# Patient Record
Sex: MALE | Race: WHITE | NOT HISPANIC OR LATINO | Employment: OTHER | URBAN - METROPOLITAN AREA
[De-identification: names, ages, dates, MRNs, and addresses within clinical notes are randomized per-mention and may not be internally consistent; named-entity substitution may affect disease eponyms.]

---

## 2017-01-12 ENCOUNTER — OFFICE VISIT (OUTPATIENT)
Dept: NEUROLOGY | Facility: CLINIC | Age: 77
End: 2017-01-12
Payer: MEDICARE

## 2017-01-12 VITALS
WEIGHT: 172.38 LBS | DIASTOLIC BLOOD PRESSURE: 67 MMHG | HEIGHT: 66 IN | BODY MASS INDEX: 27.7 KG/M2 | SYSTOLIC BLOOD PRESSURE: 133 MMHG | HEART RATE: 60 BPM

## 2017-01-12 DIAGNOSIS — G20.C PARKINSONISM, UNSPECIFIED PARKINSONISM TYPE: Primary | ICD-10-CM

## 2017-01-12 DIAGNOSIS — G31.84 MCI (MILD COGNITIVE IMPAIRMENT): ICD-10-CM

## 2017-01-12 PROCEDURE — 99215 OFFICE O/P EST HI 40 MIN: CPT | Mod: S$GLB,,,

## 2017-01-12 PROCEDURE — 1160F RVW MEDS BY RX/DR IN RCRD: CPT | Mod: S$GLB,,,

## 2017-01-12 PROCEDURE — 3075F SYST BP GE 130 - 139MM HG: CPT | Mod: S$GLB,,,

## 2017-01-12 PROCEDURE — 1157F ADVNC CARE PLAN IN RCRD: CPT | Mod: S$GLB,,,

## 2017-01-12 PROCEDURE — 1159F MED LIST DOCD IN RCRD: CPT | Mod: S$GLB,,,

## 2017-01-12 PROCEDURE — 99999 PR PBB SHADOW E&M-EST. PATIENT-LVL III: CPT | Mod: PBBFAC,,,

## 2017-01-12 PROCEDURE — 1126F AMNT PAIN NOTED NONE PRSNT: CPT | Mod: S$GLB,,,

## 2017-01-12 PROCEDURE — 3078F DIAST BP <80 MM HG: CPT | Mod: S$GLB,,,

## 2017-01-12 NOTE — MR AVS SNAPSHOT
[facility] Neurology  [address] [address]  Phone: [phone]  Fax: [phone]                  Mark Anthony Velarde Jr.   2017 3:45 PM   Office Visit    Description:  Male : 1940   Provider:  Yazmin Israel NP   Department:  [facility] Neurology                To Do List           Future Appointments        Provider Department Dept Phone    2017 11:30 AM Andrew Jane MD [facility] - Cardiology [phone]    2017 12:20 PM LAB, APPOINTMENT [place] [facility] [phone]    2017 12:25 PM SPECIMEN, [facility] [phone]    2017 1:15 PM EKG, APPT [facility] - [phone]    2017 1:40 PM COORDINATED DEVICE CHECK [facility] Arrhythmia [phone]      Goals (5 Years of Data)     None      [facility] On Call     [facility] On [facility] Nurse Care Line -  Assistance  Registered nurses in the [facility] Center provide clinical advisement, health education, appointment booking, and other advisory services.  Call for this free service at [phone].             Medications           Message regarding Medications     Verify the changes and/or additions to your medication regime listed below are the same as discussed with your clinician today.  If any of these changes or additions are incorrect, please notify your healthcare provider.             Verify that the below list of medications is an accurate representation of the medications you are currently taking.  If none reported, the list may be blank. If incorrect, please contact your healthcare provider. Carry this list with you in case of emergency.           Current Medications     amlodipine (NORVASC) 5 MG tablet Take 1 tablet (5 mg total) by mouth once daily.    aspirin (ECOTRIN) 81 MG EC tablet Take 81 mg by mouth once daily.    atorvastatin (LIPITOR) 40 MG tablet Take 1 tablet (40 mg total) by mouth once daily.    dabigatran etexilate  "(PRADAXA) 150 mg Cap Take 1 capsule (150 mg total) by mouth 2 (two) times daily. "Do NOT break, chew, or open capsules."    donepezil (ARICEPT) 10 MG tablet Take 1 tablet (10 mg total) by mouth once daily.    metoprolol tartrate (LOPRESSOR) 25 MG tablet Take 0.5 tablets (12.5 mg total) by mouth 2 (two) times daily.    multivitamin (MULTIVITAMIN) per tablet Take 1 tablet by mouth once daily.           Clinical Reference Information           Vital Signs - Last Recorded  Most recent update: 1/12/2017  3:32 PM by Marvin Sanchez MA    BP Pulse Ht Wt BMI    133/67 60 5' 6" (1.676 m) 78.2 kg (172 lb 6.4 oz) 27.83 kg/m2      Blood Pressure          Most Recent Value    BP  133/67      Allergies as of 1/12/2017     Arb-angiotensin Receptor Antagonist    Lisinopril      Immunizations Administered on Date of Encounter - 1/12/2017     None      "

## 2017-01-12 NOTE — PROGRESS NOTES
"Name: Mark Anthony Velarde Jr.  MRN: [id number]   CSN: [id number]      Date: 01/12/2017    Referring physician:  Self Referral  No address on file    Chief Complaint / Interval History: tremor    History of Present Illness (HPI):    75 yo RH male with MCI, prev followed by Deloris Bender NP, last seen in office in June. Does have tremor in RH, probably the past 12-18 months. He does not feel it is getting worse. Wife and son think it may be more frequent. Notes at rest and holding steering wheel. This does not bother him. Wife says he does not often even know it is occurring. They area not aware of tremor anywhere else.   Denies stiffness or slowness. Son and wife feel he is a bit slower a year ago.   Balance is pretty good. No assistive devices.   No recent falls. Does shuffle per wife and son- past 2-3 years intermittently. Wife states she will sometimes ask him if his shoes are too big.   Denies dysphagia but they disagree. This has been going on x 10 years- will excuse self from table- vomit and resume eating. He prev saw ENT and told to chew food better. Patient says he has always had to eat fast- is now eating slower. Still not clear if he is actually choking.   Denies sialorrhea.  He has had memory issues about 2-3 years.     Father had tremor. No FMH of PD.       From Deloris Bender's NP note in June 2016...  Date: 06/21/2016        Chief Complaint:  MCI     History of Present Illness (HPI):Pt reports to clinic at recommendation of his family because of concern for the possibility of his developing dementia. 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 Most concerning is that his sense of direction has worsened and he has trouble remembering how to get to places that he has frequented. No problems with ADL's. Still managing finances as well.     05/12/14 Interval History: Patient is presently " on Aricept 10 mg daily with no adverse side effects. Wife feels his memory function has improved with the medication. She reported one incident 2 weeks ago, patient became confused with directions while driving to a restaurant they visit frequently. Had difficulty remembering they had a gift care for payment of the meal. Did have 2 mixed drinks during meal. No other incidents. More active, doing more gardening and yard work. Both are pleased with effects of the medication.     Interval History: Last seen in April 2015, Patient continues to do well on Aricept 10 mg with no adverse side effects. Continues to garden and read. Wife feels he is doing well. Patient does not notice any decline in cognitive function since last visit. Continues to go to [employer] several times a week. Feels he helps to remain focused and improves his mood and sleep. Last MOCA 21/30- April 2015 06/21/16 Interval History- Here for follow up. Presently on Aricept with no adverse side effects. Comes to appt with his wife Has had family issues involving his son which has disrupted household. Also had infection in left leg and fracture of toe with a fair amount of pain. Was confused on Father's day with so many people around him.    02/17/14 CT Head- Age-appropriate generalized cerebral volume loss otherwise unremarkable noncontrast CT head without evidence for acute intracranial hemorrhage or definite new abnormal parenchymal attenuation. Clinical correlation and further evaluation as warranted.    Diagnoses:          Mild Cognitive Impairment vs. Dementia     Plan:  -Reviewed results of cognitive screen. Last MOCA 21/30  -continue Aricept 10 mg daily  -Discussed Namenda -still does not want to start Namenda .  -Discussed safety issues related to memory loss  -Caregiver burden and stress- Recommended Caregiver Support group  -Encouraged regular cognitive and physical exercise. Active with gardening. No cognitive exercise.  -Follow up in  "4 months with Dr Israel for evaluation of tremor of the right hand      Nonmotor/Premotor ROS:  Hyposmia (HENT)?No  RBD/sleep issues (Constitutional)?Yes- on rare occasion will talk in sleep- trouble falling asleep at times  Depression/anxiety (Psychiatric)?No  Fatigue (Constitutional)?Yes  Constipation (GI)?No  Urinary issues ()?No  Orthostasis (Cardiovascular)?"on the odd time"  Falls (Musculoskeletal)?No  Cognitive impairment (Neurologic)?"not bad"  Psychoses (Psychiatric)?No  Pain/Paresthesia (Neurologic)?No  Visual changes (Eyes)?"normal aging"  Moles / skin changes (Skin)?Yes-psoriasis  Stridor / SOB (Pulm)?No  Bruising (Heme)?Yes    Past Medical History: The patient  has a past medical history of *Atrial fibrillation; Anticoagulant long-term use; Atrial fibrillation - asymptomatic but noted on ICD interrogation ([id number]) - 16 h 40 minutes of afib (2/13/2013); Atrial flutter with controlled response - seen on ICD interrogation - asymptomatic (2/13/2013); Automatic implantable cardioverter-defibrillator in situ (2/13/2013); Basal cell carcinoma; CAD (coronary artery disease) (2/13/2013); Cardiomyopathy; Cataract; H/O syncope -  (5/27/2013); History of PTCA - LAD, LCX and PDA PCI in 2006 (2/13/2013); HTN (hypertension) (2/13/2013); Hyperlipidemia LDL goal < 70 (2/13/2013); Hypertension; ICD (implantable cardiac defibrillator) in place (2/13/2013); Ischemic cardiomyopathy LVEF ~45% (2/13/2013); LBBB (left bundle branch block) (2/13/2013); Memory loss; Psoriasis vulgaris; and Syncope and collapse.    Social History: The patient  reports that he quit smoking about 53 years ago. He does not have any smokeless tobacco history on file. He reports that he does not drink alcohol or use illicit drugs.    Family History: Their family history includes Cancer in his mother; Cataracts in his mother; Dementia in his father; Diabetes in his maternal grandmother; Kidney disease in his sister; No Known Problems in his " "brother, maternal aunt, maternal grandfather, maternal uncle, paternal aunt, paternal grandfather, paternal grandmother, and paternal uncle; Psoriasis in his father, son, and son. There is no history of Amblyopia, Blindness, Glaucoma, Hypertension, Macular degeneration, Retinal detachment, Strabismus, Stroke, or Thyroid disease.    Allergies: Arb-angiotensin receptor antagonist and Lisinopril     Meds:   Current Outpatient Prescriptions on File Prior to Visit   Medication Sig Dispense Refill    amlodipine (NORVASC) 5 MG tablet Take 1 tablet (5 mg total) by mouth once daily. 90 tablet 3    aspirin (ECOTRIN) 81 MG EC tablet Take 81 mg by mouth once daily.      atorvastatin (LIPITOR) 40 MG tablet Take 1 tablet (40 mg total) by mouth once daily. 90 tablet 3    dabigatran etexilate (PRADAXA) 150 mg Cap Take 1 capsule (150 mg total) by mouth 2 (two) times daily. "Do NOT break, chew, or open capsules." 180 capsule 3    donepezil (ARICEPT) 10 MG tablet Take 1 tablet (10 mg total) by mouth once daily. 90 tablet 2    metoprolol tartrate (LOPRESSOR) 25 MG tablet Take 0.5 tablets (12.5 mg total) by mouth 2 (two) times daily. 90 tablet 3    multivitamin (MULTIVITAMIN) per tablet Take 1 tablet by mouth once daily.       No current facility-administered medications on file prior to visit.        Exam:  There were no vitals taken for this visit.    Constitutional  Well-developed, well-nourished, appears stated age   Cardiovascular  Radial pulses 2+ and symmetric, no LE edema bilaterally   Neurological    * Mental status      - Orientation  Oriented to person, place,  and situation- irritable at times but generally cooperative     - Memory   not tested today     - Attention/concentration  Attentive during exam   * Cranial nerves       - CN III, IV, VI  Extraocular movements full, normal pursuits and saccades     - CN V  Sensation V1 - V3 intact     - CN VII  Face strong and symmetric bilaterally     - CN VIII  Hearing intact " bilaterally     - CN IX, X  Palate raises midline and symmetric     - CN XI  SCM and trapezius 5/5 bilaterally     - CN XII  Tongue midline   * Specialized movement exam Mild hypophonic speech.    Mild facial masking.   Subtle cogwheel rigidity RUE/RLE, increases to mild. Suspect slt rigidity LUE but more diff relaxing.      Slight bradykinesia with R FT, R HG, R PS and R TT. Normal on Left side.    Mild intermittent rest tremor RH. Very subtle postural tremor BH. Very subtle intention tremor LH.     No other dystonia, chorea, athetosis, myoclonus, or tics.   Arises from seated position without diff or push.   Narrow based.   Shortened stride.   Slowed pace.   Diminished arm swing B (more decreased on left).      Laboratory/Radiological:  - Results:No visits with results within 3 Month(s) from this visit.  Latest known visit with results is:    Lab Visit on 07/11/2016   Component Date Value Ref Range Status    Glucose 07/11/2016 179* 70 - 110 mg/dL Final    Sodium 07/11/2016 141  136 - 145 mmol/L Final    Potassium 07/11/2016 4.9  3.5 - 5.1 mmol/L Final    Chloride 07/11/2016 105  95 - 110 mmol/L Final    CO2 07/11/2016 31* 23 - 29 mmol/L Final    BUN, Bld 07/11/2016 17  8 - 23 mg/dL Final    Calcium 07/11/2016 9.5  8.7 - 10.5 mg/dL Final    Creatinine 07/11/2016 1.7* 0.5 - 1.4 mg/dL Final    Albumin 07/11/2016 3.6  3.5 - 5.2 g/dL Final    Phosphorus 07/11/2016 2.7  2.7 - 4.5 mg/dL Final    eGFR if  07/11/2016 43.7* >60 mL/min/1.73 m^2 Final    eGFR if non African American 07/11/2016 37.8* >60 mL/min/1.73 m^2 Final    Anion Gap 07/11/2016 5* 8 - 16 mmol/L Final           Diagnoses:          Early parkinsonian features on exam today  MCI vs dementia    Medical Decision Making:    At this point, he is not bothered by his physical symptoms. He is absolutely not interested in adding any medications. Will watch.   Wife would like to return to have him evaluated further in regards to memory.  She wants to know if it is time to add on or adjust his current memory meds. Will have him return in about 1 month and will do MOCA and concentrated visit on memory.  Will also look back on timeline of symptoms- parkinsonian features in the setting of early memory can be suggestive of DLB.      I spent 55  minutes face-to-face with the patient with >50% of the time spent with counseling and education regarding:  - results of data, diagnosis, and recommendations stated above  - the prognosis of tremor and memory  - importance of diet and exercise    Yazmin Israel, DNP, NP-C  Division of Movement and Memory Disorders  [facility]  [phone]

## 2017-01-13 ENCOUNTER — CLINICAL SUPPORT (OUTPATIENT)
Dept: ELECTROPHYSIOLOGY | Facility: CLINIC | Age: 77
End: 2017-01-13
Payer: MEDICARE

## 2017-01-13 ENCOUNTER — OFFICE VISIT (OUTPATIENT)
Dept: ELECTROPHYSIOLOGY | Facility: CLINIC | Age: 77
End: 2017-01-13
Payer: MEDICARE

## 2017-01-13 ENCOUNTER — HOSPITAL ENCOUNTER (OUTPATIENT)
Dept: CARDIOLOGY | Facility: CLINIC | Age: 77
Discharge: HOME | End: 2017-01-13
Payer: MEDICARE

## 2017-01-13 ENCOUNTER — OFFICE VISIT (OUTPATIENT)
Dept: CARDIOLOGY | Facility: CLINIC | Age: 77
End: 2017-01-13
Payer: MEDICARE

## 2017-01-13 VITALS
DIASTOLIC BLOOD PRESSURE: 67 MMHG | HEIGHT: 65 IN | BODY MASS INDEX: 28.47 KG/M2 | SYSTOLIC BLOOD PRESSURE: 140 MMHG | WEIGHT: 170.88 LBS | HEART RATE: 60 BPM

## 2017-01-13 VITALS
BODY MASS INDEX: 27.64 KG/M2 | HEART RATE: 60 BPM | HEIGHT: 66 IN | WEIGHT: 172 LBS | SYSTOLIC BLOOD PRESSURE: 118 MMHG | DIASTOLIC BLOOD PRESSURE: 58 MMHG

## 2017-01-13 DIAGNOSIS — N18.30 CHRONIC KIDNEY DISEASE, STAGE III (MODERATE): ICD-10-CM

## 2017-01-13 DIAGNOSIS — E78.5 HYPERLIPIDEMIA WITH TARGET LDL LESS THAN 70: Chronic | ICD-10-CM

## 2017-01-13 DIAGNOSIS — I44.7 LBBB (LEFT BUNDLE BRANCH BLOCK): Chronic | ICD-10-CM

## 2017-01-13 DIAGNOSIS — I25.5 ISCHEMIC CARDIOMYOPATHY: Chronic | ICD-10-CM

## 2017-01-13 DIAGNOSIS — I10 ESSENTIAL HYPERTENSION: Chronic | ICD-10-CM

## 2017-01-13 DIAGNOSIS — I25.5 ISCHEMIC CARDIOMYOPATHY: ICD-10-CM

## 2017-01-13 DIAGNOSIS — Z95.810 AUTOMATIC IMPLANTABLE CARDIAC DEFIBRILLATOR IN SITU: ICD-10-CM

## 2017-01-13 DIAGNOSIS — Z95.810 AUTOMATIC IMPLANTABLE CARDIAC DEFIBRILLATOR IN SITU: Primary | ICD-10-CM

## 2017-01-13 DIAGNOSIS — I48.0 PAROXYSMAL ATRIAL FIBRILLATION: Chronic | ICD-10-CM

## 2017-01-13 DIAGNOSIS — I48.92 ATRIAL FLUTTER WITH CONTROLLED RESPONSE: Chronic | ICD-10-CM

## 2017-01-13 DIAGNOSIS — Z98.61 HISTORY OF PTCA: Chronic | ICD-10-CM

## 2017-01-13 DIAGNOSIS — Z95.810 AUTOMATIC IMPLANTABLE CARDIOVERTER-DEFIBRILLATOR IN SITU: Chronic | ICD-10-CM

## 2017-01-13 DIAGNOSIS — I25.10 CORONARY ARTERY DISEASE INVOLVING NATIVE CORONARY ARTERY OF NATIVE HEART WITHOUT ANGINA PECTORIS: Primary | Chronic | ICD-10-CM

## 2017-01-13 DIAGNOSIS — Z79.01 CURRENT USE OF LONG TERM ANTICOAGULATION: ICD-10-CM

## 2017-01-13 DIAGNOSIS — I25.10 CORONARY ARTERY DISEASE INVOLVING NATIVE CORONARY ARTERY OF NATIVE HEART WITHOUT ANGINA PECTORIS: Chronic | ICD-10-CM

## 2017-01-13 DIAGNOSIS — I25.5 ISCHEMIC CARDIOMYOPATHY: Primary | Chronic | ICD-10-CM

## 2017-01-13 DIAGNOSIS — Z95.810 ICD (IMPLANTABLE CARDIOVERTER-DEFIBRILLATOR), DUAL, IN SITU: ICD-10-CM

## 2017-01-13 PROCEDURE — 99999 PR PBB SHADOW E&M-EST. PATIENT-LVL III: CPT | Mod: PBBFAC,,, | Performed by: INTERNAL MEDICINE

## 2017-01-13 PROCEDURE — 3074F SYST BP LT 130 MM HG: CPT | Mod: S$GLB,,, | Performed by: NURSE PRACTITIONER

## 2017-01-13 PROCEDURE — 1157F ADVNC CARE PLAN IN RCRD: CPT | Mod: S$GLB,,, | Performed by: NURSE PRACTITIONER

## 2017-01-13 PROCEDURE — 3078F DIAST BP <80 MM HG: CPT | Mod: S$GLB,,, | Performed by: NURSE PRACTITIONER

## 2017-01-13 PROCEDURE — 99214 OFFICE O/P EST MOD 30 MIN: CPT | Mod: S$GLB,,, | Performed by: NURSE PRACTITIONER

## 2017-01-13 PROCEDURE — 99999 PR PBB SHADOW E&M-EST. PATIENT-LVL III: CPT | Mod: PBBFAC,,, | Performed by: NURSE PRACTITIONER

## 2017-01-13 PROCEDURE — 99214 OFFICE O/P EST MOD 30 MIN: CPT | Mod: S$GLB,,, | Performed by: INTERNAL MEDICINE

## 2017-01-13 PROCEDURE — 1126F AMNT PAIN NOTED NONE PRSNT: CPT | Mod: S$GLB,,, | Performed by: NURSE PRACTITIONER

## 2017-01-13 PROCEDURE — 3078F DIAST BP <80 MM HG: CPT | Mod: S$GLB,,, | Performed by: INTERNAL MEDICINE

## 2017-01-13 PROCEDURE — 3077F SYST BP >= 140 MM HG: CPT | Mod: S$GLB,,, | Performed by: INTERNAL MEDICINE

## 2017-01-13 PROCEDURE — 93000 ELECTROCARDIOGRAM COMPLETE: CPT | Mod: S$GLB,,, | Performed by: INTERNAL MEDICINE

## 2017-01-13 PROCEDURE — 1159F MED LIST DOCD IN RCRD: CPT | Mod: S$GLB,,, | Performed by: NURSE PRACTITIONER

## 2017-01-13 PROCEDURE — 93283 PRGRMG EVAL IMPLANTABLE DFB: CPT | Mod: S$GLB,,, | Performed by: INTERNAL MEDICINE

## 2017-01-13 PROCEDURE — 1159F MED LIST DOCD IN RCRD: CPT | Mod: S$GLB,,, | Performed by: INTERNAL MEDICINE

## 2017-01-13 PROCEDURE — 1160F RVW MEDS BY RX/DR IN RCRD: CPT | Mod: S$GLB,,, | Performed by: INTERNAL MEDICINE

## 2017-01-13 PROCEDURE — 1160F RVW MEDS BY RX/DR IN RCRD: CPT | Mod: S$GLB,,, | Performed by: NURSE PRACTITIONER

## 2017-01-13 PROCEDURE — 1126F AMNT PAIN NOTED NONE PRSNT: CPT | Mod: S$GLB,,, | Performed by: INTERNAL MEDICINE

## 2017-01-13 PROCEDURE — 1157F ADVNC CARE PLAN IN RCRD: CPT | Mod: S$GLB,,, | Performed by: INTERNAL MEDICINE

## 2017-01-13 NOTE — MR AVS SNAPSHOT
[facility] - Arrhythmia  [address]  [facility] [address]  Phone: [phone]  Fax: [phone]                  Mark Anthony Velarde Jr.   2017 2:00 PM   Office Visit    Description:  Male : 1940   Provider:  SAL Tejeda   Department:  [facility] [place] - Arrhythmia           Reason for Visit     Pacemaker Check           Diagnoses this Visit        Comments    Ischemic cardiomyopathy    -  Primary            To Do List           Future Appointments        Provider Department Dept Phone    2017 9:00 AM Mayito Sequeira MD [facility] - Nephrology [phone]    2017 8:15 AM Yazmin Israel NP [facility] - Neurology [phone]      Goals (5 Years of Data)     None      Follow-Up and Disposition     Return in about 1 year (around 2018).      [facility] On Call     [facility] On Call Nurse Care Line - 24/ Assistance  Registered nurses in the [facility] On Call Center provide clinical advisement, health education, appointment booking, and other advisory services.  Call for this free service at [phone].             Medications           Message regarding Medications     Verify the changes and/or additions to your medication regime listed below are the same as discussed with your clinician today.  If any of these changes or additions are incorrect, please notify your healthcare provider.             Verify that the below list of medications is an accurate representation of the medications you are currently taking.  If none reported, the list may be blank. If incorrect, please contact your healthcare provider. Carry this list with you in case of emergency.           Current Medications     amlodipine (NORVASC) 5 MG tablet Take 1 tablet (5 mg total) by mouth once daily.    aspirin (ECOTRIN) 81 MG EC tablet Take 81 mg by mouth once daily.    atorvastatin (LIPITOR) 40 MG tablet Take 1 tablet (40 mg total) by mouth once daily.    dabigatran etexilate (PRADAXA) 150 mg Cap Take 1  "capsule (150 mg total) by mouth 2 (two) times daily. "Do NOT break, chew, or open capsules."    donepezil (ARICEPT) 10 MG tablet Take 1 tablet (10 mg total) by mouth once daily.    metoprolol tartrate (LOPRESSOR) 25 MG tablet Take 0.5 tablets (12.5 mg total) by mouth 2 (two) times daily.    multivitamin (MULTIVITAMIN) per tablet Take 1 tablet by mouth once daily.           Clinical Reference Information           Vital Signs - Last Recorded  Most recent update: 1/13/2017  1:56 PM by Kalyn Alvarado MA    BP Pulse Ht Wt BMI    (!) 118/58 60 5' 6" (1.676 m) 78 kg (172 lb) 27.76 kg/m2      Blood Pressure          Most Recent Value    BP  (!)  118/58      Allergies as of 1/13/2017     Arb-angiotensin Receptor Antagonist    Lisinopril      Immunizations Administered on Date of Encounter - 1/13/2017     None      Orders Placed During Today's Visit      Normal Orders This Visit    2D echo with color flow doppler       "

## 2017-01-13 NOTE — PROGRESS NOTES
Subjective:    Patient ID:  Mark Anthony Velarde Jr. is a 76 y.o. male who presents for follow-up of ICD Check.     Mr. Velarde is a patient of Dr. Sherlyn Lutz.     HPI     Mr. Velarde is a 76 year old male with a hx of syncope with inducible VT, LBBB and high grade infrahisian block s/p DC ICD, asymptomatic AF, HTN, HLD, ICM, and CAD s/p PCI. At his last office visit, Mr. Velarde reported feeling well and his device was found to be at ANGI.     Mr. Velarde underwent a successful DC ICD (09/22/15), and was lost to follow-up.     Since the procedure, Mr. Velarde reports feeling well overall; he denies chest pain, SOB/DUARTE, dizziness, palpitations, or syncope. He reports that he gardens regularly and goes to the gym 3-5 days/week; he reports no difficulty with this.     Recent cardiac studies:  Echo (07/21/15) revealed an EF of 45-50%, LVDD, mild LAE, and mild TR.   Device Interrogation (01/13/17) reveals an intrinsic SR w/CHB with stable lead and device function. AMS (<1% AF burden; max episode duration 11 hours, 28 minutes, and 14 seconds); no NSVT noted. He paces 47% in the RA and 100% in the RV. Estimated battery longevity 6-7 years. [employer] advisory device; pacer-dependent.     I reviewed today's ECG which demonstrated a dual AV-paced rhythm w/LBBBat 60 bpm; NV 88, , and QTc 472.     Review of Systems   Constitution: Negative for decreased appetite, diaphoresis, weakness and malaise/fatigue.   HENT: Negative for congestion, headaches and nosebleeds.    Eyes: Negative for blurred vision and double vision.   Cardiovascular: Negative for chest pain, claudication, cyanosis, dyspnea on exertion, irregular heartbeat, leg swelling, near-syncope, orthopnea, palpitations, paroxysmal nocturnal dyspnea and syncope.   Respiratory: Negative for cough, hemoptysis, shortness of breath, sputum production and wheezing.    Skin: Negative.    Musculoskeletal: Positive for stiffness.   Gastrointestinal: Negative for abdominal pain, hematemesis,  hematochezia, nausea and vomiting.   Neurological: Negative for dizziness and light-headedness.   Psychiatric/Behavioral: Negative for altered mental status.        Objective:    Physical Exam   Constitutional: He is oriented to person, place, and time. He appears well-developed and well-nourished.   HENT:   Head: Normocephalic and atraumatic.   Eyes: Pupils are equal, round, and reactive to light.   Neck: Normal range of motion. Neck supple.   Cardiovascular: Normal rate, regular rhythm, normal heart sounds and intact distal pulses.    Pulmonary/Chest: Effort normal and breath sounds normal.   Abdominal: Soft. Bowel sounds are normal.   Musculoskeletal: Normal range of motion.   Neurological: He is alert and oriented to person, place, and time.   Vitals reviewed.        Assessment:       1. Ischemic cardiomyopathy LVEF ~45%    2. LBBB (left bundle branch block)    3. ICD (implantable cardioverter-defibrillator), dual, in situ    4. Paroxysmal atrial fibrillation    5. Atrial flutter with controlled response - seen on ICD interrogation - asymptomatic    6. Current use of long term anticoagulation    7. Coronary artery disease involving native coronary artery of native heart without angina pectoris    8. History of PTCA - LAD, LCX and PDA PCI in 2006    9. Essential hypertension    10. Chronic kidney disease, stage III (moderate)    11. Hyperlipidemia with target LDL less than 70         Plan:       In summary, Mr. Velarde is a 76 y.o. male with a hx of ICM (EF 40-45%), LBBB, CHB s/p DC ICD w/ recent generator change, a hx of pAF, CAD s/p PTCA, HTN, CKD III, and HLD. Mr. Velarde is doing well from a rhythm perspective with stable lead and device function with <1% AF burden and no ventricular arrhythmias; rate-controlled on Lopressor and anticoagulated on Pradaxa.     Echo now to evaluate EF given 100% RV-pacing burden, LBBB, and a QRS of 184 ms.   Mr. Velarde is pacemaker-dependent, with a [employer]. Otto advisory device. Will  discuss with Dr. Sherlyn Lutz the possibility of a generator change prior to ANGI; this was discussed with Mr. Velarde and his wife; understanding verbalized.   For now, continue current medication regimen and device settings.   Follow up in device clinic as scheduled.   Follow up in EP clinic in 1 year, sooner as needed.     Amelia Jarquin, MN, APRN, FNP-C        A copy of today's note was sent to Dr. Sherlyn Lutz.

## 2017-01-13 NOTE — MR AVS SNAPSHOT
[facility] - Cardiology  [address]  [facility] [address]  Phone: [phone]                  Mark Anthony Velarde    2017 11:30 AM   Office Visit    Description:  Male : 1940   Provider:  Andrew Jane MD   Department:  [facility]           Reason for Visit     Coronary Artery Disease           Diagnoses this Visit        Comments    Coronary artery disease involving native coronary artery of native heart without angina pectoris    -  Primary     Essential hypertension         LBBB (left bundle branch block)         Ischemic cardiomyopathy         Paroxysmal atrial fibrillation         Atrial flutter with controlled response         Chronic kidney disease, stage III (moderate)         History of PTCA         Automatic implantable cardioverter-defibrillator in situ                To Do List           Future Appointments        Provider Department Dept Phone    2017 12:20 PM LAB, APPOINTMENT [place] [facility] [phone]    2017 12:25 PM SPECIMEN, MAIN CAMPUS [facility] [phone]    2017  1:15 PM EKG, APPT [facility] - [phone]    2017 1:40 PM COORDINATED DEVICE CHECK [facility] Arrhythmia [phone]    2017 2:00 [facility] [phone]      Goals (5 Years of Data)     None      [facility]     [facility] On Call Nurse Care Line - / Assistance  Registered nurses in the [facility] On Call Center provide clinical advisement, health education, appointment booking, and other advisory services.  Call for this free service at [phone].             Medications           Message regarding Medications     Verify the changes and/or additions to your medication regime listed below are the same as discussed with your clinician today.  If any of these changes or additions are incorrect, please notify your healthcare provider.             Verify that the below list  "of medications is an accurate representation of the medications you are currently taking.  If none reported, the list may be blank. If incorrect, please contact your healthcare provider. Carry this list with you in case of emergency.           Current Medications     amlodipine (NORVASC) 5 MG tablet Take 1 tablet (5 mg total) by mouth once daily.    aspirin (ECOTRIN) 81 MG EC tablet Take 81 mg by mouth once daily.    atorvastatin (LIPITOR) 40 MG tablet Take 1 tablet (40 mg total) by mouth once daily.    dabigatran etexilate (PRADAXA) 150 mg Cap Take 1 capsule (150 mg total) by mouth 2 (two) times daily. "Do NOT break, chew, or open capsules."    donepezil (ARICEPT) 10 MG tablet Take 1 tablet (10 mg total) by mouth once daily.    metoprolol tartrate (LOPRESSOR) 25 MG tablet Take 0.5 tablets (12.5 mg total) by mouth 2 (two) times daily.    multivitamin (MULTIVITAMIN) per tablet Take 1 tablet by mouth once daily.           Clinical Reference Information           Vital Signs - Last Recorded  Most recent update: 1/13/2017 11:28 AM by June Sebastian MA    BP Pulse Ht Wt BMI    (!) 140/67 (BP Location: Left arm, Patient Position: Sitting, BP Method: Automatic) 60 5' 5" (1.651 m) 77.5 kg (170 lb 13.7 oz) 28.43 kg/m2      Blood Pressure          Most Recent Value    Right Arm BP - Sitting  140/72    Left Arm BP - Sitting  140/67    BP  (!)  140/67      Allergies as of 1/13/2017     Arb-angiotensin Receptor Antagonist    Lisinopril      Immunizations Administered on Date of Encounter - 1/13/2017     None      "

## 2017-01-13 NOTE — LETTER
January 13, 2017      Maurice Ewing MD  [address]  [facility] [address]           [facility] - Arrhythmia  [address]  [facility] [address]  Phone: [phone]  Fax: [phone]          Patient: Mark Anthony Velarde Jr.   MR Number: [id number]   YOB: 1940   Date of Visit: 1/13/2017       Dear Dr. Maurice Ewing:    Thank you for referring Mark Anthony Velarde to me for evaluation. Attached you will find relevant portions of my assessment and plan of care.    If you have questions, please do not hesitate to call me. I look forward to following Mark Anthony Velarde along with you.    Sincerely,    Amelia Jarquin, [facility]    Enclosure  CC:  No Recipients    If you would like to receive this communication electronically, please contact [email or web address] or [phone] to request more information on [employer]. Link access.    For providers and/or their staff who would like to refer a patient to [facility], please contact us through our one-stop-shop provider referral line, [facility], at [phone].    If you feel you have received this communication in error or would no longer like to receive these types of communications, please e-mail [email or web address]

## 2017-01-13 NOTE — PROGRESS NOTES
"Subjective:   Patient ID:  Mark Anthony Velarde Jr. is a 76 y.o. male who presents for follow-up of Coronary Artery Disease (1 yr fu)      HPI:   Mr. Velarde returns for f/u for HTN, dyslipidemia and CAD s/p PCI of LAD, LCX and PDA in .   He has a chronic LBBB and an EF of 45%.  He underwent an EP evaluation for syncope with HUT test and an EP study. EP study demonstrated marked infrahisian block and TWA was positive.  He therefore received an ICD.  In 9-15 had a pulse generator change. Has device check today.    Mr. Velarde denies any  exertional chest discomfort, palpitations, TIA's, syncope or presyncope. In 5-2012, ICD interrogation revealed ~ 16 hrs of afib at which time he was asymptomatic. He is on pradaxa.     He is exercising on a bike 5 days a week and is also in silver sneakers. Stress test from 1-2008 was negatvie for ischemia.  Mr. Velarde denies any claudication or myalgias.  He also had a AAA screen which was negative.  He hasn't used nitro in ~ 4 years    Losartan stopped for hyperkalemia        Vitals:    01/13/17 1126   BP: (!) 140/67   BP Location: Left arm   Patient Position: Sitting   BP Method: Automatic   Pulse: 60   Weight: 77.5 kg (170 lb 13.7 oz)   Height: 5' 5" (1.651 m)     Body mass index is 28.43 kg/(m^2).  CrCl cannot be calculated (Patient has no serum creatinine result on file.).    Lab Results   Component Value Date     07/11/2016    K 4.9 07/11/2016     07/11/2016    CO2 31 (H) 07/11/2016    BUN 17 07/11/2016    CREATININE 1.7 (H) 07/11/2016     (H) 07/11/2016    MG 2.4 12/15/2007    AST 33 07/02/2014    ALT 36 07/02/2014    ALBUMIN 3.6 07/11/2016    PROT 7.2 07/02/2014    BILITOT 0.7 07/02/2014    WBC 6.92 09/18/2015    HGB 13.0 (L) 09/18/2015    HCT 40.1 09/18/2015    MCV 93 09/18/2015     09/18/2015    INR 1.6 (H) 09/18/2015    PSA 1.8 08/04/2014    TSH 0.844 07/02/2014    CHOL 120 07/02/2014    HDL 40 07/02/2014    LDLCALC 57.4 (L) 07/02/2014    TRIG 113 " "07/02/2014       Current Outpatient Prescriptions   Medication Sig    amlodipine (NORVASC) 5 MG tablet Take 1 tablet (5 mg total) by mouth once daily.    aspirin (ECOTRIN) 81 MG EC tablet Take 81 mg by mouth once daily.    atorvastatin (LIPITOR) 40 MG tablet Take 1 tablet (40 mg total) by mouth once daily.    dabigatran etexilate (PRADAXA) 150 mg Cap Take 1 capsule (150 mg total) by mouth 2 (two) times daily. "Do NOT break, chew, or open capsules."    donepezil (ARICEPT) 10 MG tablet Take 1 tablet (10 mg total) by mouth once daily.    metoprolol tartrate (LOPRESSOR) 25 MG tablet Take 0.5 tablets (12.5 mg total) by mouth 2 (two) times daily.    multivitamin (MULTIVITAMIN) per tablet Take 1 tablet by mouth once daily.     No current facility-administered medications for this visit.        Review of Systems   Constitution: Negative for decreased appetite, weakness, malaise/fatigue, weight gain and weight loss.   HENT: Negative for headaches.    Eyes: Negative for visual disturbance.   Cardiovascular: Negative for chest pain, claudication, dyspnea on exertion, irregular heartbeat, orthopnea, palpitations, paroxysmal nocturnal dyspnea and syncope.   Respiratory: Negative for cough, shortness of breath and snoring.    Skin: Negative for rash.   Musculoskeletal: Negative for arthritis, muscle cramps, muscle weakness and myalgias.   Gastrointestinal: Negative for abdominal pain, anorexia, change in bowel habit and nausea.   Genitourinary: Negative for dysuria and frequency.   Neurological: Negative for excessive daytime sleepiness, dizziness, loss of balance and numbness.   Psychiatric/Behavioral: Negative for depression.       Objective:   Physical Exam   Constitutional: He is oriented to person, place, and time. He appears well-developed and well-nourished.   HENT:   Head: Normocephalic and atraumatic.   Eyes: Pupils are equal, round, and reactive to light.   Neck: Normal range of motion. Neck supple. "   Cardiovascular: Normal rate, regular rhythm, normal heart sounds, intact distal pulses and normal pulses.  Exam reveals no gallop.    No murmur heard.  Pulmonary/Chest: Effort normal and breath sounds normal.   Abdominal: Soft. Bowel sounds are normal. There is no hepatosplenomegaly. There is no tenderness.   Musculoskeletal: Normal range of motion.   Neurological: He is alert and oriented to person, place, and time.   Skin: Skin is warm and dry.   Psychiatric: He has a normal mood and affect. His speech is normal and behavior is normal. Judgment and thought content normal.       Assessment:     1. Coronary artery disease involving native coronary artery of native heart without angina pectoris    2. Essential hypertension    3. LBBB (left bundle branch block)    4. Ischemic cardiomyopathy LVEF ~45%    5. Paroxysmal atrial fibrillation    6. Atrial flutter with controlled response - seen on ICD interrogation - asymptomatic    7. Chronic kidney disease, stage III (moderate)    8. History of PTCA - LAD, LCX and PDA PCI in 2006    9. Automatic implantable cardioverter-defibrillator in situ        Plan:   From a cardiac standpoint, pt is doing well and is clinically stable. Pts lipid profile and BP's are at goal. Pt does not require any cardiac testing at this time

## 2017-01-16 ENCOUNTER — TELEPHONE (OUTPATIENT)
Dept: ELECTROPHYSIOLOGY | Facility: CLINIC | Age: 77
End: 2017-01-16

## 2017-01-16 NOTE — TELEPHONE ENCOUNTER
"Patient's wife contacted the [facility] on this am with concern that patient's Remote ICD home monitor is not transmitting.  Attempted to trouble shoot with the her as well as the patient was sitting next to her.  All attempts made were unfortunately unsuccessful.  Instructed patient's wife to contact [facility] as to the monitor is connected to them and they in turn send the information to our clinic.  Contact # for [facility] given to patient's wife.  Also asked patient's wife to call me back once they have spoken to [facility] so we can determine what needs to be done to get patient's remote monitor back on line as to his ICD is, "Under [facility] Advisory that can possibly cause early Battery Depletion".  Patient's wife verbalized understanding to all of the above.   "

## 2017-01-17 DIAGNOSIS — I48.0 PAROXYSMAL ATRIAL FIBRILLATION: Primary | ICD-10-CM

## 2017-01-18 ENCOUNTER — OFFICE VISIT (OUTPATIENT)
Dept: NEPHROLOGY | Facility: CLINIC | Age: 77
End: 2017-01-18
Payer: MEDICARE

## 2017-01-18 VITALS
SYSTOLIC BLOOD PRESSURE: 128 MMHG | WEIGHT: 171.31 LBS | DIASTOLIC BLOOD PRESSURE: 64 MMHG | HEART RATE: 62 BPM | HEIGHT: 66 IN | OXYGEN SATURATION: 95 % | BODY MASS INDEX: 27.53 KG/M2 | TEMPERATURE: 98 F

## 2017-01-18 DIAGNOSIS — N18.30 CHRONIC KIDNEY DISEASE, STAGE III (MODERATE): Primary | ICD-10-CM

## 2017-01-18 DIAGNOSIS — I25.5 ISCHEMIC CARDIOMYOPATHY: Chronic | ICD-10-CM

## 2017-01-18 PROCEDURE — 1159F MED LIST DOCD IN RCRD: CPT | Mod: S$GLB,,, | Performed by: INTERNAL MEDICINE

## 2017-01-18 PROCEDURE — 1160F RVW MEDS BY RX/DR IN RCRD: CPT | Mod: S$GLB,,, | Performed by: INTERNAL MEDICINE

## 2017-01-18 PROCEDURE — 1126F AMNT PAIN NOTED NONE PRSNT: CPT | Mod: S$GLB,,, | Performed by: INTERNAL MEDICINE

## 2017-01-18 PROCEDURE — 99999 PR PBB SHADOW E&M-EST. PATIENT-LVL III: CPT | Mod: PBBFAC,,, | Performed by: INTERNAL MEDICINE

## 2017-01-18 PROCEDURE — 1157F ADVNC CARE PLAN IN RCRD: CPT | Mod: S$GLB,,, | Performed by: INTERNAL MEDICINE

## 2017-01-18 PROCEDURE — 99213 OFFICE O/P EST LOW 20 MIN: CPT | Mod: S$GLB,,, | Performed by: INTERNAL MEDICINE

## 2017-01-18 PROCEDURE — 3078F DIAST BP <80 MM HG: CPT | Mod: S$GLB,,, | Performed by: INTERNAL MEDICINE

## 2017-01-18 PROCEDURE — 3074F SYST BP LT 130 MM HG: CPT | Mod: S$GLB,,, | Performed by: INTERNAL MEDICINE

## 2017-01-18 NOTE — PROGRESS NOTES
Subjective:      Patient ID: Mark Anthony Velarde Jr. is a 76 y.o. White male who presents for follow-up evaluation of CKD. Previously seen by Dr. Bloom and FILIPE Conley. This is the first time that I am seeing this patient.     HPI   No acute events since last exam. No acute complaints or difficulty with urination. Continues to have an ICD/PCM who is keeping him stable. The patient denies taking NSAIDs or new antibiotics, recreational drugs, recent episode of dehydration, diarrhea, nausea or vomiting, acute illness, hospitalization or exposure to IV radiocontrast.     Review of Systems   Constitutional: Negative for activity change, appetite change, chills, diaphoresis, fatigue, fever and unexpected weight change.   HENT: Negative for congestion, facial swelling and trouble swallowing.    Eyes: Negative for pain, discharge, redness and visual disturbance.   Respiratory: Negative for cough, shortness of breath and wheezing.    Cardiovascular: Negative for chest pain, palpitations and leg swelling.   Gastrointestinal: Negative for abdominal distention, abdominal pain, constipation and diarrhea.   Endocrine: Negative for cold intolerance and heat intolerance.   Genitourinary: Negative for decreased urine volume, difficulty urinating, dysuria, flank pain, frequency and urgency.   Musculoskeletal: Negative for arthralgias, joint swelling and myalgias.   Skin: Negative for color change.   Allergic/Immunologic: Negative for immunocompromised state.   Neurological: Negative for dizziness, tremors, syncope, speech difficulty, weakness, light-headedness and numbness.   Hematological: Negative for adenopathy.   Psychiatric/Behavioral: Negative for agitation, confusion, decreased concentration and dysphoric mood.       Objective:   /64 mmHg  Physical Exam   Constitutional: He is oriented to person, place, and time. He appears well-developed and well-nourished.   HENT:   Head: Normocephalic and atraumatic.   Eyes: Conjunctivae  and EOM are normal. Pupils are equal, round, and reactive to light.   Neck: Neck supple.   Cardiovascular: Normal rate, regular rhythm, normal heart sounds and intact distal pulses.    Pulmonary/Chest: Effort normal and breath sounds normal.   Abdominal: Soft. Bowel sounds are normal.   Musculoskeletal: Normal range of motion.   Neurological: He is alert and oriented to person, place, and time. He has normal reflexes.   Skin: Skin is warm and dry.   Psychiatric: He has a normal mood and affect. His behavior is normal.   Nursing note and vitals reviewed.      Assessment:     1. CKD stage 3A eGFR ~ 50 ml/min. Etiology unknown, possible chronic TI disease, hypoperfusion injury (DCM, AFIB), hypertensive injury.  Stable kidney function. In fact, sCr is better . No proteinuria. Risk for ESRD progression is low.      Plan:       Continue current regimen, Avoid NSAIDs.      Follow up in 6 months with labs.

## 2017-01-18 NOTE — MR AVS SNAPSHOT
[facility] - Nephrology  [address]  [facility] [address]  Phone: [phone]  Fax: [phone]                  Mark Anthony Velarde Jr.   2017 9:00 AM   Office Visit    Description:  Male : 1940   Provider:  Mayito Sequeira MD   Department:  [facility] - Nephrology           Diagnoses this Visit        Comments    Chronic kidney disease, stage III (moderate)    -  Primary     Ischemic cardiomyopathy                To Do List           Future Appointments        Provider Department Dept Phone    2017 1:00 PM ECHO, [facility] - Echo/Stress Lab [phone]    2017 8:15 AM Yazmin Israel NP [facility] - Neurology [phone]      Goals (5 Years of Data)     None      Follow-Up and Disposition     Return in about 6 months (around 2017).      [facility] On Call     [facility] On Call Nurse Care Line -  Assistance  Registered nurses in the [facility] On Call Center provide clinical advisement, health education, appointment booking, and other advisory services.  Call for this free service at [phone].             Medications           Message regarding Medications     Verify the changes and/or additions to your medication regime listed below are the same as discussed with your clinician today.  If any of these changes or additions are incorrect, please notify your healthcare provider.             Verify that the below list of medications is an accurate representation of the medications you are currently taking.  If none reported, the list may be blank. If incorrect, please contact your healthcare provider. Carry this list with you in case of emergency.           Current Medications     amlodipine (NORVASC) 5 MG tablet Take 1 tablet (5 mg total) by mouth once daily.    aspirin (ECOTRIN) 81 MG EC tablet Take 81 mg by mouth once daily.    atorvastatin (LIPITOR) 40 MG tablet Take 1 tablet (40 mg total) by mouth once daily.    dabigatran etexilate (PRADAXA) 150 mg Cap  "Take 1 capsule (150 mg total) by mouth 2 (two) times daily. "Do NOT break, chew, or open capsules."    donepezil (ARICEPT) 10 MG tablet Take 1 tablet (10 mg total) by mouth once daily.    metoprolol tartrate (LOPRESSOR) 25 MG tablet Take 0.5 tablets (12.5 mg total) by mouth 2 (two) times daily.    multivitamin (MULTIVITAMIN) per tablet Take 1 tablet by mouth once daily.           Clinical Reference Information           Vital Signs - Last Recorded  Most recent update: 1/18/2017  8:57 AM by Heather Browning LPN    BP Pulse Temp Ht Wt SpO2    128/64 (BP Location: Right arm, Patient Position: Sitting) 62 97.9 °F (36.6 °C) (Oral) 5' 6" (1.676 m) 77.7 kg (171 lb 4.8 oz) 95%    BMI                27.65 kg/m2          Blood Pressure          Most Recent Value    BP  128/64      Allergies as of 1/18/2017     Arb-angiotensin Receptor Antagonist    Lisinopril      Immunizations Administered on Date of Encounter - 1/18/2017     None      "

## 2017-01-27 ENCOUNTER — HOSPITAL ENCOUNTER (OUTPATIENT)
Dept: CARDIOLOGY | Facility: CLINIC | Age: 77
Discharge: HOME | End: 2017-01-27
Payer: MEDICARE

## 2017-01-27 LAB
AORTIC VALVE REGURGITATION: ABNORMAL
DIASTOLIC DYSFUNCTION: NO
ESTIMATED PA SYSTOLIC PRESSURE: 33
MITRAL VALVE REGURGITATION: ABNORMAL
RETIRED EF AND QEF - SEE NOTES: 43 (ref 55–65)
TRICUSPID VALVE REGURGITATION: ABNORMAL

## 2017-01-27 PROCEDURE — 93306 TTE W/DOPPLER COMPLETE: CPT | Mod: S$GLB,,, | Performed by: INTERNAL MEDICINE

## 2017-02-13 ENCOUNTER — TELEPHONE (OUTPATIENT)
Dept: ELECTROPHYSIOLOGY | Facility: CLINIC | Age: 77
End: 2017-02-13

## 2017-02-13 ENCOUNTER — OFFICE VISIT (OUTPATIENT)
Dept: NEUROLOGY | Facility: CLINIC | Age: 77
End: 2017-02-13
Payer: MEDICARE

## 2017-02-13 VITALS
SYSTOLIC BLOOD PRESSURE: 148 MMHG | HEIGHT: 66 IN | WEIGHT: 170.44 LBS | DIASTOLIC BLOOD PRESSURE: 76 MMHG | BODY MASS INDEX: 27.39 KG/M2 | HEART RATE: 60 BPM

## 2017-02-13 DIAGNOSIS — G31.83 LEWY BODY DEMENTIA WITH BEHAVIORAL DISTURBANCE: Primary | ICD-10-CM

## 2017-02-13 DIAGNOSIS — F02.818 LEWY BODY DEMENTIA WITH BEHAVIORAL DISTURBANCE: Primary | ICD-10-CM

## 2017-02-13 PROCEDURE — 99999 PR PBB SHADOW E&M-EST. PATIENT-LVL III: CPT | Mod: PBBFAC,,, | Performed by: NURSE PRACTITIONER

## 2017-02-13 PROCEDURE — 3077F SYST BP >= 140 MM HG: CPT | Mod: S$GLB,,, | Performed by: NURSE PRACTITIONER

## 2017-02-13 PROCEDURE — 99215 OFFICE O/P EST HI 40 MIN: CPT | Mod: S$GLB,,, | Performed by: NURSE PRACTITIONER

## 2017-02-13 PROCEDURE — 1126F AMNT PAIN NOTED NONE PRSNT: CPT | Mod: S$GLB,,, | Performed by: NURSE PRACTITIONER

## 2017-02-13 PROCEDURE — 1159F MED LIST DOCD IN RCRD: CPT | Mod: S$GLB,,, | Performed by: NURSE PRACTITIONER

## 2017-02-13 PROCEDURE — 1160F RVW MEDS BY RX/DR IN RCRD: CPT | Mod: S$GLB,,, | Performed by: NURSE PRACTITIONER

## 2017-02-13 PROCEDURE — 1157F ADVNC CARE PLAN IN RCRD: CPT | Mod: S$GLB,,, | Performed by: NURSE PRACTITIONER

## 2017-02-13 PROCEDURE — 3078F DIAST BP <80 MM HG: CPT | Mod: S$GLB,,, | Performed by: NURSE PRACTITIONER

## 2017-02-13 RX ORDER — MEMANTINE HYDROCHLORIDE 10 MG/1
10 TABLET ORAL 2 TIMES DAILY
Qty: 60 TABLET | Refills: 11 | Status: SHIPPED | OUTPATIENT
Start: 2017-02-13 | End: 2017-02-20 | Stop reason: SDUPTHER

## 2017-02-13 RX ORDER — DONEPEZIL HYDROCHLORIDE 10 MG/1
10 TABLET, FILM COATED ORAL DAILY
Qty: 90 TABLET | Refills: 3 | Status: SHIPPED | OUTPATIENT
Start: 2017-02-13 | End: 2017-05-19 | Stop reason: SDUPTHER

## 2017-02-13 RX ORDER — MEMANTINE HYDROCHLORIDE 5 MG-10 MG
KIT ORAL
Qty: 49 TABLET | Refills: 0 | Status: SHIPPED | OUTPATIENT
Start: 2017-02-13 | End: 2017-02-20 | Stop reason: SDUPTHER

## 2017-02-13 NOTE — PROGRESS NOTES
"Name: Mark Anthony Velarde Jr.  MRN: [id number]   CSN: [id number]      Date: 02/13/2017    Referring physician:  Jeff Yan MD  [address] [place] [address]    Chief Complaint / Interval History: Parkinson's, unspecified Parkinsonism type      History of Present Illness (HPI):    75 yo male with       Date: 01/12/2017  Chief Complaint / Interval History: tremor  History of Present Illness (HPI):  75 yo RH male with MCI, prev followed by Deloris Bender NP, last seen in office in June. Does have tremor in RH, probably the past 12-18 months. He does not feel it is getting worse. Wife and son think it may be more frequent. Notes at rest and holding steering wheel. This does not bother him. Wife says he does not often even know it is occurring. They area not aware of tremor anywhere else.   Denies stiffness or slowness. Son and wife feel he is a bit slower a year ago.   Balance is pretty good. No assistive devices.   No recent falls. Does shuffle per wife and son- past 2-3 years intermittently. Wife states she will sometimes ask him if his shoes are too big.   Denies dysphagia but they disagree. This has been going on x 10 years- will excuse self from table- vomit and resume eating. He prev saw ENT and told to chew food better. Patient says he has always had to eat fast- is now eating slower. Still not clear if he is actually choking.   Denies sialorrhea.  He has had memory issues about 2-3 years.      Father had tremor. No FMH of PD.         From Deloris Bender's NP note in June 2016...  Date: 06/21/2016          Chief Complaint:  MCI      History of Present Illness (HPI):Pt reports to clinic at recommendation of his family because of concern for the possibility of his developing dementia. 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 Most concerning is that his sense of direction has " worsened and he has trouble remembering how to get to places that he has frequented. No problems with ADL's. Still managing finances as well.      05/12/14 Interval History: Patient is presently on Aricept 10 mg daily with no adverse side effects. Wife feels his memory function has improved with the medication. She reported one incident 2 weeks ago, patient became confused with directions while driving to a restaurant they visit frequently. Had difficulty remembering they had a gift care for payment of the meal. Did have 2 mixed drinks during meal. No other incidents. More active, doing more gardening and yard work. Both are pleased with effects of the medication.      Interval History: Last seen in April 2015, Patient continues to do well on Aricept 10 mg with no adverse side effects. Continues to garden and read. Wife feels he is doing well. Patient does not notice any decline in cognitive function since last visit. Continues to go to [employer] several times a week. Feels he helps to remain focused and improves his mood and sleep. Last MOCA 21/30- April 2015 06/21/16 Interval History- Here for follow up. Presently on Aricept with no adverse side effects. Comes to appt with his wife Has had family issues involving his son which has disrupted household. Also had infection in left leg and fracture of toe with a fair amount of pain. Was confused on Father's day with so many people around him.     02/17/14 CT Head- Age-appropriate generalized cerebral volume loss otherwise unremarkable noncontrast CT head without evidence for acute intracranial hemorrhage or definite new abnormal parenchymal attenuation. Clinical correlation and further evaluation as warranted.    Diagnoses:   Mild Cognitive Impairment vs. Dementia      Plan:  -Reviewed results of cognitive screen. Last MOCA 21/30  -continue Aricept 10 mg daily  -Discussed Namenda -still does not want to start Namenda .  -Discussed safety issues related to  memory loss  -Caregiver burden and stress- Recommended Caregiver Support group  -Encouraged regular cognitive and physical exercise. Active with gardening. No cognitive exercise.  -Follow up in 4 months with Dr Israel for evaluation of tremor of the right hand     Nonmotor/Premotor ROS:    Depression/anxiety (Psychiatric)?No  Fatigue (Constitutional)?No?  Falls (Musculoskeletal)?No  Cognitive impairment (Neurologic)?Yes  Psychoses (Psychiatric)?No  Pain/Paresthesia (Neurologic)?No      Past Medical History: The patient  has a past medical history of *Atrial fibrillation; Anticoagulant long-term use; Atrial fibrillation - asymptomatic but noted on ICD interrogation ([id number]) - 16 h 40 minutes of afib (2/13/2013); Atrial flutter with controlled response - seen on ICD interrogation - asymptomatic (2/13/2013); Automatic implantable cardioverter-defibrillator in situ (2/13/2013); Basal cell carcinoma; CAD (coronary artery disease) (2/13/2013); Cardiomyopathy; Cataract; H/O syncope -  (5/27/2013); History of PTCA - LAD, LCX and PDA PCI in 2006 (2/13/2013); HTN (hypertension) (2/13/2013); Hyperlipidemia LDL goal < 70 (2/13/2013); Hypertension; ICD (implantable cardiac defibrillator) in place (2/13/2013); Ischemic cardiomyopathy LVEF ~45% (2/13/2013); LBBB (left bundle branch block) (2/13/2013); Memory loss; Psoriasis vulgaris; and Syncope and collapse.    Social History: The patient  reports that he quit smoking about 53 years ago. He does not have any smokeless tobacco history on file. He reports that he does not drink alcohol or use illicit drugs.    Family History: Their family history includes Cancer in his mother; Cataracts in his mother; Dementia in his father; Diabetes in his maternal grandmother; Kidney disease in his sister; No Known Problems in his brother, maternal aunt, maternal grandfather, maternal uncle, paternal aunt, paternal grandfather, paternal grandmother, and paternal uncle; Psoriasis in his father,  "son, and son; Tremor in his father. There is no history of Amblyopia, Blindness, Glaucoma, Hypertension, Macular degeneration, Retinal detachment, Strabismus, Stroke, Thyroid disease, or Parkinsonism.    Allergies: Arb-angiotensin receptor antagonist and Lisinopril     Meds:   Current Outpatient Prescriptions on File Prior to Visit   Medication Sig Dispense Refill    amlodipine (NORVASC) 5 MG tablet Take 1 tablet (5 mg total) by mouth once daily. 90 tablet 3    aspirin (ECOTRIN) 81 MG EC tablet Take 81 mg by mouth once daily.      atorvastatin (LIPITOR) 40 MG tablet Take 1 tablet (40 mg total) by mouth once daily. 90 tablet 3    dabigatran etexilate (PRADAXA) 150 mg Cap Take 1 capsule (150 mg total) by mouth 2 (two) times daily. "Do NOT break, chew, or open capsules." 180 capsule 3    donepezil (ARICEPT) 10 MG tablet Take 1 tablet (10 mg total) by mouth once daily. 90 tablet 2    metoprolol tartrate (LOPRESSOR) 25 MG tablet Take 0.5 tablets (12.5 mg total) by mouth 2 (two) times daily. 90 tablet 3    multivitamin (MULTIVITAMIN) per tablet Take 1 tablet by mouth once daily.       No current facility-administered medications on file prior to visit.        Exam:  Visit Vitals    BP (!) 148/76 (BP Location: Left arm, Patient Position: Sitting, BP Method: Automatic)    Pulse 60    Ht 5' 6" (1.676 m)    Wt 77.3 kg (170 lb 6.7 oz)    BMI 27.51 kg/m2       Constitutional  Well-developed, well-nourished, appears stated age   Neurological    * Mental status  MOCA= 17/30 (last MOCA was 21/30)   He completed some post grad work- studies were in history.      - Orientation  Oriented to person, place, and situation. [place] to month, year, day, place and city  But not date.      - Memory   Immediate memory - 1/5 & 5/5. With delayed memory, recalls 0/5 without cue. With category cue, recalls 0. With multiple choice, recalls 3/5.      - Attention/concentration  Loses 3 of 6 points.     - Language  Animal naming & " "repetition intact. With verbal fluency, names 6 "f" words in one minute (11 or > is normal). Repeats one the words once. States that he is trying to get clean words and will tell himself to not say that one. He giggles during this on several occasions trying to keep self composed. He names fiddle, fort, fryer, finger, fondle and fight.      - Executive Unable to correctly complete trail or draw cube. Clock draw intact.      - Abstract  Loses 1 of 2 points   * Gait  See below.   * Specialized movement exam  slightly hypophonic speech.    slight  facial masking.   slight cogwheel rigidity right side, increases slt on diversion..     radykinesia.  Intermittent subtle rest tremor RH.    No other dystonia, chorea, athetosis, myoclonus, or tics.   No motor impersistence.   Narrow-based gait.   Shortened stride length.   Slightly reduced arm swing bilaterally.   Pivots without difficulty.     Laboratory/Radiological:  - Results:  Office Visit on 01/13/2017   Component Date Value Ref Range Status    EF 01/27/2017 43* 55 - 65 Final    Mitral Valve Regurgitation 01/27/2017 MILD   Final    Diastolic Dysfunction 01/27/2017 No   Final    Aortic Valve Regurgitation 01/27/2017 TRIVIAL TO MILD   Final    Est. PA Systolic Pressure 01/27/2017 33   Final    Tricuspid Valve Regurgitation 01/27/2017 TRIVIAL   Final   Lab Visit on 01/13/2017   Component Date Value Ref Range Status    Protein, Urine Random 01/13/2017 <7  0 - 15 mg/dL Final    Creatinine, Random Ur 01/13/2017 76.0  23.0 - 375.0 mg/dL Final    Prot/Creat Ratio, Ur 01/13/2017 Unable to calculate  0.00 - 0.20 Final   Lab Visit on 01/13/2017   Component Date Value Ref Range Status    WBC 01/13/2017 5.87  3.90 - 12.70 K/uL Final    RBC 01/13/2017 4.56* 4.60 - 6.20 M/uL Final    Hemoglobin 01/13/2017 13.9* 14.0 - 18.0 g/dL Final    Hematocrit 01/13/2017 42.2  40.0 - 54.0 % Final    MCV 01/13/2017 93  82 - 98 fL Final    MCH 01/13/2017 30.5  27.0 - 31.0 pg Final "    MCHC 01/13/2017 32.9  32.0 - 36.0 % Final    RDW 01/13/2017 14.5  11.5 - 14.5 % Final    Platelets 01/13/2017 231  150 - 350 K/uL Final    MPV 01/13/2017 10.0  9.2 - 12.9 fL Final    Gran # 01/13/2017 3.8  1.8 - 7.7 K/uL Final    Lymph # 01/13/2017 1.2  1.0 - 4.8 K/uL Final    Mono # 01/13/2017 0.6  0.3 - 1.0 K/uL Final    Eos # 01/13/2017 0.2  0.0 - 0.5 K/uL Final    Baso # 01/13/2017 0.03  0.00 - 0.20 K/uL Final    Gran% 01/13/2017 64.0  38.0 - 73.0 % Final    Lymph% 01/13/2017 21.1  18.0 - 48.0 % Final    Mono% 01/13/2017 10.1  4.0 - 15.0 % Final    Eosinophil% 01/13/2017 4.1  0.0 - 8.0 % Final    Basophil% 01/13/2017 0.5  0.0 - 1.9 % Final    Differential Method 01/13/2017 Automated   Final    PTH, Intact 01/13/2017 82.0* 9.0 - 77.0 pg/mL Final    Glucose 01/13/2017 95  70 - 110 mg/dL Final    Sodium 01/13/2017 143  136 - 145 mmol/L Final    Potassium 01/13/2017 5.2* 3.5 - 5.1 mmol/L Final    Chloride 01/13/2017 108  95 - 110 mmol/L Final    CO2 01/13/2017 27  23 - 29 mmol/L Final    BUN, Bld 01/13/2017 20  8 - 23 mg/dL Final    Calcium 01/13/2017 9.9  8.7 - 10.5 mg/dL Final    Creatinine 01/13/2017 1.3  0.5 - 1.4 mg/dL Final    Albumin 01/13/2017 4.0  3.5 - 5.2 g/dL Final    Phosphorus 01/13/2017 3.9  2.7 - 4.5 mg/dL Final    eGFR if African American 01/13/2017 >60.0  >60 mL/min/1.73 m^2 Final    eGFR if non  01/13/2017 52.3* >60 mL/min/1.73 m^2 Final    Anion Gap 01/13/2017 8  8 - 16 mmol/L Final       - Independent review of images:CT head 2014...                Impression    Age-appropriate generalized cerebral volume loss otherwise unremarkable noncontrast CT head without evidence for acute intracranial hemorrhage or definite new abnormal parenchymal attenuation. Clinical correlation and further evaluation as warranted.      Electronically signed by: BINDU WEBB DO  Date: 02/17/14         Diagnoses:                  Medical Decision Making:    I suspect this  "is more in the realm of DLB or even FTD, rather than MCI. They give a timeline of memory issues in the past 2-3 years and timeline of tremor as 10 months. He denies hallucinations and they are not aware of this.   Son states his father has always lacked a filter in social settings and that they referred to it as "Dad being cute."  He does not identify tremor as bothersome and does not endorse any physical issues.   Continue donepezil 10 mg daily.    Discussed options...  -refer for neuropsychological testing  -repeat scan of head  -begin Namenda    Discussed rationale for each of these. At this point, wife would like for him to try Namenda. Discussed goals of therapy as well as potential SE.   Call for problems.   Follow up 4 months.     I spent 50 minutes face-to-face with the patient, wife and son with >50% of the time spent with counseling and education regarding:  - results of data, diagnosis, and recommendations stated above  - the prognosis of dementias and parkinsonisms  - risks and benefits of Namenda  - importance of diet and exercise    Yazmin Israel, CARLTON, NP-C  Division of Movement and Memory Disorders  [facility]  [phone]    "

## 2017-02-13 NOTE — MR AVS SNAPSHOT
[facility] - Neurology  [address]  [place] [address]  Phone: [phone]  Fax: [phone]                  Mark Anthony Velarde Jr.   2017 8:15 AM   Office Visit    Description:  Male : 1940   Provider:  Yazmin Israel NP   Department:  [facility] - Neurology           Reason for Visit     Parkinson's, unspecified Parkinsonism type           Diagnoses this Visit        Comments    Lewy body dementia with behavioral disturbance    -  Primary            To Do List           Goals (5 Years of Data)     None       These Medications        Disp Refills Start End    donepezil (ARICEPT) 10 MG tablet 90 tablet 3 2017     Take 1 tablet (10 mg total) by mouth once daily. - Oral    Pharmacy: [facility] [address] [place] [id number] [address] [facility] Ph #: [phone]       memantine (NAMENDA TITRATION PACK) tablet pack 49 tablet 0 2017    Follow package directions.    Pharmacy: [facility] [address] - [address] AT [facility] Ph #: [phone]         [facility]     [facility] On Call Nurse Care Line - / Assistance  Registered nurses in the [facility] provide clinical advisement, health education, appointment booking, and other advisory services.  Call for this free service at [phone].             Medications           Message regarding Medications     Verify the changes and/or additions to your medication regime listed below are the same as discussed with your clinician today.  If any of these changes or additions are incorrect, please notify your healthcare provider.        START taking these NEW medications        Refills    memantine (NAMENDA TITRATION PACK) tablet pack 0    Sig: Follow package directions.    Class: Normal           Verify that the below list of medications is an accurate representation of the medications you are currently taking.  If  "none reported, the list may be blank. If incorrect, please contact your healthcare provider. Carry this list with you in case of emergency.           Current Medications     amlodipine (NORVASC) 5 MG tablet Take 1 tablet (5 mg total) by mouth once daily.    aspirin (ECOTRIN) 81 MG EC tablet Take 81 mg by mouth once daily.    atorvastatin (LIPITOR) 40 MG tablet Take 1 tablet (40 mg total) by mouth once daily.    dabigatran etexilate (PRADAXA) 150 mg Cap Take 1 capsule (150 mg total) by mouth 2 (two) times daily. "Do NOT break, chew, or open capsules."    donepezil (ARICEPT) 10 MG tablet Take 1 tablet (10 mg total) by mouth once daily.    metoprolol tartrate (LOPRESSOR) 25 MG tablet Take 0.5 tablets (12.5 mg total) by mouth 2 (two) times daily.    multivitamin (MULTIVITAMIN) per tablet Take 1 tablet by mouth once daily.    memantine (NAMENDA TITRATION PACK) tablet pack Follow package directions.           Clinical Reference Information           Your Vitals Were     BP Pulse Height Weight BMI    148/76 (BP Location: Left arm, Patient Position: Sitting, BP Method: Automatic) 60 5' 6" (1.676 m) 77.3 kg (170 lb 6.7 oz) 27.51 kg/m2      Blood Pressure          Most Recent Value    BP  (!)  148/76      Allergies as of 2/13/2017     Arb-angiotensin Receptor Antagonist    Lisinopril      Immunizations Administered on Date of Encounter - 2/13/2017     None      Language Assistance Services     ATTENTION: Language assistance services are available, free of charge. Please call [phone].      ATENCIÓN: Si ghulam simmons, tiene a tomlin disposición servicios gratuitos de asistencia lingüística. Llame al [phone].     [facility] Ý: N?u b?n nói Ti?ng Vi?t, có các d?ch v? h? tr? ngôn ng? mi?n phí dành cho b?n. G?i s? [phone].         [facility] complies with applicable Federal civil rights laws and does not discriminate on the basis of race, color, national origin, age, disability, or sex.        "

## 2017-02-13 NOTE — TELEPHONE ENCOUNTER
----- Message from SAL Tejeda sent at 1/30/2017 11:28 AM CST -----  Lena,     Per my last note, Mr. Velarde was feeling well from a symptom perspective; I obtained an Echo given his 100% RV pacing burden; it is slightly decreased. The final decision will be up to Dr. Sherlyn Lutz. I can only speak to his symptoms when I saw him in clinic, and at the time, he reported feeling well.     Thanks.     CM   ----- Message -----     From: Lena Johnson RN     Sent: 1/30/2017   9:09 AM       To: SAL Tejeda    Please advise on this so I can schedule pt.  ----- Message -----     From: Maurice Ewing MD     Sent: 1/27/2017   5:35 PM       To: Lena Johnson RN, SAL Tejeda,  He seems like he does not have issues with CHF (am I correct?)-- if he does not, then he needs a simple replacement if he wants to   If he has CHF sx then we would add an LV lead too.   Tx  Lena, can youset based on what mariama confirms as to his state of symptoms  Tx  ----- Message -----     From: SAL Tejeda     Sent: 1/27/2017  12:52 PM       To: MD Dr. Sherlyn Woody,     I saw Mr. Velarde and ordered an Echo as he has a SJM advisory device and is dependent. His Echo remains relatively stable, but he has yet to be scheduled for a generator change; please advise.     TEJINDER

## 2017-02-20 DIAGNOSIS — G31.83 LEWY BODY DEMENTIA WITH BEHAVIORAL DISTURBANCE: ICD-10-CM

## 2017-02-20 DIAGNOSIS — F02.818 LEWY BODY DEMENTIA WITH BEHAVIORAL DISTURBANCE: ICD-10-CM

## 2017-02-21 RX ORDER — MEMANTINE HYDROCHLORIDE 5 MG-10 MG
KIT ORAL
Qty: 49 TABLET | Refills: 0 | Status: SHIPPED | OUTPATIENT
Start: 2017-02-21 | End: 2017-09-22 | Stop reason: DRUGHIGH

## 2017-02-21 RX ORDER — MEMANTINE HYDROCHLORIDE 10 MG/1
10 TABLET ORAL 2 TIMES DAILY
Qty: 60 TABLET | Refills: 11 | Status: SHIPPED | OUTPATIENT
Start: 2017-02-21 | End: 2017-05-25 | Stop reason: SDUPTHER

## 2017-02-21 NOTE — TELEPHONE ENCOUNTER
----- Message from Fanny Ordonez sent at 2/17/2017  2:13 PM CST -----  Contact: mago    [facility]     [phone]  pts prescriptions for memantine (NAMENDA TITRATION PACK) tablet pack and memantine (NAMENDA) 10 MG Tab did not make it to the pharmacy.  The e-scribing status shows transmission failed.  pls resend asap.        [facility] [address] [place], [place] [address] [address] [place] [facility]  [address]  [facility] [place] [address]  Phone: [phone] Fax: [phone]

## 2017-05-10 DIAGNOSIS — N18.30 CHRONIC KIDNEY DISEASE, STAGE III (MODERATE): Primary | ICD-10-CM

## 2017-05-19 DIAGNOSIS — G31.83 LEWY BODY DEMENTIA WITH BEHAVIORAL DISTURBANCE: ICD-10-CM

## 2017-05-19 DIAGNOSIS — F02.818 LEWY BODY DEMENTIA WITH BEHAVIORAL DISTURBANCE: ICD-10-CM

## 2017-05-22 DIAGNOSIS — F02.818 LEWY BODY DEMENTIA WITH BEHAVIORAL DISTURBANCE: ICD-10-CM

## 2017-05-22 DIAGNOSIS — G31.83 LEWY BODY DEMENTIA WITH BEHAVIORAL DISTURBANCE: ICD-10-CM

## 2017-05-22 RX ORDER — DONEPEZIL HYDROCHLORIDE 10 MG/1
TABLET, FILM COATED ORAL
Qty: 90 TABLET | Refills: 3 | Status: SHIPPED | OUTPATIENT
Start: 2017-05-22 | End: 2017-05-25 | Stop reason: SDUPTHER

## 2017-05-22 RX ORDER — DONEPEZIL HYDROCHLORIDE 10 MG/1
TABLET, FILM COATED ORAL
Qty: 90 TABLET | Refills: 3 | Status: SHIPPED | OUTPATIENT
Start: 2017-05-22 | End: 2017-09-22 | Stop reason: SDUPTHER

## 2017-05-25 ENCOUNTER — OFFICE VISIT (OUTPATIENT)
Dept: INTERNAL MEDICINE | Facility: CLINIC | Age: 77
End: 2017-05-25
Payer: MEDICARE

## 2017-05-25 VITALS
SYSTOLIC BLOOD PRESSURE: 118 MMHG | HEART RATE: 60 BPM | WEIGHT: 169.31 LBS | HEIGHT: 66 IN | BODY MASS INDEX: 27.21 KG/M2 | DIASTOLIC BLOOD PRESSURE: 76 MMHG

## 2017-05-25 DIAGNOSIS — I48.92 ATRIAL FLUTTER WITH CONTROLLED RESPONSE: Chronic | ICD-10-CM

## 2017-05-25 DIAGNOSIS — R13.19 ESOPHAGEAL DYSPHAGIA: Primary | ICD-10-CM

## 2017-05-25 DIAGNOSIS — G31.84 MCI (MILD COGNITIVE IMPAIRMENT) WITH MEMORY LOSS: ICD-10-CM

## 2017-05-25 DIAGNOSIS — I10 ESSENTIAL HYPERTENSION: Chronic | ICD-10-CM

## 2017-05-25 PROCEDURE — 1126F AMNT PAIN NOTED NONE PRSNT: CPT | Mod: S$GLB,,, | Performed by: INTERNAL MEDICINE

## 2017-05-25 PROCEDURE — 99999 PR PBB SHADOW E&M-EST. PATIENT-LVL III: CPT | Mod: PBBFAC,,, | Performed by: INTERNAL MEDICINE

## 2017-05-25 PROCEDURE — 1159F MED LIST DOCD IN RCRD: CPT | Mod: S$GLB,,, | Performed by: INTERNAL MEDICINE

## 2017-05-25 PROCEDURE — 1157F ADVNC CARE PLAN IN RCRD: CPT | Mod: S$GLB,,, | Performed by: INTERNAL MEDICINE

## 2017-05-25 PROCEDURE — 99214 OFFICE O/P EST MOD 30 MIN: CPT | Mod: S$GLB,,, | Performed by: INTERNAL MEDICINE

## 2017-05-25 NOTE — PROGRESS NOTES
Subjective:       Patient ID: Mark Anthony Velarde Jr. is a 77 y.o. male.    Chief Complaint: Choking (1 time 2 weeks ago)    HPI: Pt has a history of dysphagia. Recently a few episodes including choking and vomitting. He has had this several times over the last few years. He saw ENT for hoarse voice and discussed the choking then. They did a scope of the vocal cards which looked ok and then they discussed getting GI eval but he didn't pursue it. He is having some memory issues (for a while) and he wife discusses and answers questions. No passing blood in stool or emesis.       Review of Systems   Constitutional: Negative for chills, fatigue, fever and unexpected weight change.   HENT: Negative for trouble swallowing.         Dysphagia   Eyes: Negative for visual disturbance.   Respiratory: Negative for cough, shortness of breath and wheezing.    Cardiovascular: Negative for chest pain and palpitations.   Gastrointestinal: Negative for abdominal pain, constipation, diarrhea, nausea and vomiting.   Genitourinary: Negative for difficulty urinating.   Musculoskeletal: Negative for neck pain.   Skin: Negative for rash.   Neurological: Negative for dizziness and headaches.       Objective:      Physical Exam   Constitutional: He is oriented to person, place, and time. He appears well-developed and well-nourished. No distress.   HENT:   Head: Normocephalic and atraumatic.   Mouth/Throat: No oropharyngeal exudate.   TM's clear, pharynx clear   Eyes: Conjunctivae and EOM are normal. Pupils are equal, round, and reactive to light. No scleral icterus.   Neck: Normal range of motion. Neck supple. No thyromegaly present.   No supraclavicular nodes palpated   Cardiovascular: Normal rate, regular rhythm and normal heart sounds.    No murmur heard.  Pulmonary/Chest: Effort normal and breath sounds normal. He has no wheezes.   Abdominal: Soft. Bowel sounds are normal. He exhibits no mass. There is no tenderness.   Musculoskeletal: He  exhibits no edema.   Lymphadenopathy:     He has no cervical adenopathy.   Neurological: He is alert and oriented to person, place, and time.   Tremor right hand   Skin: No pallor.   Psychiatric: He has a normal mood and affect.       Assessment:       1. Esophageal dysphagia    2. Atrial flutter with controlled response - seen on ICD interrogation - asymptomatic    3. Essential hypertension    4. MCI (mild cognitive impairment) with memory loss        Plan:       Mark Anthony was seen today for choking.    Diagnoses and all orders for this visit:    Esophageal dysphagia  -     Case request GI: ESOPHAGOGASTRODUODENOSCOPY (EGD)    Atrial flutter with controlled response - seen on ICD interrogation - asymptomatic    Essential hypertension    MCI (mild cognitive impairment) with memory loss        OK to hold blood thinner prior to EGD if needed.

## 2017-05-25 NOTE — MEDICAL/APP STUDENT
S: Mr. Mark Anthony Velarde Jr is a 77 yM who presents to clinic with complaints of difficulty swallowing.    Mr Velarde is here with his wife who is his primary historian as Mr. Velarde has memory difficulties. She reports his swallowing difficulties began several years ago. At the time, he was seen by ENT for hoarseness, was found to be within normal limits, and recommended to cut food smaller and a GI consult. The swallowing difficulties has progressed to where he is vomiting up pieces of undigested food weekly. The last distressing occurrance happened on 05/14 at [facility] where the pt vomited pieces of filet mignon. The pt says he always cuts the food up into small pieces but that it gets lodged in the upper part of his esophagus and then he vomits immediately. His wife say the types of food can vary from pieces to meat to beans. The pt has never had issues with liquids. He is a previous smoker (1 pack/day for 15 years; last ciagarette 40 years ago). He endorses drinking hot liquids and eating smoked foods. His father also had difficulty swallowing as he got older; however, both the pt and his wife are unclear as to why. He denies any recent travelling and his wife denies any signs of halitosis or vomiting hours after eating.      ROS: positive for swallowing difficulties, and memory recall. negative otherwise.    O: VSS and WNL, /79 mm Hg, MN 60 bpm, afebrile    Physical Exam  General: AAO x 3 and NAD  HEENT: normalcephalic and atraumatic. PERRL. Mucous membranes moist, no mucosal lesions. Neck supple, no lesions. Tracheal midline. No palpable masses. Thyroid non-enlarged, nontender. No cervical lymphandenopathy.    CARDs: normal HS, no added sounds or murmurs  Resp: clear breath sounds bilaterally, no crackles or wheezes  Neuro: AAO x 3. CN II-XII intact. Intermittent resting pill rolling tremor on the right. 5/5 strength throughout. 2+ reflexes throughout.    A: Mr. Mark Anthony Velarde Jr is a 77 yM who presents  to clinic with dysphagia.    P:  Dysphagia  - progressive over the last 4 years  - EGD to query strictures, rings, esophageal webs.  - hold pradaxa for EGD  - consult to GI  - barium swallow study if EGD wnl  - CBC    Vi Fernandez  MS4  [employer]

## 2017-06-01 ENCOUNTER — OFFICE VISIT (OUTPATIENT)
Dept: OPTOMETRY | Facility: CLINIC | Age: 77
End: 2017-06-01
Payer: MEDICARE

## 2017-06-01 DIAGNOSIS — H02.836 DERMATOCHALASIS, BILATERAL: ICD-10-CM

## 2017-06-01 DIAGNOSIS — H02.833 DERMATOCHALASIS, BILATERAL: ICD-10-CM

## 2017-06-01 DIAGNOSIS — H52.7 REFRACTIVE ERRORS: ICD-10-CM

## 2017-06-01 DIAGNOSIS — H02.419: Primary | ICD-10-CM

## 2017-06-01 DIAGNOSIS — Z96.1 PSEUDOPHAKIA, BOTH EYES: ICD-10-CM

## 2017-06-01 DIAGNOSIS — Z98.41 S/P BILATERAL CATARACT EXTRACTION: ICD-10-CM

## 2017-06-01 DIAGNOSIS — Z98.42 S/P BILATERAL CATARACT EXTRACTION: ICD-10-CM

## 2017-06-01 PROCEDURE — 99999 PR PBB SHADOW E&M-EST. PATIENT-LVL III: CPT | Mod: PBBFAC,,, | Performed by: OPTOMETRIST

## 2017-06-01 PROCEDURE — 92014 COMPRE OPH EXAM EST PT 1/>: CPT | Mod: S$GLB,,, | Performed by: OPTOMETRIST

## 2017-06-01 PROCEDURE — 92015 DETERMINE REFRACTIVE STATE: CPT | Mod: S$GLB,,, | Performed by: OPTOMETRIST

## 2017-06-01 NOTE — PROGRESS NOTES
HPI     Concerns About Ocular Health    Additional comments: Eye exam and refraction.  notes some decrease in VA   since last visit.             Comments   Patient's age: 77 y.o.  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 Gross (following   bilateral cataract surgery)    City/State: [facility]  Wears glasses? Yes     If yes, wears  Full-time or part-time?  Part-time mostly for reading,   and sometimes for driving.  Present glasses are: Bifocal, SV Distance, SV Reading? Bifocals   Approximate age of present glasses:  2+ Years old   Got new glasses following last exam, or subsequently?:  No   Any problem with VA with glasses?  No  Wears CLs?:  No  Headaches?  No  Eye pain/discomfort?  No                                                                                     Flashes?  No  Floaters?  Yes, occasionally   Diplopia/Double vision?  No  Patient's Ocular History:         Any eye surgeries? Retinal Hole in OS with Cryo, Phaco OU          Any eye injury?  No         Any treatment for eye disease?  No  Family history of eye disease?    Mother + Cataract SX  Father  + Pterygium Removal   Significant patient medical history:         1. Diabetes?  No   2. HBP?  Yes, controlled by medication and diet              3. Other (describe):                       Pacemaker                       High Cholesterol                       Psoriasis                      2 Stents    ! OTC eyedrops currently using:  None   ! Prescription eye meds currently using:  None   ! Any history of allergy/adverse reaction to any eye meds used   previously?  No    ! Any history of allergy/adverse reaction to eyedrops used during prior   eye exam(s)? No    ! Any history of allergy/adverse reaction to Novacaine or similar meds?   No   ! Any history of allergy/adverse reaction to Epinephrine or similar meds?   No    ! Patient okay with use of anesthetic eyedrops to check eye pressure?    Yes        ! Patient okay with use  "of eyedrops to dilate pupils today?  Yes   !  Allergies/Medications/Medical History/Family History reviewed today?    Yes      PD =   64/60  Desired reading distance =   18"                                                                     Last edited by Stephen Dorsey, OD on 6/1/2017  9:37 AM. (History)            Assessment /Plan     For exam results, see Encounter Report.    1. Acquired mechanical ptosis of eyelid, unspecified laterality     2. Dermatochalasis, bilateral     3. S/P bilateral cataract extraction     4. Pseudophakia, both eyes     5. Refractive errors                S/P cataract surgery in both eyes, with bialteral posterior chamber intraocular lens.  Residual refractive error in each eye, with satisfactory correctable VA in each eye.  New spectacle lens Rx issued for use as desired.  Bilateral ptosis of upper eyelid, more apparent on the right side, and partially obstructing pupil in the right eye.  Mr. Velarde to consider whether he would like referral to Dr. Terry for evaluation of need for ptosis repair eyelid surgery, and he will call me if he decides to proceed with referral.  Otherwise, ocular health appears good in both eyes.  Recheck in one year.         "

## 2017-06-01 NOTE — PATIENT INSTRUCTIONS
S/P cataract surgery in both eyes, with bialteral posterior chamber intraocular lens.  Residual refractive error in each eye, with satisfactory correctable VA in each eye.  New spectacle lens Rx issued for use as desired.  Bilateral ptosis of upper eyelid, more apparent on the right side, and partially obstructing pupil in the right eye.  Mr. Velarde to consider whether he would like referral to Dr. Terry for evaluation of need for ptosis repair eyelid surgery, and he will call me if he decides to proceed with referral.  Otherwise, ocular health appears good in both eyes.  Recheck in one year.

## 2017-06-07 ENCOUNTER — PATIENT MESSAGE (OUTPATIENT)
Dept: INTERNAL MEDICINE | Facility: CLINIC | Age: 77
End: 2017-06-07

## 2017-06-08 NOTE — TELEPHONE ENCOUNTER
It looks like they were trying to touch base with the patient's Cardiologist to determine if it was okto hold the blood thinner a few days. But I do not see a message to such. Can we try to check into this ASAP and see if we can hep them get this sorted out. Thanks and let the wife know. Pt has a decrease memory. Let me know.

## 2017-06-09 ENCOUNTER — TELEPHONE (OUTPATIENT)
Dept: ENDOSCOPY | Facility: HOSPITAL | Age: 77
End: 2017-06-09

## 2017-06-09 NOTE — TELEPHONE ENCOUNTER
Pt needs to be scheduled for EGD, not scheduled yet. Pt taking Pradaxa, will need to hold x 2 days prior to EGD. Please message back response for documentation so Pt can be scheduled.  Thank you

## 2017-06-20 DIAGNOSIS — I44.7 LBBB (LEFT BUNDLE BRANCH BLOCK): Primary | ICD-10-CM

## 2017-06-20 DIAGNOSIS — Z95.810 ICD (IMPLANTABLE CARDIOVERTER-DEFIBRILLATOR), DUAL, IN SITU: ICD-10-CM

## 2017-06-20 NOTE — PROGRESS NOTES
EXTRACTION/ IMPLANTABLE DEVICE EDUCATION CHECKLIST    7/19/17 -LABS  PRE - PROCEDURE LABS HAVE BEEN ORDERED FOR YOU @ [facility]. These lab orders have just been added to your already ordered labs for this date.  BE SURE TO ARRIVE AT YOUR SCHEDULED TIME FOR THIS LAB WORK!  (YOU DO NOT HAVE TO FAST FOR THIS LABWORK!!!!)    7/26/17 @ 6 AM -ICD gen change  REPORT TO CARDIOLOGY WAITING ROOM ON 3RD FLOOR OF HOSPITAL (DO NOT REPORT TO CLINIC)  Directions for Reporting to Cardiology Waiting Area in the Hospital  If you park in the Parking Garage:  Take elevators to the 2nd floor  Walk up ramp and turn right by Gold Elevators  Take elevator to the 3rd floor  Upon exiting the elevator, turn away from the clinic areas  Walk long rob around to front of hospital to area with windows overlooking [facility]  Check in at Reception Desk  OR  If family is dropping you off:  Have them drop you off at the front of the Hospital  (Near the ER, where all the flags are hung).  Take the E elevators to the 3rd floor.  Check in at the Reception Desk in the waiting room.    WASH YOUR CHEST WITH HIBICLENS OR AN ANTIBACTERIAL SOAP (SUCH AS DIAL) ON THE NIGHT BEFORE AND THE MORNING OF YOUR PROCEDURE.    DO NOT EAT OR DRINK ANYTHING AFTER: 12 MN ON THE NIGHT BEFORE YOUR PROCEDURE    MEDICATIONS  HOLD your Pradaxa for 1 day prior to your procedure. Last dose: Monday, July 24, 2017.  YOU MAY TAKE OTHER USUAL MORNING MEDICATIONS WITH A SIP OF WATER    YOU WILL BE GOING HOME AFTER YOUR PROCEDURE  YOU WILL NEED SOMEONE TO DRIVE YOU HOME AFTER YOUR PROCEDURE    YOUR PAIN DURING YOUR PROCEDURE WILL BE MANAGED BY THE ANESTHESIA TEAM    THE ABOVE INSTRUCTIONS WERE GIVEN TO THE PATIENT VERBALLY AND THEY VERBALIZED UNDERSTANDING. THEY DO NOT REQUIRE ANY SPECIAL NEEDS AND DO NOT HAVE ANY LEARNING BARRIERS.     Any need to reschedule or cancel procedures, or any questions regarding your procedures should be addressed directly with the  Arrhythmia Department Nurses at the following phone number: [phone]

## 2017-06-20 NOTE — PROGRESS NOTES
Post-Procedure Patient Discharge Instructions  Pacemaker/Defibrillator  Wound Care   If you are discharged with a standard dressing over the incision, you may remove the dressing after 24 hours.    If you are discharged with an AQUACEL dressing, you should keep it on until your follow-up appointment in 1-2 weeks.   If there are Steri-strips (strips of tape) over your incision, leave them on until your follow-up appointment. They may begin to fall off on their own, which is normal. If there is Dermabond (clear glue) over your incision, do not scrub it off. It acts as a barrier and will eventually disappear.   You will be discharged with 5 days of oral antibiotics. Please take the full prescription until it is gone.   Do not get the incision wet for 48 hours following the procedure. You may sponge bath during this period, working around the incision. After 48 hours, you may shower, but you should still try to keep this area as dry as possible, and avoid direct water contact to the incision (allow the water to hit back of your shoulder rather than directly on the incision). Gently pat the incision dry if it does get wet.   You may take regular showers after 2 weeks, unless otherwise indicated at your follow-up visit.   Do not submerge the incision in a tub, pool, or body of water for 6 weeks.   Avoid using deodorants, powders, creams, lotions, etc. on your incision for 6 weeks.   If you notice unusual swelling, redness, drainage, have more device site pain, chest pain, shortness of breath, or have a fever greater than 100 degrees, call our device clinic immediately: [phone] or [phone] during normal office hours. You may call [phone] after-hours or on weekends and ask for the electrophysiologist on call.  Activity    If you only had a battery/generator change performed, there are no postoperative activity restrictions.    If this is your first device or if you had new wires added to your  existing device, then you will be discharged in a sling which you should wear continuously for 48 hours. After that, the sling should remain off during the day but should be worn at night for another 2-4 weeks (depending on how active a sleeper you are).   Do not raise your device-side arm above your shoulder for 6 weeks. Do not lift more than 5-10 lbs with your device-side arm for 6 weeks.    If you were driving prior to the procedure, you may resume driving after your first follow-up appointment (1-2 weeks). If you have a history of passing out or a history of certain arrhythmias, there may be driving restrictions unrelated to the procedure. Please clarify with your physician if this is the case.   No heavy activity with the affected arm for 6 weeks (eg. tennis, golf, bowling, aerobics, mowing the lawn, etc.).   Avoid rough contact at the device site for 6 weeks.   You may participate in sexual activity unless otherwise instructed.   You may return to work within 3-5 days unless told otherwise, provided you adhere to the above activity restrictions.  Long-Term Instructions   Keep your pacemaker or defibrillator identification card with you at all times.   If you have a defibrillator and you get shocked by the device: If you receive one shock and you feel ok, you may call [phone] or [phone] during normal office hours. You may call [phone] after-hours. If you receive one shock and you do not feel well, call Emergency Medical Services. They will take you to the nearest emergency room.   If you have a defibrillator and you get more than one shock from the device or multiple shocks in a short period of time: Call Emergency Medical Services. They will take you to the nearest emergency room.   Appliances: You may operate any electrical device in your home, including microwaves.   Security Systems: Electromagnetic security systems can be located in the workplace, courthouses, or other  high-security areas. Exposure to this type of security system has been shown to cause interference in some cases. Interference may be related to the duration of exposure and/or the distance between the device and the security device. You should be aware of the location of security systems, move through them at a normal pace, and avoid leaning or standing too close.   Metal Detectors at Airports: Metal detectors at airports can potentially interfere with pacemakers or defibrillators, although this is unlikely. Metal detectors will likely be triggered by your device and therefore at places such as airports  it will be important for you to carry your identification card for the pacemaker/defibrillator. Airport personnel will likely prefer to do a manual search.   Cellular Phones: It is unlikely that using a cellular phone will interfere with your device. It should be used with the hand opposite to the side where your device was implanted. The phone should not be carried in the shirt pocket on the same side as the device.   Specific Work Conditions: Patients who work near high-voltage lines, transmitting towers, large motors, welding equipment, or powerful magnets should discuss their specific situation with their physician. In general, remain at least two feet from external electrical equipment, verify that the equipment is properly grounded, and wear insulated gloves when using electrical devices. Leave the immediate location if lightheadedness or other symptoms develop.   MRI: Some pacemakers and defibrillators are safe in MRI scanners, while others are not. Please consult with your physician to see if you have an MRI-compatible device.   Surgery: Should you require surgery in the future, some electrosurgical devices can interfere with your device function. You should discuss this with your surgeon before any operation.   Radiation Therapy: If you ever require radiation therapy in the future, care must be taken  to avoid irradiating the device.  Long-Term Follow-Up   Your device has the ability to transmit device information from home to the doctors office using a home monitoring system.   This remote system takes the place of a doctors visit. Your device will be checked from home every 3-6 months. Every 6-12 months, you will be asked to come into the office for an in-office check.   Your device should last in the range of 6-12 years. This depends on many factors including how often it paces the heart.   When the battery is low, a generator change will be performed. This is a same-day procedure with no post-op activity restrictions, unless one of the pacemaker or defibrillator leads needs to be replaced at that time, or a new lead is added to your existing system.

## 2017-06-22 ENCOUNTER — PATIENT MESSAGE (OUTPATIENT)
Dept: ELECTROPHYSIOLOGY | Facility: CLINIC | Age: 77
End: 2017-06-22

## 2017-07-03 ENCOUNTER — TELEPHONE (OUTPATIENT)
Dept: GASTROENTEROLOGY | Facility: CLINIC | Age: 77
End: 2017-07-03

## 2017-07-03 ENCOUNTER — ANESTHESIA (OUTPATIENT)
Dept: ENDOSCOPY | Facility: HOSPITAL | Age: 77
End: 2017-07-03
Payer: MEDICARE

## 2017-07-03 ENCOUNTER — HOSPITAL ENCOUNTER (OUTPATIENT)
Facility: HOSPITAL | Age: 77
Discharge: HOME | End: 2017-07-03
Attending: INTERNAL MEDICINE | Admitting: INTERNAL MEDICINE
Payer: MEDICARE

## 2017-07-03 ENCOUNTER — ANESTHESIA EVENT (OUTPATIENT)
Dept: ENDOSCOPY | Facility: HOSPITAL | Age: 77
End: 2017-07-03
Payer: MEDICARE

## 2017-07-03 VITALS
WEIGHT: 155 LBS | HEART RATE: 61 BPM | RESPIRATION RATE: 25 BRPM | DIASTOLIC BLOOD PRESSURE: 64 MMHG | RESPIRATION RATE: 18 BRPM | OXYGEN SATURATION: 96 % | HEIGHT: 66 IN | TEMPERATURE: 98 F | SYSTOLIC BLOOD PRESSURE: 133 MMHG | BODY MASS INDEX: 24.91 KG/M2

## 2017-07-03 DIAGNOSIS — K31.5 DUODENAL STRICTURE: Primary | ICD-10-CM

## 2017-07-03 DIAGNOSIS — R13.10 DYSPHAGIA, UNSPECIFIED TYPE: Primary | ICD-10-CM

## 2017-07-03 DIAGNOSIS — K29.80 DUODENITIS: ICD-10-CM

## 2017-07-03 DIAGNOSIS — R13.10 DYSPHAGIA: ICD-10-CM

## 2017-07-03 PROCEDURE — 37000009 HC ANESTHESIA EA ADD 15 MINS: Performed by: INTERNAL MEDICINE

## 2017-07-03 PROCEDURE — C1773 RET DEV, INSERTABLE: HCPCS | Performed by: INTERNAL MEDICINE

## 2017-07-03 PROCEDURE — 27201012 HC FORCEPS, HOT/COLD, DISP: Performed by: INTERNAL MEDICINE

## 2017-07-03 PROCEDURE — D9220A PRA ANESTHESIA: Mod: ANES,,, | Performed by: ANESTHESIOLOGY

## 2017-07-03 PROCEDURE — 25000003 PHARM REV CODE 250: Performed by: NURSE ANESTHETIST, CERTIFIED REGISTERED

## 2017-07-03 PROCEDURE — 37000008 HC ANESTHESIA 1ST 15 MINUTES: Performed by: INTERNAL MEDICINE

## 2017-07-03 PROCEDURE — 43239 EGD BIOPSY SINGLE/MULTIPLE: CPT | Mod: ,,, | Performed by: INTERNAL MEDICINE

## 2017-07-03 PROCEDURE — 43239 EGD BIOPSY SINGLE/MULTIPLE: CPT | Performed by: INTERNAL MEDICINE

## 2017-07-03 PROCEDURE — 88305 TISSUE EXAM BY PATHOLOGIST: CPT | Performed by: PATHOLOGY

## 2017-07-03 PROCEDURE — 88305 TISSUE EXAM BY PATHOLOGIST: CPT | Mod: 26,,, | Performed by: PATHOLOGY

## 2017-07-03 PROCEDURE — 25000003 PHARM REV CODE 250: Performed by: INTERNAL MEDICINE

## 2017-07-03 PROCEDURE — D9220A PRA ANESTHESIA: Mod: CRNA,,, | Performed by: NURSE ANESTHETIST, CERTIFIED REGISTERED

## 2017-07-03 PROCEDURE — 63600175 PHARM REV CODE 636 W HCPCS: Performed by: NURSE ANESTHETIST, CERTIFIED REGISTERED

## 2017-07-03 RX ORDER — SODIUM CHLORIDE 9 MG/ML
INJECTION, SOLUTION INTRAVENOUS CONTINUOUS
Status: DISCONTINUED | OUTPATIENT
Start: 2017-07-03 | End: 2017-07-03 | Stop reason: HOSPADM

## 2017-07-03 RX ORDER — OMEPRAZOLE 20 MG/1
CAPSULE, DELAYED RELEASE ORAL
Qty: 180 CAPSULE | Refills: 1 | Status: SHIPPED | OUTPATIENT
Start: 2017-07-03 | End: 2017-09-13

## 2017-07-03 RX ORDER — OMEPRAZOLE 20 MG/1
20 CAPSULE, DELAYED RELEASE ORAL 2 TIMES DAILY
Qty: 60 CAPSULE | Refills: 1 | Status: SHIPPED | OUTPATIENT
Start: 2017-07-03 | End: 2017-07-03 | Stop reason: SDUPTHER

## 2017-07-03 RX ORDER — PROPOFOL 10 MG/ML
VIAL (ML) INTRAVENOUS
Status: DISCONTINUED | OUTPATIENT
Start: 2017-07-03 | End: 2017-07-03

## 2017-07-03 RX ORDER — LIDOCAINE HCL/PF 100 MG/5ML
SYRINGE (ML) INTRAVENOUS
Status: DISCONTINUED | OUTPATIENT
Start: 2017-07-03 | End: 2017-07-03

## 2017-07-03 RX ADMIN — PROPOFOL 30 MG: 10 INJECTION, EMULSION INTRAVENOUS at 08:07

## 2017-07-03 RX ADMIN — PROPOFOL 20 MG: 10 INJECTION, EMULSION INTRAVENOUS at 08:07

## 2017-07-03 RX ADMIN — PROPOFOL 20 MG: 10 INJECTION, EMULSION INTRAVENOUS at 09:07

## 2017-07-03 RX ADMIN — LIDOCAINE HYDROCHLORIDE 50 MG: 20 INJECTION, SOLUTION INTRAVENOUS at 08:07

## 2017-07-03 RX ADMIN — PROPOFOL 50 MG: 10 INJECTION, EMULSION INTRAVENOUS at 08:07

## 2017-07-03 RX ADMIN — PROPOFOL 70 MG: 10 INJECTION, EMULSION INTRAVENOUS at 08:07

## 2017-07-03 RX ADMIN — SODIUM CHLORIDE: 0.9 INJECTION, SOLUTION INTRAVENOUS at 07:07

## 2017-07-03 RX ADMIN — PROPOFOL 30 MG: 10 INJECTION, EMULSION INTRAVENOUS at 09:07

## 2017-07-03 NOTE — H&P
Short Stay Endoscopy History and Physical    PCP - Jeff Yan MD     Procedure - EGD  ASA - per anesthesia  Mallampati - per anesthesia  History of Anesthesia problems - no  Family history Anesthesia problems -  no   Plan of anesthesia - General    HPI:  This is a 77 y.o. male here for evaluation of dysphagia and chocking.        ROS:  Constitutional: No fevers, chills, No weight loss  CV: No chest pain  Pulm: No cough, No shortness of breath  Ophtho: No vision changes  GI: see HPI  Derm: No rash    Medical History:  has a past medical history of *Atrial fibrillation; Anticoagulant long-term use; Atrial fibrillation - asymptomatic but noted on ICD interrogation ([id number]) - 16 h 40 minutes of afib (2/13/2013); Atrial flutter with controlled response - seen on ICD interrogation - asymptomatic (2/13/2013); Automatic implantable cardioverter-defibrillator in situ (2/13/2013); Basal cell carcinoma; CAD (coronary artery disease) (2/13/2013); Cardiomyopathy; Cataract; Cognitive deficits; H/O syncope -  (5/27/2013); History of PTCA - LAD, LCX and PDA PCI in 2006 (2/13/2013); HTN (hypertension) (2/13/2013); Hyperlipidemia LDL goal < 70 (2/13/2013); Hypertension; ICD (implantable cardiac defibrillator) in place (2/13/2013); Ischemic cardiomyopathy LVEF ~45% (2/13/2013); LBBB (left bundle branch block) (2/13/2013); Memory loss; Psoriasis vulgaris; and Syncope and collapse.    Surgical History:  has a past surgical history that includes Cardiac defibrillator placement; Tonsillectomy; Skin biopsy; Cataract extraction w/  intraocular lens implant (Right, 04/04/16); and Cataract extraction w/  intraocular lens implant (Left, 05/09/2016).    Family History: family history includes Cancer in his mother; Cataracts in his mother; Dementia in his father; Diabetes in his maternal grandmother; Kidney disease in his sister; No Known Problems in his brother, maternal aunt, maternal grandfather, maternal uncle, paternal aunt,  "paternal grandfather, paternal grandmother, and paternal uncle; Psoriasis in his father, son, and son; Tremor in his father.. Otherwise no colon cancer, inflammatory bowel disease, or GI malignancies.    Social History:  reports that he quit smoking about 53 years ago. He does not have any smokeless tobacco history on file. He reports that he does not drink alcohol or use drugs.    Review of patient's allergies indicates:   Allergen Reactions    Arb-angiotensin receptor antagonist Other (See Comments)     Hyperkalemia    Lisinopril Diarrhea       Medications:   Prescriptions Prior to Admission   Medication Sig Dispense Refill Last Dose    aspirin (ECOTRIN) 81 MG EC tablet Take 81 mg by mouth once daily.   7/2/2017 at Unknown time    atorvastatin (LIPITOR) 40 MG tablet Take 1 tablet (40 mg total) by mouth once daily. 90 tablet 3 7/2/2017 at Unknown time    amlodipine (NORVASC) 5 MG tablet Take 1 tablet (5 mg total) by mouth once daily. 90 tablet 3 Taking    dabigatran etexilate (PRADAXA) 150 mg Cap Take 1 capsule (150 mg total) by mouth 2 (two) times daily. "Do NOT break, chew, or open capsules." 180 capsule 3 6/30/2017    donepezil (ARICEPT) 10 MG tablet TAKE 1 TABLET BY MOUTH DAILY 90 tablet 3 6/30/2017    memantine (NAMENDA TITRATION PACK) tablet pack Follow package directions. 49 tablet 0 6/30/2017    metoprolol tartrate (LOPRESSOR) 25 MG tablet Take 0.5 tablets (12.5 mg total) by mouth 2 (two) times daily. 90 tablet 3 6/30/2017    multivitamin (MULTIVITAMIN) per tablet Take 1 tablet by mouth once daily.   7/2/2017       Physical Exam:    Vital Signs:   Vitals:    07/03/17 0804   BP: 126/73   Pulse: 76   Resp: 18   Temp: 98.3 °F (36.8 °C)       General Appearance: Well appearing in no acute distress  Eyes:    No scleral icterus  Lungs: CTA anteriorly  Heart:  Regular rate, S1, S2 normal, no murmurs heard.  Abdomen: Soft, non tender, non distended with normal bowel sounds. No hepatosplenomegaly, ascites, " or mass.  Extremities: No edema, large bruise on L arm  Skin: No rash    Labs:  Lab Results   Component Value Date    WBC 5.87 01/13/2017    HGB 13.9 (L) 01/13/2017    HCT 42.2 01/13/2017     01/13/2017    CHOL 120 07/02/2014    TRIG 113 07/02/2014    HDL 40 07/02/2014    ALT 36 07/02/2014    AST 33 07/02/2014     01/13/2017    K 5.2 (H) 01/13/2017     01/13/2017    CREATININE 1.3 01/13/2017    BUN 20 01/13/2017    CO2 27 01/13/2017    TSH 0.844 07/02/2014    PSA 1.8 08/04/2014    INR 1.6 (H) 09/18/2015    GLUF 97 07/17/2007       I have explained the risks and benefits of endoscopy procedures to the patient including but not limited to bleeding, perforation, infection, and death.      Dee Dee Castillo MD

## 2017-07-03 NOTE — ANESTHESIA POSTPROCEDURE EVALUATION
"Anesthesia Post Evaluation    Patient: Mark Anthony Velarde JrBridgett    Procedure(s) Performed: Procedure(s) (LRB):  ESOPHAGOGASTRODUODENOSCOPY (EGD) (N/A)    Final Anesthesia Type: general  Patient location during evaluation: GI PACU  Patient participation: Yes- Able to Participate  Level of consciousness: awake and alert  Post-procedure vital signs: reviewed and stable  Pain management: adequate  Airway patency: patent  PONV status at discharge: No PONV  Anesthetic complications: no      Cardiovascular status: blood pressure returned to baseline  Respiratory status: unassisted  Hydration status: euvolemic  Follow-up not needed.        Visit Vitals  BP (!) 113/56   Pulse 60   Temp 36.8 °C (98.3 °F) (Oral)   Resp 18   Ht 5' 6" (1.676 m)   Wt 70.3 kg (155 lb)   SpO2 96%   BMI 25.02 kg/m²       Pain/Marin Score: Pain Assessment Performed: Yes (7/3/2017  9:18 AM)  Presence of Pain: denies (7/3/2017  9:18 AM)  Marin Score: 9 (7/3/2017  9:17 AM)      "

## 2017-07-03 NOTE — TELEPHONE ENCOUNTER
Attempted to call patient regarding Dr. Castillo's orders of scheduling a CT of abdomen and a CMP lab.    Left message for patient to call the clinic back.

## 2017-07-03 NOTE — ANESTHESIA PREPROCEDURE EVALUATION
07/03/2017  Mark Anthony Velarde Jr. is a 77 y.o., male.    Anesthesia Evaluation    I have reviewed the Patient Summary Reports.    I have reviewed the Nursing Notes.   I have reviewed the Medications.     Review of Systems  Anesthesia Hx:  No problems with previous Anesthesia  History of prior surgery of interest to airway management or planning: Previous anesthesia: General Denies Family Hx of Anesthesia complications.   Denies Personal Hx of Anesthesia complications.   Cardiovascular:   Exercise tolerance: good Pacemaker Hypertension CAD  Dysrhythmias atrial fibrillation    Renal/:   Chronic Renal Disease, CRI    Neurological:   Resting tremor right upper extremity       Physical Exam  General:  Well nourished    Airway/Jaw/Neck:  Airway Findings: Mouth Opening: Normal Tongue: Normal  General Airway Assessment: Adult  Mallampati: II  Improves to II with phonation.  TM Distance: Normal, at least 6 cm  Jaw/Neck Findings:  Neck ROM: Normal ROM      Dental:  Dental Findings: In tact   Chest/Lungs:  Chest/Lungs Findings: Clear to auscultation, Normal Respiratory Rate     Heart/Vascular:  Heart Findings: Rate: Normal  Rhythm: Regular Rhythm  Sounds: Normal        Mental Status:  Mental Status Findings:  Cooperative, Alert and Oriented         Anesthesia Plan  Type of Anesthesia, risks & benefits discussed:  Anesthesia Type:  general  Patient's Preference:   Intra-op Monitoring Plan: standard ASA monitors  Intra-op Monitoring Plan Comments:   Post Op Pain Control Plan:   Post Op Pain Control Plan Comments:   Induction:   IV  Beta Blocker:  Patient is on a Beta-Blocker and has received one dose within the past 24 hours (No further documentation required).       Informed Consent: Patient understands risks and agrees with Anesthesia plan.  Questions answered. Anesthesia consent signed with patient.  ASA Score: 3     Day  of Surgery Review of History & Physical:    H&P update referred to the provider.         Ready For Surgery From Anesthesia Perspective.

## 2017-07-03 NOTE — PATIENT INSTRUCTIONS
Discharge Summary/Instructions after an Endoscopic Procedure  Patient Name: Mark Anthony Velarde  Patient MRN: [id number]  Patient YOB: 1940 Monday, July 03, 2017  Joselyn Castillo MD  RESTRICTIONS ON ACTIVITY:  - DO NOT drive a car, operate machinery, make legal/financial decisions, or   drink alcohol until the day after the procedure.    - The following day: return to full activity including work, except no heavy   lifting, straining or running for 3 days if polyps were removed.  - Diet: Eat and drink normally unless instructed otherwise.  TREATMENT FOR COMMON SIDE EFFECTS:  - Mild abdominal pain, bloating or excessive gas: rest, eat lightly and use   a heating pad.  - Sore Throat - treat with throat lozenges. Gargle with warm salt water.  SYMPTOMS TO WATCH FOR AND REPORT TO YOUR PHYSICIAN:  1. Severe abdominal pain or bloating.  2. Pain in chest.  3. Chills or fever occurring within 24 hours after a procedure.  4. A large amount of rectal bleeding, which would show as bright red,   maroon, or black stools. (A small amount of blood from the rectum is not   serious, especially if hemorrhoids are present.)  5. Because air was used during the procedure, expelling large amounts of air   from your rectum or belching is normal.  6. If a bowel prep was taken, you may not have a bowel movement for 1-3   days.  This is normal.  7. Go directly to the emergency room if you notice any of the following:   Chills and/or fever over 101 F   Persistent vomiting   Severe abdominal pain, other than gas cramps   Severe chest pain   Black, tarry stools   Any bleeding - exceeding one tablespoon  Your doctor recommends these additional instructions:  If any biopsies were performed, my office will call you in 5 to 6 business   days with any results.  You are being discharged to home.   Resume your previous diet.   Continue your present medications.   We are waiting for your pathology results.   Return to your GI clinic at appointment to  be scheduled.   The findings and recommendations have been discussed with you.  For questions, problems or results please call your physician - Joselyn Castillo MD at Work:  [phone].  [facility], EMERGENCY ROOM PHONE NUMBER: [phone]  IF A COMPLICATION OR EMERGENCY SITUATION ARISES AND YOU ARE UNABLE TO REACH   YOUR PHYSICIAN - GO TO THE EMERGENCY ROOM.  Joselyn Castillo MD  7/3/2017 9:16:17 AM  This report has been verified and signed electronically.

## 2017-07-03 NOTE — DISCHARGE INSTRUCTIONS

## 2017-07-03 NOTE — TRANSFER OF CARE
"Anesthesia Transfer of Care Note    Patient: Mark Anthony Velarde Jr.    Procedure(s) Performed: Procedure(s) (LRB):  ESOPHAGOGASTRODUODENOSCOPY (EGD) (N/A)    Patient location: PACU    Anesthesia Type: general    Transport from OR: Transported from OR on 2-3 L/min O2 by NC with adequate spontaneous ventilation    Post pain: adequate analgesia    Post assessment: no apparent anesthetic complications    Post vital signs: stable    Level of consciousness: awake    Nausea/Vomiting: no nausea/vomiting    Complications: none    Transfer of care protocol was followed      Last vitals:   Visit Vitals  BP (!) 105/52 (BP Location: Right leg, Patient Position: Lying, BP Method: Automatic)   Pulse 71   Temp 36.8 °C (98.3 °F) (Oral)   Resp 18   Ht 5' 6" (1.676 m)   Wt 70.3 kg (155 lb)   SpO2 95%   BMI 25.02 kg/m²     "

## 2017-07-03 NOTE — ANESTHESIA RELEASE NOTE
"Anesthesia Release from PACU Note    Patient: Mark Anthony Velarde JrBridgett    Procedure(s) Performed: Procedure(s) (LRB):  ESOPHAGOGASTRODUODENOSCOPY (EGD) (N/A)    Anesthesia type: general    Post pain: Adequate analgesia    Post assessment: no apparent anesthetic complications, tolerated procedure well and no evidence of recall    Last Vitals:   Visit Vitals  BP (!) 113/56   Pulse 60   Temp 36.8 °C (98.3 °F) (Oral)   Resp 18   Ht 5' 6" (1.676 m)   Wt 70.3 kg (155 lb)   SpO2 96%   BMI 25.02 kg/m²       Post vital signs: stable    Level of consciousness: awake, alert  and oriented    Nausea/Vomiting: no nausea/no vomiting    Complications: none    Airway Patency: patent    Respiratory: unassisted    Cardiovascular: stable and blood pressure at baseline    Hydration: euvolemic  "

## 2017-07-03 NOTE — TELEPHONE ENCOUNTER
EGD w duodenal stricture and diverticulum.     Pls arrange CT of abd, CMP prior to CT    Pls arrange fu w Dr. Castillo within 8 weeks or [facility] Kate if no apt available.

## 2017-07-07 ENCOUNTER — TELEPHONE (OUTPATIENT)
Dept: GASTROENTEROLOGY | Facility: CLINIC | Age: 77
End: 2017-07-07

## 2017-07-07 NOTE — TELEPHONE ENCOUNTER
Spoke with patient and results were given.    Patient verbalizes understanding.    Patient has some concerns he would like to discuss with Dr. Castillo.    Patient is having on and off trouble swallowing. Patient will sit down to eat and get a piece of meat stuck and vomit then finish the meal.    Is there anything he can do for that?  Patient has an [facility] ac[facility].

## 2017-07-10 ENCOUNTER — TELEPHONE (OUTPATIENT)
Dept: ENDOSCOPY | Facility: HOSPITAL | Age: 77
End: 2017-07-10

## 2017-07-11 ENCOUNTER — TELEPHONE (OUTPATIENT)
Dept: GASTROENTEROLOGY | Facility: CLINIC | Age: 77
End: 2017-07-11

## 2017-07-11 NOTE — TELEPHONE ENCOUNTER
Attempted to contact patient about scheduling appt with nito about getting a referral for a manometry.    Left message for patient to call the clinic.

## 2017-07-12 ENCOUNTER — TELEPHONE (OUTPATIENT)
Dept: GASTROENTEROLOGY | Facility: CLINIC | Age: 77
End: 2017-07-12

## 2017-07-12 NOTE — TELEPHONE ENCOUNTER
Left message for patient to call the clinic regarding scheduling his appt with nito to evaluate for a esophagogram and manometry.

## 2017-07-13 ENCOUNTER — TELEPHONE (OUTPATIENT)
Dept: GASTROENTEROLOGY | Facility: CLINIC | Age: 77
End: 2017-07-13

## 2017-07-13 NOTE — TELEPHONE ENCOUNTER
Attempted to contact patient to schedule appointment.    Left message for patient to call the clinic back.

## 2017-07-18 ENCOUNTER — TELEPHONE (OUTPATIENT)
Dept: GASTROENTEROLOGY | Facility: CLINIC | Age: 77
End: 2017-07-18

## 2017-07-18 NOTE — TELEPHONE ENCOUNTER
Spoke with patient.     Patient declined CT abdomen.     Patient scheduled follow up with nito for the evaluation of the manometry.

## 2017-07-19 ENCOUNTER — PATIENT MESSAGE (OUTPATIENT)
Dept: GASTROENTEROLOGY | Facility: CLINIC | Age: 77
End: 2017-07-19

## 2017-07-19 ENCOUNTER — LAB VISIT (OUTPATIENT)
Dept: LAB | Facility: HOSPITAL | Age: 77
End: 2017-07-19
Attending: INTERNAL MEDICINE
Payer: MEDICARE

## 2017-07-19 ENCOUNTER — TELEPHONE (OUTPATIENT)
Dept: NEPHROLOGY | Facility: CLINIC | Age: 77
End: 2017-07-19

## 2017-07-19 DIAGNOSIS — I44.7 LBBB (LEFT BUNDLE BRANCH BLOCK): ICD-10-CM

## 2017-07-19 DIAGNOSIS — Z95.810 ICD (IMPLANTABLE CARDIOVERTER-DEFIBRILLATOR), DUAL, IN SITU: ICD-10-CM

## 2017-07-19 DIAGNOSIS — N18.30 CHRONIC KIDNEY DISEASE, STAGE III (MODERATE): ICD-10-CM

## 2017-07-19 DIAGNOSIS — K31.5 DUODENAL STRICTURE: ICD-10-CM

## 2017-07-19 LAB
ALBUMIN SERPL BCP-MCNC: 3.7 G/DL
ALP SERPL-CCNC: 94 U/L
ALT SERPL W/O P-5'-P-CCNC: 25 U/L
ANION GAP SERPL CALC-SCNC: 6 MMOL/L
APTT BLDCRRT: 35.8 SEC
AST SERPL-CCNC: 30 U/L
BASOPHILS # BLD AUTO: 0.04 K/UL
BASOPHILS # BLD AUTO: 0.04 K/UL
BASOPHILS NFR BLD: 0.6 %
BASOPHILS NFR BLD: 0.6 %
BILIRUB SERPL-MCNC: 0.5 MG/DL
BUN SERPL-MCNC: 20 MG/DL
CALCIUM SERPL-MCNC: 9.5 MG/DL
CHLORIDE SERPL-SCNC: 109 MMOL/L
CO2 SERPL-SCNC: 27 MMOL/L
CREAT SERPL-MCNC: 1.4 MG/DL
DIFFERENTIAL METHOD: ABNORMAL
DIFFERENTIAL METHOD: ABNORMAL
EOSINOPHIL # BLD AUTO: 0.3 K/UL
EOSINOPHIL # BLD AUTO: 0.3 K/UL
EOSINOPHIL NFR BLD: 4.2 %
EOSINOPHIL NFR BLD: 4.2 %
ERYTHROCYTE [DISTWIDTH] IN BLOOD BY AUTOMATED COUNT: 14.8 %
ERYTHROCYTE [DISTWIDTH] IN BLOOD BY AUTOMATED COUNT: 14.8 %
EST. GFR  (AFRICAN AMERICAN): 54.9 ML/MIN/1.73 M^2
EST. GFR  (NON AFRICAN AMERICAN): 47.4 ML/MIN/1.73 M^2
GLUCOSE SERPL-MCNC: 72 MG/DL
HCT VFR BLD AUTO: 42.4 %
HCT VFR BLD AUTO: 42.4 %
HGB BLD-MCNC: 13.5 G/DL
HGB BLD-MCNC: 13.5 G/DL
INR PPP: 1.1
LYMPHOCYTES # BLD AUTO: 1 K/UL
LYMPHOCYTES # BLD AUTO: 1 K/UL
LYMPHOCYTES NFR BLD: 15.2 %
LYMPHOCYTES NFR BLD: 15.2 %
MCH RBC QN AUTO: 30.1 PG
MCH RBC QN AUTO: 30.1 PG
MCHC RBC AUTO-ENTMCNC: 31.8 %
MCHC RBC AUTO-ENTMCNC: 31.8 %
MCV RBC AUTO: 95 FL
MCV RBC AUTO: 95 FL
MONOCYTES # BLD AUTO: 0.6 K/UL
MONOCYTES # BLD AUTO: 0.6 K/UL
MONOCYTES NFR BLD: 8.6 %
MONOCYTES NFR BLD: 8.6 %
NEUTROPHILS # BLD AUTO: 4.8 K/UL
NEUTROPHILS # BLD AUTO: 4.8 K/UL
NEUTROPHILS NFR BLD: 71.4 %
NEUTROPHILS NFR BLD: 71.4 %
PLATELET # BLD AUTO: 237 K/UL
PLATELET # BLD AUTO: 237 K/UL
PMV BLD AUTO: 10.6 FL
PMV BLD AUTO: 10.6 FL
POTASSIUM SERPL-SCNC: 4.9 MMOL/L
PROT SERPL-MCNC: 7.2 G/DL
PROTHROMBIN TIME: 12.1 SEC
RBC # BLD AUTO: 4.48 M/UL
RBC # BLD AUTO: 4.48 M/UL
SODIUM SERPL-SCNC: 142 MMOL/L
WBC # BLD AUTO: 6.71 K/UL
WBC # BLD AUTO: 6.71 K/UL

## 2017-07-19 PROCEDURE — 85025 COMPLETE CBC W/AUTO DIFF WBC: CPT | Mod: PO

## 2017-07-19 PROCEDURE — 36415 COLL VENOUS BLD VENIPUNCTURE: CPT | Mod: PO

## 2017-07-19 PROCEDURE — 85730 THROMBOPLASTIN TIME PARTIAL: CPT

## 2017-07-19 PROCEDURE — 80053 COMPREHEN METABOLIC PANEL: CPT

## 2017-07-19 PROCEDURE — 85610 PROTHROMBIN TIME: CPT | Mod: PO

## 2017-07-19 NOTE — TELEPHONE ENCOUNTER
----- Message from Shani Mckeon sent at 7/19/2017 10:18 AM CDT -----  Contact: Pt wife Margy: [phone]  Pt wife called and stated that there was a msg for her  to come in on tomorrow.  Pt missed the call and is calling back to see if its avail.  Pt can be reached at [phone].  Please call.    Thanks      Done

## 2017-07-20 ENCOUNTER — OFFICE VISIT (OUTPATIENT)
Dept: NEPHROLOGY | Facility: CLINIC | Age: 77
End: 2017-07-20
Payer: MEDICARE

## 2017-07-20 ENCOUNTER — TELEPHONE (OUTPATIENT)
Dept: GASTROENTEROLOGY | Facility: CLINIC | Age: 77
End: 2017-07-20

## 2017-07-20 VITALS
HEIGHT: 66 IN | OXYGEN SATURATION: 95 % | HEART RATE: 60 BPM | WEIGHT: 193.13 LBS | BODY MASS INDEX: 31.04 KG/M2 | DIASTOLIC BLOOD PRESSURE: 60 MMHG | SYSTOLIC BLOOD PRESSURE: 120 MMHG

## 2017-07-20 DIAGNOSIS — N18.30 CHRONIC KIDNEY DISEASE, STAGE III (MODERATE): Primary | ICD-10-CM

## 2017-07-20 PROCEDURE — 1159F MED LIST DOCD IN RCRD: CPT | Mod: S$GLB,,, | Performed by: INTERNAL MEDICINE

## 2017-07-20 PROCEDURE — 1126F AMNT PAIN NOTED NONE PRSNT: CPT | Mod: S$GLB,,, | Performed by: INTERNAL MEDICINE

## 2017-07-20 PROCEDURE — 99999 PR PBB SHADOW E&M-EST. PATIENT-LVL III: CPT | Mod: PBBFAC,,, | Performed by: INTERNAL MEDICINE

## 2017-07-20 PROCEDURE — 99213 OFFICE O/P EST LOW 20 MIN: CPT | Mod: S$GLB,,, | Performed by: INTERNAL MEDICINE

## 2017-07-20 PROCEDURE — 1157F ADVNC CARE PLAN IN RCRD: CPT | Mod: S$GLB,,, | Performed by: INTERNAL MEDICINE

## 2017-07-20 NOTE — PROGRESS NOTES
Subjective:      Patient ID: Mark Anthony Velarde Jr. is a 77 y.o. White male who presents for follow-up evaluation of CKD.     HPI   No acute events since last exam. No acute complaints or difficulty with urination. Continues to have an ICD/PCM who is keeping him stable. The patient denies taking NSAIDs or new antibiotics, recreational drugs, recent episode of dehydration, diarrhea, nausea or vomiting, acute illness, hospitalization or exposure to IV radiocontrast.     Review of Systems   Constitutional: Negative for activity change, appetite change, chills, diaphoresis, fatigue, fever and unexpected weight change.   HENT: Negative for congestion, facial swelling and trouble swallowing.    Eyes: Negative for pain, discharge, redness and visual disturbance.   Respiratory: Negative for cough, shortness of breath and wheezing.    Cardiovascular: Negative for chest pain, palpitations and leg swelling.   Gastrointestinal: Negative for abdominal distention, abdominal pain, constipation and diarrhea.   Endocrine: Negative for cold intolerance and heat intolerance.   Genitourinary: Negative for decreased urine volume, difficulty urinating, dysuria, flank pain, frequency and urgency.   Musculoskeletal: Negative for arthralgias, joint swelling and myalgias.   Skin: Negative for color change.   Allergic/Immunologic: Negative for immunocompromised state.   Neurological: Negative for dizziness, tremors, syncope, speech difficulty, weakness, light-headedness and numbness.   Hematological: Negative for adenopathy.   Psychiatric/Behavioral: Negative for agitation, confusion, decreased concentration and dysphoric mood.       Objective:   /62 mmHg  Physical Exam   Constitutional: He is oriented to person, place, and time. He appears well-developed and well-nourished.   HENT:   Head: Normocephalic and atraumatic.   Eyes: Conjunctivae and EOM are normal. Pupils are equal, round, and reactive to light.   Neck: Neck supple.    Cardiovascular: Normal rate, regular rhythm, normal heart sounds and intact distal pulses.    Pulmonary/Chest: Effort normal and breath sounds normal.   Abdominal: Soft. Bowel sounds are normal.   Musculoskeletal: Normal range of motion.   Neurological: He is alert and oriented to person, place, and time. He has normal reflexes.   Skin: Skin is warm and dry.   Psychiatric: He has a normal mood and affect. His behavior is normal.   Nursing note and vitals reviewed.      LABS  Serum Cr 1.4 mg/dL  UPCR 0.13 g/g    Assessment:     1. CKD stage 3A eGFR ~ 50 ml/min. Etiology unknown, possible chronic TI disease, hypoperfusion injury (DCM, AFIB), hypertensive injury.  Stable kidney function. In fact, sCr is better . No proteinuria. Risk for ESRD progression is low. Avoidance of NSAIDs encouraged.      Plan:       Continue current regimen, Avoid NSAIDs.      Follow up in 6 months with labs.

## 2017-07-20 NOTE — TELEPHONE ENCOUNTER
Attempted to contact patient without success.    This call is regarding seeing if patient would like to come in tomorrow for an appt instead of sept.    Left message for patient to call back the clinic.

## 2017-07-20 NOTE — TELEPHONE ENCOUNTER
Spoke with patient and offered the appt slot available for tomorrow.    Patient declined appt and would like to keep his appt with Dr. love for Sept.    Appt was rescheduled to 9/13 instead of 9/15.    appt slip mailed.

## 2017-07-25 ENCOUNTER — ANESTHESIA EVENT (OUTPATIENT)
Dept: MEDSURG UNIT | Facility: HOSPITAL | Age: 77
End: 2017-07-25
Payer: MEDICARE

## 2017-07-25 ENCOUNTER — TELEPHONE (OUTPATIENT)
Dept: ELECTROPHYSIOLOGY | Facility: CLINIC | Age: 77
End: 2017-07-25

## 2017-07-25 NOTE — TELEPHONE ENCOUNTER
Called pt to review pre op instructions for gen change tomorrow AM. Reminded pt to fast after midnight, arrive for 6 AM to cardiology waiting area, and hold pradaxa today and tomorrow AM. Pt verbalized understanding. Pt denies any questions/concerns.

## 2017-07-26 ENCOUNTER — HOSPITAL ENCOUNTER (OUTPATIENT)
Facility: HOSPITAL | Age: 77
Discharge: HOME | End: 2017-07-26
Attending: INTERNAL MEDICINE | Admitting: INTERNAL MEDICINE
Payer: MEDICARE

## 2017-07-26 ENCOUNTER — SURGERY (OUTPATIENT)
Age: 77
End: 2017-07-26

## 2017-07-26 ENCOUNTER — ANESTHESIA (OUTPATIENT)
Dept: MEDSURG UNIT | Facility: HOSPITAL | Age: 77
End: 2017-07-26
Payer: MEDICARE

## 2017-07-26 VITALS
BODY MASS INDEX: 27.16 KG/M2 | RESPIRATION RATE: 16 BRPM | OXYGEN SATURATION: 97 % | WEIGHT: 169 LBS | DIASTOLIC BLOOD PRESSURE: 65 MMHG | SYSTOLIC BLOOD PRESSURE: 134 MMHG | HEART RATE: 61 BPM | TEMPERATURE: 97 F | HEIGHT: 66 IN

## 2017-07-26 DIAGNOSIS — Z95.810 ICD (IMPLANTABLE CARDIOVERTER-DEFIBRILLATOR) IN PLACE: ICD-10-CM

## 2017-07-26 DIAGNOSIS — Z45.02 ENCOUNTER FOR ADJUSTMENT OR MANAGEMENT OF AUTOMATIC IMPLANTABLE CARDIOVERTER-DEFIBRILLATOR: ICD-10-CM

## 2017-07-26 DIAGNOSIS — Z45.02 IMPLANTABLE CARDIOVERTER-DEFIBRILLATOR (ICD) AT END OF LIFE: Primary | ICD-10-CM

## 2017-07-26 DIAGNOSIS — I44.7 LEFT BUNDLE-BRANCH BLOCK: ICD-10-CM

## 2017-07-26 PROCEDURE — 63600175 PHARM REV CODE 636 W HCPCS: Performed by: NURSE PRACTITIONER

## 2017-07-26 PROCEDURE — 25500020 PHARM REV CODE 255

## 2017-07-26 PROCEDURE — D9220A PRA ANESTHESIA: Mod: ANES,,, | Performed by: ANESTHESIOLOGY

## 2017-07-26 PROCEDURE — 37000009 HC ANESTHESIA EA ADD 15 MINS: Performed by: INTERNAL MEDICINE

## 2017-07-26 PROCEDURE — D9220A PRA ANESTHESIA: Mod: CRNA,,, | Performed by: NURSE ANESTHETIST, CERTIFIED REGISTERED

## 2017-07-26 PROCEDURE — 37000008 HC ANESTHESIA 1ST 15 MINUTES: Performed by: INTERNAL MEDICINE

## 2017-07-26 PROCEDURE — 33263 RMVL & RPLCMT DFB GEN 2 LEAD: CPT | Mod: ,,, | Performed by: INTERNAL MEDICINE

## 2017-07-26 PROCEDURE — 25000003 PHARM REV CODE 250

## 2017-07-26 PROCEDURE — 25500020 PHARM REV CODE 255: Performed by: NURSE ANESTHETIST, CERTIFIED REGISTERED

## 2017-07-26 PROCEDURE — 93010 ELECTROCARDIOGRAM REPORT: CPT | Mod: ,,, | Performed by: INTERNAL MEDICINE

## 2017-07-26 PROCEDURE — 63600175 PHARM REV CODE 636 W HCPCS

## 2017-07-26 PROCEDURE — 63600175 PHARM REV CODE 636 W HCPCS: Performed by: NURSE ANESTHETIST, CERTIFIED REGISTERED

## 2017-07-26 PROCEDURE — 93010 ELECTROCARDIOGRAM REPORT: CPT | Mod: 76,,, | Performed by: INTERNAL MEDICINE

## 2017-07-26 PROCEDURE — 25000003 PHARM REV CODE 250: Performed by: NURSE PRACTITIONER

## 2017-07-26 PROCEDURE — C1730 CATH, EP, 19 OR FEW ELECT: HCPCS

## 2017-07-26 PROCEDURE — 93005 ELECTROCARDIOGRAM TRACING: CPT

## 2017-07-26 RX ORDER — FENTANYL CITRATE 50 UG/ML
INJECTION, SOLUTION INTRAMUSCULAR; INTRAVENOUS
Status: DISCONTINUED | OUTPATIENT
Start: 2017-07-26 | End: 2017-07-26

## 2017-07-26 RX ORDER — IODIXANOL 320 MG/ML
INJECTION, SOLUTION INTRAVASCULAR
Status: DISCONTINUED | OUTPATIENT
Start: 2017-07-26 | End: 2017-07-26

## 2017-07-26 RX ORDER — MIDAZOLAM HYDROCHLORIDE 1 MG/ML
INJECTION, SOLUTION INTRAMUSCULAR; INTRAVENOUS
Status: DISCONTINUED | OUTPATIENT
Start: 2017-07-26 | End: 2017-07-26

## 2017-07-26 RX ORDER — LIDOCAINE HCL/PF 100 MG/5ML
SYRINGE (ML) INTRAVENOUS
Status: DISCONTINUED | OUTPATIENT
Start: 2017-07-26 | End: 2017-07-26

## 2017-07-26 RX ORDER — CEFAZOLIN SODIUM 2 G/50ML
2 SOLUTION INTRAVENOUS
Status: COMPLETED | OUTPATIENT
Start: 2017-07-26 | End: 2017-07-26

## 2017-07-26 RX ORDER — SODIUM CHLORIDE 9 MG/ML
INJECTION, SOLUTION INTRAVENOUS CONTINUOUS
Status: DISCONTINUED | OUTPATIENT
Start: 2017-07-26 | End: 2017-07-26 | Stop reason: HOSPADM

## 2017-07-26 RX ORDER — PROPOFOL 10 MG/ML
VIAL (ML) INTRAVENOUS
Status: DISCONTINUED | OUTPATIENT
Start: 2017-07-26 | End: 2017-07-26

## 2017-07-26 RX ORDER — CEFADROXIL 500 MG/1
500 CAPSULE ORAL EVERY 12 HOURS
Qty: 5 CAPSULE | Refills: 0 | Status: SHIPPED | OUTPATIENT
Start: 2017-07-26 | End: 2017-09-13

## 2017-07-26 RX ADMIN — FENTANYL CITRATE 25 MCG: 50 INJECTION, SOLUTION INTRAMUSCULAR; INTRAVENOUS at 08:07

## 2017-07-26 RX ADMIN — MIDAZOLAM 1 MG: 1 INJECTION INTRAMUSCULAR; INTRAVENOUS at 08:07

## 2017-07-26 RX ADMIN — CEFAZOLIN SODIUM 2 G: 2 SOLUTION INTRAVENOUS at 08:07

## 2017-07-26 RX ADMIN — IODIXANOL 10 ML: 320 INJECTION, SOLUTION INTRAVASCULAR at 08:07

## 2017-07-26 RX ADMIN — SODIUM CHLORIDE: 0.9 INJECTION, SOLUTION INTRAVENOUS at 08:07

## 2017-07-26 RX ADMIN — LIDOCAINE HYDROCHLORIDE 60 MG: 20 INJECTION, SOLUTION INTRAVENOUS at 08:07

## 2017-07-26 RX ADMIN — PROPOFOL 20 MG: 10 INJECTION, EMULSION INTRAVENOUS at 08:07

## 2017-07-26 RX ADMIN — MIDAZOLAM 1 MG: 1 INJECTION INTRAMUSCULAR; INTRAVENOUS at 09:07

## 2017-07-26 NOTE — H&P
[facility]  Cardiology Electrophysiology  History and Physical     Patient Name: Mark Anthony Velarde Jr.  MRN: [id number]  Admission Date: 7/26/2017  Attending Provider: Maurice Ewing MD  Principal Problem: Implantable cardioverter-defibrillator (ICD) at end of life    Patient information was obtained from patient.     Subjective:     Chief Complaint:  ICD at ANGI/Battery advisory      HPI:  77 y.o. male with a hx of ICM (EF 40-45%), LBBB, CHB s/p DC ICD w/ recent generator change, a hx of pAF, CAD s/p PTCA, HTN, CKD III, and HLD. Pt 's ICD is at ANGI/ with a [employer] advisory device. Pt denies any acute symptoms at present.         Past Medical History:   Diagnosis Date    *Atrial fibrillation     Anticoagulant long-term use     Atrial fibrillation - asymptomatic but noted on ICD interrogation ([id number]) - 16 h 40 minutes of afib 2/13/2013    Atrial flutter with controlled response - seen on ICD interrogation - asymptomatic 2/13/2013    Automatic implantable cardioverter-defibrillator in situ 2/13/2013    Basal cell carcinoma     mid forehead    CAD (coronary artery disease) 2/13/2013    Cardiomyopathy     Cataract     Cognitive deficits     mild    H/O syncope -  5/27/2013    History of PTCA - LAD, LCX and PDA PCI in 2006 2/13/2013    HTN (hypertension) 2/13/2013    Hyperlipidemia LDL goal < 70 2/13/2013    Hypertension     ICD (implantable cardiac defibrillator) in place 2/13/2013    Ischemic cardiomyopathy LVEF ~45% 2/13/2013    LBBB (left bundle branch block) 2/13/2013    Memory loss     Psoriasis vulgaris     Syncope and collapse        Past Surgical History:   Procedure Laterality Date    CARDIAC DEFIBRILLATOR PLACEMENT      CATARACT EXTRACTION W/  INTRAOCULAR LENS IMPLANT Right 04/04/16    Dr Michael     CATARACT EXTRACTION W/  INTRAOCULAR LENS IMPLANT Left 05/09/2016    SKIN BIOPSY      TONSILLECTOMY            Review of patient's allergies indicates:   Allergen  "Reactions    Arb-angiotensin receptor antagonist Other (See Comments)     Hyperkalemia    Lisinopril Diarrhea        No current facility-administered medications on file prior to encounter.      Current Outpatient Prescriptions on File Prior to Encounter   Medication Sig Dispense Refill    amlodipine (NORVASC) 5 MG tablet Take 1 tablet (5 mg total) by mouth once daily. (Patient taking differently: Take 5 mg by mouth every evening. ) 90 tablet 3    aspirin (ECOTRIN) 81 MG EC tablet Take 81 mg by mouth once daily.      atorvastatin (LIPITOR) 40 MG tablet Take 1 tablet (40 mg total) by mouth once daily. 90 tablet 3    donepezil (ARICEPT) 10 MG tablet TAKE 1 TABLET BY MOUTH DAILY 90 tablet 3    memantine (NAMENDA TITRATION PACK) tablet pack Follow package directions. (Patient taking differently: 2 (two) times daily. Follow package directions.) 49 tablet 0    metoprolol tartrate (LOPRESSOR) 25 MG tablet Take 0.5 tablets (12.5 mg total) by mouth 2 (two) times daily. 90 tablet 3    multivitamin (MULTIVITAMIN) per tablet Take 1 tablet by mouth once daily.      dabigatran etexilate (PRADAXA) 150 mg Cap Take 1 capsule (150 mg total) by mouth 2 (two) times daily. "Do NOT break, chew, or open capsules." 180 capsule 3       Family History   Problem Relation Age of Onset    Psoriasis Father     Dementia Father     Tremor Father     Cataracts Mother     Cancer Mother     Psoriasis Son     Psoriasis Son     Diabetes Maternal Grandmother     Kidney disease Sister     No Known Problems Brother     No Known Problems Maternal Aunt     No Known Problems Maternal Uncle     No Known Problems Paternal Aunt     No Known Problems Paternal Uncle     No Known Problems Maternal Grandfather     No Known Problems Paternal Grandmother     No Known Problems Paternal Grandfather     Amblyopia Neg Hx     Blindness Neg Hx     Glaucoma Neg Hx     Hypertension Neg Hx     Macular degeneration Neg Hx     Retinal detachment " Neg Hx     Strabismus Neg Hx     Stroke Neg Hx     Thyroid disease Neg Hx     Parkinsonism Neg Hx        Social History   Substance Use Topics    Smoking status: Former Smoker     Quit date: 9/22/1963    Smokeless tobacco: Never Used    Alcohol use No         Review of Systems   Constitution: Negative for fevers/chills.   Positive for easily gets    weak and has malaise/fatigue.   HENT: Negative for ear pain and tinnitus.   Eyes: Negative for blurred vision.   Cardiovascular: Positive for palpitations. Negative for chest pain,  near-syncope, and syncope.   Respiratory:  Positive for shortness of breath on exertion   Endocrine:  Negative for polyuria.   Hematologic/Lymphatic: Negative for bruise/bleed easily.   Skin:  Negative for rash.   Musculoskeletal: Negative for joint pain and muscle weakness.   Extremity: Negative for swelling in the lower extremities.   Gastrointestinal:  Negative for abdominal pain and change in bowel habit.   Genitourinary: Negative for frequency.   Neurological:  Negative for dizziness.   Psychiatric/Behavioral:  Negative for depression, anxiety     Objective:     Temp: 96.7 °F (35.9 °C) (07/26/17 0620)  Pulse: 60 (07/26/17 0620)  Resp: 18 (07/26/17 0620)  BP: 138/62 (07/26/17 0624)  SpO2: 96 % (07/26/17 0620)    Vital Signs (24h Range):  Temp:  [96.7 °F (35.9 °C)]   Pulse:  [60]   Resp:  [18]   BP: (135-138)/(62)   SpO2:  [96 %]     PE:   General: Well developed, well nourished, No distress on room air   HEENT: Head is normocephalic, atraumatic;  Lungs: Clear to auscultation bilaterally.  No wheezing. Normal respiratory effort.  Cardiovascular:  S1 S2 Normal rate, regular rhythm and normal heart sounds.    PMI is not displaced  Extremities: No LE edema. Pulses 2+ and symmetric.   Abdomen: Abdomen is soft, non-tender non-distended with normal bowel sounds  Musculoskeletal: normal range of motion   Neurologic: Normal strength and tone. No focal numbness or weakness.   Psychiatric:  normal mood and affect.  behavior is normal. Alert and oriented X 3.      Significant Labs:   Lab Results   Component Value Date    WBC 6.71 07/19/2017    WBC 6.71 07/19/2017    HGB 13.5 (L) 07/19/2017    HGB 13.5 (L) 07/19/2017    HCT 42.4 07/19/2017    HCT 42.4 07/19/2017    MCV 95 07/19/2017    MCV 95 07/19/2017     07/19/2017     07/19/2017     BMP  Lab Results   Component Value Date     07/19/2017     07/19/2017     07/19/2017    K 4.9 07/19/2017    K 4.9 07/19/2017    K 4.9 07/19/2017     07/19/2017     07/19/2017     07/19/2017    CO2 27 07/19/2017    CO2 27 07/19/2017    CO2 27 07/19/2017    BUN 20 07/19/2017    BUN 20 07/19/2017    BUN 20 07/19/2017    CREATININE 1.4 07/19/2017    CREATININE 1.4 07/19/2017    CREATININE 1.4 07/19/2017    CALCIUM 9.5 07/19/2017    CALCIUM 9.5 07/19/2017    CALCIUM 9.5 07/19/2017    ANIONGAP 6 (L) 07/19/2017    ANIONGAP 6 (L) 07/19/2017    ANIONGAP 6 (L) 07/19/2017    ESTGFRAFRICA 54.9 (A) 07/19/2017    ESTGFRAFRICA 54.9 (A) 07/19/2017    ESTGFRAFRICA 54.9 (A) 07/19/2017    EGFRNONAA 47.4 (A) 07/19/2017    EGFRNONAA 47.4 (A) 07/19/2017    EGFRNONAA 47.4 (A) 07/19/2017      Lab Results   Component Value Date    INR 1.1 07/19/2017    INR 1.6 (H) 09/18/2015    INR 0.9 02/21/2008       Significant Imaging: reviewed   ECHO    CONCLUSIONS     1 - Upper limit of normal left ventricular enlargement.     2 - Mildly to moderately depressed left ventricular systolic function (EF 40-45%).     3 - Eccentric hypertrophy.     4 - Normal left ventricular diastolic function.     5 - Normal right ventricular systolic function .     6 - Mild left atrial enlargement.     7 - Trivial to mild aortic regurgitation.     8 - Mild mitral regurgitation.     9 - Trivial tricuspid regurgitation.     10 - Trivial pulmonic regurgitation.     11 - The estimated PA systolic pressure is 33 mmHg.     12 - TDI as per text.       This document has been  electronically    SIGNED BY: Harjeet Benavides MD On: 2017 12:11    EK2017 AV paced at 60 BPM     Assessment and Plan:     77 y.o. male with a hx of ICM (EF 40-45%), LBBB, CHB s/p DC ICD w/ recent generator change, a hx of pAF, CAD s/p PTCA, HTN, CKD III, and HLD. Pt 's ICD is at ANGI/ with a [employer] advisory device. Pt denies any acute symptoms at present. pradaxa was held prior to procedure last dose was on 17     Generator replacement   Anesthesia for sedation     Discussed the risks and benefits of device generator replacement. Our discussion of risks included (but was not limited to) the possibility of pain  infection, bleeding,  death   I discussed with patient risks, indications, benefits, and alternatives of the planned procedure. All questions were answered. Patient and spouse   understands and  and wishes to proceed. No further questioned voiced at this time       ANGELA Garland-BC  Cardiology Electrophysiology  NP   [facility]    Attending: MD Maurice Ewing

## 2017-07-26 NOTE — DISCHARGE SUMMARY
[facility]  Cardiac Electrophysiology  Discharge Summary      Patient Name: Mark Anthony Velarde Jr.  MRN: [id number]  Admission Date: 7/26/2017  Hospital Length of Stay: 0 days  Discharge Date and Time:  07/26/2017 10:55 AM  Attending Physician: Maurice Ewing MD    Discharging Provider: Josselyn Méndez NP  Primary Care Physician: Jeff Yan MD    HPI:  77 y.o. male with a hx of ICM (EF 40-45%), LBBB, CHB s/p DC ICD w/ recent generator change, a hx of pAF, CAD s/p PTCA, HTN, CKD III, and HLD. Pt 's ICD is at ANGI/ with a [employer] advisory device.    Procedure(s) (LRB):  REPLACEMENT-GENERATOR-ICD (N/A)     Hospital Course:  Pt underwent  Device Generator Change, tolerated procedure, no acute complications noted. Left pectoral site with  no bleeding or hematoma noted.   Pt to continue antibiotics as Rx . Pt to hold pradaxa for 3 days then resume. Pt on BB, not on Ace/Arb due to allergy.    Pt to follow up with device clinic in 1 week for wound check , and 3 months with FILIPE Jarquin   Discharge plans/instructions discussed with patient and spouse who verbalized understanding and agreement of plans of care.     Consults: anesthesia       Final Active Diagnoses:    Diagnosis Date Noted POA    PRINCIPAL PROBLEM:  Implantable cardioverter-defibrillator (ICD) at end of life [T82.198A] 07/26/2017 Yes      Problems Resolved During this Admission:    Diagnosis Date Noted Date Resolved POA       Discharged Condition: good    Disposition: Home or Self Care    Follow Up:  Follow-up Information     [facility] ARRHYTHMIA In 1 week.    Specialty:  Arrhythmia  Why:  For wound re-check  Contact information:  [address]  [facility] [id number] [phone]           [facility]aker In 3 months.    Specialty:  Cardiology  Contact information:  [address]  [facility] [id number] [phone]                 Patient Instructions:     Diet general   Order Specific Question Answer Comments    Additional restrictions: Cardiac (Low Na/Chol)      Call MD for:  temperature >100.4     Call MD for:  persistent nausea and vomiting or diarrhea     Call MD for:  severe uncontrolled pain     Call MD for:  redness, tenderness, or signs of infection (pain, swelling, redness, odor or green/yellow discharge around incision site)     Call MD for:  difficulty breathing or increased cough     Call MD for:  severe persistent headache     Call MD for:  worsening rash     Call MD for:  persistent dizziness, light-headedness, or visual disturbances     Call MD for:  increased confusion or weakness     Call MD for:   Scheduling Instructions: For any concerning medical symptoms     Remove dressing in 24 hours     Other restrictions (specify):   Scheduling Instructions: Hold pradaxa for 3 days and then resume medication       Medications:  Reconciled Home Medications:   Current Discharge Medication List      START taking these medications    Details   cefadroxil (DURICEF) 500 MG Cap Take 1 capsule (500 mg total) by mouth every 12 (twelve) hours.  Qty: 5 capsule, Refills: 0         CONTINUE these medications which have NOT CHANGED    Details   amlodipine (NORVASC) 5 MG tablet Take 1 tablet (5 mg total) by mouth once daily.  Qty: 90 tablet, Refills: 3    Associated Diagnoses: Essential hypertension      aspirin (ECOTRIN) 81 MG EC tablet Take 81 mg by mouth once daily.      atorvastatin (LIPITOR) 40 MG tablet Take 1 tablet (40 mg total) by mouth once daily.  Qty: 90 tablet, Refills: 3      donepezil (ARICEPT) 10 MG tablet TAKE 1 TABLET BY MOUTH DAILY  Qty: 90 tablet, Refills: 3    Associated Diagnoses: Lewy body dementia with behavioral disturbance      memantine (NAMENDA TITRATION PACK) tablet pack Follow package directions.  Qty: 49 tablet, Refills: 0    Associated Diagnoses: Lewy body dementia with behavioral disturbance      metoprolol tartrate (LOPRESSOR) 25 MG tablet Take 0.5 tablets (12.5 mg total) by mouth 2 (two) times  "daily.  Qty: 90 tablet, Refills: 3      multivitamin (MULTIVITAMIN) per tablet Take 1 tablet by mouth once daily.      omeprazole (PRILOSEC) 20 MG capsule TAKE ONE CAPSULE BY MOUTH TWICE DAILY  Qty: 180 capsule, Refills: 1    Comments: **Patient requests 90 days supply**      dabigatran etexilate (PRADAXA) 150 mg Cap Take 1 capsule (150 mg total) by mouth 2 (two) times daily. "Do NOT break, chew, or open capsules."  Qty: 180 capsule, Refills: 3             ANGELA Garland-BC  Cardiology Electrophysiology  NP   [facility]    Attending: MD Maurice Ewing         "

## 2017-07-26 NOTE — ANESTHESIA POSTPROCEDURE EVALUATION
"Anesthesia Post Evaluation    Patient: Mark Anthony Velarde JrBridgett    Procedure(s) Performed: Procedure(s) (LRB):  REPLACEMENT-GENERATOR-ICD (N/A)    Final Anesthesia Type: MAC  Patient location during evaluation: PACU  Patient participation: Yes- Able to Participate  Level of consciousness: awake and alert  Post-procedure vital signs: reviewed and stable  Pain management: adequate  Airway patency: patent  PONV status at discharge: No PONV  Anesthetic complications: no      Cardiovascular status: hemodynamically stable  Respiratory status: unassisted  Hydration status: euvolemic  Follow-up not needed.        Visit Vitals  /61   Pulse 60   Temp 36.3 °C (97.4 °F) (Oral)   Resp 16   Ht 5' 6" (1.676 m)   Wt 76.7 kg (169 lb)   SpO2 95%   BMI 27.28 kg/m²       Pain/Marin Score: Pain Assessment Performed: Yes (7/26/2017  9:45 AM)  Presence of Pain: denies (7/26/2017  9:45 AM)  Marin Score: 9 (7/26/2017  9:45 AM)      "

## 2017-07-26 NOTE — ANESTHESIA RELEASE NOTE
Anesthesia Release from PACU Note    Patient: Mark Anthony Velarde JrBridgett    Procedure(s) Performed: Procedure(s) (LRB):  REPLACEMENT-GENERATOR-ICD (N/A)    Anesthesia type: general    Post pain: Adequate analgesia    Post assessment: no apparent anesthetic complications, tolerated procedure well and no evidence of recall    Post vital signs:   Vitals:    07/26/17 0945   BP: 136/61   Pulse: 60   Resp: 16   Temp:         Level of consciousness: awake, alert  and oriented    Nausea/Vomiting: no nausea/no vomiting    Complications: none    Airway Patency: patent    Respiratory: unassisted, spontaneous ventilation    Cardiovascular: stable and blood pressure at baseline    Hydration: euvolemic

## 2017-07-26 NOTE — PROGRESS NOTES
Pt returned to room s/p generator change. Mepilex L chest wall incision site is c/d/i.  VSS.  Wife called to bedside.

## 2017-07-26 NOTE — ANESTHESIA PREPROCEDURE EVALUATION
07/26/2017  Mark Anthony Velarde JrBridgett is a 77 y.o., male   Patient Active Problem List   Diagnosis    Nuclear sclerosis - Both Eyes    Cortical senile cataract - Both Eyes    CAD (coronary artery disease)    HTN (hypertension)    Hyperlipidemia with target LDL less than 70    History of PTCA - LAD, LCX and PDA PCI in 2006    LBBB (left bundle branch block)    ICD (implantable cardioverter-defibrillator), dual, in situ    Ischemic cardiomyopathy LVEF ~45%    Atrial fibrillation     Atrial flutter with controlled response - seen on ICD interrogation - asymptomatic    Anemia    Vitamin D deficiency    Calculus of kidney    Chronic kidney disease, stage III (moderate)    MCI (mild cognitive impairment) with memory loss    Post-operative state    Nuclear sclerotic cataract of left eye    Tremor    Current use of long term anticoagulation    Dysphagia    Duodenitis    Implantable cardioverter-defibrillator (ICD) at end of life     .    Anesthesia Evaluation         Review of Systems      Physical Exam  General:  Well nourished    Airway/Jaw/Neck:  Airway Findings: Mouth Opening: Normal Tongue: Normal  General Airway Assessment: Adult  Mallampati: II  Improves to II with phonation.  TM Distance: Normal, at least 6 cm      Dental:  Dental Findings: In tact   Chest/Lungs:  Chest/Lungs Findings: Clear to auscultation     Heart/Vascular:  Heart Findings: Rate: Normal  Rhythm: Regular Rhythm  Sounds: Normal        Mental Status:  Mental Status Findings:  Cooperative, Alert and Oriented         Anesthesia Plan  Type of Anesthesia, risks & benefits discussed:  Anesthesia Type:  MAC  Patient's Preference: Sedation  Intra-op Monitoring Plan: standard ASA monitors  Intra-op Monitoring Plan Comments:   Post Op Pain Control Plan: per primary service following discharge from PACU  Post Op Pain Control Plan  Comments:   Induction:   IV  Beta Blocker:  Patient is not currently on a Beta-Blocker (No further documentation required).       Informed Consent: Patient understands risks and agrees with Anesthesia plan.  Questions answered.   ASA Score: 3     Day of Surgery Review of History & Physical:    H&P update referred to the surgeon.     Anesthesia Plan Notes: Sedation plan discussed.          Ready For Surgery From Anesthesia Perspective.

## 2017-07-26 NOTE — NURSING
Pt is AAOx3 and in no apparent distress.  L chest wall incision site is c/d/i without redness or swelling. Provided a copy of discharge instructions.  Teaching performed.  Pt verbalized understanding and denied any questions.  PIV d/c catheter tip intact.  2x2 applied and no active bleeding noted.

## 2017-07-26 NOTE — PROGRESS NOTES
Pt is AAOx4 and denies pain.  VSS.  Admit questions completed.  Pt's wife at bedside.  Will continue to monitor.

## 2017-07-31 DIAGNOSIS — R55 SYNCOPE, UNSPECIFIED SYNCOPE TYPE: ICD-10-CM

## 2017-07-31 DIAGNOSIS — Z95.810 AUTOMATIC IMPLANTABLE CARDIAC DEFIBRILLATOR IN SITU: ICD-10-CM

## 2017-07-31 DIAGNOSIS — Z95.818 OTHER SPECIFIED CARDIAC DEVICE IN SITU: ICD-10-CM

## 2017-07-31 DIAGNOSIS — I47.29 INDUCIBLE VENTRICULAR TACHYCARDIA: Primary | ICD-10-CM

## 2017-08-03 ENCOUNTER — CLINICAL SUPPORT (OUTPATIENT)
Dept: ELECTROPHYSIOLOGY | Facility: CLINIC | Age: 77
End: 2017-08-03
Payer: MEDICARE

## 2017-08-03 DIAGNOSIS — I25.5 ISCHEMIC CARDIOMYOPATHY: ICD-10-CM

## 2017-08-03 DIAGNOSIS — Z95.810 AUTOMATIC IMPLANTABLE CARDIAC DEFIBRILLATOR IN SITU: ICD-10-CM

## 2017-08-03 PROCEDURE — 93283 PRGRMG EVAL IMPLANTABLE DFB: CPT | Mod: S$GLB,,, | Performed by: INTERNAL MEDICINE

## 2017-08-10 ENCOUNTER — PATIENT MESSAGE (OUTPATIENT)
Dept: ELECTROPHYSIOLOGY | Facility: CLINIC | Age: 77
End: 2017-08-10

## 2017-08-14 ENCOUNTER — PATIENT MESSAGE (OUTPATIENT)
Dept: NEUROLOGY | Facility: CLINIC | Age: 77
End: 2017-08-14

## 2017-09-07 ENCOUNTER — PATIENT MESSAGE (OUTPATIENT)
Dept: ELECTROPHYSIOLOGY | Facility: CLINIC | Age: 77
End: 2017-09-07

## 2017-09-13 ENCOUNTER — LAB VISIT (OUTPATIENT)
Dept: LAB | Facility: HOSPITAL | Age: 77
End: 2017-09-13
Attending: INTERNAL MEDICINE
Payer: MEDICARE

## 2017-09-13 ENCOUNTER — OFFICE VISIT (OUTPATIENT)
Dept: GASTROENTEROLOGY | Facility: CLINIC | Age: 77
End: 2017-09-13
Payer: MEDICARE

## 2017-09-13 ENCOUNTER — TELEPHONE (OUTPATIENT)
Dept: GASTROENTEROLOGY | Facility: CLINIC | Age: 77
End: 2017-09-13

## 2017-09-13 VITALS
BODY MASS INDEX: 27.56 KG/M2 | HEART RATE: 60 BPM | HEIGHT: 66 IN | WEIGHT: 171.5 LBS | DIASTOLIC BLOOD PRESSURE: 69 MMHG | SYSTOLIC BLOOD PRESSURE: 141 MMHG

## 2017-09-13 DIAGNOSIS — R13.10 DYSPHAGIA, UNSPECIFIED TYPE: Primary | ICD-10-CM

## 2017-09-13 DIAGNOSIS — K27.9 PUD (PEPTIC ULCER DISEASE): ICD-10-CM

## 2017-09-13 DIAGNOSIS — K56.699 STRICTURE INTESTINAL: ICD-10-CM

## 2017-09-13 LAB
BASOPHILS # BLD AUTO: 0.05 K/UL
BASOPHILS NFR BLD: 0.8 %
DIFFERENTIAL METHOD: ABNORMAL
EOSINOPHIL # BLD AUTO: 0.3 K/UL
EOSINOPHIL NFR BLD: 4 %
ERYTHROCYTE [DISTWIDTH] IN BLOOD BY AUTOMATED COUNT: 14.4 %
FERRITIN SERPL-MCNC: 18 NG/ML
HCT VFR BLD AUTO: 40.6 %
HGB BLD-MCNC: 13.4 G/DL
IRON SERPL-MCNC: 59 UG/DL
LYMPHOCYTES # BLD AUTO: 1.1 K/UL
LYMPHOCYTES NFR BLD: 17 %
MCH RBC QN AUTO: 29.8 PG
MCHC RBC AUTO-ENTMCNC: 33 G/DL
MCV RBC AUTO: 90 FL
MONOCYTES # BLD AUTO: 0.6 K/UL
MONOCYTES NFR BLD: 9.3 %
NEUTROPHILS # BLD AUTO: 4.4 K/UL
NEUTROPHILS NFR BLD: 68.6 %
PLATELET # BLD AUTO: 252 K/UL
PMV BLD AUTO: 10 FL
RBC # BLD AUTO: 4.49 M/UL
SATURATED IRON: 15 %
TOTAL IRON BINDING CAPACITY: 400 UG/DL
TRANSFERRIN SERPL-MCNC: 270 MG/DL
WBC # BLD AUTO: 6.42 K/UL

## 2017-09-13 PROCEDURE — 3078F DIAST BP <80 MM HG: CPT | Mod: S$GLB,,, | Performed by: INTERNAL MEDICINE

## 2017-09-13 PROCEDURE — 3077F SYST BP >= 140 MM HG: CPT | Mod: S$GLB,,, | Performed by: INTERNAL MEDICINE

## 2017-09-13 PROCEDURE — 82728 ASSAY OF FERRITIN: CPT

## 2017-09-13 PROCEDURE — 3008F BODY MASS INDEX DOCD: CPT | Mod: S$GLB,,, | Performed by: INTERNAL MEDICINE

## 2017-09-13 PROCEDURE — 36415 COLL VENOUS BLD VENIPUNCTURE: CPT

## 2017-09-13 PROCEDURE — 1157F ADVNC CARE PLAN IN RCRD: CPT | Mod: S$GLB,,, | Performed by: INTERNAL MEDICINE

## 2017-09-13 PROCEDURE — 99999 PR PBB SHADOW E&M-EST. PATIENT-LVL III: CPT | Mod: PBBFAC,,, | Performed by: INTERNAL MEDICINE

## 2017-09-13 PROCEDURE — 99214 OFFICE O/P EST MOD 30 MIN: CPT | Mod: S$GLB,,, | Performed by: INTERNAL MEDICINE

## 2017-09-13 PROCEDURE — 1159F MED LIST DOCD IN RCRD: CPT | Mod: S$GLB,,, | Performed by: INTERNAL MEDICINE

## 2017-09-13 PROCEDURE — 86677 HELICOBACTER PYLORI ANTIBODY: CPT

## 2017-09-13 PROCEDURE — 85025 COMPLETE CBC W/AUTO DIFF WBC: CPT

## 2017-09-13 PROCEDURE — 83540 ASSAY OF IRON: CPT

## 2017-09-13 RX ORDER — OMEPRAZOLE 20 MG/1
20 CAPSULE, DELAYED RELEASE ORAL DAILY
Qty: 30 CAPSULE | Refills: 6 | Status: SHIPPED | OUTPATIENT
Start: 2017-09-13 | End: 2017-09-19

## 2017-09-13 RX ORDER — FERROUS SULFATE 325(65) MG
325 TABLET ORAL 2 TIMES DAILY
Qty: 60 TABLET | Refills: 6 | Status: SHIPPED | OUTPATIENT
Start: 2017-09-13 | End: 2017-09-19

## 2017-09-13 NOTE — LETTER
September 13, 2017      Jeff Yan MD  [address]  [facility] [address]           [facility]  [address]  [facility] [address]  Phone: [phone]  Fax: [phone]          Patient: Mark Anthony Velarde Jr.   MR Number: [id number]   YOB: 1940   Date of Visit: 9/13/2017       Dear Dr. Jeff Yan:    Thank you for referring Mark Anthony Velarde to me for evaluation. Attached you will find relevant portions of my assessment and plan of care.    If you have questions, please do not hesitate to call me. I look forward to following Mark Anthony Velarde along with you.    Sincerely,    Dee Dee Castillo MD    Enclosure  CC:  No Recipients    If you would like to receive this communication electronically, please contact [email or web address] or [phone] to request more information on [employer] access.    For providers and/or their staff who would like to refer a patient to [facility], please contact us through our one-stop-shop provider referral line, [facility], at [phone].    If you feel you have received this communication in error or would no longer like to receive these types of communications, please e-mail [email or web address]

## 2017-09-13 NOTE — PROGRESS NOTES
[facility] Consultation Note    Reason for Consult:    Chief Complaint   Patient presents with    Follow-up    Dysphagia    Anemia    stricture    Peptic Ulcer Disease         PCP:   Jeff Yan   [address] / [place] [address]    Referring MD:  Jeff Yan Md  [address] [place] [address]      HPI:  Mark Anthony Velarde Jr. is a 77 y.o. male here for evaluation of the following GI problems:    Dysphagia.  Patient reports difficulty swallowing solids, no problems with liquids.  Feels that food gets lodged in cervical esophagus.  Associated with chocking on food. Symptoms started:  Several years ago.   Had an episodes of chokling at a restaurant during family function, which prompted EGD.    Occur: Previously had symptoms about once per week, at some point it was daily. Two episodes between May and July.  No further episodes since.  Started on prilosec 20mg BID after the endoscopy.    Denies food impactions requiring ED visit.  Denies seasonal allergies, food allergies.  Denies eczema.  Seen by ENT in 2014.  Never had MBS.      Erosions is stomach/duodenum.       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  Denies nausea, vomiting, abd pain.          Anemia for years.  No overt bleeding.          Denies GERD, nausea, vomiting, early satiety, abdominal pain, bloating, diarrhea, constipation, BRBPR, melena, weight loss.       Total visit time was 30 minutes, more than 50% of which was spent in face-to-face counseling with patient regarding symptoms, diagnostic results, prognosis, risks and benefits of treatment options, instructions for management, importance of compliance with chosen treatment options, risk factor reduction, stress reduction, coping strategies.        Previous Studies:   EGD 7/3/17: Nl esoph (-). Mild resist at GEJ (-).  g eryth (-). Erosions/nodularity body (-).  Erythema in bulb. Stenosis w erosions in sweep, tx w neonattal scope (-). TiC  distal to stricture. Erosions in duodenum .     ROS:  ROS   Constitutional: No fevers, no chills, no night sweats, no weight loss  ENT: + congestion, + rhinorrhea, + chronic sinus problems  CV: No chest pain, no palpitations  Pulm: No cough, no shortness of breath  Ophtho: No blurry vision, no eye redness  GI: see HPI  Derm: No rash  Heme: No lymphadenopathy, + bruising  MSK: No arthritis, no joint swelling, no Raynauds  : No dysuria, no frequent urination, no blood in urine  Endo: No hot or cold intolerance  Neuro: No dizziness, no syncope, no seizure  Psych: No anxiety, no depression        Medical History:   Past Medical History:   Diagnosis Date    *Atrial fibrillation     Anticoagulant long-term use     Atrial fibrillation - asymptomatic but noted on ICD interrogation ([id number]) - 16 h 40 minutes of afib 2/13/2013    Atrial flutter with controlled response - seen on ICD interrogation - asymptomatic 2/13/2013    Automatic implantable cardioverter-defibrillator in situ 2/13/2013    Basal cell carcinoma     mid forehead    CAD (coronary artery disease) 2/13/2013    Cardiomyopathy     Cataract     Cognitive deficits     mild    H/O syncope -  5/27/2013    History of PTCA - LAD, LCX and PDA PCI in 2006 2/13/2013    HTN (hypertension) 2/13/2013    Hyperlipidemia LDL goal < 70 2/13/2013    Hypertension     ICD (implantable cardiac defibrillator) in place 2/13/2013    Ischemic cardiomyopathy LVEF ~45% 2/13/2013    LBBB (left bundle branch block) 2/13/2013    Memory loss     Psoriasis vulgaris     Syncope and collapse         Surgical History:   Past Surgical History:   Procedure Laterality Date    CARDIAC DEFIBRILLATOR PLACEMENT      CATARACT EXTRACTION W/  INTRAOCULAR LENS IMPLANT Right 04/04/16    Dr Michael     CATARACT EXTRACTION W/  INTRAOCULAR LENS IMPLANT Left 05/09/2016    SKIN BIOPSY      TONSILLECTOMY          Family History:   Family History   Problem Relation Age of Onset     Psoriasis Father     Dementia Father     Tremor Father     Cataracts Mother     Cancer Mother     Psoriasis Son     Psoriasis Son     Diabetes Maternal Grandmother     Kidney disease Sister     No Known Problems Brother     No Known Problems Maternal Aunt     No Known Problems Maternal Uncle     No Known Problems Paternal Aunt     No Known Problems Paternal Uncle     No Known Problems Maternal Grandfather     No Known Problems Paternal Grandmother     No Known Problems Paternal Grandfather     Amblyopia Neg Hx     Blindness Neg Hx     Glaucoma Neg Hx     Hypertension Neg Hx     Macular degeneration Neg Hx     Retinal detachment Neg Hx     Strabismus Neg Hx     Stroke Neg Hx     Thyroid disease Neg Hx     Parkinsonism Neg Hx     Celiac disease Neg Hx     Cirrhosis Neg Hx     Colon cancer Neg Hx     Colon polyps Neg Hx     Crohn's disease Neg Hx     Cystic fibrosis Neg Hx     Esophageal cancer Neg Hx     Irritable bowel syndrome Neg Hx     Inflammatory bowel disease Neg Hx     Hemochromatosis Neg Hx     Liver cancer Neg Hx     Liver disease Neg Hx     Rectal cancer Neg Hx     Stomach cancer Neg Hx     Ulcerative colitis Neg Hx     Christiano's disease Neg Hx     Lymphoma Neg Hx     Scleroderma Neg Hx     Rheum arthritis Neg Hx     Multiple sclerosis Neg Hx     Melanoma Neg Hx     Tuberculosis Neg Hx     Lupus Neg Hx         Social History:   Social History     Social History    Marital status:      Spouse name: N/A    Number of children: N/A    Years of education: N/A     Occupational History    retired      Social History Main Topics    Smoking status: Former Smoker     Quit date: 9/22/1963    Smokeless tobacco: Never Used    Alcohol use No    Drug use: No    Sexual activity: Not Asked     Other Topics Concern    None     Social History Narrative    None        Review of patient's allergies indicates:   Allergen Reactions    Arb-angiotensin receptor  "antagonist Other (See Comments)     Hyperkalemia    Lisinopril Diarrhea       Current Outpatient Prescriptions   Medication Sig Dispense Refill    amlodipine (NORVASC) 5 MG tablet Take 1 tablet (5 mg total) by mouth once daily. (Patient taking differently: Take 5 mg by mouth every evening. ) 90 tablet 3    aspirin (ECOTRIN) 81 MG EC tablet Take 81 mg by mouth once daily.      atorvastatin (LIPITOR) 40 MG tablet Take 1 tablet (40 mg total) by mouth once daily. 90 tablet 3    dabigatran etexilate (PRADAXA) 150 mg Cap Take 1 capsule (150 mg total) by mouth 2 (two) times daily. "Do NOT break, chew, or open capsules." 180 capsule 3    donepezil (ARICEPT) 10 MG tablet TAKE 1 TABLET BY MOUTH DAILY 90 tablet 3    memantine (NAMENDA TITRATION PACK) tablet pack Follow package directions. (Patient taking differently: 2 (two) times daily. Follow package directions.) 49 tablet 0    metoprolol tartrate (LOPRESSOR) 25 MG tablet Take 0.5 tablets (12.5 mg total) by mouth 2 (two) times daily. 90 tablet 3    multivitamin (MULTIVITAMIN) per tablet Take 1 tablet by mouth once daily.      omeprazole (PRILOSEC) 20 MG capsule Take 1 capsule (20 mg total) by mouth once daily. 30 capsule 6     No current facility-administered medications for this visit.         Objective Findings:  Vital Signs:  BP (!) 141/69   Pulse 60   Ht 5' 6" (1.676 m)   Wt 77.8 kg (171 lb 8.3 oz)   BMI 27.68 kg/m²   Body mass index is 27.68 kg/m².    Physical Exam:  General appearance: alert, cooperative, no distress  HENT: Normocephalic, atraumatic, neck symmetrical, no nasal discharge  Eyes: conjunctivae/corneas clear, PERRL, EOM's intact  Lungs: clear to auscultation bilaterally, no dullness to percussion bilaterally  Heart: regular rate and rhythm without rub; no displacement of the PMI  Abdomen: soft, non-tender; bowel sounds normoactive; no organomegaly  Extremities: extremities symmetric; no clubbing, cyanosis, or edema  Integument: Skin color, " texture, turgor normal; no rashes; hair distrubution normal  Neurologic: Alert and oriented X 3, normal strength, normal coordination and gait, mild dementia  Psychiatric: no pressured speech; normal affect; no evidence of impaired cognition    Labs:  Lab Results   Component Value Date    WBC 6.42 09/13/2017    HGB 13.4 (L) 09/13/2017    HCT 40.6 09/13/2017    MCV 90 09/13/2017     09/13/2017     Lab Results   Component Value Date    FERRITIN 18 (L) 09/13/2017     Lab Results   Component Value Date     07/19/2017     07/19/2017     07/19/2017    K 4.9 07/19/2017    K 4.9 07/19/2017    K 4.9 07/19/2017     07/19/2017     07/19/2017     07/19/2017    CO2 27 07/19/2017    CO2 27 07/19/2017    CO2 27 07/19/2017    GLU 72 07/19/2017    GLU 72 07/19/2017    GLU 72 07/19/2017    BUN 20 07/19/2017    BUN 20 07/19/2017    BUN 20 07/19/2017    CREATININE 1.4 07/19/2017    CREATININE 1.4 07/19/2017    CREATININE 1.4 07/19/2017    CALCIUM 9.5 07/19/2017    CALCIUM 9.5 07/19/2017    CALCIUM 9.5 07/19/2017    PROT 7.2 07/19/2017    ALBUMIN 3.7 07/19/2017    BILITOT 0.5 07/19/2017    ALKPHOS 94 07/19/2017    AST 30 07/19/2017    ALT 25 07/19/2017     Lab Results   Component Value Date    TSH 0.844 07/02/2014     No results found for: SEDRATE  No results found for: CRP  No results found for: LABA1C, HGBA1C        Assessment and Plan:  Mark Anthony Velarde Jr. is a 77 y.o. male with:    Dysphagia to solids at level of cervical esophagus. Choking. Resolved after EGD/PPI.  Seen by ENT in 2014.  Never had MBS.    -PT declined any further testing at this time as his symptoms have resolved. He would prefer to do minimal tests and procedures.    -Decrease prilosec 20mg to daily  -Would check manometry, MBS, Esophagogram w 13mm tab and [id number] if symptoms return    Gastric/duodenal erosions. +CRYSTAL. No other NSAIDs. Negative hp bx.   -Check H. Pylori IGG   -Cont omeprazole daily     Duodenal stenosis w  associated diverticulum. No previous gastric surgery. Pt denies any nausea, vomiting, early satiety.  Biopsies negative.  -PT prefers not to get CT of abdomen  -PT does not want to undergo dilation since he is asymptomatic at this time.     Anemia.  -Check iron studies, CBC = KASH  -Start iron BID  -FU w PCP to check CBC/iron studies in 4 months, and periodically after that   -Discussed that colonoscopy should be considered as KASH could represent a polyp or even a malignancy.  Anemia present since 2013/2014 and hemoglobin has been overall stable.  Multiple gastric/duodenal erosions could account for mild KASH. Risk and benefits of the procedure were discussed at length.  Patient and wife verbalized understanding. Patient does not want to undergo colonoscopy.  He would be willing to consider if today's hemoglobin is significantly lower. Wife agrees with his wishes.     Return if symptoms worsen or fail to improve.    1. Dysphagia, unspecified type    2. Stricture intestinal    3. PUD (peptic ulcer disease)          Order summary:  Orders Placed This Encounter    CBC auto differential    Ferritin    Iron and TIBC    H. PYLORI ANTIBODY, IGG    omeprazole (PRILOSEC) 20 MG capsule         Thank you so much for allowing me to participate in the care of Mark Anthony Castillo MD

## 2017-09-14 ENCOUNTER — TELEPHONE (OUTPATIENT)
Dept: GASTROENTEROLOGY | Facility: CLINIC | Age: 77
End: 2017-09-14

## 2017-09-14 LAB — H PYLORI IGG SERPL QL IA: NEGATIVE

## 2017-09-14 NOTE — TELEPHONE ENCOUNTER
----- Message from Dee Dee Castillo MD sent at 9/13/2017 10:11 PM CDT -----  Please let pt know that iron level is low and I send prescription for iron twice daily to his pharmacy.

## 2017-09-14 NOTE — TELEPHONE ENCOUNTER
Attempted to contact patient without success.    Left message for patient to return call to the clinic regarding lab results.

## 2017-09-14 NOTE — TELEPHONE ENCOUNTER
Spoke with patient and results were given.    Patient verbalizes understanding and will  prescription at the pharmacy.

## 2017-09-15 ENCOUNTER — PATIENT MESSAGE (OUTPATIENT)
Dept: GASTROENTEROLOGY | Facility: CLINIC | Age: 77
End: 2017-09-15

## 2017-09-19 RX ORDER — OMEPRAZOLE 20 MG/1
20 CAPSULE, DELAYED RELEASE ORAL DAILY
Qty: 90 CAPSULE | Refills: 3 | Status: SHIPPED | OUTPATIENT
Start: 2017-09-19 | End: 2018-12-03 | Stop reason: SDUPTHER

## 2017-09-19 RX ORDER — FERROUS SULFATE 325(65) MG
325 TABLET ORAL 2 TIMES DAILY
Qty: 180 TABLET | Refills: 3 | Status: SHIPPED | OUTPATIENT
Start: 2017-09-19 | End: 2017-10-19

## 2017-09-22 ENCOUNTER — OFFICE VISIT (OUTPATIENT)
Dept: NEUROLOGY | Facility: CLINIC | Age: 77
End: 2017-09-22
Payer: MEDICARE

## 2017-09-22 VITALS
WEIGHT: 170.63 LBS | SYSTOLIC BLOOD PRESSURE: 133 MMHG | DIASTOLIC BLOOD PRESSURE: 67 MMHG | HEIGHT: 66 IN | BODY MASS INDEX: 27.42 KG/M2 | HEART RATE: 60 BPM

## 2017-09-22 DIAGNOSIS — G31.83 LEWY BODY DEMENTIA WITH BEHAVIORAL DISTURBANCE: ICD-10-CM

## 2017-09-22 DIAGNOSIS — R25.1 TREMOR: ICD-10-CM

## 2017-09-22 DIAGNOSIS — F02.818 LEWY BODY DEMENTIA WITH BEHAVIORAL DISTURBANCE: ICD-10-CM

## 2017-09-22 DIAGNOSIS — G31.84 MCI (MILD COGNITIVE IMPAIRMENT) WITH MEMORY LOSS: Primary | ICD-10-CM

## 2017-09-22 PROCEDURE — 99999 PR PBB SHADOW E&M-EST. PATIENT-LVL III: CPT | Mod: PBBFAC,,, | Performed by: NURSE PRACTITIONER

## 2017-09-22 PROCEDURE — 1157F ADVNC CARE PLAN IN RCRD: CPT | Mod: S$GLB,,, | Performed by: NURSE PRACTITIONER

## 2017-09-22 PROCEDURE — 3008F BODY MASS INDEX DOCD: CPT | Mod: S$GLB,,, | Performed by: NURSE PRACTITIONER

## 2017-09-22 PROCEDURE — 1126F AMNT PAIN NOTED NONE PRSNT: CPT | Mod: S$GLB,,, | Performed by: NURSE PRACTITIONER

## 2017-09-22 PROCEDURE — 1159F MED LIST DOCD IN RCRD: CPT | Mod: S$GLB,,, | Performed by: NURSE PRACTITIONER

## 2017-09-22 PROCEDURE — 3075F SYST BP GE 130 - 139MM HG: CPT | Mod: S$GLB,,, | Performed by: NURSE PRACTITIONER

## 2017-09-22 PROCEDURE — 3078F DIAST BP <80 MM HG: CPT | Mod: S$GLB,,, | Performed by: NURSE PRACTITIONER

## 2017-09-22 PROCEDURE — 99214 OFFICE O/P EST MOD 30 MIN: CPT | Mod: S$GLB,,, | Performed by: NURSE PRACTITIONER

## 2017-09-22 RX ORDER — DONEPEZIL HYDROCHLORIDE 10 MG/1
10 TABLET, FILM COATED ORAL DAILY
Qty: 90 TABLET | Refills: 3 | Status: SHIPPED | OUTPATIENT
Start: 2017-09-22 | End: 2018-04-03 | Stop reason: SDUPTHER

## 2017-09-22 RX ORDER — MEMANTINE HYDROCHLORIDE 10 MG/1
10 TABLET ORAL 2 TIMES DAILY
Qty: 180 TABLET | Refills: 3 | Status: SHIPPED | OUTPATIENT
Start: 2017-09-22 | End: 2018-04-03 | Stop reason: SDUPTHER

## 2017-09-22 NOTE — PROGRESS NOTES
"Name: Mark Anthony Velarde Jr.  MRN: [id number]   CSN: [id number]      Date: 9-22-17      Referring physician:  Jeff Yan MD  [address] [place] [address]    Subjective:      Chief Complaint: "okay"    History of Present Illness (HPI):    Mark Anthony Velarde Jr. is a 77 y.o. right-handed male who presents today for a follow-up evaluation of parkinsonism/dementia and is accompanied by spouse of 57 years.     Last seen in the office in Feb 13, 2017. At that time, began Namenda. Did not see any improvement but no issues with tolerabilty.     He feels he is fine. She notes he shuffles some.   She says he shuffles some.   Tremor does not bohter him. Only in RH.     She says a concept af an object does not always register in his brain. Give ex of being in yard and telling him to pull up chair and sit down. Chair was right next to him and he did not seem to understand. He says he was not paying attentinon. She says it happeded again when she asked him to hand her the scissors. They were again in front of him.    She says "he does not seem to even want to fight the dementia." He says he is not going to fight it.     She finds that he listens and seems to follow conversation but does not participate.  She references an article she read and the lack of smell with PD. He does not have good sense of smell.     Review of Systems   Constitutional: Positive for fatigue.   HENT: Negative for drooling and hearing loss.    Eyes: Negative for visual disturbance.   Respiratory: Negative for choking and shortness of breath.    Gastrointestinal: Negative for constipation and nausea.   Genitourinary: Negative for frequency and urgency.   Musculoskeletal: Negative for gait problem.   Skin: Negative for rash.   Neurological: Positive for tremors. Negative for dizziness, syncope, speech difficulty and light-headedness.   Hematological: Bruises/bleeds easily.   Psychiatric/Behavioral: Positive for confusion. Negative for agitation, " hallucinations and sleep disturbance. The patient is not nervous/anxious.        Past Medical History: The patient  has a past medical history of *Atrial fibrillation; Anticoagulant long-term use; Atrial fibrillation - asymptomatic but noted on ICD interrogation ([id number]) - 16 h 40 minutes of afib (2/13/2013); Atrial flutter with controlled response - seen on ICD interrogation - asymptomatic (2/13/2013); Automatic implantable cardioverter-defibrillator in situ (2/13/2013); Basal cell carcinoma; CAD (coronary artery disease) (2/13/2013); Cardiomyopathy; Cataract; Cognitive deficits; H/O syncope -  (5/27/2013); History of PTCA - LAD, LCX and PDA PCI in 2006 (2/13/2013); HTN (hypertension) (2/13/2013); Hyperlipidemia LDL goal < 70 (2/13/2013); Hypertension; ICD (implantable cardiac defibrillator) in place (2/13/2013); Ischemic cardiomyopathy LVEF ~45% (2/13/2013); LBBB (left bundle branch block) (2/13/2013); Memory loss; Psoriasis vulgaris; and Syncope and collapse.    Social History: The patient  reports that he quit smoking about 54 years ago. He has never used smokeless tobacco. He reports that he does not drink alcohol or use drugs.    Family History: Their family history includes Cancer in his mother; Cataracts in his mother; Dementia in his father; Diabetes in his maternal grandmother; Kidney disease in his sister; No Known Problems in his brother, maternal aunt, maternal grandfather, maternal uncle, paternal aunt, paternal grandfather, paternal grandmother, and paternal uncle; Psoriasis in his father, son, and son; Tremor in his father.    Allergies: Arb-angiotensin receptor antagonist and Lisinopril     Meds:   Current Outpatient Prescriptions on File Prior to Visit   Medication Sig Dispense Refill    amlodipine (NORVASC) 5 MG tablet Take 1 tablet (5 mg total) by mouth once daily. (Patient taking differently: Take 5 mg by mouth every evening. ) 90 tablet 3    aspirin (ECOTRIN) 81 MG EC tablet Take 81 mg by  "mouth once daily.      atorvastatin (LIPITOR) 40 MG tablet Take 1 tablet (40 mg total) by mouth once daily. 90 tablet 3    dabigatran etexilate (PRADAXA) 150 mg Cap Take 1 capsule (150 mg total) by mouth 2 (two) times daily. "Do NOT break, chew, or open capsules." 180 capsule 3    ferrous sulfate 325 mg (65 mg iron) Tab tablet Take 1 tablet (325 mg total) by mouth 2 (two) times daily. 180 tablet 3    metoprolol tartrate (LOPRESSOR) 25 MG tablet Take 0.5 tablets (12.5 mg total) by mouth 2 (two) times daily. 90 tablet 3    multivitamin (MULTIVITAMIN) per tablet Take 1 tablet by mouth once daily.      omeprazole (PRILOSEC) 20 MG capsule Take 1 capsule (20 mg total) by mouth once daily. 90 capsule 3     No current facility-administered medications on file prior to visit.        Objective:     Physical Exam:    Vitals:    09/22/17 0816   BP: 133/67   Pulse: 60   Weight: 77.4 kg (170 lb 10.2 oz)   Height: 5' 6" (1.676 m)     Body mass index is 27.54 kg/m².    Constitutional  Well-developed, well-nourished, appears stated age   Cardiovascular  Radial pulses 2+ and symmetric, no LE edema bilaterally     ..  * Specialized movement exam  Slightly hypophonic speech.    Slight facial masking.   Subtle cogwheel rigidity RUE/RLE only.  Intermittent, slight to mild tremor RH.    Slight bradykinesia RH only.   Pushes self to stand.    No motor impersistence.   Narrow-based gait.   Shortened stride length.   Slowed pace.          Laboratory Results:  Lab Visit on 09/13/2017   Component Date Value Ref Range Status    WBC 09/13/2017 6.42  3.90 - 12.70 K/uL Final    RBC 09/13/2017 4.49* 4.60 - 6.20 M/uL Final    Hemoglobin 09/13/2017 13.4* 14.0 - 18.0 g/dL Final    Hematocrit 09/13/2017 40.6  40.0 - 54.0 % Final    MCV 09/13/2017 90  82 - 98 fL Final    MCH 09/13/2017 29.8  27.0 - 31.0 pg Final    MCHC 09/13/2017 33.0  32.0 - 36.0 g/dL Final    RDW 09/13/2017 14.4  11.5 - 14.5 % Final    Platelets 09/13/2017 252  150 - " 350 K/uL Final    MPV 09/13/2017 10.0  9.2 - 12.9 fL Final    Gran # 09/13/2017 4.4  1.8 - 7.7 K/uL Final    Lymph # 09/13/2017 1.1  1.0 - 4.8 K/uL Final    Mono # 09/13/2017 0.6  0.3 - 1.0 K/uL Final    Eos # 09/13/2017 0.3  0.0 - 0.5 K/uL Final    Baso # 09/13/2017 0.05  0.00 - 0.20 K/uL Final    Gran% 09/13/2017 68.6  38.0 - 73.0 % Final    Lymph% 09/13/2017 17.0* 18.0 - 48.0 % Final    Mono% 09/13/2017 9.3  4.0 - 15.0 % Final    Eosinophil% 09/13/2017 4.0  0.0 - 8.0 % Final    Basophil% 09/13/2017 0.8  0.0 - 1.9 % Final    Differential Method 09/13/2017 Automated   Final    Ferritin 09/13/2017 18* 20.0 - 300.0 ng/mL Final    Iron 09/13/2017 59  45 - 160 ug/dL Final    Transferrin 09/13/2017 270  200 - 375 mg/dL Final    TIBC 09/13/2017 400  250 - 450 ug/dL Final    Saturated Iron 09/13/2017 15* 20 - 50 % Final    H Pylori IgG Interp 09/14/2017 Negative  Negative Final   Lab Visit on 07/19/2017   Component Date Value Ref Range Status    Protein, Urine Random 07/19/2017 10  0 - 15 mg/dL Final    Creatinine, Random Ur 07/19/2017 76.0  23.0 - 375.0 mg/dL Final    Prot/Creat Ratio, Ur 07/19/2017 0.13  0.00 - 0.20 Final    Specimen UA 07/19/2017 Urine, Clean Catch   Final    Color, UA 07/19/2017 Yellow  Yellow, Straw, Laura Final    Appearance, UA 07/19/2017 Clear  Clear Final    pH, UA 07/19/2017 5.0  5.0 - 8.0 Final    Specific Gravity, UA 07/19/2017 1.015  1.005 - 1.030 Final    Protein, UA 07/19/2017 Negative  Negative Final    Glucose, UA 07/19/2017 Negative  Negative Final    Ketones, UA 07/19/2017 Negative  Negative Final    Bilirubin (UA) 07/19/2017 Negative  Negative Final    Occult Blood UA 07/19/2017 Negative  Negative Final    Nitrite, UA 07/19/2017 Negative  Negative Final    Urobilinogen, UA 07/19/2017 1.0  <2.0 EU/dL Final    Leukocytes, UA 07/19/2017 Negative  Negative Final   Lab Visit on 07/19/2017   Component Date Value Ref Range Status    Sodium 07/19/2017 142   136 - 145 mmol/L Final    Potassium 07/19/2017 4.9  3.5 - 5.1 mmol/L Final    Chloride 07/19/2017 109  95 - 110 mmol/L Final    CO2 07/19/2017 27  23 - 29 mmol/L Final    Glucose 07/19/2017 72  70 - 110 mg/dL Final    BUN, Bld 07/19/2017 20  8 - 23 mg/dL Final    Creatinine 07/19/2017 1.4  0.5 - 1.4 mg/dL Final    Calcium 07/19/2017 9.5  8.7 - 10.5 mg/dL Final    Anion Gap 07/19/2017 6* 8 - 16 mmol/L Final    eGFR if  07/19/2017 54.9* >60 mL/min/1.73 m^2 Final    eGFR if non African American 07/19/2017 47.4* >60 mL/min/1.73 m^2 Final    WBC 07/19/2017 6.71  3.90 - 12.70 K/uL Final    RBC 07/19/2017 4.48* 4.60 - 6.20 M/uL Final    Hemoglobin 07/19/2017 13.5* 14.0 - 18.0 g/dL Final    Hematocrit 07/19/2017 42.4  40.0 - 54.0 % Final    MCV 07/19/2017 95  82 - 98 fL Final    MCH 07/19/2017 30.1  27.0 - 31.0 pg Final    MCHC 07/19/2017 31.8* 32.0 - 36.0 % Final    RDW 07/19/2017 14.8* 11.5 - 14.5 % Final    Platelets 07/19/2017 237  150 - 350 K/uL Final    MPV 07/19/2017 10.6  9.2 - 12.9 fL Final    Gran # 07/19/2017 4.8  1.8 - 7.7 K/uL Final    Lymph # 07/19/2017 1.0  1.0 - 4.8 K/uL Final    Mono # 07/19/2017 0.6  0.3 - 1.0 K/uL Final    Eos # 07/19/2017 0.3  0.0 - 0.5 K/uL Final    Baso # 07/19/2017 0.04  0.00 - 0.20 K/uL Final    Gran% 07/19/2017 71.4  38.0 - 73.0 % Final    Lymph% 07/19/2017 15.2* 18.0 - 48.0 % Final    Mono% 07/19/2017 8.6  4.0 - 15.0 % Final    Eosinophil% 07/19/2017 4.2  0.0 - 8.0 % Final    Basophil% 07/19/2017 0.6  0.0 - 1.9 % Final    Differential Method 07/19/2017 Automated   Final    aPTT 07/19/2017 35.8* 21.0 - 32.0 sec Final    Sodium 07/19/2017 142  136 - 145 mmol/L Final    Potassium 07/19/2017 4.9  3.5 - 5.1 mmol/L Final    Chloride 07/19/2017 109  95 - 110 mmol/L Final    CO2 07/19/2017 27  23 - 29 mmol/L Final    Glucose 07/19/2017 72  70 - 110 mg/dL Final    BUN, Bld 07/19/2017 20  8 - 23 mg/dL Final    Creatinine 07/19/2017 1.4   0.5 - 1.4 mg/dL Final    Calcium 07/19/2017 9.5  8.7 - 10.5 mg/dL Final    Anion Gap 07/19/2017 6* 8 - 16 mmol/L Final    eGFR if  07/19/2017 54.9* >60 mL/min/1.73 m^2 Final    eGFR if non African American 07/19/2017 47.4* >60 mL/min/1.73 m^2 Final    WBC 07/19/2017 6.71  3.90 - 12.70 K/uL Final    RBC 07/19/2017 4.48* 4.60 - 6.20 M/uL Final    Hemoglobin 07/19/2017 13.5* 14.0 - 18.0 g/dL Final    Hematocrit 07/19/2017 42.4  40.0 - 54.0 % Final    MCV 07/19/2017 95  82 - 98 fL Final    MCH 07/19/2017 30.1  27.0 - 31.0 pg Final    MCHC 07/19/2017 31.8* 32.0 - 36.0 % Final    RDW 07/19/2017 14.8* 11.5 - 14.5 % Final    Platelets 07/19/2017 237  150 - 350 K/uL Final    MPV 07/19/2017 10.6  9.2 - 12.9 fL Final    Gran # 07/19/2017 4.8  1.8 - 7.7 K/uL Final    Lymph # 07/19/2017 1.0  1.0 - 4.8 K/uL Final    Mono # 07/19/2017 0.6  0.3 - 1.0 K/uL Final    Eos # 07/19/2017 0.3  0.0 - 0.5 K/uL Final    Baso # 07/19/2017 0.04  0.00 - 0.20 K/uL Final    Gran% 07/19/2017 71.4  38.0 - 73.0 % Final    Lymph% 07/19/2017 15.2* 18.0 - 48.0 % Final    Mono% 07/19/2017 8.6  4.0 - 15.0 % Final    Eosinophil% 07/19/2017 4.2  0.0 - 8.0 % Final    Basophil% 07/19/2017 0.6  0.0 - 1.9 % Final    Differential Method 07/19/2017 Automated   Final    Prothrombin Time 07/19/2017 12.1  9.0 - 12.5 sec Final    INR 07/19/2017 1.1  0.8 - 1.2 Final    Sodium 07/19/2017 142  136 - 145 mmol/L Final    Potassium 07/19/2017 4.9  3.5 - 5.1 mmol/L Final    Chloride 07/19/2017 109  95 - 110 mmol/L Final    CO2 07/19/2017 27  23 - 29 mmol/L Final    Glucose 07/19/2017 72  70 - 110 mg/dL Final    BUN, Bld 07/19/2017 20  8 - 23 mg/dL Final    Creatinine 07/19/2017 1.4  0.5 - 1.4 mg/dL Final    Calcium 07/19/2017 9.5  8.7 - 10.5 mg/dL Final    Total Protein 07/19/2017 7.2  6.0 - 8.4 g/dL Final    Albumin 07/19/2017 3.7  3.5 - 5.2 g/dL Final    Total Bilirubin 07/19/2017 0.5  0.1 - 1.0 mg/dL Final     Alkaline Phosphatase 07/19/2017 94  55 - 135 U/L Final    AST 07/19/2017 30  10 - 40 U/L Final    ALT 07/19/2017 25  10 - 44 U/L Final    Anion Gap 07/19/2017 6* 8 - 16 mmol/L Final    eGFR if  07/19/2017 54.9* >60 mL/min/1.73 m^2 Final    eGFR if non African American 07/19/2017 47.4* >60 mL/min/1.73 m^2 Final             Assessment and Plan     MCI (mild cognitive impairment) with memory loss    Lewy body dementia with behavioral disturbance  -     donepezil (ARICEPT) 10 MG tablet; Take 1 tablet (10 mg total) by mouth once daily.  Dispense: 90 tablet; Refill: 3    Tremor    Other orders  -     memantine (NAMENDA) 10 MG Tab; Take 1 tablet (10 mg total) by mouth 2 (two) times daily.  Dispense: 180 tablet; Refill: 3        Medical Decision Making:    They are not interested in memory testing. She says he finds this embarrassing and they do not see the point. He even references MOCA that I did once before. He told his wife he was embarrassed as she and his son were in the room. He did not like this. Not sure he will do that again.   Not interested in brain scan.   Continue donepezil and memantine.   Call for problems.   Follow up 6 months.       I spent 35 minutes face-to-face with the patient and wife with >50% of the time spent with counseling and education regarding:  - results of data, diagnosis, and recommendations stated above  - the prognosis of dementia  - risks and benefits of donepezil and memantine  - importance of diet and exercise    Yazmin Israel, [place], NP-C  Division of Movement and Memory Disorders  [facility]  [phone]

## 2017-10-21 ENCOUNTER — TELEPHONE (OUTPATIENT)
Dept: ELECTROPHYSIOLOGY | Facility: CLINIC | Age: 77
End: 2017-10-21

## 2017-10-21 DIAGNOSIS — I42.9 CARDIOMYOPATHY, UNSPECIFIED TYPE: Primary | ICD-10-CM

## 2017-10-26 ENCOUNTER — OFFICE VISIT (OUTPATIENT)
Dept: ELECTROPHYSIOLOGY | Facility: CLINIC | Age: 77
End: 2017-10-26
Payer: MEDICARE

## 2017-10-26 ENCOUNTER — HOSPITAL ENCOUNTER (OUTPATIENT)
Dept: CARDIOLOGY | Facility: CLINIC | Age: 77
Discharge: HOME | End: 2017-10-26
Payer: MEDICARE

## 2017-10-26 ENCOUNTER — CLINICAL SUPPORT (OUTPATIENT)
Dept: ELECTROPHYSIOLOGY | Facility: CLINIC | Age: 77
End: 2017-10-26
Payer: MEDICARE

## 2017-10-26 VITALS
WEIGHT: 170.44 LBS | SYSTOLIC BLOOD PRESSURE: 128 MMHG | BODY MASS INDEX: 27.39 KG/M2 | HEART RATE: 60 BPM | DIASTOLIC BLOOD PRESSURE: 68 MMHG | HEIGHT: 66 IN

## 2017-10-26 DIAGNOSIS — I10 ESSENTIAL HYPERTENSION: Chronic | ICD-10-CM

## 2017-10-26 DIAGNOSIS — N18.30 CHRONIC KIDNEY DISEASE, STAGE III (MODERATE): ICD-10-CM

## 2017-10-26 DIAGNOSIS — I48.0 PAROXYSMAL ATRIAL FIBRILLATION: Chronic | ICD-10-CM

## 2017-10-26 DIAGNOSIS — I25.5 ISCHEMIC CARDIOMYOPATHY: ICD-10-CM

## 2017-10-26 DIAGNOSIS — Z95.810 IMPLANTABLE CARDIOVERTER-DEFIBRILLATOR (ICD) IN SITU: ICD-10-CM

## 2017-10-26 DIAGNOSIS — I25.5 ISCHEMIC CARDIOMYOPATHY: Primary | Chronic | ICD-10-CM

## 2017-10-26 DIAGNOSIS — I25.10 CORONARY ARTERY DISEASE INVOLVING NATIVE CORONARY ARTERY OF NATIVE HEART WITHOUT ANGINA PECTORIS: Chronic | ICD-10-CM

## 2017-10-26 DIAGNOSIS — Z79.01 CURRENT USE OF LONG TERM ANTICOAGULATION: ICD-10-CM

## 2017-10-26 DIAGNOSIS — I42.9 CARDIOMYOPATHY, UNSPECIFIED TYPE: ICD-10-CM

## 2017-10-26 DIAGNOSIS — I44.7 LBBB (LEFT BUNDLE BRANCH BLOCK): Chronic | ICD-10-CM

## 2017-10-26 DIAGNOSIS — Z95.810 ICD (IMPLANTABLE CARDIOVERTER-DEFIBRILLATOR) IN PLACE: ICD-10-CM

## 2017-10-26 PROCEDURE — 93283 PRGRMG EVAL IMPLANTABLE DFB: CPT | Mod: S$GLB,,, | Performed by: INTERNAL MEDICINE

## 2017-10-26 PROCEDURE — 99999 PR PBB SHADOW E&M-EST. PATIENT-LVL III: CPT | Mod: PBBFAC,,, | Performed by: NURSE PRACTITIONER

## 2017-10-26 PROCEDURE — 99214 OFFICE O/P EST MOD 30 MIN: CPT | Mod: S$GLB,,, | Performed by: NURSE PRACTITIONER

## 2017-10-26 PROCEDURE — 93000 ELECTROCARDIOGRAM COMPLETE: CPT | Mod: S$GLB,,, | Performed by: INTERNAL MEDICINE

## 2017-10-26 NOTE — PROGRESS NOTES
Subjective:    Patient ID:  Mark Anthony Velarde Jr. is a 77 y.o. male who presents for an ICD Check.     Mark Anthony Velarde Jr. is a patient of Dr. Sherlyn Lutz.    HPI     Mr. Velarde is a 77 year old male with a hx of syncope with inducible VT, LBBB and high grade infrahisian block s/p DC ICD, asymptomatic AF, HTN, HLD, ICM, and CAD s/p PCI. At his last office visit, Mr. Velarde reported feeling well and his device was found to be at ANGI. Mr. Velarde underwent a successful DC ICD (09/22/15), and was lost to follow-up.   Mr. Velarde underwent a successful generator change (07/26/17); his prior device was identified as a [facility] advisory device>>pacer-dependent.     Since the proceduret, Mr. Velarde reports He denies chest pain, SOB/DUARTE, dizziness, palpitations, or syncope. He notes occasional fatigue; energy level is stable he still gardens regularly.     Recent cardiac studies:  Echo (07/21/15) revealed an EF of 45-50%, LVDD, mild LAE, and mild TR.   Device Interrogation (10/26/17) reveals an intrinsic SB/SR w/CHB with stable lead and device function. AMS noted (<1% AF burden; max 2 min, 52 sec; egrams c/w AF/AFL); no NSVT.  He paces 60% in the RA and 100% in the RV. Estimated battery longevity 7.8 years.     I reviewed today's ECG which demonstrated a dual AV-paced rhythm at 60 bpm; , and QTc 478.    Review of Systems   Constitution: Negative for diaphoresis and malaise/fatigue.   HENT: Negative for nosebleeds.    Eyes: Negative for double vision.   Cardiovascular: Negative for chest pain, dyspnea on exertion, irregular heartbeat, near-syncope, palpitations and syncope.   Respiratory: Negative for shortness of breath.    Skin: Negative.    Musculoskeletal: Negative.    Gastrointestinal: Negative for hematemesis and hematochezia.   Genitourinary: Negative for hematuria.   Neurological: Negative for dizziness and light-headedness.   Psychiatric/Behavioral: Negative for altered mental status.        Objective:    Physical Exam    Constitutional: He is oriented to person, place, and time. He appears well-developed and well-nourished.   HENT:   Head: Normocephalic and atraumatic.   Eyes: Pupils are equal, round, and reactive to light.   Cardiovascular: Normal rate and normal heart sounds.    Pulmonary/Chest: Effort normal and breath sounds normal.   Neurological: He is alert and oriented to person, place, and time.   Vitals reviewed.        Assessment:       1. Ischemic cardiomyopathy LVEF ~45%    2. LBBB (left bundle branch block)    3. ICD (implantable cardioverter-defibrillator) in place    4. Paroxysmal atrial fibrillation    5. Coronary artery disease involving native coronary artery of native heart without angina pectoris    6. Essential hypertension    7. Chronic kidney disease, stage III (moderate)    8. Current use of long term anticoagulation         Plan:       Mr. Velarde is doing well from a device perspective with stable lead and device function, <1% AF burden, and no ventricular arrhythmia noted; anticoagulated on Pradaxa.    Continue current medication regimen and device settings.   Follow up in device clinic as scheduled.   Follow up in EP clinic in 1 year, sooner as needed.     Amelia Jarquin, [place], APRN, FNP-C        (A copy of today's note was sent to Dr. Sherlyn Lutz.)

## 2017-11-29 RX ORDER — METOPROLOL TARTRATE 25 MG/1
12.5 TABLET, FILM COATED ORAL 2 TIMES DAILY
Qty: 90 TABLET | Refills: 3 | Status: SHIPPED | OUTPATIENT
Start: 2017-11-29 | End: 2018-11-28 | Stop reason: SDUPTHER

## 2017-12-19 RX ORDER — ATORVASTATIN CALCIUM 40 MG/1
40 TABLET, FILM COATED ORAL DAILY
Qty: 90 TABLET | Refills: 3 | Status: SHIPPED | OUTPATIENT
Start: 2017-12-19 | End: 2019-01-18 | Stop reason: SDUPTHER

## 2018-01-11 ENCOUNTER — OFFICE VISIT (OUTPATIENT)
Dept: INTERNAL MEDICINE | Facility: CLINIC | Age: 78
End: 2018-01-11
Payer: MEDICARE

## 2018-01-11 ENCOUNTER — LAB VISIT (OUTPATIENT)
Dept: LAB | Facility: HOSPITAL | Age: 78
End: 2018-01-11
Attending: INTERNAL MEDICINE
Payer: MEDICARE

## 2018-01-11 ENCOUNTER — TELEPHONE (OUTPATIENT)
Dept: INTERNAL MEDICINE | Facility: CLINIC | Age: 78
End: 2018-01-11

## 2018-01-11 VITALS
SYSTOLIC BLOOD PRESSURE: 120 MMHG | WEIGHT: 173.31 LBS | HEIGHT: 66 IN | DIASTOLIC BLOOD PRESSURE: 70 MMHG | HEART RATE: 64 BPM | BODY MASS INDEX: 27.85 KG/M2

## 2018-01-11 DIAGNOSIS — I48.92 ATRIAL FLUTTER WITH CONTROLLED RESPONSE: Chronic | ICD-10-CM

## 2018-01-11 DIAGNOSIS — G31.84 MCI (MILD COGNITIVE IMPAIRMENT) WITH MEMORY LOSS: ICD-10-CM

## 2018-01-11 DIAGNOSIS — D64.9 ANEMIA, UNSPECIFIED TYPE: Primary | ICD-10-CM

## 2018-01-11 DIAGNOSIS — K31.89 EROSIVE GASTROPATHY: ICD-10-CM

## 2018-01-11 DIAGNOSIS — I48.0 PAROXYSMAL ATRIAL FIBRILLATION: Chronic | ICD-10-CM

## 2018-01-11 DIAGNOSIS — D64.9 ANEMIA, UNSPECIFIED TYPE: ICD-10-CM

## 2018-01-11 DIAGNOSIS — D50.9 IRON DEFICIENCY ANEMIA, UNSPECIFIED IRON DEFICIENCY ANEMIA TYPE: Primary | ICD-10-CM

## 2018-01-11 LAB
BASOPHILS # BLD AUTO: 0.04 K/UL
BASOPHILS NFR BLD: 0.6 %
DIFFERENTIAL METHOD: ABNORMAL
EOSINOPHIL # BLD AUTO: 0.3 K/UL
EOSINOPHIL NFR BLD: 3.6 %
ERYTHROCYTE [DISTWIDTH] IN BLOOD BY AUTOMATED COUNT: 14.3 %
FERRITIN SERPL-MCNC: 49 NG/ML
HCT VFR BLD AUTO: 42.5 %
HGB BLD-MCNC: 14.1 G/DL
IRON SERPL-MCNC: 106 UG/DL
LYMPHOCYTES # BLD AUTO: 1 K/UL
LYMPHOCYTES NFR BLD: 13.9 %
MCH RBC QN AUTO: 30.7 PG
MCHC RBC AUTO-ENTMCNC: 33.2 G/DL
MCV RBC AUTO: 93 FL
MONOCYTES # BLD AUTO: 0.6 K/UL
MONOCYTES NFR BLD: 8.8 %
NEUTROPHILS # BLD AUTO: 5.3 K/UL
NEUTROPHILS NFR BLD: 73 %
PLATELET # BLD AUTO: 218 K/UL
PMV BLD AUTO: 9.8 FL
RBC # BLD AUTO: 4.59 M/UL
SATURATED IRON: 31 %
TOTAL IRON BINDING CAPACITY: 340 UG/DL
TRANSFERRIN SERPL-MCNC: 230 MG/DL
WBC # BLD AUTO: 7.19 K/UL

## 2018-01-11 PROCEDURE — 82728 ASSAY OF FERRITIN: CPT

## 2018-01-11 PROCEDURE — 99999 PR PBB SHADOW E&M-EST. PATIENT-LVL III: CPT | Mod: PBBFAC,,, | Performed by: INTERNAL MEDICINE

## 2018-01-11 PROCEDURE — 83540 ASSAY OF IRON: CPT

## 2018-01-11 PROCEDURE — 36415 COLL VENOUS BLD VENIPUNCTURE: CPT

## 2018-01-11 PROCEDURE — 85025 COMPLETE CBC W/AUTO DIFF WBC: CPT

## 2018-01-11 PROCEDURE — 99214 OFFICE O/P EST MOD 30 MIN: CPT | Mod: S$GLB,,, | Performed by: INTERNAL MEDICINE

## 2018-01-11 NOTE — PROGRESS NOTES
Subjective:       Patient ID: Mark Anthony Velarde Jr. is a 77 y.o. male.    Chief Complaint: Follow-up    HPI:  Patient comes in follow-up gastric revaluation.  He was found several months ago to erosive gastropathy following episode of choking.  He had some obstructive symptoms.  EGD showed some erosive gastropathy and erosions.  He was placed on a PPI and iron and has done fairly well.  Since then he says he has had no GI problems.  He went through a pacemaker battery change and declined/ canceled esophageal manometry and CT scan.  Gastro decided to have him return to me.  The family at that time did not want to get too aggressive so gastro recommended just repeating his iron and blood count.  He has no changes in his stool, no blood in his stool.  No vomiting.  His weight has been stable      Review of Systems   Constitutional: Negative for activity change, chills, fatigue, fever and unexpected weight change.   HENT: Negative for hearing loss, nosebleeds, rhinorrhea and trouble swallowing.    Eyes: Negative for pain, discharge and visual disturbance.   Respiratory: Negative for cough, chest tightness, shortness of breath and wheezing.    Cardiovascular: Negative for chest pain and palpitations.   Gastrointestinal: Negative for abdominal pain, blood in stool, constipation, diarrhea, nausea and vomiting.   Endocrine: Negative for polydipsia and polyuria.   Genitourinary: Negative for difficulty urinating, hematuria and urgency.   Musculoskeletal: Negative for arthralgias, joint swelling and neck pain.   Skin: Negative for rash.   Neurological: Negative for dizziness, weakness and headaches.   Hematological: Does not bruise/bleed easily.   Psychiatric/Behavioral: Positive for confusion (MCI). Negative for dysphoric mood, sleep disturbance and suicidal ideas.       Objective:      Physical Exam   Constitutional: He appears well-developed and well-nourished. No distress.   HENT:   Head: Normocephalic and atraumatic.   Right  Ear: External ear normal.   Left Ear: External ear normal.   Mouth/Throat: Oropharynx is clear and moist. No oropharyngeal exudate.   TM's clear, pharynx clear   Eyes: Conjunctivae and EOM are normal. Pupils are equal, round, and reactive to light. No scleral icterus.   Neck: Normal range of motion. Neck supple. No thyromegaly present.   No supraclavicular nodes palpated   Cardiovascular: Normal rate, regular rhythm and normal heart sounds.    No murmur heard.  Pulmonary/Chest: Effort normal and breath sounds normal. He has no wheezes.   Abdominal: Soft. Bowel sounds are normal. He exhibits no mass. There is no tenderness.   Musculoskeletal: He exhibits no edema.   Lymphadenopathy:     He has no cervical adenopathy.   Neurological: He is alert.   Skin: No rash noted. No pallor.   Psychiatric: He has a normal mood and affect.       Assessment:       1. Anemia, unspecified type    2. Erosive gastropathy    3. Paroxysmal atrial fibrillation    4. Atrial flutter with controlled response - seen on ICD interrogation - asymptomatic    5. MCI (mild cognitive impairment) with memory loss        Plan:     Mark Anthony was seen today for follow-up.    Diagnoses and all orders for this visit:    Anemia, unspecified type  -     CBC auto differential; Future  -     Iron and TIBC; Future  -     Ferritin; Future    Erosive gastropathy  Comments:  Noted on EGD in July 2017    Paroxysmal atrial fibrillation    Atrial flutter with controlled response - seen on ICD interrogation - asymptomatic    MCI (mild cognitive impairment) with memory loss         Review studies and follow-up in a few months

## 2018-01-12 NOTE — TELEPHONE ENCOUNTER
Can we let patients wife know (he has some memory issues) that his iron and blood counts are normal! They can stop the iron but continue the omeprazole. I want to see him in 3 months with repeat labs. Lab orders are placed.

## 2018-01-16 NOTE — TELEPHONE ENCOUNTER
Spoke with Mrs. Velarde, will stop iron and continue prilosec. Follow up appt made for April 16,2018

## 2018-01-17 ENCOUNTER — OFFICE VISIT (OUTPATIENT)
Dept: CARDIOLOGY | Facility: CLINIC | Age: 78
End: 2018-01-17
Payer: MEDICARE

## 2018-01-17 VITALS
BODY MASS INDEX: 26.96 KG/M2 | HEART RATE: 60 BPM | HEIGHT: 67 IN | SYSTOLIC BLOOD PRESSURE: 137 MMHG | WEIGHT: 171.75 LBS | DIASTOLIC BLOOD PRESSURE: 70 MMHG

## 2018-01-17 DIAGNOSIS — I44.7 LBBB (LEFT BUNDLE BRANCH BLOCK): Chronic | ICD-10-CM

## 2018-01-17 DIAGNOSIS — I25.5 ISCHEMIC CARDIOMYOPATHY: Primary | Chronic | ICD-10-CM

## 2018-01-17 DIAGNOSIS — I10 ESSENTIAL HYPERTENSION: Chronic | ICD-10-CM

## 2018-01-17 DIAGNOSIS — Z98.61 HISTORY OF PTCA: Chronic | ICD-10-CM

## 2018-01-17 DIAGNOSIS — E78.5 HYPERLIPIDEMIA WITH TARGET LDL LESS THAN 70: Chronic | ICD-10-CM

## 2018-01-17 DIAGNOSIS — Z79.01 CURRENT USE OF LONG TERM ANTICOAGULATION: ICD-10-CM

## 2018-01-17 DIAGNOSIS — I48.0 PAROXYSMAL ATRIAL FIBRILLATION: Chronic | ICD-10-CM

## 2018-01-17 DIAGNOSIS — N18.30 CHRONIC KIDNEY DISEASE, STAGE III (MODERATE): ICD-10-CM

## 2018-01-17 DIAGNOSIS — I48.92 ATRIAL FLUTTER WITH CONTROLLED RESPONSE: Chronic | ICD-10-CM

## 2018-01-17 DIAGNOSIS — Z95.810 ICD (IMPLANTABLE CARDIOVERTER-DEFIBRILLATOR) IN PLACE: ICD-10-CM

## 2018-01-17 DIAGNOSIS — I25.10 CORONARY ARTERY DISEASE INVOLVING NATIVE CORONARY ARTERY OF NATIVE HEART WITHOUT ANGINA PECTORIS: Chronic | ICD-10-CM

## 2018-01-17 PROBLEM — Z45.02 IMPLANTABLE CARDIOVERTER-DEFIBRILLATOR (ICD) AT END OF LIFE: Status: RESOLVED | Noted: 2017-07-26 | Resolved: 2018-01-17

## 2018-01-17 PROCEDURE — 99999 PR PBB SHADOW E&M-EST. PATIENT-LVL III: CPT | Mod: PBBFAC,,, | Performed by: INTERNAL MEDICINE

## 2018-01-17 PROCEDURE — 99214 OFFICE O/P EST MOD 30 MIN: CPT | Mod: S$GLB,,, | Performed by: INTERNAL MEDICINE

## 2018-01-17 RX ORDER — AMLODIPINE BESYLATE 5 MG/1
5 TABLET ORAL DAILY
Qty: 90 TABLET | Refills: 3 | Status: SHIPPED | OUTPATIENT
Start: 2018-01-17 | End: 2019-01-18 | Stop reason: SDUPTHER

## 2018-01-17 NOTE — PROGRESS NOTES
"Subjective:   Patient ID:  Mark Anthony Velarde Jr. is a 77 y.o. male who presents for follow-up of Coronary Artery Disease; Hypertension; Hyperlipidemia; and Atrial Fibrillation      HPI:   Mr. Velarde returns for f/u for HTN, dyslipidemia and CAD s/p PCI of LAD, LCX and PDA in .   He has a chronic LBBB and an EF of 45%.  He underwent an EP evaluation for syncope with HUT test and an EP study. EP study demonstrated marked infrahisian block and TWA was positive.  He therefore received an ICD.  In 9-15 had a pulse generator change. Mr. Velarde underwent a successful generator change (07/26/17); his prior device was identified as a [facility] advisory device>>pacer-dependent.     Mr. Velarde denies any  exertional chest discomfort, palpitations, TIA's, syncope or presyncope.  Mr. Velarde denies any claudication or myalgias. He is exercising on a bike 5 days a week and is also in silver sneakers.    Stress test from 1-2008 was negatvie for ischemia.    In 5-2012, ICD interrogation revealed ~ 16 hrs of afib at which time he was asymptomatic. He is on pradaxa.       He had his AAA screen which was negative.    He hasn't used nitro in  several years    Losartan stopped for hyperkalemia    Vitals:    01/17/18 1112   BP: 137/70   Pulse: 60   Weight: 77.9 kg (171 lb 11.8 oz)   Height: 5' 7" (1.702 m)     Body mass index is 26.9 kg/m².  CrCl cannot be calculated (Patient's most recent lab result is older than the maximum 7 days allowed.).    Lab Results   Component Value Date     07/19/2017     07/19/2017     07/19/2017    K 4.9 07/19/2017    K 4.9 07/19/2017    K 4.9 07/19/2017     07/19/2017     07/19/2017     07/19/2017    CO2 27 07/19/2017    CO2 27 07/19/2017    CO2 27 07/19/2017    BUN 20 07/19/2017    BUN 20 07/19/2017    BUN 20 07/19/2017    CREATININE 1.4 07/19/2017    CREATININE 1.4 07/19/2017    CREATININE 1.4 07/19/2017    GLU 72 07/19/2017    GLU 72 07/19/2017    GLU 72 07/19/2017    MG 2.4 " "12/15/2007    AST 30 07/19/2017    ALT 25 07/19/2017    ALBUMIN 3.7 07/19/2017    PROT 7.2 07/19/2017    BILITOT 0.5 07/19/2017    WBC 7.19 01/11/2018    HGB 14.1 01/11/2018    HCT 42.5 01/11/2018    MCV 93 01/11/2018     01/11/2018    INR 1.1 07/19/2017    PSA 1.8 08/04/2014    TSH 0.844 07/02/2014    CHOL 120 07/02/2014    HDL 40 07/02/2014    LDLCALC 57.4 (L) 07/02/2014    TRIG 113 07/02/2014       Current Outpatient Prescriptions   Medication Sig    amlodipine (NORVASC) 5 MG tablet Take 1 tablet (5 mg total) by mouth once daily. (Patient taking differently: Take 5 mg by mouth every evening. )    aspirin (ECOTRIN) 81 MG EC tablet Take 81 mg by mouth once daily.    atorvastatin (LIPITOR) 40 MG tablet Take 1 tablet (40 mg total) by mouth once daily.    dabigatran etexilate (PRADAXA) 150 mg Cap Take 1 capsule (150 mg total) by mouth 2 (two) times daily. "Do NOT break, chew, or open capsules."    donepezil (ARICEPT) 10 MG tablet Take 1 tablet (10 mg total) by mouth once daily.    memantine (NAMENDA) 10 MG Tab Take 1 tablet (10 mg total) by mouth 2 (two) times daily.    metoprolol tartrate (LOPRESSOR) 25 MG tablet Take 0.5 tablets (12.5 mg total) by mouth 2 (two) times daily.    multivitamin (MULTIVITAMIN) per tablet Take 1 tablet by mouth once daily.    omeprazole (PRILOSEC) 20 MG capsule Take 1 capsule (20 mg total) by mouth once daily.     No current facility-administered medications for this visit.        Review of Systems   Constitution: Negative for decreased appetite, weakness, malaise/fatigue, weight gain and weight loss.   Eyes: Negative for visual disturbance.   Cardiovascular: Negative for chest pain, claudication, dyspnea on exertion, irregular heartbeat, orthopnea, palpitations, paroxysmal nocturnal dyspnea and syncope.   Respiratory: Negative for cough, shortness of breath and snoring.    Skin: Negative for rash.   Musculoskeletal: Positive for arthritis. Negative for muscle cramps, " muscle weakness and myalgias.   Gastrointestinal: Negative for abdominal pain, anorexia, change in bowel habit and nausea.   Genitourinary: Negative for dysuria and frequency.   Neurological: Negative for excessive daytime sleepiness, dizziness, headaches, loss of balance and numbness.   Psychiatric/Behavioral: Negative for depression.       Objective:   Physical Exam   Constitutional: He is oriented to person, place, and time. He appears well-developed and well-nourished.   HENT:   Head: Normocephalic and atraumatic.   Eyes: Pupils are equal, round, and reactive to light.   Neck: Normal range of motion. Neck supple.   Cardiovascular: Normal rate, regular rhythm, normal heart sounds, intact distal pulses and normal pulses.  Exam reveals no gallop.    No murmur heard.  Pulmonary/Chest: Effort normal and breath sounds normal.   Abdominal: Soft. Bowel sounds are normal. There is no hepatosplenomegaly. There is no tenderness.   Musculoskeletal: Normal range of motion.   Neurological: He is alert and oriented to person, place, and time.   Skin: Skin is warm and dry.   Psychiatric: He has a normal mood and affect. His speech is normal and behavior is normal. Judgment and thought content normal.       Assessment:     1. Ischemic cardiomyopathy LVEF ~45%    2. LBBB (left bundle branch block)    3. Coronary artery disease involving native coronary artery of native heart without angina pectoris    4. Atrial flutter with controlled response - seen on ICD interrogation - asymptomatic    5. Paroxysmal atrial fibrillation    6. Essential hypertension    7. Hyperlipidemia with target LDL less than 70    8. Chronic kidney disease, stage III (moderate)    9. Current use of long term anticoagulation    10. History of PTCA - LAD, LCX and PDA PCI in 2006    11. ICD (implantable cardioverter-defibrillator) in place        Plan:   From a cardiac standpoint, pt is doing well and is clinically stable. Pts lipid profile and BP's are at  goal. Pt does not require any cardiac testing at this time

## 2018-01-19 ENCOUNTER — LAB VISIT (OUTPATIENT)
Dept: LAB | Facility: HOSPITAL | Age: 78
End: 2018-01-19
Attending: INTERNAL MEDICINE
Payer: MEDICARE

## 2018-01-19 DIAGNOSIS — N18.30 CHRONIC KIDNEY DISEASE, STAGE III (MODERATE): ICD-10-CM

## 2018-01-19 LAB
ANION GAP SERPL CALC-SCNC: 8 MMOL/L
BASOPHILS # BLD AUTO: 0.04 K/UL
BASOPHILS NFR BLD: 0.6 %
BUN SERPL-MCNC: 16 MG/DL
CALCIUM SERPL-MCNC: 9.5 MG/DL
CHLORIDE SERPL-SCNC: 106 MMOL/L
CO2 SERPL-SCNC: 27 MMOL/L
CREAT SERPL-MCNC: 1.4 MG/DL
DIFFERENTIAL METHOD: ABNORMAL
EOSINOPHIL # BLD AUTO: 0.2 K/UL
EOSINOPHIL NFR BLD: 3.4 %
ERYTHROCYTE [DISTWIDTH] IN BLOOD BY AUTOMATED COUNT: 14.6 %
EST. GFR  (AFRICAN AMERICAN): 54.9 ML/MIN/1.73 M^2
EST. GFR  (NON AFRICAN AMERICAN): 47.4 ML/MIN/1.73 M^2
GLUCOSE SERPL-MCNC: 159 MG/DL
HCT VFR BLD AUTO: 46.1 %
HGB BLD-MCNC: 14.6 G/DL
LYMPHOCYTES # BLD AUTO: 1.1 K/UL
LYMPHOCYTES NFR BLD: 16.2 %
MCH RBC QN AUTO: 30 PG
MCHC RBC AUTO-ENTMCNC: 31.7 G/DL
MCV RBC AUTO: 95 FL
MONOCYTES # BLD AUTO: 0.6 K/UL
MONOCYTES NFR BLD: 9.2 %
NEUTROPHILS # BLD AUTO: 4.9 K/UL
NEUTROPHILS NFR BLD: 70.6 %
PLATELET # BLD AUTO: 261 K/UL
PMV BLD AUTO: 10.6 FL
POTASSIUM SERPL-SCNC: 4.2 MMOL/L
RBC # BLD AUTO: 4.86 M/UL
SODIUM SERPL-SCNC: 141 MMOL/L
WBC # BLD AUTO: 6.96 K/UL

## 2018-01-19 PROCEDURE — 85025 COMPLETE CBC W/AUTO DIFF WBC: CPT | Mod: PO

## 2018-01-19 PROCEDURE — 36415 COLL VENOUS BLD VENIPUNCTURE: CPT | Mod: PO

## 2018-01-19 PROCEDURE — 80048 BASIC METABOLIC PNL TOTAL CA: CPT

## 2018-01-24 ENCOUNTER — OFFICE VISIT (OUTPATIENT)
Dept: NEPHROLOGY | Facility: CLINIC | Age: 78
End: 2018-01-24
Payer: MEDICARE

## 2018-01-24 VITALS
SYSTOLIC BLOOD PRESSURE: 130 MMHG | HEART RATE: 63 BPM | BODY MASS INDEX: 26.92 KG/M2 | DIASTOLIC BLOOD PRESSURE: 60 MMHG | WEIGHT: 171.5 LBS | OXYGEN SATURATION: 98 % | HEIGHT: 67 IN

## 2018-01-24 DIAGNOSIS — N18.30 CHRONIC KIDNEY DISEASE, STAGE III (MODERATE): Primary | ICD-10-CM

## 2018-01-24 PROCEDURE — 99213 OFFICE O/P EST LOW 20 MIN: CPT | Mod: S$GLB,,, | Performed by: INTERNAL MEDICINE

## 2018-01-24 PROCEDURE — 99999 PR PBB SHADOW E&M-EST. PATIENT-LVL III: CPT | Mod: PBBFAC,,, | Performed by: INTERNAL MEDICINE

## 2018-01-24 NOTE — PROGRESS NOTES
Subjective:      Patient ID: Mark Anthony Velarde Jr. is a 77 y.o. White male who presents for follow-up evaluation of CKD.     HPI   No acute events since last exam. No acute complaints or difficulty with urination. Continues to have an ICD/PCM who is keeping him stable, had device replaced 6 mo ago. The patient denies taking NSAIDs or new antibiotics, recreational drugs, recent episode of dehydration, diarrhea, nausea or vomiting, acute illness, hospitalization or exposure to IV radiocontrast.     Review of Systems   Constitutional: Negative for activity change, appetite change, chills, diaphoresis, fatigue, fever and unexpected weight change.   HENT: Negative for congestion, facial swelling and trouble swallowing.    Eyes: Negative for pain, discharge, redness and visual disturbance.   Respiratory: Negative for cough, shortness of breath and wheezing.    Cardiovascular: Negative for chest pain, palpitations and leg swelling.   Gastrointestinal: Negative for abdominal distention, abdominal pain, constipation and diarrhea.   Endocrine: Negative for cold intolerance and heat intolerance.   Genitourinary: Negative for decreased urine volume, difficulty urinating, dysuria, flank pain, frequency and urgency.   Musculoskeletal: Negative for arthralgias, joint swelling and myalgias.   Skin: Negative for color change.   Allergic/Immunologic: Negative for immunocompromised state.   Neurological: Negative for dizziness, tremors, syncope, speech difficulty, weakness, light-headedness and numbness.   Hematological: Negative for adenopathy.   Psychiatric/Behavioral: Negative for agitation, confusion, decreased concentration and dysphoric mood.       Objective:   /62 mmHg  Physical Exam   Constitutional: He is oriented to person, place, and time. He appears well-developed and well-nourished.   HENT:   Head: Normocephalic and atraumatic.   Eyes: Conjunctivae and EOM are normal. Pupils are equal, round, and reactive to light.    Neck: Neck supple.   Cardiovascular: Normal rate, regular rhythm, normal heart sounds and intact distal pulses.    Pulmonary/Chest: Effort normal and breath sounds normal.   Abdominal: Soft. Bowel sounds are normal.   Musculoskeletal: Normal range of motion.   Neurological: He is alert and oriented to person, place, and time. He has normal reflexes.   Skin: Skin is warm and dry.   Psychiatric: He has a normal mood and affect. His behavior is normal.   Nursing note and vitals reviewed.      LABS  Serum Cr 1.4 mg/dL  UPCR 0.13 g/g    Assessment:     1. CKD stage 3A eGFR ~ 50 ml/min. Etiology unknown, possible chronic TI disease, hypoperfusion injury (DCM, AFIB), hypertensive injury.  Stable kidney function. In fact, sCr is better. No proteinuria. Risk for ESRD progression is low. Avoidance of NSAIDs encouraged.      Plan:       Continue current regimen, Avoid NSAIDs.      Follow up in 12 months with labs.

## 2018-01-30 ENCOUNTER — CLINICAL SUPPORT (OUTPATIENT)
Dept: ELECTROPHYSIOLOGY | Facility: CLINIC | Age: 78
End: 2018-01-30
Payer: MEDICARE

## 2018-01-30 DIAGNOSIS — R55 SYNCOPE, UNSPECIFIED SYNCOPE TYPE: ICD-10-CM

## 2018-01-30 DIAGNOSIS — Z95.810 IMPLANTABLE CARDIOVERTER-DEFIBRILLATOR (ICD) IN SITU: ICD-10-CM

## 2018-01-30 DIAGNOSIS — I47.29 INDUCIBLE VENTRICULAR TACHYCARDIA: ICD-10-CM

## 2018-01-30 PROCEDURE — 93296 REM INTERROG EVL PM/IDS: CPT | Mod: S$GLB,,, | Performed by: INTERNAL MEDICINE

## 2018-01-30 PROCEDURE — 93295 DEV INTERROG REMOTE 1/2/MLT: CPT | Mod: S$GLB,,, | Performed by: INTERNAL MEDICINE

## 2018-02-02 RX ORDER — DABIGATRAN ETEXILATE 150 MG/1
150 CAPSULE ORAL 2 TIMES DAILY
Qty: 180 CAPSULE | Refills: 3 | Status: SHIPPED | OUTPATIENT
Start: 2018-02-02 | End: 2019-02-19 | Stop reason: ALTCHOICE

## 2018-04-03 ENCOUNTER — OFFICE VISIT (OUTPATIENT)
Dept: NEUROLOGY | Facility: CLINIC | Age: 78
End: 2018-04-03
Payer: MEDICARE

## 2018-04-03 VITALS
WEIGHT: 169.06 LBS | SYSTOLIC BLOOD PRESSURE: 133 MMHG | DIASTOLIC BLOOD PRESSURE: 66 MMHG | HEART RATE: 60 BPM | HEIGHT: 67 IN | BODY MASS INDEX: 26.53 KG/M2

## 2018-04-03 DIAGNOSIS — F02.818 LEWY BODY DEMENTIA WITH BEHAVIORAL DISTURBANCE: ICD-10-CM

## 2018-04-03 DIAGNOSIS — G31.83 LEWY BODY DEMENTIA WITH BEHAVIORAL DISTURBANCE: ICD-10-CM

## 2018-04-03 PROCEDURE — 3075F SYST BP GE 130 - 139MM HG: CPT | Mod: CPTII,S$GLB,, | Performed by: NURSE PRACTITIONER

## 2018-04-03 PROCEDURE — 99214 OFFICE O/P EST MOD 30 MIN: CPT | Mod: S$GLB,,, | Performed by: NURSE PRACTITIONER

## 2018-04-03 PROCEDURE — 99999 PR PBB SHADOW E&M-EST. PATIENT-LVL III: CPT | Mod: PBBFAC,,, | Performed by: NURSE PRACTITIONER

## 2018-04-03 PROCEDURE — 3078F DIAST BP <80 MM HG: CPT | Mod: CPTII,S$GLB,, | Performed by: NURSE PRACTITIONER

## 2018-04-03 RX ORDER — MEMANTINE HYDROCHLORIDE 10 MG/1
10 TABLET ORAL 2 TIMES DAILY
Qty: 180 TABLET | Refills: 3 | Status: SHIPPED | OUTPATIENT
Start: 2018-04-03 | End: 2019-04-15 | Stop reason: SDUPTHER

## 2018-04-03 RX ORDER — DONEPEZIL HYDROCHLORIDE 10 MG/1
10 TABLET, FILM COATED ORAL DAILY
Qty: 90 TABLET | Refills: 3 | Status: SHIPPED | OUTPATIENT
Start: 2018-04-03 | End: 2018-12-03 | Stop reason: SDUPTHER

## 2018-04-03 NOTE — LETTER
April 3, 2018      Jeff Yan MD  [address]  [facility] [address]           [facility]  Neurology  [address]  [facility] [address]  Phone: [phone]  Fax: [phone]          Patient: Mark Anthony Velarde Jr.   MR Number: [id number]   YOB: 1940   Date of Visit: 4/3/2018       Dear Dr. Jeff Yan:    Thank you for referring Mark Anthony Velarde to me for evaluation. Attached you will find relevant portions of my assessment and plan of care.    If you have questions, please do not hesitate to call me. I look forward to following Mark Anthony Velarde along with you.    Sincerely,    Yazmin Israel NP    Enclosure  CC:  No Recipients    If you would like to receive this communication electronically, please contact [email or web address] or [phone] to request more information on [facility] access.    For providers and/or their staff who would like to refer a patient to [facility], please contact us through our one-stop-shop provider referral line, [facility], at [phone].    If you feel you have received this communication in error or would no longer like to receive these types of communications, please e-mail [email or web address]

## 2018-04-03 NOTE — PROGRESS NOTES
"Name: Mark Anthony Velarde Jr.  MRN: [id number]   CSN: [id number]      Date: 4-3-18      Referring physician:  Jeff Yan MD  [address] [place] [address]    Subjective:      Chief Complaint: follow up    History of Present Illness (HPI):    Mark Anthony Velarde Jr. is a 77 y.o. right-handed male who presents today for a follow-up evaluation of parkinsonism, suspect DLB and is accompanied by wife. Last seen in office 9-22-17. At that time, they were not interested in brain scan or NP testing. Continued donepezil and memantine without change.     When asked how he is doing, he says he is "midland." He does not some tremor, does not bother him. Only in the RH. It is daily.   When asked how his memory is, he says "Who cares,I am old."   Wife says he will put something down and then forget what he did with it. It could be his belt, pants, sunglasses. He is always asking her where his items are.    She talks about when he makes himself a sandwich. It is a very separate thought process. He will say oh I need a plate. Oh I need a cup. Oh I need a knife.   Still helpful with emptying . If he does not know where something goes, he puts on the counter so he does not put in wrong place.   They have lived in their home 50+ years. He used to cook and knew where everything in the kitchen was.   She offers that his memory does not seem to bother him and she feels this is good.     He does drive. Wife quickly say not much anymore. In the past month, he has driven twice and only short distances. He does not drive at night at all.     More difficulty getting out of chair and car.   Denies muscle stiffness.   He does not feel slow but wife nods her head that he is.  Spends a lot of time working in the garden.   Balance- no falls. Will stumble at times.    Wife says he has no sense of smell left.     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 He is being honored at the end of the month as retiring from this " activity.   He told his wife 6-8 years ago that he was not remembering his students names. These are small classes.     Denies hallucinations.       Saw Deloris Bender NP before me and Dr. Tony.   My initial note 1-12-17.   History of Present Illness (HPI):     75 yo RH male with MCI, prev followed by Deloris Bender NP, last seen in office in June. Does have tremor in RH, probably the past 12-18 months. He does not feel it is getting worse. Wife and son think it may be more frequent. Notes at rest and holding steering wheel. This does not bother him. Wife says he does not often even know it is occurring. They area not aware of tremor anywhere else.   Denies stiffness or slowness. Son and wife feel he is a bit slower a year ago.   Balance is pretty good. No assistive devices.   No recent falls. Does shuffle per wife and son- past 2-3 years intermittently. Wife states she will sometimes ask him if his shoes are too big.   Denies dysphagia but they disagree. This has been going on x 10 years- will excuse self from table- vomit and resume eating. He prev saw ENT and told to chew food better. Patient says he has always had to eat fast- is now eating slower. Still not clear if he is actually choking.   Denies sialorrhea.  He has had memory issues about 2-3 years.      Father had tremor. No FMH of PD.         Review of Systems   Neurological: Positive for tremors.   Psychiatric/Behavioral: Positive for hallucinations.       Past Medical History: The patient  has a past medical history of *Atrial fibrillation; Anticoagulant long-term use; Atrial fibrillation - asymptomatic but noted on ICD interrogation ([id number]) - 16 h 40 minutes of afib (2/13/2013); Atrial flutter with controlled response - seen on ICD interrogation - asymptomatic (2/13/2013); Automatic implantable cardioverter-defibrillator in situ (2/13/2013); Basal cell carcinoma; CAD (coronary artery disease) (2/13/2013); Cardiomyopathy; Cataract; Cognitive  "deficits; H/O syncope -  (5/27/2013); History of PTCA - LAD, LCX and PDA PCI in 2006 (2/13/2013); HTN (hypertension) (2/13/2013); Hyperlipidemia LDL goal < 70 (2/13/2013); Hypertension; ICD (implantable cardiac defibrillator) in place (2/13/2013); Ischemic cardiomyopathy LVEF ~45% (2/13/2013); LBBB (left bundle branch block) (2/13/2013); Memory loss; Psoriasis vulgaris; and Syncope and collapse.    Social History: The patient  reports that he quit smoking about 54 years ago. He has never used smokeless tobacco. He reports that he does not drink alcohol or use drugs.    Family History: Their family history includes Cancer in his mother; Cataracts in his mother; Dementia in his father; Diabetes in his maternal grandmother; Kidney disease in his sister; No Known Problems in his brother, maternal aunt, maternal grandfather, maternal uncle, paternal aunt, paternal grandfather, paternal grandmother, and paternal uncle; Psoriasis in his father, son, and son; Tremor in his father.    Allergies: Arb-angiotensin receptor antagonist and Lisinopril     Meds:   Current Outpatient Prescriptions on File Prior to Visit   Medication Sig Dispense Refill    amLODIPine (NORVASC) 5 MG tablet Take 1 tablet (5 mg total) by mouth once daily. 90 tablet 3    aspirin (ECOTRIN) 81 MG EC tablet Take 81 mg by mouth once daily.      atorvastatin (LIPITOR) 40 MG tablet Take 1 tablet (40 mg total) by mouth once daily. 90 tablet 3    dabigatran etexilate (PRADAXA) 150 mg Cap Take 1 capsule (150 mg total) by mouth 2 (two) times daily. "Do NOT break, chew, or open capsules." 180 capsule 3    donepezil (ARICEPT) 10 MG tablet Take 1 tablet (10 mg total) by mouth once daily. 90 tablet 3    memantine (NAMENDA) 10 MG Tab Take 1 tablet (10 mg total) by mouth 2 (two) times daily. 180 tablet 3    metoprolol tartrate (LOPRESSOR) 25 MG tablet Take 0.5 tablets (12.5 mg total) by mouth 2 (two) times daily. 90 tablet 3    multivitamin (MULTIVITAMIN) per " "tablet Take 1 tablet by mouth once daily.      omeprazole (PRILOSEC) 20 MG capsule Take 1 capsule (20 mg total) by mouth once daily. 90 capsule 3    FLUAD [id number], 65 YR UP,,PF, 45 mcg (15 mcg x 3)/0.5 mL Syrg ADM 0.5ML IM UTD  0     No current facility-administered medications on file prior to visit.        Objective:     Physical Exam:    Vitals:    04/03/18 1415   BP: 133/66   Pulse: 60   Weight: 76.7 kg (169 lb 1.5 oz)   Height: 5' 7" (1.702 m)     Body mass index is 26.48 kg/m².    Constitutional  Well-developed, well-nourished, appears stated age   Cardiovascular  Radial pulses 2+ and symmetric, no LE edema bilaterally     ..  Neurological    * Mental status  MOCA = deferred    - Orientation Oriented to: person, place, situation, month of year and year  Not oriented to: day of week Thursday) and stated date of 2nd     - Memory    Impaired     - Attention/concentration  Normal     - Language  Fluent     * Specialized movement exam  Mild hypophonic speech.    No facial masking.    Slight cogwheel rigidity RUE and near mild RUE.Tone normal in BLE.       Slight bradykinesia RH.     Mild rest tremor RH. Slight postural tremor BH.      No other dystonia, chorea, athetosis, myoclonus, or tics.      No motor impersistence.   Normal-based gait.   Shortened stride length.   Reduced R arm swing with RH tremor.           Laboratory Results:  Lab Visit on 01/19/2018   Component Date Value Ref Range Status    Protein, Urine Random 01/19/2018 <7  0 - 15 mg/dL Final    Creatinine, Random Ur 01/19/2018 75.0  23.0 - 375.0 mg/dL Final    Prot/Creat Ratio, Ur 01/19/2018 Unable to calculate  0.00 - 0.20 Final    Specimen UA 01/19/2018 Urine, Clean Catch   Final    Color, UA 01/19/2018 Yellow  Yellow, Straw, Laura Final    Appearance, UA 01/19/2018 Clear  Clear Final    pH, UA 01/19/2018 6.0  5.0 - 8.0 Final    Specific Gravity, UA 01/19/2018 1.010  1.005 - 1.030 Final    Protein, UA 01/19/2018 Negative  Negative Final "    Glucose, UA 01/19/2018 Negative  Negative Final    Ketones, UA 01/19/2018 Negative  Negative Final    Bilirubin (UA) 01/19/2018 Negative  Negative Final    Occult Blood UA 01/19/2018 Negative  Negative Final    Nitrite, UA 01/19/2018 Negative  Negative Final    Urobilinogen, UA 01/19/2018 Negative  <2.0 EU/dL Final    Leukocytes, UA 01/19/2018 Negative  Negative Final   Lab Visit on 01/19/2018   Component Date Value Ref Range Status    Sodium 01/19/2018 141  136 - 145 mmol/L Final    Potassium 01/19/2018 4.2  3.5 - 5.1 mmol/L Final    Chloride 01/19/2018 106  95 - 110 mmol/L Final    CO2 01/19/2018 27  23 - 29 mmol/L Final    Glucose 01/19/2018 159* 70 - 110 mg/dL Final    BUN, Bld 01/19/2018 16  8 - 23 mg/dL Final    Creatinine 01/19/2018 1.4  0.5 - 1.4 mg/dL Final    Calcium 01/19/2018 9.5  8.7 - 10.5 mg/dL Final    Anion Gap 01/19/2018 8  8 - 16 mmol/L Final    eGFR if  01/19/2018 54.9* >60 mL/min/1.73 m^2 Final    eGFR if non African American 01/19/2018 47.4* >60 mL/min/1.73 m^2 Final    WBC 01/19/2018 6.96  3.90 - 12.70 K/uL Final    RBC 01/19/2018 4.86  4.60 - 6.20 M/uL Final    Hemoglobin 01/19/2018 14.6  14.0 - 18.0 g/dL Final    Hematocrit 01/19/2018 46.1  40.0 - 54.0 % Final    MCV 01/19/2018 95  82 - 98 fL Final    MCH 01/19/2018 30.0  27.0 - 31.0 pg Final    MCHC 01/19/2018 31.7* 32.0 - 36.0 g/dL Final    RDW 01/19/2018 14.6* 11.5 - 14.5 % Final    Platelets 01/19/2018 261  150 - 350 K/uL Final    MPV 01/19/2018 10.6  9.2 - 12.9 fL Final    Gran # (ANC) 01/19/2018 4.9  1.8 - 7.7 K/uL Final    Lymph # 01/19/2018 1.1  1.0 - 4.8 K/uL Final    Mono # 01/19/2018 0.6  0.3 - 1.0 K/uL Final    Eos # 01/19/2018 0.2  0.0 - 0.5 K/uL Final    Baso # 01/19/2018 0.04  0.00 - 0.20 K/uL Final    Gran% 01/19/2018 70.6  38.0 - 73.0 % Final    Lymph% 01/19/2018 16.2* 18.0 - 48.0 % Final    Mono% 01/19/2018 9.2  4.0 - 15.0 % Final    Eosinophil% 01/19/2018 3.4  0.0 -  8.0 % Final    Basophil% 01/19/2018 0.6  0.0 - 1.9 % Final    Differential Method 01/19/2018 Automated   Final   Lab Visit on 01/11/2018   Component Date Value Ref Range Status    WBC 01/11/2018 7.19  3.90 - 12.70 K/uL Final    RBC 01/11/2018 4.59* 4.60 - 6.20 M/uL Final    Hemoglobin 01/11/2018 14.1  14.0 - 18.0 g/dL Final    Hematocrit 01/11/2018 42.5  40.0 - 54.0 % Final    MCV 01/11/2018 93  82 - 98 fL Final    MCH 01/11/2018 30.7  27.0 - 31.0 pg Final    MCHC 01/11/2018 33.2  32.0 - 36.0 g/dL Final    RDW 01/11/2018 14.3  11.5 - 14.5 % Final    Platelets 01/11/2018 218  150 - 350 K/uL Final    MPV 01/11/2018 9.8  9.2 - 12.9 fL Final    Gran # (ANC) 01/11/2018 5.3  1.8 - 7.7 K/uL Final    Lymph # 01/11/2018 1.0  1.0 - 4.8 K/uL Final    Mono # 01/11/2018 0.6  0.3 - 1.0 K/uL Final    Eos # 01/11/2018 0.3  0.0 - 0.5 K/uL Final    Baso # 01/11/2018 0.04  0.00 - 0.20 K/uL Final    Gran% 01/11/2018 73.0  38.0 - 73.0 % Final    Lymph% 01/11/2018 13.9* 18.0 - 48.0 % Final    Mono% 01/11/2018 8.8  4.0 - 15.0 % Final    Eosinophil% 01/11/2018 3.6  0.0 - 8.0 % Final    Basophil% 01/11/2018 0.6  0.0 - 1.9 % Final    Differential Method 01/11/2018 Automated   Final    Iron 01/11/2018 106  45 - 160 ug/dL Final    Transferrin 01/11/2018 230  200 - 375 mg/dL Final    TIBC 01/11/2018 340  250 - 450 ug/dL Final    Saturated Iron 01/11/2018 31  20 - 50 % Final    Ferritin 01/11/2018 49  20.0 - 300.0 ng/mL Final           Assessment and Plan     Dementia with Lewy Bodies     Medical Decision Making:    His memory problems started first, followed by tremor. He does not hallucinate. I do believe this is more in the realm of DLB. I have discussed this with them.   Continue donepezil and memantine.   Again discussed NP evaluation. Today, he is agreeable.   I will have him scheduled in the memory clinic for eval and I will see him the same day.     I spent 35 minutes face-to-face with the patient and wife  with >50% of the time spent with counseling and education regarding:  - results of data, diagnosis, and recommendations stated above  - the prognosis of dementia and DLB  - importance of diet and exercise    Yazmin Israel DNP, NP-C  Division of Movement and Memory Disorders  [facility]  [phone]

## 2018-04-03 NOTE — PATIENT INSTRUCTIONS
I have sent prescriptions to your pharmacy.     I will get you an appointment in the memory clinic within the next couple of months.

## 2018-04-04 ENCOUNTER — TELEPHONE (OUTPATIENT)
Dept: NEUROLOGY | Facility: CLINIC | Age: 78
End: 2018-04-04

## 2018-04-04 NOTE — TELEPHONE ENCOUNTER
Spoke to Pt wife she verbalized her  appt date and time.    -- Message from Yazmin Israel NP sent at 4/3/2018  5:02 PM CDT -----  Needs appt in memory clinic next available.

## 2018-04-16 ENCOUNTER — OFFICE VISIT (OUTPATIENT)
Dept: INTERNAL MEDICINE | Facility: CLINIC | Age: 78
End: 2018-04-16
Payer: MEDICARE

## 2018-04-16 VITALS
SYSTOLIC BLOOD PRESSURE: 120 MMHG | OXYGEN SATURATION: 93 % | DIASTOLIC BLOOD PRESSURE: 65 MMHG | BODY MASS INDEX: 26.44 KG/M2 | WEIGHT: 168.44 LBS | HEART RATE: 78 BPM | HEIGHT: 67 IN

## 2018-04-16 DIAGNOSIS — G31.84 MCI (MILD COGNITIVE IMPAIRMENT) WITH MEMORY LOSS: Primary | ICD-10-CM

## 2018-04-16 DIAGNOSIS — D64.9 ANEMIA, UNSPECIFIED TYPE: ICD-10-CM

## 2018-04-16 DIAGNOSIS — K21.00 GASTROESOPHAGEAL REFLUX DISEASE WITH ESOPHAGITIS: ICD-10-CM

## 2018-04-16 DIAGNOSIS — M47.817 LUMBAR AND SACRAL ARTHRITIS: ICD-10-CM

## 2018-04-16 DIAGNOSIS — I10 ESSENTIAL HYPERTENSION: Chronic | ICD-10-CM

## 2018-04-16 DIAGNOSIS — R25.1 TREMOR: ICD-10-CM

## 2018-04-16 DIAGNOSIS — E55.9 VITAMIN D DEFICIENCY: ICD-10-CM

## 2018-04-16 DIAGNOSIS — T14.8XXA SKIN ABRASION: ICD-10-CM

## 2018-04-16 DIAGNOSIS — I25.5 ISCHEMIC CARDIOMYOPATHY: Chronic | ICD-10-CM

## 2018-04-16 PROCEDURE — 3074F SYST BP LT 130 MM HG: CPT | Mod: CPTII,S$GLB,, | Performed by: INTERNAL MEDICINE

## 2018-04-16 PROCEDURE — 3078F DIAST BP <80 MM HG: CPT | Mod: CPTII,S$GLB,, | Performed by: INTERNAL MEDICINE

## 2018-04-16 PROCEDURE — 99999 PR PBB SHADOW E&M-EST. PATIENT-LVL III: CPT | Mod: PBBFAC,,, | Performed by: INTERNAL MEDICINE

## 2018-04-16 PROCEDURE — 99214 OFFICE O/P EST MOD 30 MIN: CPT | Mod: S$GLB,,, | Performed by: INTERNAL MEDICINE

## 2018-04-16 NOTE — PROGRESS NOTES
Subjective:       Patient ID: Mark Anthony Velarde Jr. is a 77 y.o. male.    Chief Complaint: No chief complaint on file.     Patient here reflux with esophagitis.  Patient was previously losing weight, having dysphagia.  All of that has stabilized and he says he does not want a follow-up scope or gastro point.  He is continuing to see neurology for memory impairment and tremor.  He said he was told this is not parkinsonism but they are monitoring the memory he has had some mild back stiffness and arthritis but continues to work in his garden.  He wanted to know if he could use heat and Tylenol.  Wife asked that I look at his scalp.  He cut it Combing his hair.  She is not sure how but she said he noted minor pain and some bleeding.      Review of Systems   Constitutional: Negative for chills, fatigue, fever and unexpected weight change.   HENT: Negative for nosebleeds and trouble swallowing.    Eyes: Negative for pain and visual disturbance.   Respiratory: Negative for cough, shortness of breath and wheezing.    Cardiovascular: Negative for chest pain and palpitations.   Gastrointestinal: Negative for abdominal pain, constipation, diarrhea, nausea and vomiting.   Genitourinary: Negative for difficulty urinating and hematuria.   Musculoskeletal: Positive for arthralgias and back pain (  Low back primarily after prolonged sitting or after working in the garden or 1st thing in the morning.). Negative for neck pain.   Skin: Negative for rash.   Neurological: Positive for tremors and weakness. Negative for dizziness and headaches.   Hematological: Does not bruise/bleed easily.   Psychiatric/Behavioral: Positive for confusion. Negative for dysphoric mood, sleep disturbance and suicidal ideas.       Objective:      Physical Exam   Constitutional: He is oriented to person, place, and time. He appears well-developed and well-nourished. No distress.   HENT:   Head: Normocephalic and atraumatic.   Mouth/Throat: No oropharyngeal  exudate.   TM's clear, pharynx clear   Eyes: Conjunctivae and EOM are normal. Pupils are equal, round, and reactive to light. No scleral icterus.   Neck: Normal range of motion. Neck supple. No thyromegaly present.   No supraclavicular nodes palpated   Cardiovascular: Normal rate, regular rhythm and normal heart sounds.    No murmur heard.  Pulmonary/Chest: Effort normal and breath sounds normal. He has no wheezes.   Abdominal: Soft. Bowel sounds are normal. He exhibits no mass. There is no tenderness.   Musculoskeletal: He exhibits no edema or tenderness ( mild stiffness but no tenderness).   Lymphadenopathy:     He has no cervical adenopathy.   Neurological: He is alert and oriented to person, place, and time.   Skin: No pallor.   Psychiatric: He has a normal mood and affect.           Scalp laceration. Not tender or actively bleeding.   Assessment:       1. MCI (mild cognitive impairment) with memory loss    2. Tremor    3. Essential hypertension    4. Ischemic cardiomyopathy LVEF ~45%    5. Anemia, unspecified type    6. Vitamin D deficiency    7. Lumbar and sacral arthritis    8. Skin abrasion    9. Gastroesophageal reflux disease with esophagitis        Plan:       Diagnoses and all orders for this visit:    MCI (mild cognitive impairment) with memory loss  Comments:  Clinically stable. Sees Neurology.     Tremor    Essential hypertension    Ischemic cardiomyopathy LVEF ~45%    Anemia, unspecified type    Vitamin D deficiency    Lumbar and sacral arthritis    Skin abrasion    Gastroesophageal reflux disease with esophagitis         Local care to the small abrasion on the scalp .  Call for any problems.  Follow up in about 3-4 months.

## 2018-05-03 ENCOUNTER — CLINICAL SUPPORT (OUTPATIENT)
Dept: ELECTROPHYSIOLOGY | Facility: CLINIC | Age: 78
End: 2018-05-03
Payer: MEDICARE

## 2018-05-03 DIAGNOSIS — R55 SYNCOPE, UNSPECIFIED SYNCOPE TYPE: ICD-10-CM

## 2018-05-03 DIAGNOSIS — Z95.810 IMPLANTABLE CARDIOVERTER-DEFIBRILLATOR (ICD) IN SITU: ICD-10-CM

## 2018-05-03 DIAGNOSIS — I47.29 INDUCIBLE VENTRICULAR TACHYCARDIA: ICD-10-CM

## 2018-05-03 PROCEDURE — 93296 REM INTERROG EVL PM/IDS: CPT | Mod: S$GLB,,, | Performed by: INTERNAL MEDICINE

## 2018-05-03 PROCEDURE — 93295 DEV INTERROG REMOTE 1/2/MLT: CPT | Mod: S$GLB,,, | Performed by: INTERNAL MEDICINE

## 2018-05-16 ENCOUNTER — OFFICE VISIT (OUTPATIENT)
Dept: NEUROLOGY | Facility: CLINIC | Age: 78
End: 2018-05-16
Payer: MEDICARE

## 2018-05-16 VITALS
HEART RATE: 59 BPM | WEIGHT: 169.5 LBS | BODY MASS INDEX: 26.6 KG/M2 | SYSTOLIC BLOOD PRESSURE: 149 MMHG | DIASTOLIC BLOOD PRESSURE: 73 MMHG | HEIGHT: 67 IN

## 2018-05-16 DIAGNOSIS — F02.80 MIXED DEMENTIA: ICD-10-CM

## 2018-05-16 DIAGNOSIS — G31.83 LEWY BODY DEMENTIA WITH BEHAVIORAL DISTURBANCE: ICD-10-CM

## 2018-05-16 DIAGNOSIS — F01.50 MIXED DEMENTIA: ICD-10-CM

## 2018-05-16 DIAGNOSIS — G20.C PARKINSONISM, UNSPECIFIED PARKINSONISM TYPE: Primary | ICD-10-CM

## 2018-05-16 DIAGNOSIS — F02.818 LEWY BODY DEMENTIA WITH BEHAVIORAL DISTURBANCE: ICD-10-CM

## 2018-05-16 DIAGNOSIS — G30.9 MIXED DEMENTIA: ICD-10-CM

## 2018-05-16 PROCEDURE — 99215 OFFICE O/P EST HI 40 MIN: CPT | Mod: S$GLB,,, | Performed by: PSYCHIATRY & NEUROLOGY

## 2018-05-16 PROCEDURE — 3077F SYST BP >= 140 MM HG: CPT | Mod: CPTII,S$GLB,, | Performed by: PSYCHIATRY & NEUROLOGY

## 2018-05-16 PROCEDURE — 96118 PR NEUROPSYCH TESTING BY PSYCH/PHYS: CPT | Mod: S$GLB,,, | Performed by: PSYCHIATRY & NEUROLOGY

## 2018-05-16 PROCEDURE — 99999 PR PBB SHADOW E&M-EST. PATIENT-LVL III: CPT | Mod: PBBFAC,,,

## 2018-05-16 PROCEDURE — 3078F DIAST BP <80 MM HG: CPT | Mod: CPTII,S$GLB,, | Performed by: PSYCHIATRY & NEUROLOGY

## 2018-05-16 NOTE — PROGRESS NOTES
NEUROBEHAVIORAL STATUS EXAMINATION - CONFIDENTIAL  MEMORY DISORDERS CLINIC    MEDICAL NECESSITY: Evaluate cognitive and neuropsychiatric symptoms in the setting of dementia.   27405 = 2 hours     HISTORY OF PRESENT ILLNESS: Mr. 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 He established care with [facility] in 2014 at the recommendation of his family. He scored 19/30 on the MoCA at that time, suggesting MCI vs dementia. Dr. Israel noted parkinsonism on exam in 1/2017, suggesting DLB as a possibility given the course of his symptoms. He scored 17/30 on the MoCA in 2/2017.      Presently, Mr. Anthony wife reported a progressive decline in cognition. She described short-term memory loss, such as asking the time of this appointment numerous times. He frequently misplaces items, but they find it eventually. She has trouble navigating to familiar locations. He is not argumentative or irritable. In contrast, Mr. Velarde has not noticed any changes/problems in memory.     Mr. Lux will occasionally say a few words during sleep, but his wife denied dream enactment behavior. He sleeps in his recliner for 2 hours before going to bed and then sleeps about 6 hours in bed. He can doze off when watching television during the day, probably less than an hour total. His wife denied daytime somnolence. They both denied that he is experiencing visual hallucinations/misperceptions.     IADLS/DAILY FUNCTIONING: He lives independently with his wife. They have 5 children, one of which lives just a few blocks away.     Medication Compliance: His wife fills a pillbox and provides oversight. She reminds him to take the medications as needed.    Appointment Management: Wife manages his calendar, which has always been the case.   Financial Management: Wife exclusively manages the  finances, which began within the past several years.   Cooking: He used to cook, but he has mostly stopped cooking. His dishes were becoming progressively worse before he stopped cooking completely.   Driving: He gradually let his wife assume primary driving responsibilities. She was also having to navigate him to even familiar places. He will drive about 1 mile to very familiar places about 1-2 times per month.   Electronics:  Unable to use, such as television and answering machine.   Hobbies: He has maintained a vegetable garden for the past 40 years. His wife finds that she has to remind him what needs to be planted for different seasons. She will have to remind him to harvest the vegetables as well. He is still able to maintain a garden.   Basic ADLs: Wife has to remind him on occasion, but his balance seems fine in the shower.     CURRENT MEDICATIONS:   amLODIPine (NORVASC) 5 MG tablet    aspirin (ECOTRIN) 81 MG EC tablet    atorvastatin (LIPITOR) 40 MG tablet    dabigatran etexilate (PRADAXA) 150 mg Cap    donepezil (ARICEPT) 10 MG tablet    FLUAD [id number], 65 YR UP,,PF, 45 mcg (15 mcg x 3)/0.5 mL Syrg    memantine (NAMENDA) 10 MG Tab    metoprolol tartrate (LOPRESSOR) 25 MG tablet    multivitamin (MULTIVITAMIN) per tablet    omeprazole (PRILOSEC) 20 MG capsule      BEHAVIORAL OBSERVATIONS/TEST RESULTS: Mr. Velarde was not fully oriented to time. He stated the correct month and day of the week. He was 10 days off the exact date. He stated the year was 2023 instead of 2018. He correctly recalled the name of the current president. His total score of 13/30 on the MoCA was suggestive of significant cognitive dysfunction. He encoded 4/5 words after 2 trials, freely recalling 0/5 following a brief delay. He recalled 1/5 words with categorical cueing and 2 of the remaining 4 words with multiple choice cueing. Mr. Velarde wrote the numbers in a counterclockwise fashion on a clock, with the number 1 in the 11 position, 2  in the 10 position, etc. He provided 0/5 correct responses on a serial 7 subtraction task. Letter verbal fluency was below expectation. Speech was hypophonic as the examiner had to ask him to speak louder during this test.    Mr. Anthony total score of 12/18 on the SHIRLEY was indicative of fronto-subcortical dysfunction. He demonstrated intact letter verbal fluency and conceptual reasoning. He was unable to complete the Luria sequence with or without the examiner. He also made errors on contrasting motor programming and go-no-go tasks.     Mr. Anthony copy of a complex figure was well below expectation, suggestive of moderate visuospatial dysfunction.     IMPRESSIONS AND PLAN:     Diagnosis: Dementia - Possibly of a mixed etiology. Mr. Velarde meets criteria for possible Lewy Body Dementia. He also presents with a pattern of hien forgetting seen in Alzheimers disease. He has worsening parkinsonism over the past several years.    Recommendations:   1. Completely discontinue driving - In the setting of dementia. His wife was amenable to this recommendation.   2. Speech Therapy - In the setting of hypophonia.    3. Level of Care - Appropriately managed. Continue to provide support in all complex activities of daily living (medications, finances, appointments, cooking)   4. Follow-up - Memory Disorders Clinic in 3-6 months    Rito Mejias Psy.D., ABPP  Board Certified in Clinical Neuropsychology  Department of Neurology    APPENDIX  TESTS ADMINISTERED:  Clinical Interview and Review of Records, Ike Cognitive Assessment (MoCA), Frontal Assessment Battery (SHIRLEY), Skip Complex Figure copy trial.             MoCA     13/30        SHIRLEY    12/18

## 2018-05-16 NOTE — PROGRESS NOTES
Name: Mark Anthony Velarde Jr.  MRN: [id number]   CSN: [id number]      Date: 5-16-18      Referring physician:  Aaareferral Self  No address on file    Subjective:      Chief Complaint: memory    History of Present Illness (HPI):    Mark Anthony Velarde Jr. is a 77 y.o. right-handed male who presents today for  evaluation in the multidisciplinary memory clinic for evaluation of memory loss in the presence of parkinsonism, query DLB and is accompanied by wife of 57 years.     While he is undergoing testing, she shares that for the last year, she feels she has lost her partner. She describes him as still very loving and generous. However, they always used to discuss things and make decisions together. Now, when she asks him what he thinks, he will tell her it is up to her. She adds that he has not lost his personality.     He has always been one to misplace things but was always able to retrace his steps. He has lost this ability. object.     She describes an event about two years ago [place] [facility] [place] He drove himself here to get labs drawn. He was coming right back home. She kept waiting and he never arrived. She later learned that he could not find his car in the garage. He got in the car with security and still could not find his truck. Eventually, security called his wife and asked her to describe the truck.  They had already passed it but he did not recognize it. He eventually drove himself home. She was surprised that he did not seem bothered by this.       He is independent with feeding, dressing, and bathing. He no longer cooks. She prepares all the food, sets up his meds, and manages the finances.     She volunteers one day every 2 weeks. She feels he is safe when he is home alone. He never wanders.   He is not good with electronics but can use the phone.      He continues to say he is not bothered by his tremor.   Denies stiffness or slowness. Wife acknowledges that he is slow.  No balance issues. Will stumble occ when working in  "garden. Spends a great deal of time in garden (grows vegetables).       My initial note: 01/12/2017  Chief Complaint / Interval History: tremor  History of Present Illness (HPI):  75 yo RH male with MCI, prev followed by Deloris Bender NP, last seen in office in June. Does have tremor in RH, probably the past 12-18 months. He does not feel it is getting worse. Wife and son think it may be more frequent. Notes at rest and holding steering wheel. This does not bother him. Wife says he does not often even know it is occurring. They area not aware of tremor anywhere else.   Denies stiffness or slowness. Son and wife feel he is a bit slower a year ago.   Balance is pretty good. No assistive devices.   No recent falls. Does shuffle per wife and son- past 2-3 years intermittently. Wife states she will sometimes ask him if his shoes are too big.   Denies dysphagia but they disagree. This has been going on x 10 years- will excuse self from table- vomit and resume eating. He prev saw ENT and told to chew food better. Patient says he has always had to eat fast- is now eating slower. Still not clear if he is actually choking.   Denies sialorrhea.  He has had memory issues about 2-3 years.      Father had tremor. No FMH of PD.     Dr. Tony Initial Consult: 02/10/2014  Chief Complaint: Memory Loss     History of Present Illness (HPI):Pt reports to clinic at recommendation of his family because of concern for the possibility of his developing dementia. 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  Most concerning is that his sense of direction has worsened and he has trouble remembering how to get to places that he has frequented. No problems with ADL's. Still managing finances as well.    Review of Systems   Constitutional: Negative.    HENT: Positive for voice change. Negative for trouble " swallowing.    Neurological: Positive for tremors.   Psychiatric/Behavioral: Negative for hallucinations and sleep disturbance.       Past Medical History: The patient  has a past medical history of *Atrial fibrillation; Anticoagulant long-term use; Atrial fibrillation - asymptomatic but noted on ICD interrogation ([id number]) - 16 h 40 minutes of afib (2/13/2013); Atrial flutter with controlled response - seen on ICD interrogation - asymptomatic (2/13/2013); Automatic implantable cardioverter-defibrillator in situ (2/13/2013); Basal cell carcinoma; CAD (coronary artery disease) (2/13/2013); Cardiomyopathy; Cataract; Cognitive deficits; H/O syncope -  (5/27/2013); History of PTCA - LAD, LCX and PDA PCI in 2006 (2/13/2013); HTN (hypertension) (2/13/2013); Hyperlipidemia LDL goal < 70 (2/13/2013); Hypertension; ICD (implantable cardiac defibrillator) in place (2/13/2013); Ischemic cardiomyopathy LVEF ~45% (2/13/2013); LBBB (left bundle branch block) (2/13/2013); Memory loss; Psoriasis vulgaris; and Syncope and collapse.    Social History: The patient  reports that he quit smoking about 54 years ago. He has never used smokeless tobacco. He reports that he does not drink alcohol or use drugs.    Family History: Their family history includes Cancer in his mother; Cataracts in his mother; Dementia in his father; Diabetes in his maternal grandmother; Kidney disease in his sister; No Known Problems in his brother, maternal aunt, maternal grandfather, maternal uncle, paternal aunt, paternal grandfather, paternal grandmother, and paternal uncle; Psoriasis in his father, son, and son; Tremor in his father.    Allergies: Arb-angiotensin receptor antagonist and Lisinopril     Meds:   Current Outpatient Prescriptions on File Prior to Visit   Medication Sig Dispense Refill    amLODIPine (NORVASC) 5 MG tablet Take 1 tablet (5 mg total) by mouth once daily. 90 tablet 3    aspirin (ECOTRIN) 81 MG EC tablet Take 81 mg by mouth once  "daily.      atorvastatin (LIPITOR) 40 MG tablet Take 1 tablet (40 mg total) by mouth once daily. 90 tablet 3    dabigatran etexilate (PRADAXA) 150 mg Cap Take 1 capsule (150 mg total) by mouth 2 (two) times daily. "Do NOT break, chew, or open capsules." 180 capsule 3    donepezil (ARICEPT) 10 MG tablet Take 1 tablet (10 mg total) by mouth once daily. 90 tablet 3    FLUAD [id number], 65 YR UP,,PF, 45 mcg (15 mcg x 3)/0.5 mL Syrg ADM 0.5ML IM UTD  0    memantine (NAMENDA) 10 MG Tab Take 1 tablet (10 mg total) by mouth 2 (two) times daily. 180 tablet 3    metoprolol tartrate (LOPRESSOR) 25 MG tablet Take 0.5 tablets (12.5 mg total) by mouth 2 (two) times daily. 90 tablet 3    multivitamin (MULTIVITAMIN) per tablet Take 1 tablet by mouth once daily.      omeprazole (PRILOSEC) 20 MG capsule Take 1 capsule (20 mg total) by mouth once daily. 90 capsule 3     No current facility-administered medications on file prior to visit.        Objective:     Physical Exam:    Vitals:    05/16/18 0831   BP: (!) 149/73   Pulse: (!) 59   Weight: 76.9 kg (169 lb 8 oz)   Height: 5' 7" (1.702 m)     Body mass index is 26.55 kg/m².    Constitutional  Well-developed, well-nourished, appears stated age     Reviewed emergency situation questions. When given a health emergency scenario, he knows to call 911.  When discussed fire scenario, he says he would determine what type of fire if was and put it out. He describes if it was an electrical fire, he would pull the plug and cut the electricity. When asked if would call anyone, he says no, he would be putting out the fire. When asked what he would do if there was a large fire, he says there would be no reason for a large fire. With further questioning, he eventually says he would call the fire dept. When asked their number, he is able to say 911.     Mild hypophonia.   Slt facial masking. Does not maintain eye contact like normally does.   Tremor noted RH.  Mild global bradykinesia. "   Mildly stooped posture. Short stride, no shuffle.   Reduced R arm swing with tremor.   Pace slowed. Steady.       Laboratory Results:  No visits with results within 3 Month(s) from this visit.   Latest known visit with results is:   Lab Visit on 01/19/2018   Component Date Value Ref Range Status    Protein, Urine Random 01/19/2018 <7  0 - 15 mg/dL Final    Creatinine, Random Ur 01/19/2018 75.0  23.0 - 375.0 mg/dL Final    Prot/Creat Ratio, Ur 01/19/2018 Unable to calculate  0.00 - 0.20 Final    Specimen UA 01/19/2018 Urine, Clean Catch   Final    Color, UA 01/19/2018 Yellow  Yellow, Straw, Laura Final    Appearance, UA 01/19/2018 Clear  Clear Final    pH, UA 01/19/2018 6.0  5.0 - 8.0 Final    Specific Gravity, UA 01/19/2018 1.010  1.005 - 1.030 Final    Protein, UA 01/19/2018 Negative  Negative Final    Glucose, UA 01/19/2018 Negative  Negative Final    Ketones, UA 01/19/2018 Negative  Negative Final    Bilirubin (UA) 01/19/2018 Negative  Negative Final    Occult Blood UA 01/19/2018 Negative  Negative Final    Nitrite, UA 01/19/2018 Negative  Negative Final    Urobilinogen, UA 01/19/2018 Negative  <2.0 EU/dL Final    Leukocytes, UA 01/19/2018 Negative  Negative Final             Impression      Age-appropriate generalized cerebral volume loss otherwise unremarkable noncontrast CT head without evidence for acute intracranial hemorrhage or definite new abnormal parenchymal attenuation. Clinical correlation and further evaluation as warranted.      Electronically signed by: BINDU WEBB DO  Date: 02/17/14           Assessment and Plan     Parkinsonism, unspecified Parkinsonism type    Mixed dementia        Medical Decision Making:    Meets criteria for DLB but also has findings c/w AD.   Discussed with them.     Continue donepezil (Aricept) and memantine (Namenda) without change.   He continues to say he is absolutely not bothered by the parkinsonian symptoms and does not want medications for this.    Recommend that you stop driving. Discussed with them. Neither feel this will be a problem.   Please review who to call in an emergency (911) and add this to the cardboard list you have by the phones.   Call or use [facility] for problems or questions.   Discussed ST. He is not interested. If changes mind, they will let me know.       Follow up 6 months in memory clinic.      I spent 40 minutes face-to-face with the patient and wife with >50% of the time spent with counseling and education regarding:  - results of data, diagnosis, and recommendations stated above  - the prognosis of mixed dementia and pdism  - risks and benefits of memory meds  - importance of diet and exercise    Yazmin Israel, CARLTON, NP-C  Division of Movement and Memory Disorders  [facility]  [phone]

## 2018-05-16 NOTE — PATIENT INSTRUCTIONS
Continue donepezil (Aricept) and memantine (Namenda) without change.     We recommend that you stop driving.     Please review who to call in an emergency (911) and add this to the cardboard list you have by the phones.     Call or use [facility] if you have any problems or questions.     If you decide you want to go to Speech Therapy to help strengthen your voice, let me know. I will be happy to order this for you.

## 2018-05-17 RX ORDER — DONEPEZIL HYDROCHLORIDE 10 MG/1
TABLET, FILM COATED ORAL
Qty: 90 TABLET | Refills: 0 | OUTPATIENT
Start: 2018-05-17

## 2018-06-18 ENCOUNTER — TELEPHONE (OUTPATIENT)
Dept: INTERNAL MEDICINE | Facility: CLINIC | Age: 78
End: 2018-06-18

## 2018-06-21 ENCOUNTER — OFFICE VISIT (OUTPATIENT)
Dept: OPTOMETRY | Facility: CLINIC | Age: 78
End: 2018-06-21
Payer: MEDICARE

## 2018-06-21 DIAGNOSIS — Z98.41 S/P BILATERAL CATARACT EXTRACTION: ICD-10-CM

## 2018-06-21 DIAGNOSIS — Z96.1 PSEUDOPHAKIA, BOTH EYES: ICD-10-CM

## 2018-06-21 DIAGNOSIS — H26.491 POSTERIOR CAPSULAR OPACIFICATION NON VISUALLY SIGNIFICANT OF RIGHT EYE: Primary | ICD-10-CM

## 2018-06-21 DIAGNOSIS — H52.203 ASTIGMATISM OF BOTH EYES, UNSPECIFIED TYPE: ICD-10-CM

## 2018-06-21 DIAGNOSIS — H02.419: ICD-10-CM

## 2018-06-21 DIAGNOSIS — Z98.42 S/P BILATERAL CATARACT EXTRACTION: ICD-10-CM

## 2018-06-21 PROCEDURE — 92014 COMPRE OPH EXAM EST PT 1/>: CPT | Mod: S$GLB,,, | Performed by: OPTOMETRIST

## 2018-06-21 PROCEDURE — 99999 PR PBB SHADOW E&M-EST. PATIENT-LVL III: CPT | Mod: PBBFAC,,, | Performed by: OPTOMETRIST

## 2018-06-21 PROCEDURE — 92015 DETERMINE REFRACTIVE STATE: CPT | Mod: S$GLB,,, | Performed by: OPTOMETRIST

## 2018-06-21 NOTE — PROGRESS NOTES
HPI     Concerns About Ocular Health    Additional comments: Eye exam and refraction.  No acute ocular/vision problems.  No apparent VA changes.            Comments   Patient's age: 78 y.o. WM   Occupation: Reitred  Approximate date of last eye examination: 06/01/2017- Dr. Dorsey   City/State: [facility]  Wears glasses? Yes     If yes, wears  Full-time or part-time?  Part-time mostly for reading,   and sometimes for driving.  Patient didn't bring glasses today.   Present glasses are: Bifocal, SV Distance, SV Reading? Bifocals   Approximate age of present glasses:  2+ Years old   Got new glasses following last exam, or subsequently?:  No   Any problem with VA with glasses?  No  Wears CLs?:  No  Headaches?  No  Eye pain/discomfort?  No                                                                                     Flashes?  No  Floaters?  Yes, occasionally   Diplopia/Double vision?  No  Patient's Ocular History:         Any eye surgeries? Retinal Hole in OS with Cryo, Phaco OU          Any eye injury?  No         Any treatment for eye disease?  No  Family history of eye disease?    Mother + Cataract SX  Father  + Pterygium Removal   Significant patient medical history:         1. Diabetes?  No   2. HBP?  Yes, controlled by medication and diet              3. Other (describe):                       Pacemaker                       High Cholesterol                       Psoriasis                      2 Stents    ! OTC eyedrops currently using:  None   ! Prescription eye meds currently using:  None   ! Any history of allergy/adverse reaction to any eye meds used   previously?  No    ! Any history of allergy/adverse reaction to eyedrops used during prior   eye exam(s)? No    ! Any history of allergy/adverse reaction to Novacaine or similar meds?   No   ! Any history of allergy/adverse reaction to Epinephrine or similar meds?   No    ! Patient okay with use of anesthetic eyedrops to check eye pressure?    Yes        !  "Patient okay with use of eyedrops to dilate pupils today?  Yes   !  Allergies/Medications/Medical History/Family History reviewed today?    Yes      PD =   64/60  Desired reading distance =   18"                                                                     Last edited by Stephen Dorsey, OD on 6/21/2018  3:44 PM. (History)            Assessment /Plan     For exam results, see Encounter Report.    1. Posterior capsular opacification non visually significant of right eye     2. Acquired mechanical ptosis of eyelid, unspecified laterality     3. S/P bilateral cataract extraction     4. Pseudophakia, both eyes     5. Astigmatism of both eyes, unspecified type                    S/P cataract surgery in both eyes, with bialteral posterior chamber intraocular lens.  Early paracentral posterior capsular opacification in the right eye.  Residual astigmatic refractive error in each eye, with satisfactory best-correctable VA in each eye.  New spectacle lens Rx issued for use as desired.    Bilateral ptosis of upper eyelid, more apparent on the right side, and partially obstructing pupil on both eye.  Mr. Velarde declines referral to Dr. Terry for evaluation of need for ptosis repair eyelid surgery.    Otherwise, ocular health appears good in both eyes.  Recheck in one year.       "

## 2018-06-21 NOTE — PATIENT INSTRUCTIONS
S/P cataract surgery in both eyes, with bialteral posterior chamber intraocular lens.  Early paracentral posterior capsular opacification in the right eye.  Residual astigmatic refractive error in each eye, with satisfactory best-correctable VA in each eye.  New spectacle lens Rx issued for use as desired.    Bilateral ptosis of upper eyelid, more apparent on the right side, and partially obstructing pupil on both eye.  Mr. Velarde declines referral to Dr. Terry for evaluation of need for ptosis repair eyelid surgery.    Otherwise, ocular health appears good in both eyes.  Recheck in one year.

## 2018-07-11 ENCOUNTER — PATIENT MESSAGE (OUTPATIENT)
Dept: CARDIOLOGY | Facility: CLINIC | Age: 78
End: 2018-07-11

## 2018-08-02 ENCOUNTER — CLINICAL SUPPORT (OUTPATIENT)
Dept: ELECTROPHYSIOLOGY | Facility: CLINIC | Age: 78
End: 2018-08-02
Payer: MEDICARE

## 2018-08-02 DIAGNOSIS — Z95.810 IMPLANTABLE CARDIOVERTER-DEFIBRILLATOR (ICD) IN SITU: ICD-10-CM

## 2018-08-02 DIAGNOSIS — R55 SYNCOPE, UNSPECIFIED SYNCOPE TYPE: ICD-10-CM

## 2018-08-02 DIAGNOSIS — I47.29 INDUCIBLE VENTRICULAR TACHYCARDIA: ICD-10-CM

## 2018-08-02 PROCEDURE — 93295 DEV INTERROG REMOTE 1/2/MLT: CPT | Mod: S$GLB,,, | Performed by: INTERNAL MEDICINE

## 2018-08-02 PROCEDURE — 93296 REM INTERROG EVL PM/IDS: CPT | Mod: S$GLB,,, | Performed by: INTERNAL MEDICINE

## 2018-08-08 DIAGNOSIS — Z95.810 AICD (AUTOMATIC CARDIOVERTER/DEFIBRILLATOR) PRESENT: Primary | ICD-10-CM

## 2018-08-08 DIAGNOSIS — I25.5 ISCHEMIC CARDIOMYOPATHY: ICD-10-CM

## 2018-08-16 ENCOUNTER — LAB VISIT (OUTPATIENT)
Dept: LAB | Facility: HOSPITAL | Age: 78
End: 2018-08-16
Attending: INTERNAL MEDICINE
Payer: MEDICARE

## 2018-08-16 ENCOUNTER — OFFICE VISIT (OUTPATIENT)
Dept: INTERNAL MEDICINE | Facility: CLINIC | Age: 78
End: 2018-08-16
Payer: MEDICARE

## 2018-08-16 VITALS
WEIGHT: 170 LBS | HEART RATE: 85 BPM | DIASTOLIC BLOOD PRESSURE: 72 MMHG | OXYGEN SATURATION: 97 % | SYSTOLIC BLOOD PRESSURE: 120 MMHG | BODY MASS INDEX: 26.62 KG/M2

## 2018-08-16 DIAGNOSIS — E78.5 HYPERLIPIDEMIA WITH TARGET LDL LESS THAN 70: Chronic | ICD-10-CM

## 2018-08-16 DIAGNOSIS — R73.09 ELEVATED GLUCOSE: ICD-10-CM

## 2018-08-16 DIAGNOSIS — Z00.00 ROUTINE PHYSICAL EXAMINATION: Primary | ICD-10-CM

## 2018-08-16 DIAGNOSIS — G20.C PARKINSONISM, UNSPECIFIED PARKINSONISM TYPE: ICD-10-CM

## 2018-08-16 DIAGNOSIS — I10 ESSENTIAL HYPERTENSION: Chronic | ICD-10-CM

## 2018-08-16 DIAGNOSIS — I48.0 PAROXYSMAL ATRIAL FIBRILLATION: Chronic | ICD-10-CM

## 2018-08-16 DIAGNOSIS — F01.50 MIXED DEMENTIA: ICD-10-CM

## 2018-08-16 DIAGNOSIS — L40.0 PSORIASIS VULGARIS: ICD-10-CM

## 2018-08-16 DIAGNOSIS — E55.9 VITAMIN D DEFICIENCY: ICD-10-CM

## 2018-08-16 DIAGNOSIS — D64.9 ANEMIA, UNSPECIFIED TYPE: ICD-10-CM

## 2018-08-16 DIAGNOSIS — Z95.810 ICD (IMPLANTABLE CARDIOVERTER-DEFIBRILLATOR) IN PLACE: ICD-10-CM

## 2018-08-16 DIAGNOSIS — R13.10 DYSPHAGIA, UNSPECIFIED TYPE: ICD-10-CM

## 2018-08-16 DIAGNOSIS — G30.9 MIXED DEMENTIA: ICD-10-CM

## 2018-08-16 DIAGNOSIS — N18.30 CHRONIC KIDNEY DISEASE, STAGE III (MODERATE): ICD-10-CM

## 2018-08-16 DIAGNOSIS — F02.80 MIXED DEMENTIA: ICD-10-CM

## 2018-08-16 LAB
ESTIMATED AVG GLUCOSE: 117 MG/DL
HBA1C MFR BLD HPLC: 5.7 %

## 2018-08-16 PROCEDURE — 99999 PR PBB SHADOW E&M-EST. PATIENT-LVL III: CPT | Mod: PBBFAC,,, | Performed by: INTERNAL MEDICINE

## 2018-08-16 PROCEDURE — 83036 HEMOGLOBIN GLYCOSYLATED A1C: CPT

## 2018-08-16 PROCEDURE — 3078F DIAST BP <80 MM HG: CPT | Mod: CPTII,S$GLB,, | Performed by: INTERNAL MEDICINE

## 2018-08-16 PROCEDURE — 3074F SYST BP LT 130 MM HG: CPT | Mod: CPTII,S$GLB,, | Performed by: INTERNAL MEDICINE

## 2018-08-16 PROCEDURE — 99213 OFFICE O/P EST LOW 20 MIN: CPT | Mod: 25,S$GLB,, | Performed by: INTERNAL MEDICINE

## 2018-08-16 PROCEDURE — 36415 COLL VENOUS BLD VENIPUNCTURE: CPT

## 2018-08-16 NOTE — PROGRESS NOTES
Subjective:       Patient ID: Mark Anthony Velarde Jr. is a 78 y.o. male.    Chief Complaint: Follow-up    HPI  Review of Systems   Constitutional: Negative for activity change, chills, fever and unexpected weight change.   HENT: Negative for hearing loss, rhinorrhea and trouble swallowing.    Eyes: Negative for discharge and visual disturbance.   Respiratory: Negative for chest tightness and wheezing.    Cardiovascular: Negative for chest pain and palpitations.   Gastrointestinal: Negative for abdominal distention, abdominal pain, blood in stool, constipation, diarrhea, nausea, rectal pain and vomiting.        1 episode of dysphagia last night but it was with a large bite of Pork Chop, otherwise they will watch/monitor.    Endocrine: Negative for polydipsia and polyuria.   Genitourinary: Negative for difficulty urinating, dysuria, flank pain, hematuria, testicular pain and urgency.   Musculoskeletal: Positive for arthralgias. Negative for joint swelling and neck pain.   Skin: Negative for rash and wound.   Neurological: Positive for tremors. Negative for weakness, numbness and headaches.   Psychiatric/Behavioral: Positive for confusion. Negative for dysphoric mood.       Objective:      Physical Exam   Constitutional: He is oriented to person, place, and time. He appears well-developed and well-nourished. No distress.   HENT:   Head: Normocephalic and atraumatic.   Right Ear: External ear normal.   Left Ear: External ear normal.   Mouth/Throat: Oropharynx is clear and moist. No oropharyngeal exudate.   TM's clear, pharynx clear   Eyes: Conjunctivae and EOM are normal. Pupils are equal, round, and reactive to light. No scleral icterus.   Neck: Normal range of motion. Neck supple. No thyromegaly present.   No supraclavicular nodes palpated   Cardiovascular: Normal rate, regular rhythm and normal heart sounds.   No murmur heard.  Pulmonary/Chest: Effort normal and breath sounds normal. He has no wheezes.   Abdominal: Soft.  Bowel sounds are normal. He exhibits no mass. There is no tenderness.   Musculoskeletal: He exhibits no edema.   Lymphadenopathy:     He has no cervical adenopathy.   Neurological: He is alert and oriented to person, place, and time.   Tremor right hand.    Skin: Rash (psoriasis plaque) noted. No erythema. No pallor.   Psychiatric: He has a normal mood and affect. His behavior is normal.       Assessment:       1. Routine physical examination    2. Dysphagia, unspecified type    3. Vitamin D deficiency    4. Chronic kidney disease, stage III (moderate)    5. Anemia, unspecified type    6. Paroxysmal atrial fibrillation    7. Essential hypertension    8. Hyperlipidemia with target LDL less than 70    9. ICD (implantable cardioverter-defibrillator) in place    10. Parkinsonism, unspecified Parkinsonism type    11. Mixed dementia    12. Psoriasis vulgaris        Plan:       Mark Anthony was seen today for follow-up.    Diagnoses and all orders for this visit:    Routine physical examination    Dysphagia, unspecified type    Vitamin D deficiency    Chronic kidney disease, stage III (moderate)    Anemia, unspecified type    Paroxysmal atrial fibrillation    Essential hypertension    Hyperlipidemia with target LDL less than 70    ICD (implantable cardioverter-defibrillator) in place    Parkinsonism, unspecified Parkinsonism type    Mixed dementia    Psoriasis vulgaris    Elevated glucose  -     Hemoglobin A1c; Future

## 2018-08-20 ENCOUNTER — PATIENT MESSAGE (OUTPATIENT)
Dept: INTERNAL MEDICINE | Facility: CLINIC | Age: 78
End: 2018-08-20

## 2018-09-19 ENCOUNTER — TELEPHONE (OUTPATIENT)
Dept: NEUROLOGY | Facility: CLINIC | Age: 78
End: 2018-09-19

## 2018-09-19 NOTE — TELEPHONE ENCOUNTER
Spoke to Pt wife she verbalized her  appt date and time.       ----- Message from Genesis Liu sent at 9/19/2018  9:58 AM CDT -----  Contact: Margy (wife) @ [phone]  Calling to schedule an appt per a letter that the patient received, for Neuro psychology. Please call.

## 2018-11-02 DIAGNOSIS — Z95.810 AICD (AUTOMATIC CARDIOVERTER/DEFIBRILLATOR) PRESENT: Primary | ICD-10-CM

## 2018-11-02 DIAGNOSIS — I25.5 ISCHEMIC CARDIOMYOPATHY: ICD-10-CM

## 2018-11-02 DIAGNOSIS — I47.20 VT (VENTRICULAR TACHYCARDIA): ICD-10-CM

## 2018-11-05 ENCOUNTER — CLINICAL SUPPORT (OUTPATIENT)
Dept: CARDIOLOGY | Facility: HOSPITAL | Age: 78
End: 2018-11-05
Attending: INTERNAL MEDICINE
Payer: MEDICARE

## 2018-11-05 DIAGNOSIS — Z46.9 FITTING AND ADJUSTMENT OF DEVICE: ICD-10-CM

## 2018-11-05 PROCEDURE — 93299 CARDIAC DEVICE CHECK - REMOTE: CPT

## 2018-11-07 ENCOUNTER — OFFICE VISIT (OUTPATIENT)
Dept: NEUROLOGY | Facility: CLINIC | Age: 78
End: 2018-11-07
Payer: MEDICARE

## 2018-11-07 ENCOUNTER — CLINICAL SUPPORT (OUTPATIENT)
Dept: CARDIOLOGY | Facility: HOSPITAL | Age: 78
End: 2018-11-07
Attending: INTERNAL MEDICINE
Payer: MEDICARE

## 2018-11-07 VITALS
BODY MASS INDEX: 26.44 KG/M2 | HEIGHT: 67 IN | HEART RATE: 60 BPM | DIASTOLIC BLOOD PRESSURE: 73 MMHG | SYSTOLIC BLOOD PRESSURE: 135 MMHG | WEIGHT: 168.44 LBS

## 2018-11-07 DIAGNOSIS — F01.50 MIXED DEMENTIA: Primary | ICD-10-CM

## 2018-11-07 DIAGNOSIS — G30.9 MIXED DEMENTIA: Primary | ICD-10-CM

## 2018-11-07 DIAGNOSIS — R60.0 PEDAL EDEMA: ICD-10-CM

## 2018-11-07 DIAGNOSIS — Z46.9 FITTING AND ADJUSTMENT OF DEVICE: ICD-10-CM

## 2018-11-07 DIAGNOSIS — I42.8 OTHER CARDIOMYOPATHIES: Primary | ICD-10-CM

## 2018-11-07 DIAGNOSIS — G20.C PARKINSONISM, UNSPECIFIED PARKINSONISM TYPE: ICD-10-CM

## 2018-11-07 DIAGNOSIS — F02.80 MIXED DEMENTIA: Primary | ICD-10-CM

## 2018-11-07 PROCEDURE — 1101F PT FALLS ASSESS-DOCD LE1/YR: CPT | Mod: CPTII,S$GLB,, | Performed by: PSYCHIATRY & NEUROLOGY

## 2018-11-07 PROCEDURE — 99499 UNLISTED E&M SERVICE: CPT | Mod: S$GLB,,, | Performed by: NURSE PRACTITIONER

## 2018-11-07 PROCEDURE — 3075F SYST BP GE 130 - 139MM HG: CPT | Mod: CPTII,S$GLB,, | Performed by: PSYCHIATRY & NEUROLOGY

## 2018-11-07 PROCEDURE — 99214 OFFICE O/P EST MOD 30 MIN: CPT | Mod: S$GLB,,, | Performed by: PSYCHIATRY & NEUROLOGY

## 2018-11-07 PROCEDURE — 3078F DIAST BP <80 MM HG: CPT | Mod: CPTII,S$GLB,, | Performed by: PSYCHIATRY & NEUROLOGY

## 2018-11-07 PROCEDURE — 99999 PR PBB SHADOW E&M-EST. PATIENT-LVL III: CPT | Mod: PBBFAC,,,

## 2018-11-07 PROCEDURE — 93299 CARDIAC DEVICE CHECK - REMOTE: CPT

## 2018-11-07 NOTE — PROGRESS NOTES
Name: Mark Anthony Velarde Jr.  MRN: [id number]   CSN: [id number]      Date: 11-7-18      Referring physician:  No referring provider defined for this encounter.    Subjective:      Chief Complaint: DLB    History of Present Illness (HPI):    Mark Anthony Velarde Jr. is a 78 y.o. right-handed male who presents today for a follow-up evaluation of DLB and is accompanied by wife. Last seen [facility] 5[facility]16[facility]18.   neuro clinic 4-3-18. At that time, he was cont on donepezil and memantine.     He feels his memory is about the same. She feels it is slt worse.   He has not had any hallucinations.   He denies depression. His wife feels he has some depression. She talks about the [employer]  scandel he knew one of the [employer] priests and he was reallyu disturbed by this.   She says ususally his mood is upbeat.   [facility]. He rode on a kneeling bus so it was easy for him to maneuver. They were goen 2 weeks. They were in [facility] rooms every couple of days. He did fiene with this. She    Aware of tremor RH, has not worsened. Does not botehr him. Wife says he is often not aware of it.   Balance not as good as once wsa but no falls.   Wife aware that he shuffles.   He has always talked in sleep to a small degree.       Review of Systems   HENT: Negative for drooling and trouble swallowing.    Gastrointestinal: Negative for constipation.   Neurological: Positive for tremors.   Psychiatric/Behavioral: Negative for dysphoric mood, hallucinations and sleep disturbance. The patient is not nervous/anxious.        Past Medical History: The patient  has a past medical history of *Atrial fibrillation, Anticoagulant long-term use, Atrial fibrillation - asymptomatic but noted on ICD interrogation ([id number]) - 16 h 40 minutes of afib (2/13/2013), Atrial flutter with controlled response - seen on ICD interrogation - asymptomatic (2/13/2013), Automatic implantable cardioverter-defibrillator  "in situ (2/13/2013), Basal cell carcinoma, CAD (coronary artery disease) (2/13/2013), Cardiomyopathy, Cataract, Cognitive deficits, H/O syncope -  (5/27/2013), History of PTCA - LAD, LCX and PDA PCI in 2006 (2/13/2013), HTN (hypertension) (2/13/2013), Hyperlipidemia LDL goal < 70 (2/13/2013), Hypertension, ICD (implantable cardiac defibrillator) in place (2/13/2013), Ischemic cardiomyopathy LVEF ~45% (2/13/2013), LBBB (left bundle branch block) (2/13/2013), Memory loss, Psoriasis vulgaris, and Syncope and collapse.    Social History: The patient  reports that he quit smoking about 55 years ago. he has never used smokeless tobacco. He reports that he does not drink alcohol or use drugs.    Family History: Their family history includes Cancer in his mother; Cataracts in his mother; Dementia in his father; Diabetes in his maternal grandmother; Kidney disease in his sister; No Known Problems in his brother, maternal aunt, maternal grandfather, maternal uncle, paternal aunt, paternal grandfather, paternal grandmother, and paternal uncle; Psoriasis in his father, son, and son; Tremor in his father.    Allergies: Arb-angiotensin receptor antagonist and Lisinopril     Meds:   Current Outpatient Medications on File Prior to Visit   Medication Sig Dispense Refill    amLODIPine (NORVASC) 5 MG tablet Take 1 tablet (5 mg total) by mouth once daily. 90 tablet 3    aspirin (ECOTRIN) 81 MG EC tablet Take 81 mg by mouth once daily.      atorvastatin (LIPITOR) 40 MG tablet Take 1 tablet (40 mg total) by mouth once daily. 90 tablet 3    dabigatran etexilate (PRADAXA) 150 mg Cap Take 1 capsule (150 mg total) by mouth 2 (two) times daily. "Do NOT break, chew, or open capsules." 180 capsule 3    donepezil (ARICEPT) 10 MG tablet Take 1 tablet (10 mg total) by mouth once daily. 90 tablet 3    FLUAD [id number], 65 YR UP,,PF, 45 mcg (15 mcg x 3)/0.5 mL Syrg ADM 0.5ML IM UTD  0    FLUZONE HIGH-DOSE 2018-19, PF, 180 mcg/0.5 mL vaccine " "ADM 0.5ML IM UTD  0    memantine (NAMENDA) 10 MG Tab Take 1 tablet (10 mg total) by mouth 2 (two) times daily. 180 tablet 3    metoprolol tartrate (LOPRESSOR) 25 MG tablet Take 0.5 tablets (12.5 mg total) by mouth 2 (two) times daily. 90 tablet 3    multivitamin (MULTIVITAMIN) per tablet Take 1 tablet by mouth once daily.      omeprazole (PRILOSEC) 20 MG capsule Take 1 capsule (20 mg total) by mouth once daily. 90 capsule 3     No current facility-administered medications on file prior to visit.        Objective:     Physical Exam:    Vitals:    11/07/18 0857   BP: 135/73   Pulse: 60   Weight: 76.4 kg (168 lb 6.9 oz)   Height: 5' 7" (1.702 m)     Body mass index is 26.38 kg/m².    Constitutional  Well-developed, well-nourished, appears stated age   Cardiovascular  Edema noted RLE only     ..  Neurological    * Mental status  MOCA = deferred   - Orientation Oriented to: person, place, situation, season and president  Not oriented to: month of year (Feb), year (2019) and date- "I don't care date."     - Memory     Impaired     - Attention/concentration  Normal     - Language  fluent     - Fund of knowledge  Wife asks if he knows what event happened yesterday (mid-term elections). He does not know and adds that he does not care.       * Cranial nerves       - CN II  PERRL, visual fields full to confrontation     - CN III, IV, VI  Extraocular movements full, normal pursuits and saccades     - CN V  Sensation V1 - V3 intact     - CN VII  Face strong and symmetric bilaterally     - CN VIII  Hearing intact bilaterally     - CN IX, X  Palate raises midline and symmetric     - CN XI  SCM and trapezius 5/5 bilaterally     - CN XII  Tongue midline   * Motor  Muscle bulk normal, strength 5/5 throughout   * Sensory   Intact to temperature and vibration throughout   * Coordination  No dysmetria with finger-to-nose      ..  * Specialized movement exam  Slight hypophonic speech.    Slight facial masking.   Mild cogwheel " rigidity throughout  BUT and slt in BLE.   Slt bradykinesia RH.   No tremor with rest, posture, kinesis, or intention.    No other dystonia, chorea, athetosis, myoclonus, or tics.     No motor impersistence.   Normal-based gait.   Shortened stride length.   Reduced R arm swing.             Laboratory Results:  Lab Visit on 08/16/2018   Component Date Value Ref Range Status    Hemoglobin A1C 08/16/2018 5.7* 4.0 - 5.6 % Final    Estimated Avg Glucose 08/16/2018 117  68 - 131 mg/dL Final           Assessment and Plan     Mixed dementia  Comments:  DLB and AD    Parkinsonism, unspecified Parkinsonism type    Pedal edema  Comments:  RLE        Medical Decision Making:    Mr. Velarde has been followed in neurology since 2-2014. He then developed tremor and pdism. He does not hallucinate but does have some issues with RBD. This meets criteria for DLB. However, he also has hien forgetting as seen in Alzheimer's dementia.     He will continue donepezil and memantine.   He is not bothered by parkinsonism symptoms.     Wife is very motivated to help his memory. She has questions about enhancing his cognition. Discussed strategies of engaging him in conversation but discouraged quizzing.     Call for problems.    Follow up       I spent 35 minutes face-to-face with the patient and wife with >50% of the time spent with counseling and education regarding:  - results of data, diagnosis, and recommendations stated above  - the prognosis of dementia  - importance of diet and exercise    Yazmin Israel, CARLTON, NP-C  Division of Movement and Memory Disorders  [facility]  [phone]

## 2018-11-07 NOTE — PATIENT INSTRUCTIONS
Continue donepezil and memantine.     If you develop issues with swallowing, please let us know.     Please watch the swelling in the Right leg. If this does not get better in a couple of days or if it gets worse, please call your PCP.

## 2018-11-08 ENCOUNTER — HOSPITAL ENCOUNTER (OUTPATIENT)
Dept: CARDIOLOGY | Facility: CLINIC | Age: 78
Discharge: HOME | End: 2018-11-08
Payer: MEDICARE

## 2018-11-08 ENCOUNTER — OFFICE VISIT (OUTPATIENT)
Dept: ELECTROPHYSIOLOGY | Facility: CLINIC | Age: 78
End: 2018-11-08
Payer: MEDICARE

## 2018-11-08 ENCOUNTER — CLINICAL SUPPORT (OUTPATIENT)
Dept: CARDIOLOGY | Facility: HOSPITAL | Age: 78
End: 2018-11-08
Attending: INTERNAL MEDICINE
Payer: MEDICARE

## 2018-11-08 VITALS
BODY MASS INDEX: 26.93 KG/M2 | DIASTOLIC BLOOD PRESSURE: 66 MMHG | WEIGHT: 167.56 LBS | HEART RATE: 62 BPM | HEIGHT: 66 IN | SYSTOLIC BLOOD PRESSURE: 121 MMHG

## 2018-11-08 DIAGNOSIS — Z95.810 ICD (IMPLANTABLE CARDIOVERTER-DEFIBRILLATOR) IN PLACE: ICD-10-CM

## 2018-11-08 DIAGNOSIS — I42.8 OTHER CARDIOMYOPATHIES: ICD-10-CM

## 2018-11-08 DIAGNOSIS — I25.10 CORONARY ARTERY DISEASE INVOLVING NATIVE CORONARY ARTERY OF NATIVE HEART WITHOUT ANGINA PECTORIS: Chronic | ICD-10-CM

## 2018-11-08 DIAGNOSIS — Z79.01 CURRENT USE OF LONG TERM ANTICOAGULATION: ICD-10-CM

## 2018-11-08 DIAGNOSIS — I10 ESSENTIAL HYPERTENSION: Chronic | ICD-10-CM

## 2018-11-08 DIAGNOSIS — N18.30 CHRONIC KIDNEY DISEASE, STAGE III (MODERATE): ICD-10-CM

## 2018-11-08 DIAGNOSIS — I25.5 ISCHEMIC CARDIOMYOPATHY: Primary | Chronic | ICD-10-CM

## 2018-11-08 DIAGNOSIS — I44.7 LBBB (LEFT BUNDLE BRANCH BLOCK): Chronic | ICD-10-CM

## 2018-11-08 DIAGNOSIS — I48.0 PAROXYSMAL ATRIAL FIBRILLATION: Chronic | ICD-10-CM

## 2018-11-08 DIAGNOSIS — I25.5 ISCHEMIC CARDIOMYOPATHY: ICD-10-CM

## 2018-11-08 DIAGNOSIS — Z79.899 MEDICATION COURSE CHANGED: ICD-10-CM

## 2018-11-08 DIAGNOSIS — Z95.810 AICD (AUTOMATIC CARDIOVERTER/DEFIBRILLATOR) PRESENT: ICD-10-CM

## 2018-11-08 PROCEDURE — 93283 PRGRMG EVAL IMPLANTABLE DFB: CPT | Mod: 26,,, | Performed by: INTERNAL MEDICINE

## 2018-11-08 PROCEDURE — 93005 ELECTROCARDIOGRAM TRACING: CPT | Mod: S$GLB,,, | Performed by: INTERNAL MEDICINE

## 2018-11-08 PROCEDURE — 93010 ELECTROCARDIOGRAM REPORT: CPT | Mod: S$GLB,,, | Performed by: INTERNAL MEDICINE

## 2018-11-08 PROCEDURE — 93283 PRGRMG EVAL IMPLANTABLE DFB: CPT

## 2018-11-08 PROCEDURE — 99214 OFFICE O/P EST MOD 30 MIN: CPT | Mod: S$GLB,,, | Performed by: NURSE PRACTITIONER

## 2018-11-08 PROCEDURE — 3078F DIAST BP <80 MM HG: CPT | Mod: CPTII,S$GLB,, | Performed by: NURSE PRACTITIONER

## 2018-11-08 PROCEDURE — 99499 UNLISTED E&M SERVICE: CPT | Mod: S$GLB,,, | Performed by: NURSE PRACTITIONER

## 2018-11-08 PROCEDURE — 1101F PT FALLS ASSESS-DOCD LE1/YR: CPT | Mod: CPTII,S$GLB,, | Performed by: NURSE PRACTITIONER

## 2018-11-08 PROCEDURE — 99999 PR PBB SHADOW E&M-EST. PATIENT-LVL III: CPT | Mod: PBBFAC,,, | Performed by: NURSE PRACTITIONER

## 2018-11-08 PROCEDURE — 3074F SYST BP LT 130 MM HG: CPT | Mod: CPTII,S$GLB,, | Performed by: NURSE PRACTITIONER

## 2018-11-08 RX ORDER — AMIODARONE HYDROCHLORIDE 100 MG/1
TABLET ORAL
Qty: 60 TABLET | Refills: 3 | Status: SHIPPED | OUTPATIENT
Start: 2018-11-08 | End: 2019-06-11 | Stop reason: SDUPTHER

## 2018-11-08 NOTE — PROGRESS NOTES
"Mr. Velarde is a patient of Mr. Ewing and was last seen in clinic 10/28/17.    Subjective:   Patient ID:  Mark Anthony Velarde Jr. is a 78 y.o. male who presents for follow-up of annual ICD check.  .   HPI:  Mr. Velarde is a 78 y.o. male with a PMHx syncope with inducible VT, left BBB and high grade infrahisian block s/p ICD implanted 2/2008 ([facility]), ICM (EF 40-45%), pAF, CAD s/p PTCA, HTN, CKD III, and HLD.    Background:   Primary cardiologist is Dr. Jane.  ICD implant 2/2008. Generator change 9/2015. After generator change was lost to follow up.  Subsequent generator change 7/2017 due to ICD on recall list.   Echo 1/2017:Mildly to moderately depressed left ventricular systolic function (EF 40-45%).     Update (11/08/2018):    Today Mr. Velarde presents for his annual ICD check. The patient states that on 11/6/2018, he was standing in his kitchen doing dishes and suddenly felt a shock from his device and fell to his knees. He states he did not note any SOB, UDARTE, chest pain, palpitations, weakness, recent infections, or stress prior to his event. He and his wife returned from a 2 week bus trip and state Mr. Velarde has been fatigued since their return, but they are unsure if this is cardiac related. After the event the patient said he felt perfectly fine with no symptoms.    Device Interrogation 11/8/18 reveals an intrinsic SR with stable lead and device function.  He paces 45% in the RA and 81% in the RV.  VIP added to reduce ventricular pacing.  AF burden 9.5%. Estimated battery longevity 6-7 years. SVTx49 and NSVTx32. Patient had 12 treated "VT" episodes, including 6 in the VT-1 zone, 5 in the VT-2 zone. ATP therapy was delivered in 5 separate instances, although one was on 10/31/2018 (1 successful ATP) and the rest were on 11/6/2018  (4 instances of ATP; with 2 of those instances resulting in ICD discharge).     I have personally reviewed the patient's EKG today, which shows APVP at 62bpm.     Recent Cardiac Tests:     2D " "Echo (1/27/2017):  CONCLUSIONS     1 - Upper limit of normal left ventricular enlargement.     2 - Mildly to moderately depressed left ventricular systolic function (EF 40-45%).     3 - Eccentric hypertrophy.     4 - Normal left ventricular diastolic function.     5 - Normal right ventricular systolic function .     6 - Mild left atrial enlargement.     7 - Trivial to mild aortic regurgitation.     8 - Mild mitral regurgitation.     9 - Trivial tricuspid regurgitation.     10 - Trivial pulmonic regurgitation.     11 - The estimated PA systolic pressure is 33 mmHg.     12 - TDI as per text.     Current Outpatient Medications   Medication Sig    amLODIPine (NORVASC) 5 MG tablet Take 1 tablet (5 mg total) by mouth once daily.    amiodarone (PACERONE) 100 MG Tab Take Amiodarone 2 tablet (200 mg total) by mouth once daily for 30 days, THEN, take 1 tablet (100 mg total) by mouth once daily.    aspirin (ECOTRIN) 81 MG EC tablet Take 81 mg by mouth once daily.    atorvastatin (LIPITOR) 40 MG tablet Take 1 tablet (40 mg total) by mouth once daily.    dabigatran etexilate (PRADAXA) 150 mg Cap Take 1 capsule (150 mg total) by mouth 2 (two) times daily. "Do NOT break, chew, or open capsules."    donepezil (ARICEPT) 10 MG tablet Take 1 tablet (10 mg total) by mouth once daily.    FLUAD [id number], 65 YR UP,,PF, 45 mcg (15 mcg x 3)/0.5 mL Syrg ADM 0.5ML IM UTD    FLUZONE HIGH-DOSE 2018-19, PF, 180 mcg/0.5 mL vaccine ADM 0.5ML IM UTD    memantine (NAMENDA) 10 MG Tab Take 1 tablet (10 mg total) by mouth 2 (two) times daily.    metoprolol tartrate (LOPRESSOR) 25 MG tablet Take 0.5 tablets (12.5 mg total) by mouth 2 (two) times daily.    multivitamin (MULTIVITAMIN) per tablet Take 1 tablet by mouth once daily.    omeprazole (PRILOSEC) 20 MG capsule Take 1 capsule (20 mg total) by mouth once daily.     No current facility-administered medications for this visit.        Review of Systems   Constitution: Negative for chills, " fever, weakness and malaise/fatigue.   HENT: Negative for congestion.    Eyes: Negative for pain.   Cardiovascular: Negative for chest pain, dyspnea on exertion, irregular heartbeat, leg swelling, near-syncope, orthopnea, palpitations and syncope.   Respiratory: Negative for cough, hemoptysis, shortness of breath and wheezing.    Endocrine: Negative for polyphagia.   Hematologic/Lymphatic: Negative for bleeding problem.   Skin: Negative for dry skin and rash.   Musculoskeletal: Negative for back pain and muscle cramps.   Gastrointestinal: Negative for bloating, abdominal pain, hematemesis, hematochezia, nausea and vomiting.   Genitourinary: Negative for dysuria and hematuria.   Neurological: Positive for tremors. Negative for dizziness, headaches, light-headedness and loss of balance.   Psychiatric/Behavioral: Negative for altered mental status.     Objective:     Physical Exam   Constitutional: He is oriented to person, place, and time. He appears well-developed and well-nourished. No distress.   HENT:   Head: Normocephalic and atraumatic.   Mouth/Throat: No oropharyngeal exudate.   Eyes: Conjunctivae are normal. Pupils are equal, round, and reactive to light. Right eye exhibits no discharge. Left eye exhibits no discharge.   Neck: Normal range of motion. Neck supple.   Cardiovascular: Normal rate, regular rhythm, normal heart sounds and intact distal pulses. Exam reveals no gallop and no friction rub.   No murmur heard.  Pulmonary/Chest: Effort normal and breath sounds normal. No respiratory distress. He has no wheezes. He has no rales. He exhibits no tenderness.   Device to LUCW   Abdominal: Bowel sounds are normal. He exhibits no distension and no mass. There is no tenderness. There is no rebound and no guarding.   Musculoskeletal: Normal range of motion. He exhibits no edema or deformity.   Neurological: He is alert and oriented to person, place, and time.   Skin: Skin is warm and dry. No rash noted. He is not  "diaphoretic. No erythema.   Psychiatric: He has a normal mood and affect. His behavior is normal. Judgment and thought content normal.   Nursing note and vitals reviewed.    Lab Results   Component Value Date     01/19/2018    K 4.2 01/19/2018    MG 2.4 12/15/2007    BUN 16 01/19/2018    CREATININE 1.4 01/19/2018    ALT 25 07/19/2017    AST 30 07/19/2017    HGB 14.6 01/19/2018    HCT 46.1 01/19/2018    TSH 0.844 07/02/2014    LDLCALC 57.4 (L) 07/02/2014       Recent Labs   Lab 07/19/17  0940   INR 1.1       Assessment:     1. Ischemic cardiomyopathy LVEF ~45%    2. LBBB (left bundle branch block)    3. Paroxysmal atrial fibrillation    4. Essential hypertension    5. Coronary artery disease involving native coronary artery of native heart without angina pectoris    6. Chronic kidney disease, stage III (moderate)    7. Current use of long term anticoagulation    8. ICD (implantable cardioverter-defibrillator) in place    9. Medication course changed       Plan:     In summary, Syd is a 78 y.o. male with a history of syncope with inducible VT, left BBBB and high grade infrahisian block s/p ICD implanted 2/2008 ([facility]), ICM (EF 40-45%), pAF, CAD s/p PTCA, HTN, CKD III, and HLD here for annual follow up.   Mr. Velarde has had recent increase in arrhythmia (asymptomatic), starting 10/31/2018 and culminating in ICD discharge x2 on 11/6/2018. Case reviewed with Dr. Ewing. "VT" episodes are likely SVT resulting in inappropriate discharge. Will start low-dose amiodarone therapy. (Of note: patient states he has Lewy Body dementia and not Parkinsons- Dr. Ewing has cleared him to for amiodarone therapy). AF burden 9.5%. Patient asymptomatic and on pradaxa. Per device interrogation, patient is now in sinus rhythm (history of CHB). VIP turned ON to reduce ventricular pacing burden. Will obtain updated echo. Updated LFTs and TSH today. Will continue with close follow up.     Start amiodarone 200mg daily for one month, " then reduce to 100mg daily.  Continue other medications.   2D Echo  Labs for LFTs and TSH today.  Follow up in device clinic as scheduled.     Follow-up in about 4 weeks (around 12/6/2018).     *Case was discussed with Dr. Ewing, who also counseled the patient.*    (A copy of this report has been forwarded to Dr. Ewing.)    SAL Segura-JAMEY  Cardiology Electrophysiology  NP [facility]    ____________________________________    I have also reviewed the case and agree with the plan specified by Dr. Ewing and FILIPE Oh.   DENIZ Ragland, NP-C  Cardiac Electrophysiology

## 2018-11-09 ENCOUNTER — OFFICE VISIT (OUTPATIENT)
Dept: INTERNAL MEDICINE | Facility: CLINIC | Age: 78
End: 2018-11-09
Payer: MEDICARE

## 2018-11-09 ENCOUNTER — HOSPITAL ENCOUNTER (OUTPATIENT)
Dept: VASCULAR SURGERY | Facility: CLINIC | Age: 78
Discharge: HOME | End: 2018-11-09
Attending: INTERNAL MEDICINE
Payer: MEDICARE

## 2018-11-09 VITALS
HEART RATE: 61 BPM | OXYGEN SATURATION: 98 % | HEIGHT: 66 IN | BODY MASS INDEX: 26.96 KG/M2 | WEIGHT: 167.75 LBS | SYSTOLIC BLOOD PRESSURE: 132 MMHG | DIASTOLIC BLOOD PRESSURE: 64 MMHG

## 2018-11-09 DIAGNOSIS — G30.9 MIXED DEMENTIA: ICD-10-CM

## 2018-11-09 DIAGNOSIS — F01.50 MIXED DEMENTIA: ICD-10-CM

## 2018-11-09 DIAGNOSIS — F02.80 MIXED DEMENTIA: ICD-10-CM

## 2018-11-09 DIAGNOSIS — I10 ESSENTIAL HYPERTENSION: Chronic | ICD-10-CM

## 2018-11-09 DIAGNOSIS — R60.0 UNILATERAL EDEMA OF LOWER EXTREMITY: ICD-10-CM

## 2018-11-09 DIAGNOSIS — G20.C PARKINSONISM, UNSPECIFIED PARKINSONISM TYPE: ICD-10-CM

## 2018-11-09 DIAGNOSIS — R60.0 UNILATERAL EDEMA OF LOWER EXTREMITY: Primary | ICD-10-CM

## 2018-11-09 DIAGNOSIS — M79.89 RIGHT LEG SWELLING: ICD-10-CM

## 2018-11-09 DIAGNOSIS — N18.30 CHRONIC KIDNEY DISEASE, STAGE III (MODERATE): ICD-10-CM

## 2018-11-09 PROCEDURE — 3078F DIAST BP <80 MM HG: CPT | Mod: CPTII,S$GLB,, | Performed by: INTERNAL MEDICINE

## 2018-11-09 PROCEDURE — 1101F PT FALLS ASSESS-DOCD LE1/YR: CPT | Mod: CPTII,S$GLB,, | Performed by: INTERNAL MEDICINE

## 2018-11-09 PROCEDURE — 99499 UNLISTED E&M SERVICE: CPT | Mod: S$GLB,,, | Performed by: INTERNAL MEDICINE

## 2018-11-09 PROCEDURE — 3075F SYST BP GE 130 - 139MM HG: CPT | Mod: CPTII,S$GLB,, | Performed by: INTERNAL MEDICINE

## 2018-11-09 PROCEDURE — 99214 OFFICE O/P EST MOD 30 MIN: CPT | Mod: S$GLB,,, | Performed by: INTERNAL MEDICINE

## 2018-11-09 PROCEDURE — 93971 EXTREMITY STUDY: CPT | Mod: S$GLB,,, | Performed by: SURGERY

## 2018-11-09 PROCEDURE — 99999 PR PBB SHADOW E&M-EST. PATIENT-LVL III: CPT | Mod: PBBFAC,,, | Performed by: INTERNAL MEDICINE

## 2018-11-09 NOTE — PROGRESS NOTES
Subjective:       Patient ID: Mark Anthony Velarde Jr. is a 78 y.o. male.    Chief Complaint: Leg Swelling (Right leg 1 week and redness by the toes) and Foot Swelling (left foot)    Patient history of mixed dementia parkinsonism comes right leg swelling.  He was seeing his memory doctor about week ago and told to follow up me.  He thinks he noticed this about 2 weeks ago but did not but again much.  Some point he fell causing an abrasion to his right hand and.  His wife just noticed some bruising on the top of the foot yesterday.  Patient thinks the swelling was there prior to the fall he is on a blood thinner would make a blood clot unlikely however he does have swelling up to the knee on the right leg only.  The left leg is unremarkable.       Review of Systems   Constitutional: Negative for chills, fatigue, fever and unexpected weight change.   HENT: Negative for nosebleeds and trouble swallowing.    Eyes: Negative for pain and visual disturbance.   Respiratory: Negative for cough, shortness of breath and wheezing.    Cardiovascular: Positive for leg swelling (From just below the right knee down to the top of the foot.). Negative for chest pain and palpitations.   Gastrointestinal: Negative for abdominal pain, constipation, diarrhea, nausea and vomiting.   Genitourinary: Negative for difficulty urinating and hematuria.   Musculoskeletal: Negative for neck pain.   Skin: Negative for rash.   Neurological: Negative for dizziness and headaches.        Memory impairment   Hematological: Does not bruise/bleed easily.   Psychiatric/Behavioral: Negative for dysphoric mood, sleep disturbance and suicidal ideas.       Objective:      Physical Exam   Constitutional: He is oriented to person, place, and time. He appears well-developed and well-nourished. No distress.   HENT:   Head: Normocephalic and atraumatic.   Mouth/Throat: Oropharynx is clear and moist. No oropharyngeal exudate.   TM's clear, pharynx clear   Eyes:  Conjunctivae and EOM are normal. Pupils are equal, round, and reactive to light. No scleral icterus.   Neck: Normal range of motion. Neck supple. No thyromegaly present.   No supraclavicular nodes palpated   Cardiovascular: Normal rate, regular rhythm and normal heart sounds.   No murmur heard.  Pulmonary/Chest: Effort normal and breath sounds normal. He has no wheezes.   Abdominal: Soft. Bowel sounds are normal. He exhibits no mass. There is no tenderness.   Musculoskeletal: He exhibits edema (Significant edema just below the right knee toward the top of the and ankle.  There is some bruising over the lateral 4th and 5th toes.  Fair ROM of the foot and ankle. ). He exhibits no tenderness.   Lymphadenopathy:     He has no cervical adenopathy.   Neurological: He is alert and oriented to person, place, and time.   Skin: No rash noted. No erythema. No pallor.   Psychiatric: He has a normal mood and affect. His behavior is normal.       Assessment:       1. Unilateral edema of lower extremity    2. Mixed dementia    3. Parkinsonism, unspecified Parkinsonism type    4. Right leg swelling    5. Essential hypertension    6. Chronic kidney disease, stage III (moderate)        Plan:       Mark Anthony was seen today for leg swelling and foot swelling.    Diagnoses and all orders for this visit:    Unilateral edema of lower extremity  -     Vascular Lab () US Venous Leg Right; Future  -     US Lower Extremity Veins Right; Future  -     CAR Ultrasound doppler venous leg right; Future    Mixed dementia    Parkinsonism, unspecified Parkinsonism type    Right leg swelling  -     Vascular Lab () US Venous Leg Right; Future  -     US Lower Extremity Veins Right; Future  -     CAR Ultrasound doppler venous leg right; Future    Essential hypertension  -     Vascular Lab () US Venous Leg Right; Future  -     US Lower Extremity Veins Right; Future  -     CAR Ultrasound doppler venous leg right; Future    Chronic kidney disease, stage  III (moderate)          Even I think a blood clot is very unlikely because patient being on Coumadin I would like to suggest an ultrasound.  Patient in family are worried about a blood clot especially with their son's wedding coming up next week

## 2018-11-12 ENCOUNTER — PATIENT MESSAGE (OUTPATIENT)
Dept: INTERNAL MEDICINE | Facility: CLINIC | Age: 78
End: 2018-11-12

## 2018-11-12 ENCOUNTER — TELEPHONE (OUTPATIENT)
Dept: CARDIOLOGY | Facility: HOSPITAL | Age: 78
End: 2018-11-12

## 2018-11-12 NOTE — TELEPHONE ENCOUNTER
----- Message from Maurice Ewing MD sent at 11/12/2018  1:10 PM CST -----  We are good for now  ----- Message -----  From: Pauline Brunson RN  Sent: 11/12/2018   8:48 AM  To: Maurice Ewing MD    Mr. Velarde received ATP x1 for SVT on 11/11/18. Pt seen in arrhythmia clinic on 11/8/18 for shocks delivered for probable SVT. Pt was started on Amiodarone. Discussed with STANTON David NP. Pt is still loading on Amio, will continue to monitor for now. Please advise if you would like something else done.

## 2018-11-14 DIAGNOSIS — I48.0 PAROXYSMAL ATRIAL FIBRILLATION: Primary | ICD-10-CM

## 2018-11-28 RX ORDER — METOPROLOL TARTRATE 25 MG/1
12.5 TABLET, FILM COATED ORAL 2 TIMES DAILY
Qty: 90 TABLET | Refills: 3 | Status: SHIPPED | OUTPATIENT
Start: 2018-11-28 | End: 2019-12-20 | Stop reason: SDUPTHER

## 2018-11-29 ENCOUNTER — PATIENT MESSAGE (OUTPATIENT)
Dept: GASTROENTEROLOGY | Facility: CLINIC | Age: 78
End: 2018-11-29

## 2018-12-01 ENCOUNTER — PATIENT MESSAGE (OUTPATIENT)
Dept: INTERNAL MEDICINE | Facility: CLINIC | Age: 78
End: 2018-12-01

## 2018-12-03 DIAGNOSIS — G31.83 LEWY BODY DEMENTIA WITH BEHAVIORAL DISTURBANCE: ICD-10-CM

## 2018-12-03 DIAGNOSIS — F02.818 LEWY BODY DEMENTIA WITH BEHAVIORAL DISTURBANCE: ICD-10-CM

## 2018-12-03 RX ORDER — OMEPRAZOLE 20 MG/1
20 CAPSULE, DELAYED RELEASE ORAL DAILY
Qty: 90 CAPSULE | Refills: 3 | Status: SHIPPED | OUTPATIENT
Start: 2018-12-03 | End: 2019-12-05 | Stop reason: SDUPTHER

## 2018-12-04 ENCOUNTER — TELEPHONE (OUTPATIENT)
Dept: ELECTROPHYSIOLOGY | Facility: CLINIC | Age: 78
End: 2018-12-04

## 2018-12-04 RX ORDER — DONEPEZIL HYDROCHLORIDE 10 MG/1
10 TABLET, FILM COATED ORAL DAILY
Qty: 90 TABLET | Refills: 3 | Status: SHIPPED | OUTPATIENT
Start: 2018-12-04 | End: 2019-05-30

## 2018-12-04 NOTE — TELEPHONE ENCOUNTER
Received: 2 weeks ago   Message Contents   [facility] Arrhythmia Device Staff             Pt had ICD Stj  Check 11-8-18.   Pt seeing [facility] on 12-20-18.   Can you print me  out a copy when you have Time.     Thank you

## 2018-12-20 ENCOUNTER — HOSPITAL ENCOUNTER (OUTPATIENT)
Dept: CARDIOLOGY | Facility: CLINIC | Age: 78
Discharge: HOME | End: 2018-12-20
Payer: MEDICARE

## 2018-12-20 ENCOUNTER — OFFICE VISIT (OUTPATIENT)
Dept: ELECTROPHYSIOLOGY | Facility: CLINIC | Age: 78
End: 2018-12-20
Payer: MEDICARE

## 2018-12-20 VITALS
HEART RATE: 67 BPM | WEIGHT: 167 LBS | HEIGHT: 66 IN | SYSTOLIC BLOOD PRESSURE: 124 MMHG | DIASTOLIC BLOOD PRESSURE: 65 MMHG | BODY MASS INDEX: 26.84 KG/M2

## 2018-12-20 DIAGNOSIS — E78.5 HYPERLIPIDEMIA WITH TARGET LDL LESS THAN 70: Chronic | ICD-10-CM

## 2018-12-20 DIAGNOSIS — I25.5 ISCHEMIC CARDIOMYOPATHY: Primary | Chronic | ICD-10-CM

## 2018-12-20 DIAGNOSIS — F02.80 LEWY BODY DEMENTIA WITHOUT BEHAVIORAL DISTURBANCE: ICD-10-CM

## 2018-12-20 DIAGNOSIS — G31.83 LEWY BODY DEMENTIA WITHOUT BEHAVIORAL DISTURBANCE: ICD-10-CM

## 2018-12-20 DIAGNOSIS — I10 ESSENTIAL HYPERTENSION: Chronic | ICD-10-CM

## 2018-12-20 DIAGNOSIS — Z98.61 HISTORY OF PTCA: Chronic | ICD-10-CM

## 2018-12-20 DIAGNOSIS — I42.8 OTHER CARDIOMYOPATHIES: ICD-10-CM

## 2018-12-20 DIAGNOSIS — Z95.810 ICD (IMPLANTABLE CARDIOVERTER-DEFIBRILLATOR) IN PLACE: ICD-10-CM

## 2018-12-20 DIAGNOSIS — I44.7 LBBB (LEFT BUNDLE BRANCH BLOCK): Chronic | ICD-10-CM

## 2018-12-20 DIAGNOSIS — Z91.89 AT RISK FOR AMIODARONE TOXICITY WITH LONG TERM USE: ICD-10-CM

## 2018-12-20 DIAGNOSIS — Z79.899 AT RISK FOR AMIODARONE TOXICITY WITH LONG TERM USE: ICD-10-CM

## 2018-12-20 DIAGNOSIS — I48.0 PAROXYSMAL ATRIAL FIBRILLATION: Chronic | ICD-10-CM

## 2018-12-20 DIAGNOSIS — I48.92 ATRIAL FLUTTER WITH CONTROLLED RESPONSE: Chronic | ICD-10-CM

## 2018-12-20 PROCEDURE — 3078F DIAST BP <80 MM HG: CPT | Mod: CPTII,S$GLB,, | Performed by: INTERNAL MEDICINE

## 2018-12-20 PROCEDURE — 99499 UNLISTED E&M SERVICE: CPT | Mod: S$GLB,,, | Performed by: INTERNAL MEDICINE

## 2018-12-20 PROCEDURE — 1101F PT FALLS ASSESS-DOCD LE1/YR: CPT | Mod: CPTII,S$GLB,, | Performed by: INTERNAL MEDICINE

## 2018-12-20 PROCEDURE — 3074F SYST BP LT 130 MM HG: CPT | Mod: CPTII,S$GLB,, | Performed by: INTERNAL MEDICINE

## 2018-12-20 PROCEDURE — 99215 OFFICE O/P EST HI 40 MIN: CPT | Mod: S$GLB,,, | Performed by: INTERNAL MEDICINE

## 2018-12-20 PROCEDURE — 93005 ELECTROCARDIOGRAM TRACING: CPT | Mod: S$GLB,,, | Performed by: INTERNAL MEDICINE

## 2018-12-20 PROCEDURE — 99999 PR PBB SHADOW E&M-EST. PATIENT-LVL III: CPT | Mod: PBBFAC,,, | Performed by: INTERNAL MEDICINE

## 2018-12-20 PROCEDURE — 93010 ELECTROCARDIOGRAM REPORT: CPT | Mod: S$GLB,,, | Performed by: INTERNAL MEDICINE

## 2018-12-20 NOTE — PROGRESS NOTES
"  Subjective:   Patient ID:  Mark Anthony Velarde Jr. is a 78 y.o. male     Chief complaint:Atrial Fibrillation      HPI  Background:  Mr. Velarde is a 78 y.o. male with a PMHx syncope with inducible VT, left BBB and high grade infrahisian block s/p ICD implanted 2/2008 ([facility]), ICM (EF 40-45%), pAF, CAD s/p PTCA, HTN, CKD III, and HLD.     Background:   Primary cardiologist is Dr. Jane.  ICD implant 2/2008. Generator change 9/2015. After generator change was lost to follow up.  Subsequent generator change 7/2017 due to ICD on recall list.   Echo 1/2017:Mildly to moderately depressed left ventricular systolic function (EF 40-45%).      Update (11/08/2018):     Today Mr. Velarde presents for his annual ICD check. The patient states that on 11/6/2018, he was standing in his kitchen doing dishes and suddenly felt a shock from his device and fell to his knees. He states he did not note any SOB, DUARTE, chest pain, palpitations, weakness, recent infections, or stress prior to his event. He and his wife returned from a 2 week bus trip and state Mr. Velarde has been fatigued since their return, but they are unsure if this is cardiac related. After the event the patient said he felt perfectly fine with no symptoms.     Device Interrogation 11/8/18 reveals an intrinsic SR with stable lead and device function.  He paces 45% in the RA and 81% in the RV.  VIP added to reduce ventricular pacing.  AF burden 9.5%. Estimated battery longevity 6-7 years. SVTx49 and NSVTx32. Patient had 12 treated "VT" episodes, including 6 in the VT-1 zone, 5 in the VT-2 zone. ATP therapy was delivered in 5 separate instances, although one was on 10/31/2018 (1 successful ATP) and the rest were on 11/6/2018  (4 instances of ATP; with 2 of those instances resulting in ICD discharge).      Recent Cardiac Tests:      2D Echo (1/27/2017):  CONCLUSIONS     1 - Upper limit of normal left ventricular enlargement.     2 - Mildly to moderately depressed left ventricular " "systolic function (EF 40-45%).     3 - Eccentric hypertrophy.     4 - Normal left ventricular diastolic function.     5 - Normal right ventricular systolic function .     6 - Mild left atrial enlargement.     7 - Trivial to mild aortic regurgitation.     8 - Mild mitral regurgitation.     9 - Trivial tricuspid regurgitation.     10 - Trivial pulmonic regurgitation.     11 - The estimated PA systolic pressure is 33 mmHg.     12 - TDI as per text.       Update since then:  Ms David started him on amio based on the fact that the "VT" episodes are likely SVT resulting in inappropriate discharge (my review) . Started low-dose amiodarone therapy -- 200 a day for 4 weeks then 100 a day . (Of note per Ms David: patient states he has Lewy Body dementia and not Parkinsons- Dr. Ewing has cleared him to for amiodarone therapy). AF burden 9.5%. Patient asymptomatic and on pradaxa. Per device interrogation, patient is now in sinus rhythm (history of CHB). VIP turned ON to reduce ventricular pacing burden. Will obtain updated echo. Updated LFTs and TSH today. Will continue with close follow up.   Echo has not been done   His tremor has not gotten worse   I have reviewed the actual image of the ECG tracing obtained today and it shows PRV pacing.  QRSd = 158   msec     Current Outpatient Medications   Medication Sig    amiodarone (PACERONE) 100 MG Tab Take Amiodarone 2 tablet (200 mg total) by mouth once daily for 30 days, THEN, take 1 tablet (100 mg total) by mouth once daily.    amLODIPine (NORVASC) 5 MG tablet Take 1 tablet (5 mg total) by mouth once daily.    aspirin (ECOTRIN) 81 MG EC tablet Take 81 mg by mouth once daily.    atorvastatin (LIPITOR) 40 MG tablet Take 1 tablet (40 mg total) by mouth once daily.    dabigatran etexilate (PRADAXA) 150 mg Cap Take 1 capsule (150 mg total) by mouth 2 (two) times daily. "Do NOT break, chew, or open capsules."    donepezil (ARICEPT) 10 MG tablet Take 1 tablet (10 mg total) " by mouth once daily.    FLUAD [id number], 65 YR UP,,PF, 45 mcg (15 mcg x 3)/0.5 mL Syrg ADM 0.5ML IM UTD    FLUZONE HIGH-DOSE 2018-19, PF, 180 mcg/0.5 mL vaccine ADM 0.5ML IM UTD    memantine (NAMENDA) 10 MG Tab Take 1 tablet (10 mg total) by mouth 2 (two) times daily.    metoprolol tartrate (LOPRESSOR) 25 MG tablet Take 0.5 tablets (12.5 mg total) by mouth 2 (two) times daily.    multivitamin (MULTIVITAMIN) per tablet Take 1 tablet by mouth once daily.    omeprazole (PRILOSEC) 20 MG capsule Take 1 capsule (20 mg total) by mouth once daily.     No current facility-administered medications for this visit.      Review of Systems   Constitution: Negative for decreased appetite, weakness, malaise/fatigue, weight gain and weight loss.   Eyes: Negative for blurred vision.   Cardiovascular: Negative for chest pain, claudication, cyanosis, dyspnea on exertion, irregular heartbeat, leg swelling, near-syncope, orthopnea and palpitations.   Respiratory: Negative for cough, shortness of breath, sleep disturbances due to breathing, snoring and wheezing.    Endocrine: Negative for heat intolerance.   Hematologic/Lymphatic: Does not bruise/bleed easily.   Musculoskeletal: Negative for muscle weakness and myalgias.   Gastrointestinal: Negative for melena, nausea and vomiting.   Genitourinary: Negative for nocturia.   Neurological: Negative for excessive daytime sleepiness, dizziness, headaches and light-headedness.   Psychiatric/Behavioral: Negative for depression, memory loss and substance abuse. The patient does not have insomnia and is not nervous/anxious.        Objective:   Physical Exam   Constitutional: He is oriented to person, place, and time. He appears well-developed and well-nourished.   HENT:   Head: Normocephalic and atraumatic.   Right Ear: External ear normal.   Left Ear: External ear normal.   Eyes: Conjunctivae are normal. Pupils are equal, round, and reactive to light. Left conjunctiva is not injected. Left  "conjunctiva has no hemorrhage.   Neck: Neck supple. No JVD present. No thyromegaly present.   Cardiovascular: Normal rate, regular rhythm, normal heart sounds and intact distal pulses. PMI is not displaced. Exam reveals no gallop, no friction rub, no midsystolic click and no opening snap.   No murmur heard.  Pulses:       Carotid pulses are 2+ on the right side, and 2+ on the left side.       Radial pulses are 2+ on the right side, and 2+ on the left side.        Dorsalis pedis pulses are 2+ on the right side, and 2+ on the left side.        Posterior tibial pulses are 2+ on the right side, and 2+ on the left side.   Pulmonary/Chest: Effort normal and breath sounds normal. No respiratory distress. He has no wheezes. He has no rales. He exhibits no tenderness.   Device pocket is in excellent repair     Abdominal: Soft. Normal appearance. He exhibits no pulsatile liver. There is no hepatomegaly. There is no tenderness. There is no rigidity.   Musculoskeletal: Normal range of motion. He exhibits no edema or tenderness.        Right knee: He exhibits no swelling.        Left knee: He exhibits no swelling.        Right ankle: He exhibits no swelling.        Left ankle: He exhibits no swelling.        Right lower leg: He exhibits no swelling.        Left lower leg: He exhibits no swelling.        Right foot: There is no swelling.        Left foot: There is no swelling.   Neurological: He is alert and oriented to person, place, and time. He has normal strength and normal reflexes. No cranial nerve deficit. Coordination normal.   R arm tremor   Skin: Skin is warm, dry and intact. No rash noted. No cyanosis.   Psychiatric: He has a normal mood and affect. His behavior is normal.   Nursing note and vitals reviewed.    /65   Pulse 67   Ht 5' 6" (1.676 m)   Wt 75.8 kg (167 lb)   BMI 26.95 kg/m²      Assessment:      1. Ischemic cardiomyopathy LVEF ~45%    2. LBBB (left bundle branch block)    3. Hyperlipidemia with " target LDL less than 70    4. Essential hypertension    5. Atrial flutter with controlled response - seen on ICD interrogation - asymptomatic    6. Paroxysmal atrial fibrillation    7. ICD (implantable cardioverter-defibrillator) in place    8. History of PTCA - LAD, LCX and PDA PCI in 2006    9. Lewy body dementia without behavioral disturbance    10. At risk for amiodarone toxicity with long term use        Plan:    ICD eval today  Amio level in 3 months  Orders Placed This Encounter   Procedures    Amiodarone level     Standing Status:   Future     Standing Expiration Date:   2/18/2020     Follow-up in about 6 months (around 6/20/2019), or if symptoms worsen or fail to improve, for christel salinas.  There are no discontinued medications.     This SmartLink is deprecated. Use AVPowered OutcomesEDLIST instead to display the medication list for a patient.

## 2019-01-17 ENCOUNTER — OFFICE VISIT (OUTPATIENT)
Dept: NEPHROLOGY | Facility: CLINIC | Age: 79
End: 2019-01-17
Payer: MEDICARE

## 2019-01-17 ENCOUNTER — LAB VISIT (OUTPATIENT)
Dept: LAB | Facility: HOSPITAL | Age: 79
End: 2019-01-17
Attending: INTERNAL MEDICINE
Payer: MEDICARE

## 2019-01-17 VITALS
WEIGHT: 167.13 LBS | HEIGHT: 66 IN | SYSTOLIC BLOOD PRESSURE: 130 MMHG | DIASTOLIC BLOOD PRESSURE: 60 MMHG | BODY MASS INDEX: 26.86 KG/M2 | HEART RATE: 60 BPM | OXYGEN SATURATION: 98 %

## 2019-01-17 DIAGNOSIS — N18.30 CHRONIC KIDNEY DISEASE, STAGE III (MODERATE): ICD-10-CM

## 2019-01-17 DIAGNOSIS — N18.30 CHRONIC KIDNEY DISEASE, STAGE III (MODERATE): Primary | ICD-10-CM

## 2019-01-17 LAB
BACTERIA #/AREA URNS AUTO: NORMAL /HPF
BILIRUB UR QL STRIP: NEGATIVE
CLARITY UR REFRACT.AUTO: CLEAR
COLOR UR AUTO: YELLOW
GLUCOSE UR QL STRIP: NEGATIVE
HGB UR QL STRIP: NEGATIVE
KETONES UR QL STRIP: NEGATIVE
LEUKOCYTE ESTERASE UR QL STRIP: ABNORMAL
MICROSCOPIC COMMENT: NORMAL
NITRITE UR QL STRIP: NEGATIVE
PH UR STRIP: 5 [PH] (ref 5–8)
PROT UR QL STRIP: NEGATIVE
RBC #/AREA URNS AUTO: 0 /HPF (ref 0–4)
SP GR UR STRIP: 1.01 (ref 1–1.03)
URN SPEC COLLECT METH UR: ABNORMAL
WBC #/AREA URNS AUTO: 1 /HPF (ref 0–5)

## 2019-01-17 PROCEDURE — 3075F SYST BP GE 130 - 139MM HG: CPT | Mod: CPTII,S$GLB,, | Performed by: INTERNAL MEDICINE

## 2019-01-17 PROCEDURE — 3078F PR MOST RECENT DIASTOLIC BLOOD PRESSURE < 80 MM HG: ICD-10-PCS | Mod: CPTII,S$GLB,, | Performed by: INTERNAL MEDICINE

## 2019-01-17 PROCEDURE — 3078F DIAST BP <80 MM HG: CPT | Mod: CPTII,S$GLB,, | Performed by: INTERNAL MEDICINE

## 2019-01-17 PROCEDURE — 99999 PR PBB SHADOW E&M-EST. PATIENT-LVL III: CPT | Mod: PBBFAC,,, | Performed by: INTERNAL MEDICINE

## 2019-01-17 PROCEDURE — 99213 PR OFFICE/OUTPT VISIT, EST, LEVL III, 20-29 MIN: ICD-10-PCS | Mod: S$GLB,,, | Performed by: INTERNAL MEDICINE

## 2019-01-17 PROCEDURE — 81001 URINALYSIS AUTO W/SCOPE: CPT

## 2019-01-17 PROCEDURE — 99499 UNLISTED E&M SERVICE: CPT | Mod: S$GLB,,, | Performed by: INTERNAL MEDICINE

## 2019-01-17 PROCEDURE — 99999 PR PBB SHADOW E&M-EST. PATIENT-LVL III: ICD-10-PCS | Mod: PBBFAC,,, | Performed by: INTERNAL MEDICINE

## 2019-01-17 PROCEDURE — 1101F PT FALLS ASSESS-DOCD LE1/YR: CPT | Mod: CPTII,S$GLB,, | Performed by: INTERNAL MEDICINE

## 2019-01-17 PROCEDURE — 99213 OFFICE O/P EST LOW 20 MIN: CPT | Mod: S$GLB,,, | Performed by: INTERNAL MEDICINE

## 2019-01-17 PROCEDURE — 1101F PR PT FALLS ASSESS DOC 0-1 FALLS W/OUT INJ PAST YR: ICD-10-PCS | Mod: CPTII,S$GLB,, | Performed by: INTERNAL MEDICINE

## 2019-01-17 PROCEDURE — 99499 RISK ADDL DX/OHS AUDIT: ICD-10-PCS | Mod: S$GLB,,, | Performed by: INTERNAL MEDICINE

## 2019-01-17 PROCEDURE — 3075F PR MOST RECENT SYSTOLIC BLOOD PRESS GE 130-139MM HG: ICD-10-PCS | Mod: CPTII,S$GLB,, | Performed by: INTERNAL MEDICINE

## 2019-01-18 ENCOUNTER — OFFICE VISIT (OUTPATIENT)
Dept: CARDIOLOGY | Facility: CLINIC | Age: 79
End: 2019-01-18
Payer: MEDICARE

## 2019-01-18 VITALS
HEIGHT: 66 IN | WEIGHT: 168.88 LBS | HEART RATE: 60 BPM | SYSTOLIC BLOOD PRESSURE: 141 MMHG | BODY MASS INDEX: 27.14 KG/M2 | DIASTOLIC BLOOD PRESSURE: 65 MMHG

## 2019-01-18 DIAGNOSIS — Z79.01 CURRENT USE OF LONG TERM ANTICOAGULATION: ICD-10-CM

## 2019-01-18 DIAGNOSIS — I44.7 LBBB (LEFT BUNDLE BRANCH BLOCK): Chronic | ICD-10-CM

## 2019-01-18 DIAGNOSIS — I25.10 CORONARY ARTERY DISEASE INVOLVING NATIVE CORONARY ARTERY OF NATIVE HEART WITHOUT ANGINA PECTORIS: Primary | Chronic | ICD-10-CM

## 2019-01-18 DIAGNOSIS — I25.5 ISCHEMIC CARDIOMYOPATHY: Chronic | ICD-10-CM

## 2019-01-18 DIAGNOSIS — Z98.61 HISTORY OF PTCA: Chronic | ICD-10-CM

## 2019-01-18 DIAGNOSIS — I10 ESSENTIAL HYPERTENSION: Chronic | ICD-10-CM

## 2019-01-18 DIAGNOSIS — Z95.810 ICD (IMPLANTABLE CARDIOVERTER-DEFIBRILLATOR) IN PLACE: ICD-10-CM

## 2019-01-18 DIAGNOSIS — I48.92 ATRIAL FLUTTER WITH CONTROLLED RESPONSE: Chronic | ICD-10-CM

## 2019-01-18 DIAGNOSIS — E78.5 HYPERLIPIDEMIA WITH TARGET LDL LESS THAN 70: Chronic | ICD-10-CM

## 2019-01-18 DIAGNOSIS — I48.0 PAROXYSMAL ATRIAL FIBRILLATION: Chronic | ICD-10-CM

## 2019-01-18 PROCEDURE — 3077F SYST BP >= 140 MM HG: CPT | Mod: CPTII,S$GLB,, | Performed by: INTERNAL MEDICINE

## 2019-01-18 PROCEDURE — 99999 PR PBB SHADOW E&M-EST. PATIENT-LVL III: CPT | Mod: PBBFAC,,, | Performed by: INTERNAL MEDICINE

## 2019-01-18 PROCEDURE — 99499 RISK ADDL DX/OHS AUDIT: ICD-10-PCS | Mod: S$GLB,,, | Performed by: INTERNAL MEDICINE

## 2019-01-18 PROCEDURE — 3078F PR MOST RECENT DIASTOLIC BLOOD PRESSURE < 80 MM HG: ICD-10-PCS | Mod: CPTII,S$GLB,, | Performed by: INTERNAL MEDICINE

## 2019-01-18 PROCEDURE — 1101F PT FALLS ASSESS-DOCD LE1/YR: CPT | Mod: CPTII,S$GLB,, | Performed by: INTERNAL MEDICINE

## 2019-01-18 PROCEDURE — 1101F PR PT FALLS ASSESS DOC 0-1 FALLS W/OUT INJ PAST YR: ICD-10-PCS | Mod: CPTII,S$GLB,, | Performed by: INTERNAL MEDICINE

## 2019-01-18 PROCEDURE — 99214 PR OFFICE/OUTPT VISIT, EST, LEVL IV, 30-39 MIN: ICD-10-PCS | Mod: S$GLB,,, | Performed by: INTERNAL MEDICINE

## 2019-01-18 PROCEDURE — 99999 PR PBB SHADOW E&M-EST. PATIENT-LVL III: ICD-10-PCS | Mod: PBBFAC,,, | Performed by: INTERNAL MEDICINE

## 2019-01-18 PROCEDURE — 99214 OFFICE O/P EST MOD 30 MIN: CPT | Mod: S$GLB,,, | Performed by: INTERNAL MEDICINE

## 2019-01-18 PROCEDURE — 99499 UNLISTED E&M SERVICE: CPT | Mod: S$GLB,,, | Performed by: INTERNAL MEDICINE

## 2019-01-18 PROCEDURE — 3077F PR MOST RECENT SYSTOLIC BLOOD PRESSURE >= 140 MM HG: ICD-10-PCS | Mod: CPTII,S$GLB,, | Performed by: INTERNAL MEDICINE

## 2019-01-18 PROCEDURE — 3078F DIAST BP <80 MM HG: CPT | Mod: CPTII,S$GLB,, | Performed by: INTERNAL MEDICINE

## 2019-01-18 RX ORDER — AMLODIPINE BESYLATE 5 MG/1
5 TABLET ORAL DAILY
Qty: 90 TABLET | Refills: 3 | Status: SHIPPED | OUTPATIENT
Start: 2019-01-18 | End: 2020-02-18 | Stop reason: SDUPTHER

## 2019-01-18 RX ORDER — ATORVASTATIN CALCIUM 40 MG/1
40 TABLET, FILM COATED ORAL DAILY
Qty: 90 TABLET | Refills: 3 | Status: SHIPPED | OUTPATIENT
Start: 2019-01-18 | End: 2019-10-28 | Stop reason: SDUPTHER

## 2019-01-18 NOTE — PROGRESS NOTES
"Subjective:   Patient ID:  Mark Anthony Velarde Jr. is a 78 y.o. male who presents for follow-up of Ischemic cardiomyopathy LVEF ~45% (1 yr f/u )      HPI:   Mr. Velarde returns for f/u for HTN, dyslipidemia, PAF on AC and CAD s/p PCI of LAD, LCX and PDA in .  He has a chronic LBBB and an EF of 45%.  He is followed by Dr. Parker for his device.  His device was initially placed when he underwent an EP evaluation for syncope with HUT test and an EP study. EP study demonstrated marked infrahisian block and TWA was positive.  He therefore received an ICD.  In 9-15 had a pulse generator change. Mr. Velarde underwent a successful generator change (07/26/17); his prior device was identified as a [facility] advisory device>>pacer-dependent.     Mr. Velarde denies any  exertional chest discomfort, palpitations, TIA's, syncope or presyncope.  Mr. Velarde denies any claudication or myalgias.  He is exercising with [employer] equipment at the [employer] [place].    From EP note: "Device Interrogation 11/8/18 reveals an intrinsic SR with stable lead and device function.  He paces 45% in the RA and 81% in the RV.  VIP added to reduce ventricular pacing.  AF burden 9.5%. Estimated battery longevity 6-7 years. SVTx49 and NSVTx32. Patient had 12 treated "VT" episodes, including 6 in the VT-1 zone, 5 in the VT-2 zone. ATP therapy was delivered in 5 separate instances, although one was on 10/31/2018 (1 successful ATP) and the rest were on 11/6/2018  (4 instances of ATP; with 2 of those instances resulting in ICD discharge)."   He is now on amio    Stress test from 1-2008 was negatvie for ischemia.    He had his AAA screen which was negative.    He hasn't used nitro in several years    Losartan stopped in the past for hyperkalemia    He has been dx with lewy body dementia.      Vitals:    01/18/19 0825   BP: (!) 141/65   BP Location: Left arm   Patient Position: Sitting   BP Method: Medium (Automatic)   Pulse: 60   Weight: 76.6 kg (168 lb 14 oz) " "  Height: 5' 6" (1.676 m)     Body mass index is 27.26 kg/m².  Estimated Creatinine Clearance: 33.8 mL/min (A) (based on SCr of 1.7 mg/dL (H)).    Lab Results   Component Value Date     01/17/2019    K 4.2 01/17/2019     01/17/2019    CO2 23 01/17/2019    BUN 22 01/17/2019    CREATININE 1.7 (H) 01/17/2019     (H) 01/17/2019    HGBA1C 5.7 (H) 08/16/2018    MG 2.4 12/15/2007    AST 27 11/08/2018    ALT 22 11/08/2018    ALBUMIN 3.8 11/08/2018    PROT 7.8 11/08/2018    BILITOT 0.7 11/08/2018    WBC 6.96 01/19/2018    HGB 14.6 01/19/2018    HCT 46.1 01/19/2018    MCV 95 01/19/2018     01/19/2018    INR 1.1 07/19/2017    PSA 1.8 08/04/2014    TSH 1.295 11/08/2018    CHOL 120 07/02/2014    HDL 40 07/02/2014    LDLCALC 57.4 (L) 07/02/2014    TRIG 113 07/02/2014       Current Outpatient Medications   Medication Sig    amiodarone (PACERONE) 100 MG Tab Take Amiodarone 2 tablet (200 mg total) by mouth once daily for 30 days, THEN, take 1 tablet (100 mg total) by mouth once daily.    amLODIPine (NORVASC) 5 MG tablet Take 1 tablet (5 mg total) by mouth once daily.    aspirin (ECOTRIN) 81 MG EC tablet Take 81 mg by mouth once daily.    atorvastatin (LIPITOR) 40 MG tablet Take 1 tablet (40 mg total) by mouth once daily.    dabigatran etexilate (PRADAXA) 150 mg Cap Take 1 capsule (150 mg total) by mouth 2 (two) times daily. "Do NOT break, chew, or open capsules."    donepezil (ARICEPT) 10 MG tablet Take 1 tablet (10 mg total) by mouth once daily.    FLUAD [id number], 65 YR UP,,PF, 45 mcg (15 mcg x 3)/0.5 mL Syrg ADM 0.5ML IM UTD    FLUZONE HIGH-DOSE 2018-19, PF, 180 mcg/0.5 mL vaccine ADM 0.5ML IM UTD    memantine (NAMENDA) 10 MG Tab Take 1 tablet (10 mg total) by mouth 2 (two) times daily.    metoprolol tartrate (LOPRESSOR) 25 MG tablet Take 0.5 tablets (12.5 mg total) by mouth 2 (two) times daily.    multivitamin (MULTIVITAMIN) per tablet Take 1 tablet by mouth once daily.    omeprazole " (PRILOSEC) 20 MG capsule Take 1 capsule (20 mg total) by mouth once daily.     No current facility-administered medications for this visit.        Review of Systems   Constitution: Negative for decreased appetite, weakness, malaise/fatigue, weight gain and weight loss.   Eyes: Negative for visual disturbance.   Cardiovascular: Negative for chest pain, claudication, dyspnea on exertion, irregular heartbeat, orthopnea, palpitations, paroxysmal nocturnal dyspnea and syncope.   Respiratory: Negative for cough, shortness of breath and snoring.    Skin: Negative for rash.   Musculoskeletal: Negative for arthritis, muscle cramps, muscle weakness and myalgias.   Gastrointestinal: Negative for abdominal pain, anorexia, change in bowel habit and nausea.   Genitourinary: Negative for dysuria and frequency.   Neurological: Negative for excessive daytime sleepiness, dizziness, headaches, loss of balance and numbness.   Psychiatric/Behavioral: Negative for depression.       Objective:   Physical Exam   Constitutional: He is oriented to person, place, and time. He appears well-developed and well-nourished.   HENT:   Head: Normocephalic and atraumatic.   Eyes: Pupils are equal, round, and reactive to light.   Neck: Normal range of motion. Neck supple.   Cardiovascular: Normal rate, regular rhythm, intact distal pulses and normal pulses. Exam reveals no gallop.   Murmur heard.   Blowing early systolic murmur is present with a grade of 1/6 at the upper right sternal border.  Pulmonary/Chest: Effort normal and breath sounds normal.   Abdominal: Soft. Bowel sounds are normal. There is no hepatosplenomegaly. There is no tenderness.   Musculoskeletal: Normal range of motion.   Neurological: He is alert and oriented to person, place, and time.   Skin: Skin is warm and dry.   Psychiatric: He has a normal mood and affect. His speech is normal and behavior is normal. Judgment and thought content normal.       Assessment:     1. Coronary  artery disease involving native coronary artery of native heart without angina pectoris    2. Essential hypertension    3. Hyperlipidemia with target LDL less than 70    4. History of PTCA - LAD, LCX and PDA PCI in 2006    5. LBBB (left bundle branch block)    6. Ischemic cardiomyopathy LVEF ~45%    7. Paroxysmal atrial fibrillation    8. Atrial flutter with controlled response - seen on ICD interrogation - asymptomatic    9. ICD (implantable cardioverter-defibrillator) in place    10. Current use of long term anticoagulation        Plan:   Will switch to eliquis 2.5 mg BID once he runs out of pradaxa. His wife will call when his supply is running low. GFR is now in the 30s.     From a cardiac standpoint, pt is doing well and is clinically stable. Pts lipid profile and BP's are at goal. Pt does not require any cardiac testing at this time      No orders of the defined types were placed in this encounter.

## 2019-01-18 NOTE — PROGRESS NOTES
NEPHROLOGY PROGRESS NOTE    Subjective:      Patient ID: Mark Anthony Velarde Jr. is a 78 y.o. White male who presents for follow-up evaluation of CKD.     HPI   No acute events since last exam. No acute complaints or difficulty with urination. Continues to have an ICD/PCM and takes Pradaxa. The patient denies taking NSAIDs or new antibiotics, recreational drugs, recent episode of dehydration, diarrhea, nausea or vomiting, acute illness, hospitalization or exposure to IV radiocontrast.     Review of Systems   Constitutional: Negative for activity change, appetite change, chills, diaphoresis, fatigue, fever and unexpected weight change.   HENT: Negative for congestion, facial swelling and trouble swallowing.    Eyes: Negative for pain, discharge, redness and visual disturbance.   Respiratory: Negative for cough, shortness of breath and wheezing.    Cardiovascular: Negative for chest pain, palpitations and leg swelling.   Gastrointestinal: Negative for abdominal distention, abdominal pain, constipation and diarrhea.   Endocrine: Negative for cold intolerance and heat intolerance.   Genitourinary: Negative for decreased urine volume, difficulty urinating, dysuria, flank pain, frequency and urgency.   Musculoskeletal: Negative for arthralgias, joint swelling and myalgias.   Skin: Negative for color change.   Allergic/Immunologic: Negative for immunocompromised state.   Neurological: Negative for dizziness, tremors, syncope, speech difficulty, weakness, light-headedness and numbness.   Hematological: Negative for adenopathy.   Psychiatric/Behavioral: Negative for agitation, confusion, decreased concentration and dysphoric mood.       Objective:   /60 mmHg  Physical Exam   Constitutional: He is oriented to person, place, and time. He appears well-developed and well-nourished.   HENT:   Head: Normocephalic and atraumatic.   Eyes: Conjunctivae and EOM are normal. Pupils are equal, round, and reactive to light.   Neck: Neck  supple.   Cardiovascular: Normal rate, regular rhythm, normal heart sounds and intact distal pulses.   Pulmonary/Chest: Effort normal and breath sounds normal.   Abdominal: Soft. Bowel sounds are normal.   Musculoskeletal: Normal range of motion.   Neurological: He is alert and oriented to person, place, and time. He has normal reflexes.   Skin: Skin is warm and dry.   Psychiatric: He has a normal mood and affect. His behavior is normal.   Nursing note and vitals reviewed.      LABS  Serum Cr 1.7 mg/dL (1.4)  UPCR 0.13 g/g    Assessment:     1. CKD stage 3B eGFR ~ 37 - 50 ml/min. Etiology unknown, possible chronic TI disease, hypoperfusion injury (DCM, AFIB), hypertensive injury.  Stable kidney function over the last 3 years with fluctuations between 1.3 to 1.7 mg/dL. No proteinuria. Risk for ESRD progression is low. Avoidance of NSAIDs encouraged.      Plan:       Continue current regimen, Avoid NSAIDs.      Follow up in 6 months with labs.

## 2019-01-24 DIAGNOSIS — Z46.9 FITTING AND ADJUSTMENT OF DEVICE: Primary | ICD-10-CM

## 2019-02-03 ENCOUNTER — PATIENT MESSAGE (OUTPATIENT)
Dept: ELECTROPHYSIOLOGY | Facility: CLINIC | Age: 79
End: 2019-02-03

## 2019-02-12 ENCOUNTER — CLINICAL SUPPORT (OUTPATIENT)
Dept: CARDIOLOGY | Facility: HOSPITAL | Age: 79
End: 2019-02-12
Attending: INTERNAL MEDICINE
Payer: MEDICARE

## 2019-02-12 DIAGNOSIS — I25.5 ISCHEMIC CARDIOMYOPATHY: ICD-10-CM

## 2019-02-12 DIAGNOSIS — Z95.810 AICD (AUTOMATIC CARDIOVERTER/DEFIBRILLATOR) PRESENT: ICD-10-CM

## 2019-02-12 DIAGNOSIS — I47.20 VT (VENTRICULAR TACHYCARDIA): ICD-10-CM

## 2019-02-12 PROCEDURE — 93296 REM INTERROG EVL PM/IDS: CPT

## 2019-02-13 ENCOUNTER — PATIENT MESSAGE (OUTPATIENT)
Dept: CARDIOLOGY | Facility: CLINIC | Age: 79
End: 2019-02-13

## 2019-02-14 ENCOUNTER — TELEPHONE (OUTPATIENT)
Dept: CARDIOLOGY | Facility: CLINIC | Age: 79
End: 2019-02-14

## 2019-02-14 NOTE — TELEPHONE ENCOUNTER
----- Message from Antonia Flores sent at 2/14/2019 10:10 AM CST -----  Contact: Brandy with [facility]   Pt need a refill on his Norvasc 5 mg the pharmacy said they did not receive the prescription.  LOV 1/18/19 Dr. Jane    Thanks

## 2019-03-20 ENCOUNTER — LAB VISIT (OUTPATIENT)
Dept: LAB | Facility: HOSPITAL | Age: 79
End: 2019-03-20
Attending: INTERNAL MEDICINE
Payer: MEDICARE

## 2019-03-20 DIAGNOSIS — Z91.89 AT RISK FOR AMIODARONE TOXICITY WITH LONG TERM USE: ICD-10-CM

## 2019-03-20 DIAGNOSIS — Z79.899 AT RISK FOR AMIODARONE TOXICITY WITH LONG TERM USE: ICD-10-CM

## 2019-03-20 PROCEDURE — 36415 COLL VENOUS BLD VENIPUNCTURE: CPT | Mod: PO

## 2019-03-20 PROCEDURE — 80299 QUANTITATIVE ASSAY DRUG: CPT

## 2019-03-22 LAB
AMIODARONE SERPL-SCNC: 0.3 UG/ML (ref 1–3)
N-DESETHYL-AMIODARONE: 0.6 UG/ML

## 2019-04-15 RX ORDER — MEMANTINE HYDROCHLORIDE 10 MG/1
10 TABLET ORAL 2 TIMES DAILY
Qty: 180 TABLET | Refills: 3 | Status: SHIPPED | OUTPATIENT
Start: 2019-04-15 | End: 2020-02-18 | Stop reason: SDUPTHER

## 2019-05-14 ENCOUNTER — CLINICAL SUPPORT (OUTPATIENT)
Dept: CARDIOLOGY | Facility: HOSPITAL | Age: 79
End: 2019-05-14
Attending: INTERNAL MEDICINE
Payer: MEDICARE

## 2019-05-14 DIAGNOSIS — Z95.810 AICD (AUTOMATIC CARDIOVERTER/DEFIBRILLATOR) PRESENT: ICD-10-CM

## 2019-05-14 DIAGNOSIS — I47.20 VT (VENTRICULAR TACHYCARDIA): ICD-10-CM

## 2019-05-14 DIAGNOSIS — I25.5 ISCHEMIC CARDIOMYOPATHY: ICD-10-CM

## 2019-05-14 PROCEDURE — 93296 REM INTERROG EVL PM/IDS: CPT

## 2019-05-17 ENCOUNTER — PATIENT MESSAGE (OUTPATIENT)
Dept: NEUROLOGY | Facility: CLINIC | Age: 79
End: 2019-05-17

## 2019-05-21 NOTE — TELEPHONE ENCOUNTER
Lengthy discussion with her. Had an episode last week where she dropped him off at the [facility] where he goes to the 2nd floor to the [employer]. She waits in the car while he completes his transaction. They have been doing this for quite some time. He will withdrawal cash as he does not know how to use the machines.   He has always known to come back down and get in the car.   Last week, he did not come out after about 30 minutes.   After checking, she discovered he left on foot and started walking home.   They had deputies out looking for him.   They did find him in a relatively short amount of time.   While he started out going the right way, he ended up taking wrong turns. He fell at least once from what they can tell. He was by Jovan Far[facility] and working his way towards [employer].  When she was reunited with him, he told her he thought she left so he was walking home.    She would like an appt in memory clinic for follow up. I will request.     He is not one to like testing.   Will attempt but she needs more one on one time with neuropsychologist so she can speak freely.   I will likely do PE while she talks to them.

## 2019-05-24 ENCOUNTER — HOSPITAL ENCOUNTER (OUTPATIENT)
Dept: RADIOLOGY | Facility: HOSPITAL | Age: 79
Discharge: HOME | End: 2019-05-24
Attending: INTERNAL MEDICINE
Payer: MEDICARE

## 2019-05-24 DIAGNOSIS — R60.0 UNILATERAL EDEMA OF LOWER EXTREMITY: ICD-10-CM

## 2019-05-24 DIAGNOSIS — M79.89 RIGHT LEG SWELLING: ICD-10-CM

## 2019-05-24 DIAGNOSIS — I10 ESSENTIAL HYPERTENSION: Chronic | ICD-10-CM

## 2019-05-24 PROCEDURE — 93971 US LOWER EXTREMITY VEINS RIGHT: ICD-10-PCS | Mod: 26,RT,, | Performed by: RADIOLOGY

## 2019-05-24 PROCEDURE — 93971 EXTREMITY STUDY: CPT | Mod: TC,RT

## 2019-05-24 PROCEDURE — 93971 EXTREMITY STUDY: CPT | Mod: 26,RT,, | Performed by: RADIOLOGY

## 2019-05-27 ENCOUNTER — PATIENT MESSAGE (OUTPATIENT)
Dept: INTERNAL MEDICINE | Facility: CLINIC | Age: 79
End: 2019-05-27

## 2019-05-29 DIAGNOSIS — G31.83 LEWY BODY DEMENTIA WITH BEHAVIORAL DISTURBANCE: ICD-10-CM

## 2019-05-29 DIAGNOSIS — F02.818 LEWY BODY DEMENTIA WITH BEHAVIORAL DISTURBANCE: ICD-10-CM

## 2019-05-29 RX ORDER — DONEPEZIL HYDROCHLORIDE 10 MG/1
10 TABLET, FILM COATED ORAL DAILY
Qty: 90 TABLET | Refills: 3 | OUTPATIENT
Start: 2019-05-29

## 2019-05-29 NOTE — TELEPHONE ENCOUNTER
[address] [facility]  [address] [place] [address]  Phone: [phone]  Fax: [phone]    Juanita Stewart MD        May 13, 2019     Patient: Danuta Michele   YOB: 1980   Date of Visit: 5/13/2019       To Whom it May Concern:    Ephraim Szymanski was seen in my clinic on 5/13/2019. If you have any questions or concerns, please don't hesitate to call.     Sincerely,         Juanita Stewart MD Called. Spoke to wife. Do not recommend continuing donepezil with pacerone.  Risks outweigh benefits.    Stop donepezil.   She verbalizes understanding.

## 2019-05-29 NOTE — TELEPHONE ENCOUNTER
----- Message from Tam Ferguson sent at 5/29/2019 12:02 PM CDT -----  Pharmacy Calling    Reason for call: Refill    Pharm[facility] Name: [facility]    Prescription Name: donepezil (ARICEPT) 10 MG tablet    Phone Number: P: [phone] AND F: [phone]    Additional Information:

## 2019-05-30 RX ORDER — DONEPEZIL HYDROCHLORIDE 10 MG/1
10 TABLET, FILM COATED ORAL DAILY
Qty: 90 TABLET | Refills: 3 | OUTPATIENT
Start: 2019-05-30

## 2019-06-03 ENCOUNTER — TELEPHONE (OUTPATIENT)
Dept: CARDIOLOGY | Facility: HOSPITAL | Age: 79
End: 2019-06-03

## 2019-06-03 NOTE — TELEPHONE ENCOUNTER
Spoke to patient's wife in relation to Dr. Ewing has now transitioned to [facility] only.  Patient's wife stated they do not have a preference for establishing care with another EP doctor here @ .  Patient's wife also stated they do not want the new appointment later than the month of June. Patient is currently scheduled on 6/20/19 with the NP.  The wife was informed the appointment with the NP will be cancelled and rescheduled with a doctor.  She also stated the patient can come any day and any time.  Informed the wife a new appointment would be scheduled and an appointment reminder would be mailed.  Understanding was verbalized.

## 2019-06-06 RX ORDER — DONEPEZIL HYDROCHLORIDE 10 MG/1
10 TABLET, FILM COATED ORAL NIGHTLY
Qty: 30 TABLET | Refills: 11 | OUTPATIENT
Start: 2019-06-06 | End: 2020-06-05

## 2019-06-10 ENCOUNTER — TELEPHONE (OUTPATIENT)
Dept: NEUROLOGY | Facility: CLINIC | Age: 79
End: 2019-06-10

## 2019-06-10 NOTE — TELEPHONE ENCOUNTER
Please let pharmacy and wife know that I have discontinued donepezil.  I spoke to wife about this a couple of weeks ago. There is a possible interaction with donepezil and amiodarone.

## 2019-06-11 NOTE — TELEPHONE ENCOUNTER
Called pt to discuss rescheduling apt with FILIPE HANCOCK on 6/20 to new provider apt. No answer. Left voicemail.

## 2019-06-13 RX ORDER — AMIODARONE HYDROCHLORIDE 100 MG/1
100 TABLET ORAL DAILY
Qty: 90 TABLET | Refills: 3 | Status: SHIPPED | OUTPATIENT
Start: 2019-06-13 | End: 2019-11-27

## 2019-06-19 ENCOUNTER — HOSPITAL ENCOUNTER (EMERGENCY)
Facility: HOSPITAL | Age: 79
Discharge: HOME | End: 2019-06-19
Attending: EMERGENCY MEDICINE
Payer: MEDICARE

## 2019-06-19 ENCOUNTER — DOCUMENTATION ONLY (OUTPATIENT)
Dept: CARDIOLOGY | Facility: HOSPITAL | Age: 79
End: 2019-06-19

## 2019-06-19 ENCOUNTER — NURSE TRIAGE (OUTPATIENT)
Dept: ADMINISTRATIVE | Facility: CLINIC | Age: 79
End: 2019-06-19

## 2019-06-19 VITALS
HEART RATE: 60 BPM | TEMPERATURE: 98 F | RESPIRATION RATE: 18 BRPM | DIASTOLIC BLOOD PRESSURE: 74 MMHG | OXYGEN SATURATION: 98 % | WEIGHT: 165 LBS | BODY MASS INDEX: 25.9 KG/M2 | HEIGHT: 67 IN | SYSTOLIC BLOOD PRESSURE: 120 MMHG

## 2019-06-19 DIAGNOSIS — S01.81XA FACIAL LACERATION, INITIAL ENCOUNTER: Primary | ICD-10-CM

## 2019-06-19 DIAGNOSIS — R31.9 HEMATURIA, UNSPECIFIED TYPE: ICD-10-CM
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
## 2019-06-19 DIAGNOSIS — R33.9 URINARY RETENTION: ICD-10-CM

## 2019-06-19 DIAGNOSIS — S69.92XA SCAPHOLUNATE LIGAMENT INJURY WITH NO INSTABILITY, LEFT, INITIAL ENCOUNTER: ICD-10-CM

## 2019-06-19 LAB
ALBUMIN SERPL BCP-MCNC: 3.5 G/DL (ref 3.5–5.2)
ALP SERPL-CCNC: 96 U/L (ref 55–135)
ALT SERPL W/O P-5'-P-CCNC: 17 U/L (ref 10–44)
ANION GAP SERPL CALC-SCNC: 9 MMOL/L (ref 8–16)
AST SERPL-CCNC: 22 U/L (ref 10–40)
BASOPHILS # BLD AUTO: 0.05 K/UL (ref 0–0.2)
BASOPHILS NFR BLD: 0.6 % (ref 0–1.9)
BILIRUB SERPL-MCNC: 0.5 MG/DL (ref 0.1–1)
BILIRUB UR QL STRIP: NEGATIVE
BNP SERPL-MCNC: 109 PG/ML (ref 0–99)
BUN SERPL-MCNC: 20 MG/DL (ref 8–23)
CALCIUM SERPL-MCNC: 9.1 MG/DL (ref 8.7–10.5)
CHLORIDE SERPL-SCNC: 108 MMOL/L (ref 95–110)
CLARITY UR REFRACT.AUTO: CLEAR
CO2 SERPL-SCNC: 23 MMOL/L (ref 23–29)
COLOR UR AUTO: YELLOW
CREAT SERPL-MCNC: 1.3 MG/DL (ref 0.5–1.4)
DIFFERENTIAL METHOD: ABNORMAL
EOSINOPHIL # BLD AUTO: 0.2 K/UL (ref 0–0.5)
EOSINOPHIL NFR BLD: 2.6 % (ref 0–8)
ERYTHROCYTE [DISTWIDTH] IN BLOOD BY AUTOMATED COUNT: 15 % (ref 11.5–14.5)
EST. GFR  (AFRICAN AMERICAN): 59.2 ML/MIN/1.73 M^2
EST. GFR  (NON AFRICAN AMERICAN): 51.2 ML/MIN/1.73 M^2
GLUCOSE SERPL-MCNC: 86 MG/DL (ref 70–110)
GLUCOSE UR QL STRIP: NEGATIVE
HCT VFR BLD AUTO: 40.6 % (ref 40–54)
HGB BLD-MCNC: 13 G/DL (ref 14–18)
HGB UR QL STRIP: ABNORMAL
HYALINE CASTS UR QL AUTO: 1 /LPF
IMM GRANULOCYTES # BLD AUTO: 0.04 K/UL (ref 0–0.04)
IMM GRANULOCYTES NFR BLD AUTO: 0.5 % (ref 0–0.5)
KETONES UR QL STRIP: NEGATIVE
LEUKOCYTE ESTERASE UR QL STRIP: NEGATIVE
LYMPHOCYTES # BLD AUTO: 0.8 K/UL (ref 1–4.8)
LYMPHOCYTES NFR BLD: 9.6 % (ref 18–48)
MCH RBC QN AUTO: 30.4 PG (ref 27–31)
MCHC RBC AUTO-ENTMCNC: 32 G/DL (ref 32–36)
MCV RBC AUTO: 95 FL (ref 82–98)
MICROSCOPIC COMMENT: ABNORMAL
MONOCYTES # BLD AUTO: 0.7 K/UL (ref 0.3–1)
MONOCYTES NFR BLD: 8.7 % (ref 4–15)
NEUTROPHILS # BLD AUTO: 6.5 K/UL (ref 1.8–7.7)
NEUTROPHILS NFR BLD: 78 % (ref 38–73)
NITRITE UR QL STRIP: NEGATIVE
NRBC BLD-RTO: 0 /100 WBC
PH UR STRIP: 6 [PH] (ref 5–8)
PLATELET # BLD AUTO: 236 K/UL (ref 150–350)
PMV BLD AUTO: 9.7 FL (ref 9.2–12.9)
POTASSIUM SERPL-SCNC: 4.5 MMOL/L (ref 3.5–5.1)
PROT SERPL-MCNC: 6.5 G/DL (ref 6–8.4)
PROT UR QL STRIP: NEGATIVE
RBC # BLD AUTO: 4.27 M/UL (ref 4.6–6.2)
RBC #/AREA URNS AUTO: 19 /HPF (ref 0–4)
SODIUM SERPL-SCNC: 140 MMOL/L (ref 136–145)
SP GR UR STRIP: 1.01 (ref 1–1.03)
TROPONIN I SERPL DL<=0.01 NG/ML-MCNC: 0.01 NG/ML (ref 0–0.03)
URN SPEC COLLECT METH UR: ABNORMAL
WBC # BLD AUTO: 8.35 K/UL (ref 3.9–12.7)
WBC #/AREA URNS AUTO: 4 /HPF (ref 0–5)

## 2019-06-19 PROCEDURE — 99285 PR EMERGENCY DEPT VISIT,LEVEL V: ICD-10-PCS | Mod: 25,,, | Performed by: EMERGENCY MEDICINE

## 2019-06-19 PROCEDURE — 12011 PR RESUPERF WND FACE <2.5 CM: ICD-10-PCS | Mod: 51,59,, | Performed by: EMERGENCY MEDICINE

## 2019-06-19 PROCEDURE — 12001 RPR S/N/AX/GEN/TRNK 2.5CM/<: CPT

## 2019-06-19 PROCEDURE — 93005 ELECTROCARDIOGRAM TRACING: CPT | Mod: 59

## 2019-06-19 PROCEDURE — 12011 RPR F/E/E/N/L/M 2.5 CM/<: CPT | Mod: 51,59,, | Performed by: EMERGENCY MEDICINE

## 2019-06-19 PROCEDURE — 93010 EKG 12-LEAD: ICD-10-PCS | Mod: ,,, | Performed by: INTERNAL MEDICINE

## 2019-06-19 PROCEDURE — 83880 ASSAY OF NATRIURETIC PEPTIDE: CPT

## 2019-06-19 PROCEDURE — 99285 EMERGENCY DEPT VISIT HI MDM: CPT | Mod: 25

## 2019-06-19 PROCEDURE — 90471 IMMUNIZATION ADMIN: CPT | Performed by: PHYSICIAN ASSISTANT

## 2019-06-19 PROCEDURE — 29130 PR APPLY FINGER SPLINT,STATIC: ICD-10-PCS | Mod: 59,LT,, | Performed by: EMERGENCY MEDICINE

## 2019-06-19 PROCEDURE — 81001 URINALYSIS AUTO W/SCOPE: CPT

## 2019-06-19 PROCEDURE — 84484 ASSAY OF TROPONIN QUANT: CPT

## 2019-06-19 PROCEDURE — 25000003 PHARM REV CODE 250: Performed by: PHYSICIAN ASSISTANT

## 2019-06-19 PROCEDURE — 85025 COMPLETE CBC W/AUTO DIFF WBC: CPT

## 2019-06-19 PROCEDURE — 51702 INSERT TEMP BLADDER CATH: CPT | Mod: 59

## 2019-06-19 PROCEDURE — 12011 RPR F/E/E/N/L/M 2.5 CM/<: CPT

## 2019-06-19 PROCEDURE — 90715 TDAP VACCINE 7 YRS/> IM: CPT | Performed by: PHYSICIAN ASSISTANT

## 2019-06-19 PROCEDURE — 63600175 PHARM REV CODE 636 W HCPCS: Performed by: PHYSICIAN ASSISTANT

## 2019-06-19 PROCEDURE — 29130 APPL FINGER SPLINT STATIC: CPT | Mod: 59,LT,, | Performed by: EMERGENCY MEDICINE

## 2019-06-19 PROCEDURE — 93010 ELECTROCARDIOGRAM REPORT: CPT | Mod: ,,, | Performed by: INTERNAL MEDICINE

## 2019-06-19 PROCEDURE — 99285 EMERGENCY DEPT VISIT HI MDM: CPT | Mod: 25,,, | Performed by: EMERGENCY MEDICINE

## 2019-06-19 PROCEDURE — 80053 COMPREHEN METABOLIC PANEL: CPT

## 2019-06-19 RX ORDER — BACITRACIN ZINC 500 UNIT/G
1 OINTMENT IN PACKET (EA) TOPICAL
Status: COMPLETED | OUTPATIENT
Start: 2019-06-19 | End: 2019-06-19

## 2019-06-19 RX ORDER — LIDOCAINE HYDROCHLORIDE 10 MG/ML
10 INJECTION INFILTRATION; PERINEURAL
Status: COMPLETED | OUTPATIENT
Start: 2019-06-19 | End: 2019-06-19

## 2019-06-19 RX ADMIN — BACITRACIN 1 EACH: 500 OINTMENT TOPICAL at 04:06

## 2019-06-19 RX ADMIN — CLOSTRIDIUM TETANI TOXOID ANTIGEN (FORMALDEHYDE INACTIVATED), CORYNEBACTERIUM DIPHTHERIAE TOXOID ANTIGEN (FORMALDEHYDE INACTIVATED), BORDETELLA PERTUSSIS TOXOID ANTIGEN (GLUTARALDEHYDE INACTIVATED), BORDETELLA PERTUSSIS FILAMENTOUS HEMAGGLUTININ ANTIGEN (FORMALDEHYDE INACTIVATED), BORDETELLA PERTUSSIS PERTACTIN ANTIGEN, AND BORDETELLA PERTUSSIS FIMBRIAE 2/3 ANTIGEN 0.5 ML: 5; 2; 2.5; 5; 3; 5 INJECTION, SUSPENSION INTRAMUSCULAR at 04:06

## 2019-06-19 RX ADMIN — LIDOCAINE HYDROCHLORIDE 10 ML: 10 INJECTION, SOLUTION INFILTRATION; PERINEURAL at 03:06

## 2019-06-19 NOTE — ED TRIAGE NOTES
Pt arrived to ED via POV with CC of unwitnessed fall with head injury around 10 am this morning, + LOC, pt reports on plavix, pt reports has pacemaker/difibbrilator. Denies CP and SOB.     Patient identifiers verified and correct for Mark Anthony Velarde Jr..    LOC: The patient is awake, alert and oriented x 4. Pt is speaking appropriately, no slurred speech.  APPEARANCE: Patient resting comfortably and in no acute distress. Pt is clean and well groomed. No JVD visible. Pt reports pain level of 0.  SKIN: Skin is warm dry and color is consistent with ethnicity. No tenting observed and capillary refill <3 seconds. No clubbing noted to nail beds. scatter brusing visible to all extremities.  Mucus membranes moist and acyanotic.  MUSCULOSKELETAL: Full range of motion present in all extremities. Hand  equal and leg strength strong +5 bilaterally.  RESPIRATORY: Airway is open and patent. Respirations-unlabored, regular rate, equal bilaterally on inspiration and expiration. No accessory muscle use noted. Lungs clear to auscultation in all fields bilaterally anterior and posterior.   CARDIAC: Patient has regular heart rate and rhythm.  No peripheral edema noted, and patient has no c/o chest pain. Pacemaker/defibrillator to left upper chest, site intact.   ABDOMEN: Soft and non-tender to palpation with no distention noted. Normoactive bowel sounds X4 quadrants. Pt has no complaints of abnormal bowel movements. Pt reports normal appetite.   NEUROLOGIC: Eyes open spontaneously and facial expression symmetrical. Pt behavior appropriate to situation, and pt follows commands.  Pt reports sensation present in all extremities when touched with a finger. PERRLA  : No complaints of frequency, burning, urgency or blood in the urine.

## 2019-06-19 NOTE — DISCHARGE INSTRUCTIONS
Stop taking Eliquis until you follow up with your primary care physician. If you begin to have worsening blood clots in urine, return to ED    Please make sure to follow up with Orthopedics, Urology,PCP to discuss today's Emergency Department visit, wound check and suture removal in 7 days, and for further evaluation and management. Please return to the Emergency Department if you develop any fevers, chills, body aches, increased bleeding, increased pain or any additional concerning symptoms.

## 2019-06-19 NOTE — PROGRESS NOTES
Cardiac device interrogation and/or reprogramming completed by industry representative, Aimee Edge); please refer to report located in the Cards Procedure tab.

## 2019-06-19 NOTE — ED PROVIDER NOTES
Encounter Date: 6/19/2019       History     Chief Complaint   Patient presents with    Fall     fall this am at 10am, fell last week. bruising to both eyes. no loc. on Eliquis     Head Injury     Mr Velarde is an 80 yo male patient with PMHx of dementia, A-Fib on eliquis, CAD, AICD, HTN that presents to the ED after an unwitnessed fall at home this morning at 1000. Pt is accompanied by his wife who is able to provide history due to patient's dementia. Pt apparently went outside to get the paper and wife states he walked back in the house bleeding from his face, left wrist and bilateral knees. Pt unable to remember falling and unable to report LOC.         Review of patient's allergies indicates:   Allergen Reactions    Arb-angiotensin receptor antagonist Other (See Comments)     Hyperkalemia    Lisinopril Diarrhea     Past Medical History:   Diagnosis Date    *Atrial fibrillation     Anticoagulant long-term use     Atrial fibrillation - asymptomatic but noted on ICD interrogation ([id number]) - 16 h 40 minutes of afib 2/13/2013    Atrial flutter with controlled response - seen on ICD interrogation - asymptomatic 2/13/2013    Automatic implantable cardioverter-defibrillator in situ 2/13/2013    Basal cell carcinoma     mid forehead    CAD (coronary artery disease) 2/13/2013    Cardiomyopathy     Cataract     Cognitive deficits     mild    H/O syncope -  5/27/2013    History of PTCA - LAD, LCX and PDA PCI in 2006 2/13/2013    HTN (hypertension) 2/13/2013    Hyperlipidemia LDL goal < 70 2/13/2013    Hypertension     ICD (implantable cardiac defibrillator) in place 2/13/2013    Ischemic cardiomyopathy LVEF ~45% 2/13/2013    LBBB (left bundle branch block) 2/13/2013    Memory loss     Psoriasis vulgaris     Syncope and collapse      Past Surgical History:   Procedure Laterality Date    CARDIAC DEFIBRILLATOR PLACEMENT      CATARACT EXTRACTION W/  INTRAOCULAR LENS IMPLANT Right 04/04/16    Dr Michael      CATARACT EXTRACTION W/  INTRAOCULAR LENS IMPLANT Left 05/09/2016    ESOPHAGOGASTRODUODENOSCOPY (EGD) N/A 7/3/2017    Performed by Dee Dee Castillo MD at [facility] ENDO (4TH FLR)    INSERTION-INTRAOCULAR LENS (IOL) Left 5/9/2016    [facility] at [facility]    INSERTION-INTRAOCULAR LENS (IOL) Right 4/4/2016    [facility] by Jud [facility] at [facility] OR    PHACOEMULSIFICATION-ASPIRATION-CATARACT Left 5/9/2016    [facility] at [facility]    PHACOEMULSIFICATION-ASPIRATION-CATARACT Right 4/4/2016    [facility] by Jud [facility] at [facility] OR    REPLACEMENT-GENERATOR-ICD N/A 7/26/2017    Performed by Maurice Ewing MD at [facility] CATH LAB    SKIN BIOPSY      TONSILLECTOMY       Family History   Problem Relation Age of Onset    Psoriasis Father     Dementia Father     Tremor Father     Cataracts Mother     Cancer Mother     Psoriasis Son     Psoriasis Son     Diabetes Maternal Grandmother     Kidney disease Sister     No Known Problems Brother     No Known Problems Maternal Aunt     No Known Problems Maternal Uncle     No Known Problems Paternal Aunt     No Known Problems Paternal Uncle     No Known Problems Maternal Grandfather     No Known Problems Paternal Grandmother     No Known Problems Paternal Grandfather     Amblyopia Neg Hx     Blindness Neg Hx     Glaucoma Neg Hx     Hypertension Neg Hx     Macular degeneration Neg Hx     Retinal detachment Neg Hx     Strabismus Neg Hx     Stroke Neg Hx     Thyroid disease Neg Hx     Parkinsonism Neg Hx     Celiac disease Neg Hx     Cirrhosis Neg Hx     Colon cancer Neg Hx     Colon polyps Neg Hx     Crohn's disease Neg Hx     Cystic fibrosis Neg Hx     Esophageal cancer Neg Hx     Irritable bowel syndrome Neg Hx     Inflammatory bowel disease Neg Hx     Hemochromatosis Neg Hx     Liver cancer Neg Hx     Liver disease Neg Hx     Rectal cancer Neg Hx     Stomach cancer Neg Hx     Ulcerative colitis  Neg Hx     Christiano's disease Neg Hx     Lymphoma Neg Hx     Scleroderma Neg Hx     Rheum arthritis Neg Hx     Multiple sclerosis Neg Hx     Melanoma Neg Hx     Tuberculosis Neg Hx     Lupus Neg Hx      Social History     Tobacco Use    Smoking status: Former Smoker     Last attempt to quit: 1963     Years since quittin.7    Smokeless tobacco: Never Used   Substance Use Topics    Alcohol use: No    Drug use: No     Review of Systems   Constitutional: Negative for chills and fever.   HENT: Negative for congestion, rhinorrhea and sore throat.    Eyes: Negative for pain and visual disturbance.   Respiratory: Negative for chest tightness and shortness of breath.    Cardiovascular: Negative for chest pain.   Gastrointestinal: Negative for abdominal pain, diarrhea, nausea and vomiting.   Genitourinary: Negative for dysuria, flank pain and frequency.   Musculoskeletal: Negative for back pain, neck pain and neck stiffness.   Skin: Positive for wound.   Neurological: Negative for dizziness, syncope, weakness, light-headedness, numbness and headaches.   Hematological: Bruises/bleeds easily (ON ELIQUIS).   Psychiatric/Behavioral: Positive for confusion.       Physical Exam     Initial Vitals [19 1138]   BP Pulse Resp Temp SpO2   (!) 142/65 62 18 98.8 °F (37.1 °C) 98 %      MAP       --         Physical Exam    Constitutional: He appears well-developed and well-nourished. He is cooperative.  Non-toxic appearance. No distress.   HENT:   Head: Normocephalic. Head is with raccoon's eyes, with abrasion and with contusion.       Eyes: Conjunctivae and EOM are normal. Pupils are equal, round, and reactive to light.   Neck: Normal range of motion. Neck supple. No spinous process tenderness and no muscular tenderness present.   Cardiovascular: Normal rate and intact distal pulses. Exam reveals no gallop and no friction rub.    No murmur heard.  Pulmonary/Chest: Effort normal. No respiratory distress. He has no  decreased breath sounds. He has no wheezes. He has no rhonchi. He has no rales.   Abdominal: Soft. There is no tenderness.   Neurological: He is alert.   Skin: Abrasion and bruising noted.                                  ED Course   Lac Repair  Date/Time: 6/19/2019 4:00 PM  Performed by: Hermilo Powell PA-C  Authorized by: Raymon Sheriff MD   Body area: head/neck  Laceration length: 2 cm  Foreign bodies: no foreign bodies  Vascular damage: no  Anesthesia: local infiltration    Anesthesia:  Local Anesthetic: lidocaine 1% with epinephrine  Anesthetic total: 4 mL  Preparation: Patient was prepped and draped in the usual sterile fashion.  Irrigation solution: saline  Irrigation method: syringe  Amount of cleaning: extensive  Skin closure: Ethilon (6-0)  Number of sutures: 2  Technique: simple  Approximation: close  Approximation difficulty: simple  Dressing: antibiotic ointment and non-stick sterile dressing  Patient tolerance: Patient tolerated the procedure well with no immediate complications        Labs Reviewed   CBC W/ AUTO DIFFERENTIAL - Abnormal; Notable for the following components:       Result Value    RBC 4.27 (*)     Hemoglobin 13.0 (*)     RDW 15.0 (*)     Lymph # 0.8 (*)     Gran% 78.0 (*)     Lymph% 9.6 (*)     All other components within normal limits   B-TYPE NATRIURETIC PEPTIDE - Abnormal; Notable for the following components:     (*)     All other components within normal limits   COMPREHENSIVE METABOLIC PANEL - Abnormal; Notable for the following components:    eGFR if  59.2 (*)     eGFR if non  51.2 (*)     All other components within normal limits   URINALYSIS, REFLEX TO URINE CULTURE - Abnormal; Notable for the following components:    Occult Blood UA 2+ (*)     All other components within normal limits    Narrative:     Preferred Collection Type->Urine, Clean Catch   URINALYSIS MICROSCOPIC - Abnormal; Notable for the following components:    RBC,  UA 19 (*)     All other components within normal limits    Narrative:     Preferred Collection Type->Urine, Clean Catch   TROPONIN I        ECG Results          EKG 12-lead (Final result)  Result time 06/20/19 00:06:41    Final result by Interface, Lab In [facility] (06/20/19 00:06:41)                 Narrative:    Test Reason : W19.XXXA,    Vent. Rate : 060 BPM     Atrial Rate : 060 BPM     P-R Int : 222 ms          QRS Dur : 168 ms      QT Int : 494 ms       P-R-T Axes : 000 -80 079 degrees     QTc Int : 494 ms    AV dual-paced rhythm with prolonged AV conduction  Abnormal ECG  When compared with ECG of 20-DEC-2018 08:37,  Previous ECG has undetermined rhythm, needs review  Confirmed by TED PADILLA MD ([id number]) on 6/20/2019 12:06:16 AM    Referred By: RAMIRO   SELF           Confirmed By:TED PADILLA MD                            Imaging Results          CT Head Without Contrast (Final result)  Result time 06/19/19 14:01:12    Final result by Jarvis Sahni MD (06/19/19 14:01:12)                 Impression:      No evidence of acute intracranial pathology.    Electronically signed by resident: Jose F Pena  Date:    06/19/2019  Time:    13:58    Electronically signed by: Jarvis Sahni MD  Date:    06/19/2019  Time:    14:01             Narrative:    EXAMINATION:  CT HEAD WITHOUT CONTRAST    CLINICAL HISTORY:  Fall, head injury, unknown LOC, on eliquis;    TECHNIQUE:  Low dose axial CT images obtained throughout the head without the use of intravenous contrast.  Axial, sagittal and coronal reconstructions were performed.    COMPARISON:  CT head without contrast 02/17/2014.    FINDINGS:  Intracranial compartment:    Ventricles and sulci are normal in size for age without evidence of hydrocephalus.    The brain parenchyma appears within normal limits.  No parenchymal mass, hemorrhage, edema or major vascular distribution infarct.    No extra-axial blood or fluid collections.    Skull/extracranial contents  (limited evaluation):    No fracture. Mastoid air cells and paranasal sinuses are essentially clear.  Soft tissue swelling overlies the left lateral orbit likely representing hematoma.                               CT Maxillofacial Without Contrast (Final result)  Result time 06/19/19 14:03:01    Final result by Jarvis Sahni MD (06/19/19 14:03:01)                 Impression:      No evidence of acute fracture.  Small hematoma lateral to the left orbit in the soft tissues.    Electronically signed by resident: Jose F Pena  Date:    06/19/2019  Time:    14:00    Electronically signed by: Jarvis Sahni MD  Date:    06/19/2019  Time:    14:03             Narrative:    EXAMINATION:  CT MAXILLOFACIAL WITHOUT CONTRAST    CLINICAL HISTORY:  Maxface trauma blunt;    TECHNIQUE:  Axial low-dose images, coronal and sagittal reformations were performed through the head/paranasal sinuses.  Contrast was not administered.    COMPARISON:  CT head 02/17/2014.    FINDINGS:  The paranasal sinuses are presently essentially clear.  No air-fluid levels or aerated secretions to specifically indicate acute sinusitis.  No osseous thickening or sclerosis to specifically indicate chronic sinus disease.    The bilateral sphenoethmoidal recess and bilateral maxillary infundibulum are patent.  There is rightward deviation of the nasal septum.  Roof of the ethmoid sinus are grossly symmetric.    Limited intracranial evaluation is unremarkable.  There is soft tissue swelling overlying the left lateral orbit.                               CT Cervical Spine Without Contrast (Final result)  Result time 06/19/19 14:01:32    Final result by Lisa Castro MD (06/19/19 14:01:32)                 Impression:      Degenerative changes.  No fracture or soft tissue swelling or malalignment.      Electronically signed by: Lisa Castro  Date:    06/19/2019  Time:    14:01             Narrative:    EXAMINATION:  CT CERVICAL SPINE WITHOUT  CONTRAST    CLINICAL HISTORY:  Fall;    TECHNIQUE:  Low dose axial images, sagittal and coronal reformations were performed though the cervical spine.  Contrast was not administered.    COMPARISON:  None    FINDINGS:  The C1 ring and odontoid and lamina and transverse processes and spinous processes appear intact.  The visualized ribs and clavicles the and lungs appear normal.  No pneumothorax or pleural effusion.  There are cardiac leads partially visualized.    The atlantal dens interval shows moderate degenerative change without widening or erosion.  The facets show normal alignment without fracture.  There is moderate to severe degenerative disc changes of the C3-4 and C5-6 and C6-7 level.  There is posterior osteophytic ridging of the C3-4 and C5-6 levels as could be seen with disc protrusions.  There is moderate to severe facet arthropathy of the C2-3 and C4-5 level on the right.  No compression deformity or endplate erosion.    No prevertebral soft tissue swelling.  The epiglottis appears normal.  The visualized mastoid sinuses appear normal.  Visualized paranasal sinuses and middle ears appear normal.  Visualized brain and skull appear normal with this technique.                               X-Ray Chest PA And Lateral (Final result)  Result time 06/19/19 12:42:55    Final result by María Elena Sbaillon MD (06/19/19 12:42:55)                 Impression:      No acute cardiopulmonary abnormality.      Electronically signed by: María Elena Sabillon  Date:    06/19/2019  Time:    12:42             Narrative:    EXAMINATION:  XR CHEST PA AND LATERAL    CLINICAL HISTORY:  Unspecified fall, initial encounter    TECHNIQUE:  PA and lateral views of the chest were performed.    COMPARISON:  09/22/2015    FINDINGS:  Stable positioning of left chest wall pacemaker/AICD.The lungs are clear.  No focal consolidation, pleural effusion or pneumothorax.    Normal cardiomediastinal contour.    No acute or aggressive osseous abnormality.                                X-Ray Wrist Complete Left (Final result)  Result time 06/19/19 12:47:25    Final result by María Elena Sabillon MD (06/19/19 12:47:25)                 Impression:      No acute osseous abnormality.    Widening of the scapholunate interval, suggesting age indeterminate ligamentous injury.    Degenerative changes of the wrist.      Electronically signed by: María Elena Sabillon  Date:    06/19/2019  Time:    12:47             Narrative:    EXAMINATION:  XR WRIST COMPLETE 3 VIEWS LEFT    CLINICAL HISTORY:  Unspecified fall, initial encounter    TECHNIQUE:  PA, lateral, and oblique views of the left wrist were performed.    COMPARISON:  None    FINDINGS:  No acute, displaced fracture.  Widening of the scapholunate interval.  Joint space narrowing, subchondral cystic changes and osteophytosis involving the radiocarpal joint, carpometacarpal joints and 1st metacarpophalangeal joint.  No aggressive osseous abnormality.  No focal soft tissue abnormality.                                 Medical Decision Making:   Initial Assessment:   Mr Velarde is an 80 yo male patient with PMHx of dementia, A-Fib on eliquis, CAD, AICD, HTN that presents to the ED after an unwitnessed fall at home this morning at 1000. Pt is accompanied by his wife who is able to provide history due to patient's dementia. Pt apparently went outside to get the paper and wife states he walked back in the house bleeding from his face, left wrist and bilateral knees. Pt unable to remember falling and unable to report LOC.   Clinical Tests:   Lab Tests: Ordered and Reviewed  Radiological Study: Ordered and Reviewed  Medical Tests: Ordered and Reviewed  ED Management:  Pt hemodynamically stable. Pt is overall well appearing, pleasant, conversational and in no acute distress. Pt's mentation at baseline per patient's wife. CT head read reports no acute intracranial process. CT max face read reports no fractures. CT C-spine read reports no fractures.  Wrist xray reveals widening of the scapholunate interval suggestive of ligamentous injury. Will place in thumb spica and refer to ortho. Laceration repaired without complication and patient tolerated procedure without issue. He developed episode of gross hematuria shortly before discharge. Post void residual reveal > 700 cc. Alford placed. No gross hematuria noted in alford bag. Converted to leg bag. Plan is to discharge patient and have him follow up with Orthopedics, Urology, and PCP. Plan and results discussed and explained to patient and family who are understanding and agreeable with plan. Return instructions given. All of the patient's questions were answered.  I reviewed the patient's chart, labs, and imaging and discussed the case with my supervising physician.                           Clinical Impression:       ICD-10-CM ICD-9-CM   1. Facial laceration, initial encounter S01.81XA 873.40   2. Fall W19.XXXA E888.9   3. Hematuria, unspecified type R31.9 599.70   4. Urinary retention R33.9 788.20   5. Scapholunate ligament injury with no instability, left, initial encounter S69.92XA 959.3         Disposition:   Disposition: Discharged  Condition: Stable                        Hermilo Powell PA-C  06/20/19 [id number]

## 2019-06-19 NOTE — ED NOTES
Splint Application  Date/Time: 6/19/2019 4:37 PM  Performed by: Raymon Sheriff MD  Authorized by: Raymon Sheriff MD   Consent Done: Yes  Consent: Verbal consent obtained.  Risks and benefits: risks, benefits and alternatives were discussed  Consent given by: spouse  Patient understanding: patient states understanding of the procedure being performed  Patient consent: the patient's understanding of the procedure matches consent given  Imaging studies: imaging studies available  Patient identity confirmed: name  Location details: left wrist  Splint type: thumb spica  Supplies used: Ortho-Glass  Post-procedure: The splinted body part was neurovascularly unchanged following the procedure.  Patient tolerance: Patient tolerated the procedure well with no immediate complications           Raymon Sheriff MD  06/19/19 [id number]

## 2019-06-20 ENCOUNTER — OFFICE VISIT (OUTPATIENT)
Dept: UROLOGY | Facility: CLINIC | Age: 79
End: 2019-06-20
Payer: MEDICARE

## 2019-06-20 ENCOUNTER — OFFICE VISIT (OUTPATIENT)
Dept: ORTHOPEDICS | Facility: CLINIC | Age: 79
End: 2019-06-20
Payer: MEDICARE

## 2019-06-20 ENCOUNTER — TELEPHONE (OUTPATIENT)
Dept: ORTHOPEDICS | Facility: CLINIC | Age: 79
End: 2019-06-20

## 2019-06-20 ENCOUNTER — TELEPHONE (OUTPATIENT)
Dept: INTERNAL MEDICINE | Facility: CLINIC | Age: 79
End: 2019-06-20

## 2019-06-20 VITALS
HEIGHT: 67 IN | HEART RATE: 60 BPM | WEIGHT: 165 LBS | BODY MASS INDEX: 25.9 KG/M2 | SYSTOLIC BLOOD PRESSURE: 137 MMHG | DIASTOLIC BLOOD PRESSURE: 76 MMHG

## 2019-06-20 VITALS
WEIGHT: 165 LBS | HEART RATE: 59 BPM | SYSTOLIC BLOOD PRESSURE: 103 MMHG | HEIGHT: 67 IN | DIASTOLIC BLOOD PRESSURE: 59 MMHG | BODY MASS INDEX: 25.9 KG/M2

## 2019-06-20 DIAGNOSIS — S69.92XA INJURY OF LEFT WRIST, INITIAL ENCOUNTER: Primary | ICD-10-CM
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
## 2019-06-20 DIAGNOSIS — R31.0 GROSS HEMATURIA: ICD-10-CM

## 2019-06-20 PROCEDURE — 3075F PR MOST RECENT SYSTOLIC BLOOD PRESS GE 130-139MM HG: ICD-10-PCS | Mod: CPTII,S$GLB,, | Performed by: PHYSICIAN ASSISTANT

## 2019-06-20 PROCEDURE — 3074F PR MOST RECENT SYSTOLIC BLOOD PRESSURE < 130 MM HG: ICD-10-PCS | Mod: CPTII,S$GLB,, | Performed by: PHYSICIAN ASSISTANT

## 2019-06-20 PROCEDURE — 99499 UNLISTED E&M SERVICE: CPT | Mod: S$GLB,,, | Performed by: PHYSICIAN ASSISTANT

## 2019-06-20 PROCEDURE — 3078F PR MOST RECENT DIASTOLIC BLOOD PRESSURE < 80 MM HG: ICD-10-PCS | Mod: CPTII,S$GLB,, | Performed by: PHYSICIAN ASSISTANT

## 2019-06-20 PROCEDURE — 1101F PT FALLS ASSESS-DOCD LE1/YR: CPT | Mod: CPTII,S$GLB,, | Performed by: PHYSICIAN ASSISTANT

## 2019-06-20 PROCEDURE — 99499 RISK ADDL DX/OHS AUDIT: ICD-10-PCS | Mod: S$GLB,,, | Performed by: PHYSICIAN ASSISTANT

## 2019-06-20 PROCEDURE — 99999 PR PBB SHADOW E&M-EST. PATIENT-LVL IV: ICD-10-PCS | Mod: PBBFAC,,, | Performed by: PHYSICIAN ASSISTANT

## 2019-06-20 PROCEDURE — 1101F PR PT FALLS ASSESS DOC 0-1 FALLS W/OUT INJ PAST YR: ICD-10-PCS | Mod: CPTII,S$GLB,, | Performed by: PHYSICIAN ASSISTANT

## 2019-06-20 PROCEDURE — 99204 PR OFFICE/OUTPT VISIT, NEW, LEVL IV, 45-59 MIN: ICD-10-PCS | Mod: S$GLB,,, | Performed by: PHYSICIAN ASSISTANT

## 2019-06-20 PROCEDURE — 3074F SYST BP LT 130 MM HG: CPT | Mod: CPTII,S$GLB,, | Performed by: PHYSICIAN ASSISTANT

## 2019-06-20 PROCEDURE — 99999 PR PBB SHADOW E&M-EST. PATIENT-LVL III: CPT | Mod: PBBFAC,,, | Performed by: PHYSICIAN ASSISTANT

## 2019-06-20 PROCEDURE — 99214 PR OFFICE/OUTPT VISIT, EST, LEVL IV, 30-39 MIN: ICD-10-PCS | Mod: S$GLB,,, | Performed by: PHYSICIAN ASSISTANT

## 2019-06-20 PROCEDURE — 99999 PR PBB SHADOW E&M-EST. PATIENT-LVL IV: CPT | Mod: PBBFAC,,, | Performed by: PHYSICIAN ASSISTANT

## 2019-06-20 PROCEDURE — 3078F DIAST BP <80 MM HG: CPT | Mod: CPTII,S$GLB,, | Performed by: PHYSICIAN ASSISTANT

## 2019-06-20 PROCEDURE — 99214 OFFICE O/P EST MOD 30 MIN: CPT | Mod: S$GLB,,, | Performed by: PHYSICIAN ASSISTANT

## 2019-06-20 PROCEDURE — 99204 OFFICE O/P NEW MOD 45 MIN: CPT | Mod: S$GLB,,, | Performed by: PHYSICIAN ASSISTANT

## 2019-06-20 PROCEDURE — 99999 PR PBB SHADOW E&M-EST. PATIENT-LVL III: ICD-10-PCS | Mod: PBBFAC,,, | Performed by: PHYSICIAN ASSISTANT

## 2019-06-20 PROCEDURE — 3075F SYST BP GE 130 - 139MM HG: CPT | Mod: CPTII,S$GLB,, | Performed by: PHYSICIAN ASSISTANT

## 2019-06-20 RX ORDER — LIDOCAINE HYDROCHLORIDE 20 MG/ML
JELLY TOPICAL ONCE
Status: CANCELLED | OUTPATIENT
Start: 2019-06-20 | End: 2019-06-20

## 2019-06-20 RX ORDER — DOXYCYCLINE HYCLATE 100 MG
100 TABLET ORAL ONCE
Status: CANCELLED | OUTPATIENT
Start: 2019-06-20 | End: 2019-06-20

## 2019-06-20 NOTE — TELEPHONE ENCOUNTER
Ortho Telephone Triage  Call  [phone]  Wife states pt has removed splint placed to L wrist per ED yesterday and does not want to wear it. States pt c/o that it is hot. Ortho appt scheduled today at 11:00am with TAJ Cordoba PA-C for R wrist injury s/p fall per OFE Powell PA-C/ED referral. Wife confirms time and location of appt to be followed by Urology appt at 1:45p today.

## 2019-06-20 NOTE — PROGRESS NOTES
SUBJECTIVE:     Chief Complaint & History of Present Illness:  Mark Anthony Velarde Jr. is a New patient 79 y.o. male who is seen here today with a complaint of    Chief Complaint   Patient presents with    Left Wrist - Follow-up    . Patient had a fall yesterday and presented to ED afterwards.  Patient's wife is main communication of hx since patient does have dementia.  Wife states that he went outside to get the paper and came inside with bruising and bleeding.  In the ED xray of wrist was taken which showed scapholunate widening, he was placed in thumb spica splint and sent to Orthopedics.  He denies any pain in clinic today. Also denies any decrease in ROM.      Review of patient's allergies indicates:   Allergen Reactions    Arb-angiotensin receptor antagonist Other (See Comments)     Hyperkalemia    Lisinopril Diarrhea         Current Outpatient Medications   Medication Sig Dispense Refill    amiodarone (PACERONE) 100 MG Tab Take 1 tablet (100 mg total) by mouth once daily. 90 tablet 3    amLODIPine (NORVASC) 5 MG tablet Take 1 tablet (5 mg total) by mouth once daily. 90 tablet 3    apixaban (ELIQUIS) 2.5 mg Tab Take 1 tablet (2.5 mg total) by mouth 2 (two) times daily. 60 tablet 11    aspirin (ECOTRIN) 81 MG EC tablet Take 81 mg by mouth once daily.      atorvastatin (LIPITOR) 40 MG tablet Take 1 tablet (40 mg total) by mouth once daily. 90 tablet 3    FLUAD [id number], 65 YR UP,,PF, 45 mcg (15 mcg x 3)/0.5 mL Syrg ADM 0.5ML IM UTD  0    FLUZONE HIGH-DOSE 2018-19, PF, 180 mcg/0.5 mL vaccine ADM 0.5ML IM UTD  0    memantine (NAMENDA) 10 MG Tab Take 1 tablet (10 mg total) by mouth 2 (two) times daily. 180 tablet 3    metoprolol tartrate (LOPRESSOR) 25 MG tablet Take 0.5 tablets (12.5 mg total) by mouth 2 (two) times daily. 90 tablet 3    multivitamin (MULTIVITAMIN) per tablet Take 1 tablet by mouth once daily.      omeprazole (PRILOSEC) 20 MG capsule Take 1 capsule (20 mg total) by mouth once daily. 90  capsule 3     No current facility-administered medications for this visit.        Past Medical History:   Diagnosis Date    *Atrial fibrillation     Anticoagulant long-term use     Atrial fibrillation - asymptomatic but noted on ICD interrogation (5-02-20121) - 16 h 40 minutes of afib 2/13/2013    Atrial flutter with controlled response - seen on ICD interrogation - asymptomatic 2/13/2013    Automatic implantable cardioverter-defibrillator in situ 2/13/2013    Basal cell carcinoma     mid forehead    CAD (coronary artery disease) 2/13/2013    Cardiomyopathy     Cataract     Cognitive deficits     mild    H/O syncope -  5/27/2013    History of PTCA - LAD, LCX and PDA PCI in 2006 2/13/2013    HTN (hypertension) 2/13/2013    Hyperlipidemia LDL goal < 70 2/13/2013    Hypertension     ICD (implantable cardiac defibrillator) in place 2/13/2013    Ischemic cardiomyopathy LVEF ~45% 2/13/2013    LBBB (left bundle branch block) 2/13/2013    Memory loss     Psoriasis vulgaris     Syncope and collapse        Past Surgical History:   Procedure Laterality Date    CARDIAC DEFIBRILLATOR PLACEMENT      CATARACT EXTRACTION W/  INTRAOCULAR LENS IMPLANT Right 04/04/16    Dr Michael     CATARACT EXTRACTION W/  INTRAOCULAR LENS IMPLANT Left 05/09/2016    ESOPHAGOGASTRODUODENOSCOPY (EGD) N/A 7/3/2017    Performed by Dee Dee [facility] at [facility]O (4TH FLR)    INSERTION-INTRAOCULAR LENS (IOL) Left 5/9/2016    [facility] OR    INSERTION-INTRAOCULAR LENS (IOL) Right 4/4/2016    [facility] by [facility] at [facility] OR    PHACOEMULSIFICATION-ASPIRATION-CATARACT Left 5/9/2016    [facility]    PHACOEMULSIFICATION-ASPIRATION-CATARACT Right 4/4/2016    [facility] OR    REPLACEMENT-GENERATOR-ICD N/A 7/26/2017    Performed by Maurice Ewing [facility] at [facility] CATH LAB    SKIN BIOPSY      TONSILLECTOMY         Vital Signs (Most  "Recent)  Vitals:    06/20/19 1041   BP: (!) 103/59   Pulse: (!) 59           Review of Systems:  ROS:  Constitutional: no fever or chills  Eyes: no visual changes  ENT: no nasal congestion or sore throat  Respiratory: no cough or shortness of breath  Cardiovascular: no chest pain or palpitations, positive for HTN, ICD in place  Gastrointestinal: no nausea or vomiting, tolerating diet  Genitourinary: no hematuria or dysuria, positive for CKD stage III  Integument/Breast: no rash or pruritis  Hematologic/Lymphatic: no easy bruising or lymphadenopathy  Musculoskeletal: positive for wrist pain  Neurological: positive for tremors and Parkinsonism and dementia  Behavioral/Psych: no auditory or visual hallucinations  Endocrine: no heat or cold intolerance                OBJECTIVE:     PHYSICAL EXAM:  Height: 5' 7" (170.2 cm) Weight: 74.8 kg (165 lb), General Appearance: Well nourished, well developed, in no acute distress.  Neurological: Mood & affect are normal.  left wrist   Condition of skin:abrasion to medial aspect of wrist  Hand Exam: normal exam, no swelling, tenderness, instability; ligaments intact, full ROM both hands, wrists, and finger joints   Negative for snuffbox tenderness  ROM:full and equal bilateral    Wrist Exam: palmar flexion 90/90, dorsiflexion 90/90, pronation 90/90, supination 90/90, flexion contracture 0/0, extension contracture 0/0, radial deviation 25/25, ulnar deviation 25/25      RADIOGRAPHS:  Xray of left wrist taken in ED on 6/19/19, images reviewed by me today in clinic, demonstrates scapholunate widening (likely chronic) without evidence of fracture or dislocation.    ASSESSMENT/PLAN:   Diagnosis: Left wrist injury.  Fall.  Plan: We discussed with the patient at length all the different treatment options available for his wrist, including anti-inflammatories, acetaminophen, rest, ice, physical therapy to include strengthening, range of motion exercise ultrasound, splinting,  occasional " cortisone injections for temporary relief,  or possible surgical interventions.  Patient received wrist support brace in clinic today and was removed from thumb spica.  I do not suspect scaphoid fracture after examining him and reviewing xray.  He is to wear the brace when putting force through wrist joint for 1 week.  Follow up in 2 weeks if symptoms worsen or do not begin to resolve.  Patient and patient's wife verbalized understanding.

## 2019-06-20 NOTE — LETTER
June 20, 2019      Arvin Jones MD  [address]  [facility] [address]           [facility] 4th [facility]  [address]  [facility] [address]  Phone: [phone]          Patient: Mark Anthony Velarde Jr.   MR Number: [id number]   YOB: 1940   Date of Visit: 6/20/2019       Dear Dr. Arvin Jones:    Thank you for referring Mark Anthony Velarde to me for evaluation. Attached you will find relevant portions of my assessment and plan of care.    If you have questions, please do not hesitate to call me. I look forward to following Mark Anthony Velarde along with you.    Sincerely,    Yasemin Uribe PA-C    Enclosure  CC:  No Recipients    If you would like to receive this communication electronically, please contact [email or web address] or [phone] to request more information on [employer] Link access.    For providers and/or their staff who would like to refer a patient to [facility], please contact us through our one-stop-shop provider referral line, [facility], at [phone].    If you feel you have received this communication in error or would no longer like to receive these types of communications, please e-mail [email or web address]

## 2019-06-20 NOTE — H&P (VIEW-ONLY)
CHIEF COMPLAINT:    Mr. Velarde is a 79 y.o. male presenting for gross hematuria.   PRESENTING ILLNESS:    Mark Anthony Velarde Jr. is a 79 y.o. male with a PMH of dementia, CAD, HTN, CKD, nephrolithiasis who presents for gross hematuria.  Patient with dementia, history given by wife.  He had an unwitnessed fall yesterday and presented to the ED.    When he first gave a urine sample his urine was yellow.  Right before leaving he had to void again and his urine was really red.  A bladder scan was done (>300 mL) and a catheter was placed.   Last night after taking the splint off the wife noticed he had blood in his urine again.  She has not seen any blood clots. She called the on call nurse who advised her to have him evaluated by urology.  Today his urine is still red.  His catheter has been draining without issue.   He has never had blood in his urine prior to this.    He is on eliquis but has not taken it since yesterday as advised by ED physician.  He was previously on pradaxa.   He tripped over a stepping stone last week and fell.  His son witnessed the fall and was able to assist him up.  He did not seek medical attention.    Previous/Current Smoker: smoked for 6-8 years (as a teenager) smoke 1/2pk a day  Radiation therapy to pelvis: no  Chemotherapy: no  Personal/ family history of bladder/ kidney/ prostate cancer: no  Exposure to harmful chemicals: no  History of kidney stones: no    Two months ago he was experiencing urge incontinence. Then he started experiencing trouble urinating.  He would strain to void.  She denies him having frequency.  He has nocturia x 1.  No history of BPH per wife.  He is not on flomax.      REVIEW OF SYSTEMS:    Constitutional: Negative for fever and chills.   HENT: Negative for hearing loss.   Eyes: Negative for visual disturbance.   Respiratory: Negative for shortness of breath.   Cardiovascular: Negative for chest pain.   Gastrointestinal: Negative for vomiting.   Genitourinary: See  HPI  Neurological: Negative for dizziness (after fall).   Hematological: Does not bruise/bleed easily.   Psychiatric/Behavioral: Positive for confusion.       PATIENT HISTORY:    Past Medical History:   Diagnosis Date    *Atrial fibrillation     Anticoagulant long-term use     Atrial fibrillation - asymptomatic but noted on ICD interrogation (5-02-20121) - 16 h 40 minutes of afib 2/13/2013    Atrial flutter with controlled response - seen on ICD interrogation - asymptomatic 2/13/2013    Automatic implantable cardioverter-defibrillator in situ 2/13/2013    Basal cell carcinoma     mid forehead    CAD (coronary artery disease) 2/13/2013    Cardiomyopathy     Cataract     Cognitive deficits     mild    H/O syncope -  5/27/2013    History of PTCA - LAD, LCX and PDA PCI in 2006 2/13/2013    HTN (hypertension) 2/13/2013    Hyperlipidemia LDL goal < 70 2/13/2013    Hypertension     ICD (implantable cardiac defibrillator) in place 2/13/2013    Ischemic cardiomyopathy LVEF ~45% 2/13/2013    LBBB (left bundle branch block) 2/13/2013    Memory loss     Psoriasis vulgaris     Syncope and collapse        Past Surgical History:   Procedure Laterality Date    CARDIAC DEFIBRILLATOR PLACEMENT      CATARACT EXTRACTION W/  INTRAOCULAR LENS IMPLANT Right 04/04/16    Dr Michael     CATARACT EXTRACTION W/  INTRAOCULAR LENS IMPLANT Left 05/09/2016    ESOPHAGOGASTRODUODENOSCOPY (EGD) N/A 7/3/2017    [facility] Dee [facility] ([facility])    INSERTION-INTRAOCULAR LENS (IOL) Left 5/9/2016    [facility]    INSERTION-INTRAOCULAR LENS (IOL) Right 4/4/2016    [facility]    PHACOEMULSIFICATION-ASPIRATION-CATARACT Left 5/9/2016    [facility]    PHACOEMULSIFICATION-ASPIRATION-CATARACT Right 4/4/2016    [facility]    REPLACEMENT-GENERATOR-ICD N/A 7/26/2017    Performed by Maurice THAKUR  [facility] CATH LAB    SKIN BIOPSY      TONSILLECTOMY         Family History   Problem Relation Age of Onset    Psoriasis Father     Dementia Father     Tremor Father     Cataracts Mother     Cancer Mother     Psoriasis Son     Psoriasis Son     Diabetes Maternal Grandmother     Kidney disease Sister     No Known Problems Brother     No Known Problems Maternal Aunt     No Known Problems Maternal Uncle     No Known Problems Paternal Aunt     No Known Problems Paternal Uncle     No Known Problems Maternal Grandfather     No Known Problems Paternal Grandmother     No Known Problems Paternal Grandfather     Amblyopia Neg Hx     Blindness Neg Hx     Glaucoma Neg Hx     Hypertension Neg Hx     Macular degeneration Neg Hx     Retinal detachment Neg Hx     Strabismus Neg Hx     Stroke Neg Hx     Thyroid disease Neg Hx     Parkinsonism Neg Hx     Celiac disease Neg Hx     Cirrhosis Neg Hx     Colon cancer Neg Hx     Colon polyps Neg Hx     Crohn's disease Neg Hx     Cystic fibrosis Neg Hx     Esophageal cancer Neg Hx     Irritable bowel syndrome Neg Hx     Inflammatory bowel disease Neg Hx     Hemochromatosis Neg Hx     Liver cancer Neg Hx     Liver disease Neg Hx     Rectal cancer Neg Hx     Stomach cancer Neg Hx     Ulcerative colitis Neg Hx     Christiano's disease Neg Hx     Lymphoma Neg Hx     Scleroderma Neg Hx     Rheum arthritis Neg Hx     Multiple sclerosis Neg Hx     Melanoma Neg Hx     Tuberculosis Neg Hx     Lupus Neg Hx        Social History     Socioeconomic History    Marital status:      Spouse name: Not on file    Number of children: Not on file    Years of education: Not on file    Highest education level: Not on file   Occupational History    Occupation: retired   Social Needs    Financial resource strain: Not on file    Food insecurity:     Worry: Not on file     Inability: Not on file    Transportation needs:     Medical: Not on  file     Non-medical: Not on file   Tobacco Use    Smoking status: Former Smoker     Last attempt to quit: 1963     Years since quittin.7    Smokeless tobacco: Never Used   Substance and Sexual Activity    Alcohol use: No    Drug use: No    Sexual activity: Not on file   Lifestyle    Physical activity:     Days per week: Not on file     Minutes per session: Not on file    Stress: Not on file   Relationships    Social connections:     Talks on phone: Not on file     Gets together: Not on file     Attends Yazdanism service: Not on file     Active member of club or organization: Not on file     Attends meetings of clubs or organizations: Not on file     Relationship status: Not on file   Other Topics Concern    Not on file   Social History Narrative    Not on file       Allergies:  Arb-angiotensin receptor antagonist and Lisinopril    Medications:    Current Outpatient Medications:     amiodarone (PACERONE) 100 MG Tab, Take 1 tablet (100 mg total) by mouth once daily., Disp: 90 tablet, Rfl: 3    amLODIPine (NORVASC) 5 MG tablet, Take 1 tablet (5 mg total) by mouth once daily., Disp: 90 tablet, Rfl: 3    apixaban (ELIQUIS) 2.5 mg Tab, Take 1 tablet (2.5 mg total) by mouth 2 (two) times daily., Disp: 60 tablet, Rfl: 11    aspirin (ECOTRIN) 81 MG EC tablet, Take 81 mg by mouth once daily., Disp: , Rfl:     atorvastatin (LIPITOR) 40 MG tablet, Take 1 tablet (40 mg total) by mouth once daily., Disp: 90 tablet, Rfl: 3    FLUAD [id number], 65 YR UP,,PF, 45 mcg (15 mcg x 3)/0.5 mL Syrg, ADM 0.5ML IM UTD, Disp: , Rfl: 0    FLUZONE HIGH-DOSE , PF, 180 mcg/0.5 mL vaccine, ADM 0.5ML IM UTD, Disp: , Rfl: 0    memantine (NAMENDA) 10 MG Tab, Take 1 tablet (10 mg total) by mouth 2 (two) times daily., Disp: 180 tablet, Rfl: 3    metoprolol tartrate (LOPRESSOR) 25 MG tablet, Take 0.5 tablets (12.5 mg total) by mouth 2 (two) times daily., Disp: 90 tablet, Rfl: 3    multivitamin (MULTIVITAMIN) per  tablet, Take 1 tablet by mouth once daily., Disp: , Rfl:     omeprazole (PRILOSEC) 20 MG capsule, Take 1 capsule (20 mg total) by mouth once daily., Disp: 90 capsule, Rfl: 3  No current facility-administered medications for this visit.     PHYSICAL EXAMINATION:    Constitutional: He appears well-developed and well-nourished.  He is in no apparent distress.    Eyes: No scleral icterus noted bilaterally. No discharge bilaterally.    Nose: No rhinorrhea    Cardiovascular: Normal rate.  No pitting edema noted in lower extremities bilaterally    Pulmonary/Chest: Effort normal. No respiratory distress.     Abdominal:  He exhibits no distension.  No CVA tenderness noted bilaterally.      Lymphadenopathy:        Right: No supraclavicular adenopathy present.        Left: No supraclavicular adenopathy present.     Neurological: He is alert and oriented to person, place, and time.     Skin: Skin is warm and dry.     Psych: Cooperative with normal affect.    Genitourinary: The penis is normal. The urethral meatus is normal. Abdi catheter in place and secured to leg.  Red/orange urine noted in leg bag.       Physical Exam      LABS:    Lab Results   Component Value Date    PSA 1.8 08/04/2014    PSA 4.3 (H) 07/02/2014    PSA 1.97 06/12/2013    PSA 1.5 10/14/2010    PSA 1.7 04/01/2009    PSA 2.7 11/14/2006    PSA 2.4 06/24/2005       IMPRESSION:    No diagnosis found.      PLAN:  I spent 45 minutes with the patient of which more than half was spent in direct consultation with the patient in regards to our treatment and plan.      I explained to the patient that the causes of hematuria, whether it be gross hematuria or microhematuria, are many. In patients with gross hematuria, the chances of an underlying  malignancy arelow at 15-20%.    Nevertheless, I explained to the patient that the evaluation in both cases consists of upper tract imaging followed by flexible cystoscopy. I described the rationale and procedure for both and  answered all questions.    Hematuria work up discussed in detail.  Will proceed with hematuria work up:  Cysto  CT urogram     Recommend he drinks plenty of fluids to prevent blood clots.  Discussed risk of possible bladder spasms due to alford catheter and expectations.   Go to the ED if urine cease to flow in bag for >4hrs  Make sure alford catheter is secured to leg and bag is not secured too low on leg.      Patient's wife inquired about how long to hold eliquis.  She was advised to contact his cardiologist.    He will follow up next week for a voiding trial.  I did not start flomax given potential to cause dizziness and patient already having multiple falls.        Yasemin Uribe PA-C

## 2019-06-20 NOTE — PROGRESS NOTES
CHIEF COMPLAINT:    Mr. Velarde is a 79 y.o. male presenting for gross hematuria.   PRESENTING ILLNESS:    Mark Anthony Velarde Jr. is a 79 y.o. male with a PMH of dementia, CAD, HTN, CKD, nephrolithiasis who presents for gross hematuria.  Patient with dementia, history given by wife.  He had an unwitnessed fall yesterday and presented to the ED.    When he first gave a urine sample his urine was yellow.  Right before leaving he had to void again and his urine was really red.  A bladder scan was done (>300 mL) and a catheter was placed.   Last night after taking the splint off the wife noticed he had blood in his urine again.  She has not seen any blood clots. She called the on call nurse who advised her to have him evaluated by urology.  Today his urine is still red.  His catheter has been draining without issue.   He has never had blood in his urine prior to this.    He is on eliquis but has not taken it since yesterday as advised by ED physician.  He was previously on pradaxa.   He tripped over a stepping stone last week and fell.  His son witnessed the fall and was able to assist him up.  He did not seek medical attention.    Previous/Current Smoker: smoked for 6-8 years (as a teenager) smoke 1/2pk a day  Radiation therapy to pelvis: no  Chemotherapy: no  Personal/ family history of bladder/ kidney/ prostate cancer: no  Exposure to harmful chemicals: no  History of kidney stones: no    Two months ago he was experiencing urge incontinence. Then he started experiencing trouble urinating.  He would strain to void.  She denies him having frequency.  He has nocturia x 1.  No history of BPH per wife.  He is not on flomax.      REVIEW OF SYSTEMS:    Constitutional: Negative for fever and chills.   HENT: Negative for hearing loss.   Eyes: Negative for visual disturbance.   Respiratory: Negative for shortness of breath.   Cardiovascular: Negative for chest pain.   Gastrointestinal: Negative for vomiting.   Genitourinary: See  HPI  Neurological: Negative for dizziness (after fall).   Hematological: Does not bruise/bleed easily.   Psychiatric/Behavioral: Positive for confusion.       PATIENT HISTORY:    Past Medical History:   Diagnosis Date    *Atrial fibrillation     Anticoagulant long-term use     Atrial fibrillation - asymptomatic but noted on ICD interrogation (5-02-20121) - 16 h 40 minutes of afib 2/13/2013    Atrial flutter with controlled response - seen on ICD interrogation - asymptomatic 2/13/2013    Automatic implantable cardioverter-defibrillator in situ 2/13/2013    Basal cell carcinoma     mid forehead    CAD (coronary artery disease) 2/13/2013    Cardiomyopathy     Cataract     Cognitive deficits     mild    H/O syncope -  5/27/2013    History of PTCA - LAD, LCX and PDA PCI in 2006 2/13/2013    HTN (hypertension) 2/13/2013    Hyperlipidemia LDL goal < 70 2/13/2013    Hypertension     ICD (implantable cardiac defibrillator) in place 2/13/2013    Ischemic cardiomyopathy LVEF ~45% 2/13/2013    LBBB (left bundle branch block) 2/13/2013    Memory loss     Psoriasis vulgaris     Syncope and collapse        Past Surgical History:   Procedure Laterality Date    CARDIAC DEFIBRILLATOR PLACEMENT      CATARACT EXTRACTION W/  INTRAOCULAR LENS IMPLANT Right 04/04/16    Dr Michael     CATARACT EXTRACTION W/  INTRAOCULAR LENS IMPLANT Left 05/09/2016    ESOPHAGOGASTRODUODENOSCOPY (EGD) N/A 7/3/2017    Performed by Dee Dee [facility]illo [facility] at [facility] ([facility] F[facility])    INSERTION-INTRAOCULAR LENS (IOL) Left 5/9/2016    [facility] Jud [facility] at [facility] OR    INSERTION-INTRAOCULAR LENS (IOL) Right 4/4/2016    Performed by Jud [facility] at [facility] OR    PHACOEMULSIFICATION-ASPIRATION-CATARACT Left 5/9/2016    Per[facility] by [facility] at [facility]    PHACOEMULSIFICATION-ASPIRATION-CATARACT Right 4/4/2016    Per[facility] at [facility]    REPLACEMENT-GENERATOR-ICD N/A 7/26/2017    Performed by Maurice THAKUR  MD [facility] at [facility]    SKIN BIOPSY      TONSILLECTOMY         Family History   Problem Relation Age of Onset    Psoriasis Father     Dementia Father     Tremor Father     Cataracts Mother     Cancer Mother     Psoriasis Son     Psoriasis Son     Diabetes Maternal Grandmother     Kidney disease Sister     No Known Problems Brother     No Known Problems Maternal Aunt     No Known Problems Maternal Uncle     No Known Problems Paternal Aunt     No Known Problems Paternal Uncle     No Known Problems Maternal Grandfather     No Known Problems Paternal Grandmother     No Known Problems Paternal Grandfather     Amblyopia Neg Hx     Blindness Neg Hx     Glaucoma Neg Hx     Hypertension Neg Hx     Macular degeneration Neg Hx     Retinal detachment Neg Hx     Strabismus Neg Hx     Stroke Neg Hx     Thyroid disease Neg Hx     Parkinsonism Neg Hx     Celiac disease Neg Hx     Cirrhosis Neg Hx     Colon cancer Neg Hx     Colon polyps Neg Hx     Crohn's disease Neg Hx     Cystic fibrosis Neg Hx     Esophageal cancer Neg Hx     Irritable bowel syndrome Neg Hx     Inflammatory bowel disease Neg Hx     Hemochromatosis Neg Hx     Liver cancer Neg Hx     Liver disease Neg Hx     Rectal cancer Neg Hx     Stomach cancer Neg Hx     Ulcerative colitis Neg Hx     Christiano's disease Neg Hx     Lymphoma Neg Hx     Scleroderma Neg Hx     Rheum arthritis Neg Hx     Multiple sclerosis Neg Hx     Melanoma Neg Hx     Tuberculosis Neg Hx     Lupus Neg Hx        Social History     Socioeconomic History    Marital status:      Spouse name: Not on file    Number of children: Not on file    Years of education: Not on file    Highest education level: Not on file   Occupational History    Occupation: retired   Social Needs    Financial resource strain: Not on file    Food insecurity:     Worry: Not on file     Inability: Not on file    Transportation needs:     Medical: Not on  file     Non-medical: Not on file   Tobacco Use    Smoking status: Former Smoker     Last attempt to quit: 1963     Years since quittin.7    Smokeless tobacco: Never Used   Substance and Sexual Activity    Alcohol use: No    Drug use: No    Sexual activity: Not on file   Lifestyle    Physical activity:     Days per week: Not on file     Minutes per session: Not on file    Stress: Not on file   Relationships    Social connections:     Talks on phone: Not on file     Gets together: Not on file     Attends [employer] service: Not on file     Active member of club or organization: Not on file     Attends meetings of clubs or organizations: Not on file     Relationship status: Not on file   Other Topics Concern    Not on file   Social History Narrative    Not on file       Allergies:  Arb-angiotensin receptor antagonist and Lisinopril    Medications:    Current Outpatient Medications:     amiodarone (PACERONE) 100 MG Tab, Take 1 tablet (100 mg total) by mouth once daily., Disp: 90 tablet, Rfl: 3    amLODIPine (NORVASC) 5 MG tablet, Take 1 tablet (5 mg total) by mouth once daily., Disp: 90 tablet, Rfl: 3    apixaban (ELIQUIS) 2.5 mg Tab, Take 1 tablet (2.5 mg total) by mouth 2 (two) times daily., Disp: 60 tablet, Rfl: 11    aspirin (ECOTRIN) 81 MG EC tablet, Take 81 mg by mouth once daily., Disp: , Rfl:     atorvastatin (LIPITOR) 40 MG tablet, Take 1 tablet (40 mg total) by mouth once daily., Disp: 90 tablet, Rfl: 3    FLUAD [id number], 65 YR UP,,PF, 45 mcg (15 mcg x 3)/0.5 mL Syrg, ADM 0.5ML IM UTD, Disp: , Rfl: 0    FLUZONE HIGH-DOSE , PF, 180 mcg/0.5 mL vaccine, ADM 0.5ML IM UTD, Disp: , Rfl: 0    memantine (NAMENDA) 10 MG Tab, Take 1 tablet (10 mg total) by mouth 2 (two) times daily., Disp: 180 tablet, Rfl: 3    metoprolol tartrate (LOPRESSOR) 25 MG tablet, Take 0.5 tablets (12.5 mg total) by mouth 2 (two) times daily., Disp: 90 tablet, Rfl: 3    multivitamin (MULTIVITAMIN) per  tablet, Take 1 tablet by mouth once daily., Disp: , Rfl:     omeprazole (PRILOSEC) 20 MG capsule, Take 1 capsule (20 mg total) by mouth once daily., Disp: 90 capsule, Rfl: 3  No current facility-administered medications for this visit.     PHYSICAL EXAMINATION:    Constitutional: He appears well-developed and well-nourished.  He is in no apparent distress.    Eyes: No scleral icterus noted bilaterally. No discharge bilaterally.    Nose: No rhinorrhea    Cardiovascular: Normal rate.  No pitting edema noted in lower extremities bilaterally    Pulmonary/Chest: Effort normal. No respiratory distress.     Abdominal:  He exhibits no distension.  No CVA tenderness noted bilaterally.      Lymphadenopathy:        Right: No supraclavicular adenopathy present.        Left: No supraclavicular adenopathy present.     Neurological: He is alert and oriented to person, place, and time.     Skin: Skin is warm and dry.     Psych: Cooperative with normal affect.    Genitourinary: The penis is normal. The urethral meatus is normal. Abdi catheter in place and secured to leg.  Red/orange urine noted in leg bag.       Physical Exam      LABS:    Lab Results   Component Value Date    PSA 1.8 08/04/2014    PSA 4.3 (H) 07/02/2014    PSA 1.97 06/12/2013    PSA 1.5 10/14/2010    PSA 1.7 04/01/2009    PSA 2.7 11/14/2006    PSA 2.4 06/24/2005       IMPRESSION:    No diagnosis found.      PLAN:  I spent 45 minutes with the patient of which more than half was spent in direct consultation with the patient in regards to our treatment and plan.      I explained to the patient that the causes of hematuria, whether it be gross hematuria or microhematuria, are many. In patients with gross hematuria, the chances of an underlying  malignancy arelow at 15-20%.    Nevertheless, I explained to the patient that the evaluation in both cases consists of upper tract imaging followed by flexible cystoscopy. I described the rationale and procedure for both and  answered all questions.    Hematuria work up discussed in detail.  Will proceed with hematuria work up:  Cysto  CT urogram     Recommend he drinks plenty of fluids to prevent blood clots.  Discussed risk of possible bladder spasms due to alford catheter and expectations.   Go to the ED if urine cease to flow in bag for >4hrs  Make sure alford catheter is secured to leg and bag is not secured too low on leg.      Patient's wife inquired about how long to hold eliquis.  She was advised to contact his cardiologist.    He will follow up next week for a voiding trial.  I did not start flomax given potential to cause dizziness and patient already having multiple falls.        Yasemin Uribe PA-C

## 2019-06-20 NOTE — TELEPHONE ENCOUNTER
"  Reason for Disposition   [1] Bloody or red-colored urine  AND [2] no recent prostate or bladder surgery  (Exception: brief episode and urine now clear)    Answer Assessment - Initial Assessment Questions  1. SYMPTOMS: "What symptoms are you concerned about?"    Bright red blood in urine today. cath put in in ED. Told to see urology, no back pains, no nausea    2. ONSET:  "When did the symptoms start?"    No clots. Urinated twice at ED - 2nd time blood. Seems uncomfortable but not in pain, afeb.   3. FEVER: "Is there a fever?" If so, ask: "What is the temperature, how was it measured, and when did it start?"     afeb   4. ABDOMINAL PAIN: "Is there any abdominal pain?" (e.g., Scale 1-10; or mild, moderate, severe)     No   5. URINE COLOR: "What color is the urine?"  "Is there blood present in the urine?" (e.g., clear, yellow, cloudy, tea-colored, blood streaks, bright red)     Bright red   6. ONSET: "When was the catheter inserted?"     Today   7. OTHER SYMPTOMS: "Do you have any other symptoms?" (e.g., back pain, bad urine odor)   malodorous  , takes eliquis- told to hold this evening.    Protocols used:  URINARY CATHETER SYMPTOMS AND [id number]  ED 6/19/19  Spouse called re pt had fall and seen in ED. hx dementia. Pt unwrapped splint on arm for ligament injury.  Pt had cath in- bright red urine again. pt sleeping now. ED warnings given. rec fluids to Call back with questions.   "

## 2019-06-20 NOTE — TELEPHONE ENCOUNTER
Spoke to to pt's wife and booked appt with Mary. Pt also requested a follow up with Dr. Yan. appt scheduled to see PCP

## 2019-06-21 ENCOUNTER — PATIENT MESSAGE (OUTPATIENT)
Dept: CARDIOLOGY | Facility: CLINIC | Age: 79
End: 2019-06-21

## 2019-06-24 ENCOUNTER — HOSPITAL ENCOUNTER (OUTPATIENT)
Dept: RADIOLOGY | Facility: HOSPITAL | Age: 79
Discharge: HOME | End: 2019-06-24
Attending: PHYSICIAN ASSISTANT
Payer: MEDICARE

## 2019-06-24 ENCOUNTER — OFFICE VISIT (OUTPATIENT)
Dept: INTERNAL MEDICINE | Facility: CLINIC | Age: 79
End: 2019-06-24
Payer: MEDICARE

## 2019-06-24 VITALS
HEIGHT: 66 IN | OXYGEN SATURATION: 98 % | SYSTOLIC BLOOD PRESSURE: 126 MMHG | HEART RATE: 60 BPM | WEIGHT: 162.69 LBS | BODY MASS INDEX: 26.14 KG/M2 | DIASTOLIC BLOOD PRESSURE: 62 MMHG

## 2019-06-24 DIAGNOSIS — R31.0 GROSS HEMATURIA: ICD-10-CM

## 2019-06-24 DIAGNOSIS — I48.92 ATRIAL FLUTTER WITH CONTROLLED RESPONSE: Chronic | ICD-10-CM

## 2019-06-24 DIAGNOSIS — Z95.810 ICD (IMPLANTABLE CARDIOVERTER-DEFIBRILLATOR) IN PLACE: ICD-10-CM

## 2019-06-24 DIAGNOSIS — Z48.02 VISIT FOR SUTURE REMOVAL: Primary | ICD-10-CM
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
## 2019-06-24 DIAGNOSIS — G20.C PARKINSONISM, UNSPECIFIED PARKINSONISM TYPE: ICD-10-CM

## 2019-06-24 PROCEDURE — 1101F PT FALLS ASSESS-DOCD LE1/YR: CPT | Mod: CPTII,S$GLB,, | Performed by: PHYSICIAN ASSISTANT

## 2019-06-24 PROCEDURE — 3074F PR MOST RECENT SYSTOLIC BLOOD PRESSURE < 130 MM HG: ICD-10-PCS | Mod: CPTII,S$GLB,, | Performed by: PHYSICIAN ASSISTANT

## 2019-06-24 PROCEDURE — 3074F SYST BP LT 130 MM HG: CPT | Mod: CPTII,S$GLB,, | Performed by: PHYSICIAN ASSISTANT

## 2019-06-24 PROCEDURE — 25500020 PHARM REV CODE 255: Performed by: PHYSICIAN ASSISTANT

## 2019-06-24 PROCEDURE — 99214 OFFICE O/P EST MOD 30 MIN: CPT | Mod: S$GLB,,, | Performed by: PHYSICIAN ASSISTANT

## 2019-06-24 PROCEDURE — 3078F DIAST BP <80 MM HG: CPT | Mod: CPTII,S$GLB,, | Performed by: PHYSICIAN ASSISTANT

## 2019-06-24 PROCEDURE — 99999 PR PBB SHADOW E&M-EST. PATIENT-LVL IV: ICD-10-PCS | Mod: PBBFAC,,, | Performed by: PHYSICIAN ASSISTANT

## 2019-06-24 PROCEDURE — 74178 CT UROGRAM ABD PELVIS W WO: ICD-10-PCS | Mod: 26,,, | Performed by: RADIOLOGY

## 2019-06-24 PROCEDURE — 1101F PR PT FALLS ASSESS DOC 0-1 FALLS W/OUT INJ PAST YR: ICD-10-PCS | Mod: CPTII,S$GLB,, | Performed by: PHYSICIAN ASSISTANT

## 2019-06-24 PROCEDURE — 74178 CT ABD&PLV WO CNTR FLWD CNTR: CPT | Mod: TC

## 2019-06-24 PROCEDURE — 99999 PR PBB SHADOW E&M-EST. PATIENT-LVL IV: CPT | Mod: PBBFAC,,, | Performed by: PHYSICIAN ASSISTANT

## 2019-06-24 PROCEDURE — 74178 CT ABD&PLV WO CNTR FLWD CNTR: CPT | Mod: 26,,, | Performed by: RADIOLOGY

## 2019-06-24 PROCEDURE — 99499 UNLISTED E&M SERVICE: CPT | Mod: S$GLB,,, | Performed by: PHYSICIAN ASSISTANT

## 2019-06-24 PROCEDURE — 3078F PR MOST RECENT DIASTOLIC BLOOD PRESSURE < 80 MM HG: ICD-10-PCS | Mod: CPTII,S$GLB,, | Performed by: PHYSICIAN ASSISTANT

## 2019-06-24 PROCEDURE — 99214 PR OFFICE/OUTPT VISIT, EST, LEVL IV, 30-39 MIN: ICD-10-PCS | Mod: S$GLB,,, | Performed by: PHYSICIAN ASSISTANT

## 2019-06-24 PROCEDURE — 99499 RISK ADDL DX/OHS AUDIT: ICD-10-PCS | Mod: S$GLB,,, | Performed by: PHYSICIAN ASSISTANT

## 2019-06-24 RX ADMIN — IOHEXOL 150 ML: 350 INJECTION, SOLUTION INTRAVENOUS at 07:06

## 2019-06-24 NOTE — PROGRESS NOTES
Subjective:       Patient ID: Mark Anthony Velarde Jr. is a 79 y.o. male.    Chief Complaint: Hospital Follow Up    HPI     Established pt of Jeff Yan MD (new to me)      Suture Removal: Patient here for suture removal. he obtained a laceration to his face 5 days ago.  He was seen in ED. He had 2 sutures placed Mechanism of injury: Fall.  His  last tetanus  was 5 day(s) ago.    Pt without acute complaints. He denies cp, sob, ha, vision changes, or wrist pain.     CT Head/Maxillofacial/Cervial Spine all unremarkable in ED for acute fracture or bleed.     He has seen Ortho s/p fall, not wearing brace. Denies pain. Skin abrasion to left wrist healing.     Also seeing Urology s/p fall as he had episode of gross hematuria. Current has urinary catheter in place. Ct Urogram today.     Review of patient's allergies indicates:   Allergen Reactions    Arb-angiotensin receptor antagonist Other (See Comments)     Hyperkalemia    Lisinopril Diarrhea     Past Medical History:   Diagnosis Date    *Atrial fibrillation     Anticoagulant long-term use     Atrial fibrillation - asymptomatic but noted on ICD interrogation (5-02-20121) - 16 h 40 minutes of afib 2/13/2013    Atrial flutter with controlled response - seen on ICD interrogation - asymptomatic 2/13/2013    Automatic implantable cardioverter-defibrillator in situ 2/13/2013    Basal cell carcinoma     mid forehead    CAD (coronary artery disease) 2/13/2013    Cardiomyopathy     Cataract     Cognitive deficits     mild    H/O syncope -  5/27/2013    History of PTCA - LAD, LCX and PDA PCI in 2006 2/13/2013    HTN (hypertension) 2/13/2013    Hyperlipidemia LDL goal < 70 2/13/2013    Hypertension     ICD (implantable cardiac defibrillator) in place 2/13/2013    Ischemic cardiomyopathy LVEF ~45% 2/13/2013    LBBB (left bundle branch block) 2/13/2013    Memory loss     Psoriasis vulgaris     Syncope and collapse      Social History     Tobacco Use     "Smoking status: Former Smoker     Last attempt to quit: 1963     Years since quittin.7    Smokeless tobacco: Never Used   Substance Use Topics    Alcohol use: No     Frequency: Monthly or less     Drinks per session: 1 or 2     Binge frequency: Never    Drug use: No         Review of Systems   Constitutional: Negative for activity change and unexpected weight change.   HENT: Negative for hearing loss, rhinorrhea and trouble swallowing.    Eyes: Negative for discharge and visual disturbance.   Respiratory: Negative for chest tightness and wheezing.    Cardiovascular: Negative for chest pain and palpitations.   Gastrointestinal: Negative for blood in stool, constipation, diarrhea and vomiting.   Genitourinary: Positive for difficulty urinating, hematuria and urgency.        Following in Urology. Has catheter in place.    Musculoskeletal: Negative for arthralgias, joint swelling and neck pain.   Neurological: Negative for dizziness, weakness and headaches.   Hematological: Bruises/bleeds easily.   Psychiatric/Behavioral: Positive for confusion (at baseline (hx of dementia)). Negative for dysphoric mood.       Objective: /62   Pulse 60 Comment: manual radial recheck  Ht 5' 6" (1.676 m)   Wt 73.8 kg (162 lb 11.2 oz)   SpO2 98%   BMI 26.26 kg/m²         Physical Exam   Constitutional: He appears well-developed and well-nourished. No distress.   HENT:   Head: Normocephalic and atraumatic.   Mouth/Throat: Oropharynx is clear and moist.   Mild periorbital bruising b/l improved (reviewed ED photos)   Cardiovascular: Normal rate and regular rhythm. Exam reveals no friction rub.   No murmur heard.  Pulmonary/Chest: Effort normal and breath sounds normal. He has no wheezes. He has no rales.   Abdominal: Soft. Bowel sounds are normal. There is no tenderness.   Neurological: He is alert. He displays tremor.   Skin: Skin is warm and dry. Capillary refill takes less than 2 seconds. No rash noted.   Left " temporal with 2cm healed laceration with scabbing. No drainage, wound dehiscence or soi noted. 2 sutures present and removed. Pt tolerated well   Psychiatric: He has a normal mood and affect.   Vitals reviewed.      Assessment:       1. Visit for suture removal    2. Fall, subsequent encounter    3. Parkinsonism, unspecified Parkinsonism type    4. ICD (implantable cardioverter-defibrillator) in place    5. Atrial flutter with controlled response - seen on ICD interrogation - asymptomatic        Plan:         Mark Anthony was seen today for hospital follow up.    Diagnoses and all orders for this visit:    Visit for suture removal  Fall, subsequent encounter  Pt tolerated suture removal well      Parkinsonism, unspecified Parkinsonism type  Followed by Neurology    ICD (implantable cardioverter-defibrillator) in place  Atrial flutter with controlled response - seen on ICD interrogation - asymptomatic  Followed by Cardiology  Eliquis on HOLD      Mary Santiago PA-C    Future Appointments   Date Time Provider Department Center   6/24/2019  6:45 PM [facility] OIC-CT1 500 LB LIMIT [facility]   6/25/2019  8:00 AM Yasemin Uribe PA-C [facility] UROLOGY [facility]   7/2/2019 10:15 AM Stephen Dorsey, OD [facility] OPTOMTY [facility]   7/3/2019  1:00 PM Jeff Yan MD [facility] IM [facility] PCW   7/5/2019  9:00 AM Gavin Cordoba PA-C [facility] ORTHO [facility]   7/19/2019  8:45 AM EKG, APPT [facility] EKG [facility]   7/19/2019  9:00 AM COORDINATED DEVICE CHECK [facility] ARRHPRO [facility]   7/19/2019  9:40 AM Dominic Benitez MD [facility] ARRHYTH [facility]   8/13/2019  8:15 AM HOME MONITOR DEVICE CHECK, [facility] ARRHPRO [facility]   9/4/2019  8:00 AM [facility] MEMORY CLINIC [facility] NEURPSY [facility]

## 2019-06-24 NOTE — PATIENT INSTRUCTIONS
"  Suture or Staple Removal  You were seen today for a suture or staple removal. Your wound is healing as expected. The wound has healed well enough that the sutures or staples can be removed. The wound will continue to heal for the next few months.  At this time there is no sign of infection.   Home care  · If you have pain, take pain medicine as advised by your healthcare provider.   · Keep your wound clean and protected by covering it with a bandage for the next week or so.   · Wash your hands with soap and warm water before and after caring for your wound. This helps prevent infection.  · Clean the wound gently with soap and warm water daily or as directed by your childs health care provider. Do not use iodine, alcohol, or other cleansers on the wound.  Gently pat it dry. Put on a new bandage, if needed. Do not reuse bandages.  · If the area gets wet, gently pat it dry with a clean cloth. Replace the wet bandage with a dry one.  · Check the wound daily for signs of infection. (These are listed under "When to seek medical advice" below.)  · You may shower and bathe as usual. Swimming is now permitted.  Follow-up care  Follow up with your healthcare provider as advised.  When to seek medical advice   Call your healthcare provider if any of the following occur:  · Wound reopens or bleeds  · Signs of an infection, such as:  ¨ Increasing redness or swelling around the wound  ¨ Increased warmth from the wound  ¨ Worsening pain  ¨ Red streaking lines away from the wound  ¨ Fluid draining from the wound  · Fever of 100.4°F (38°C) or higher, or as directed by your child's healthcare provider  Date Last Reviewed: 9/27/2015  © 8393-9695 [employer]. [address] [place] [address], [place] [address]. All rights reserved. This information is not intended as a substitute for professional medical care. Always follow your healthcare professional's instructions.        "

## 2019-06-24 NOTE — PROGRESS NOTES
Answers for HPI/ROS submitted by the patient on 6/24/2019   activity change: No  unexpected weight change: No  neck pain: No  hearing loss: No  rhinorrhea: No  trouble swallowing: No  eye discharge: No  visual disturbance: No  chest tightness: No  wheezing: No  chest pain: No  palpitations: No  blood in stool: No  constipation: No  vomiting: No  diarrhea: No  polydipsia: No  polyuria: No  difficulty urinating: Yes   urgency: Yes  hematuria: Yes  joint swelling: No  arthralgias: No  headaches: No  weakness: No  confusion: Yes (Parkinson/Dementia)  dysphoric mood: No

## 2019-06-25 ENCOUNTER — TELEPHONE (OUTPATIENT)
Dept: UROLOGY | Facility: CLINIC | Age: 79
End: 2019-06-25

## 2019-06-25 ENCOUNTER — OFFICE VISIT (OUTPATIENT)
Dept: UROLOGY | Facility: CLINIC | Age: 79
End: 2019-06-25
Payer: MEDICARE

## 2019-06-25 VITALS
WEIGHT: 162 LBS | DIASTOLIC BLOOD PRESSURE: 71 MMHG | HEART RATE: 60 BPM | SYSTOLIC BLOOD PRESSURE: 128 MMHG | BODY MASS INDEX: 26.03 KG/M2 | HEIGHT: 66 IN

## 2019-06-25 DIAGNOSIS — R33.9 URINARY RETENTION: Primary | ICD-10-CM

## 2019-06-25 DIAGNOSIS — N40.0 ENLARGED PROSTATE: ICD-10-CM

## 2019-06-25 DIAGNOSIS — R31.0 GROSS HEMATURIA: ICD-10-CM

## 2019-06-25 PROCEDURE — 1101F PT FALLS ASSESS-DOCD LE1/YR: CPT | Mod: CPTII,S$GLB,, | Performed by: PHYSICIAN ASSISTANT

## 2019-06-25 PROCEDURE — 3074F PR MOST RECENT SYSTOLIC BLOOD PRESSURE < 130 MM HG: ICD-10-PCS | Mod: CPTII,S$GLB,, | Performed by: PHYSICIAN ASSISTANT

## 2019-06-25 PROCEDURE — 99999 PR PBB SHADOW E&M-EST. PATIENT-LVL IV: ICD-10-PCS | Mod: PBBFAC,,, | Performed by: PHYSICIAN ASSISTANT

## 2019-06-25 PROCEDURE — 51700 PR IRRIGATION, BLADDER: ICD-10-PCS | Mod: S$GLB,,, | Performed by: PHYSICIAN ASSISTANT

## 2019-06-25 PROCEDURE — 99214 OFFICE O/P EST MOD 30 MIN: CPT | Mod: 25,S$GLB,, | Performed by: PHYSICIAN ASSISTANT

## 2019-06-25 PROCEDURE — 3074F SYST BP LT 130 MM HG: CPT | Mod: CPTII,S$GLB,, | Performed by: PHYSICIAN ASSISTANT

## 2019-06-25 PROCEDURE — 51700 IRRIGATION OF BLADDER: CPT | Mod: S$GLB,,, | Performed by: PHYSICIAN ASSISTANT

## 2019-06-25 PROCEDURE — 99214 PR OFFICE/OUTPT VISIT, EST, LEVL IV, 30-39 MIN: ICD-10-PCS | Mod: 25,S$GLB,, | Performed by: PHYSICIAN ASSISTANT

## 2019-06-25 PROCEDURE — 3078F DIAST BP <80 MM HG: CPT | Mod: CPTII,S$GLB,, | Performed by: PHYSICIAN ASSISTANT

## 2019-06-25 PROCEDURE — 3078F PR MOST RECENT DIASTOLIC BLOOD PRESSURE < 80 MM HG: ICD-10-PCS | Mod: CPTII,S$GLB,, | Performed by: PHYSICIAN ASSISTANT

## 2019-06-25 PROCEDURE — 99999 PR PBB SHADOW E&M-EST. PATIENT-LVL IV: CPT | Mod: PBBFAC,,, | Performed by: PHYSICIAN ASSISTANT

## 2019-06-25 PROCEDURE — 1101F PR PT FALLS ASSESS DOC 0-1 FALLS W/OUT INJ PAST YR: ICD-10-PCS | Mod: CPTII,S$GLB,, | Performed by: PHYSICIAN ASSISTANT

## 2019-06-25 NOTE — PATIENT INSTRUCTIONS
Drink plenty of fluids today.  Call at 2p.m. to give an update on urine output.  Return to clinic or emergency department (if after clinic hours) to have alford catheter put back in if unable to urinate within 5 hours of alford catheter removal or starts to experience bladder pressure/pain, decrease flow, straining/difficulty urinating, urinary frequency.

## 2019-06-25 NOTE — PROGRESS NOTES
CHIEF COMPLAINT:    Mr. Velarde is a 79 y.o. male presenting for voiding trial.   PRESENTING ILLNESS:    Mark Anthony Velarde Jr. is a 79 y.o. male with a PMH of dementia, CAD, HTN, CKD, nephrolithiasis who presents for voiding trial.   He presents with his wife and son who helps give the history.    Patient was seen last week for evaluation of gross hematuria and urinary retention.      Previous history:  He had an unwitnessed fall yesterday and presented to the ED on 6/19/19.    When he first gave a urine sample his urine was yellow.  Right before leaving he had to void again and his urine was really red.  A bladder scan was done (>300 mL) and a catheter was placed.   He was on eliquis at the time of the fall but has since stopped it.      He was previously on pradaxa.   He tripped over a stepping stone the week prior to this last fall.  His son witnessed the fall and was able to assist him up.  He did not seek medical attention.     Today he presents for a voiding trial. This morning his urine is yellow.  He experienced hematuria over the weekend.   He does not have any urinary complaints today.  His catheter has been draining well.      He had a CT urogram yesterday as part of his hematuria work up.      CT urogram 6/24/19:   No solid renal masses, nephrolithiasis, or hydronephrosis.  There is a small right simple renal cyst.  The ureters are normal in course and caliber.  The bladder is collapsed with diffuse wall thickening, which can be seen with chronic outlet obstruction as well as cystitis.  Abdi catheter in position.  Just posterior to the Abdi catheter is a round filling defect measuring approximately 2.5 cm.  This is favored to represent nodular impression from enlarged prostate, but cannot rule out intrinsic bladder lesion.  Reproductive organs: Prostatomegaly.    REVIEW OF SYSTEMS:    Constitutional: Negative for fever and chills.   HENT: Negative for hearing loss.   Eyes: Negative for visual disturbance.    Respiratory: Negative for shortness of breath.   Cardiovascular: Negative for chest pain.   Gastrointestinal: Negative for vomiting, and constipation.   Genitourinary: See HPI  Neurological: Negative for dizziness.   Hematological: Does not bruise/bleed easily.   Psychiatric/Behavioral: Positive for confusion.       PATIENT HISTORY:    Past Medical History:   Diagnosis Date    *Atrial fibrillation     Anticoagulant long-term use     Atrial fibrillation - asymptomatic but noted on ICD interrogation ([id number]) - 16 h 40 minutes of afib 2/13/2013    Atrial flutter with controlled response - seen on ICD interrogation - asymptomatic 2/13/2013    Automatic implantable cardioverter-defibrillator in situ 2/13/2013    Basal cell carcinoma     mid forehead    CAD (coronary artery disease) 2/13/2013    Cardiomyopathy     Cataract     Cognitive deficits     mild    H/O syncope -  5/27/2013    History of PTCA - LAD, LCX and PDA PCI in 2006 2/13/2013    HTN (hypertension) 2/13/2013    Hyperlipidemia LDL goal < 70 2/13/2013    Hypertension     ICD (implantable cardiac defibrillator) in place 2/13/2013    Ischemic cardiomyopathy LVEF ~45% 2/13/2013    LBBB (left bundle branch block) 2/13/2013    Memory loss     Psoriasis vulgaris     Syncope and collapse        Past Surgical History:   Procedure Laterality Date    CARDIAC DEFIBRILLATOR PLACEMENT      CATARACT EXTRACTION W/  INTRAOCULAR LENS IMPLANT Right 04/04/16    Dr Michael     CATARACT EXTRACTION W/  INTRAOCULAR LENS IMPLANT Left 05/09/2016    ESOPHAGOGASTRODUODENOSCOPY (EGD) N/A 7/3/2017    Performed by Dee Dee [facility]illo [facility] at [facility] (4TH FLR)    INSERTION-INTRAOCULAR LENS (IOL) Left 5/9/2016    [facility] at [facility] OR    INSERTION-INTRAOCULAR LENS (IOL) Right 4/4/2016    [facility] at [facility] OR    PHACOEMULSIFICATION-ASPIRATION-CATARACT Left 5/9/2016    [facility] at [facility] OR     PHACOEMULSIFICATION-ASPIRATION-CATARACT Right 4/4/2016    [facility]    REPLACEMENT-GENERATOR-ICD N/A 7/26/2017    Per[facility] by [facility] at [facility] CATH LAB    SKIN BIOPSY      TONSILLECTOMY         Family History   Problem Relation Age of Onset    Psoriasis Father     Dementia Father     Tremor Father     Cataracts Mother     Cancer Mother     Psoriasis Son     Psoriasis Son     Diabetes Maternal Grandmother     Kidney disease Sister     No Known Problems Brother     No Known Problems Maternal Aunt     No Known Problems Maternal Uncle     No Known Problems Paternal Aunt     No Known Problems Paternal Uncle     No Known Problems Maternal Grandfather     No Known Problems Paternal Grandmother     No Known Problems Paternal Grandfather     Amblyopia Neg Hx     Blindness Neg Hx     Glaucoma Neg Hx     Hypertension Neg Hx     Macular degeneration Neg Hx     Retinal detachment Neg Hx     Strabismus Neg Hx     Stroke Neg Hx     Thyroid disease Neg Hx     Parkinsonism Neg Hx     Celiac disease Neg Hx     Cirrhosis Neg Hx     Colon cancer Neg Hx     Colon polyps Neg Hx     Crohn's disease Neg Hx     Cystic fibrosis Neg Hx     Esophageal cancer Neg Hx     Irritable bowel syndrome Neg Hx     Inflammatory bowel disease Neg Hx     Hemochromatosis Neg Hx     Liver cancer Neg Hx     Liver disease Neg Hx     Rectal cancer Neg Hx     Stomach cancer Neg Hx     Ulcerative colitis Neg Hx     Christiano's disease Neg Hx     Lymphoma Neg Hx     Scleroderma Neg Hx     Rheum arthritis Neg Hx     Multiple sclerosis Neg Hx     Melanoma Neg Hx     Tuberculosis Neg Hx     Lupus Neg Hx        Social History     Socioeconomic History    Marital status:      Spouse name: Not on file    Number of children: Not on file    Years of education: Not on file    Highest education level: Not on file   Occupational History    Occupation: retired    Social Needs    Financial resource strain: Hard    Food insecurity:     Worry: Never true     Inability: Never true    Transportation needs:     Medical: No     Non-medical: No   Tobacco Use    Smoking status: Former Smoker     Last attempt to quit: 1963     Years since quittin.7    Smokeless tobacco: Never Used   Substance and Sexual Activity    Alcohol use: No     Frequency: Monthly or less     Drinks per session: 1 or 2     Binge frequency: Never    Drug use: No    Sexual activity: Not on file   Lifestyle    Physical activity:     Days per week: 6 days     Minutes per session: 60 min    Stress: Not at all   Relationships    Social connections:     Talks on phone: Once a week     Gets together: Three times a week     Attends L[employer] service: Not on file     Active member of club or organization: Yes     Attends meetings of clubs or organizations: Not on file     Relationship status:    Other Topics Concern    Not on file   Social History Narrative    Not on file       Allergies:  Arb-angiotensin receptor antagonist and Lisinopril    Medications:    Current Outpatient Medications:     amiodarone (PACERONE) 100 MG Tab, Take 1 tablet (100 mg total) by mouth once daily., Disp: 90 tablet, Rfl: 3    amLODIPine (NORVASC) 5 MG tablet, Take 1 tablet (5 mg total) by mouth once daily., Disp: 90 tablet, Rfl: 3    apixaban (ELIQUIS) 2.5 mg Tab, Take 1 tablet (2.5 mg total) by mouth 2 (two) times daily., Disp: 60 tablet, Rfl: 11    aspirin (ECOTRIN) 81 MG EC tablet, Take 81 mg by mouth once daily., Disp: , Rfl:     atorvastatin (LIPITOR) 40 MG tablet, Take 1 tablet (40 mg total) by mouth once daily., Disp: 90 tablet, Rfl: 3    memantine (NAMENDA) 10 MG Tab, Take 1 tablet (10 mg total) by mouth 2 (two) times daily., Disp: 180 tablet, Rfl: 3    metoprolol tartrate (LOPRESSOR) 25 MG tablet, Take 0.5 tablets (12.5 mg total) by mouth 2 (two) times daily., Disp: 90 tablet, Rfl: 3     multivitamin (MULTIVITAMIN) per tablet, Take 1 tablet by mouth once daily., Disp: , Rfl:     omeprazole (PRILOSEC) 20 MG capsule, Take 1 capsule (20 mg total) by mouth once daily., Disp: 90 capsule, Rfl: 3  No current facility-administered medications for this visit.     PHYSICAL EXAMINATION:    Constitutional: He appears well-developed and well-nourished.  He is in no apparent distress.    Eyes: No scleral icterus noted bilaterally. No discharge bilaterally.    Nose: No rhinorrhea    Cardiovascular: Normal rate.  No pitting edema noted in lower extremities bilaterally    Pulmonary/Chest: Effort normal. No respiratory distress.     Abdominal:  He exhibits no distension.     Lymphadenopathy:        Right: No supraclavicular adenopathy present.        Left: No supraclavicular adenopathy present.     Neurological: He is alert and oriented to person, place, and time.     Skin: Skin is warm and dry.     Psych: Cooperative with normal affect.    Physical Exam    Alford catheter in place with yellow urine noted in leg bag.      LABS:      Lab Results   Component Value Date    PSA 1.8 08/04/2014    PSA 4.3 (H) 07/02/2014    PSA 1.97 06/12/2013    PSA 1.5 10/14/2010    PSA 1.7 04/01/2009    PSA 2.7 11/14/2006    PSA 2.4 06/24/2005       IMPRESSION:    Encounter Diagnoses   Name Primary?    Urinary retention Yes    Gross hematuria     Enlarged prostate          PLAN:    Voiding trial performed by Nurse Krystyna.  225ml of sterile water was instilled into bladder.  Alford catheter was removed. Patient urinated 100ml without difficulty.     Voiding trial passed  Patient was instructed to drink plenty of fluids today.  Instructed patient to call at 2p.m. to give an update on urine output.  I instructed patient to return to clinic or emergency department (if after clinic hours) to have alford catheter put back in if unable to urinate within 5 hours of alford catheter removal or starts to experience bladder pressure/pain, decrease  flow, straining/difficulty urinating, urinary frequency.      Cysto scheduled for 7/18/19 to complete hematuria workup and follow up bladder findings seen on CT scan.  Discussed CT urogram findings with patient and family. Recommend he follow up with pcp for additional findings.  His wife states that she will notify pcp at upcoming appointment.    Follow up to clinic to recheck pvr if unable to do it at the time of cysto.      Yasemin Uribe PA-C

## 2019-07-02 ENCOUNTER — PATIENT MESSAGE (OUTPATIENT)
Dept: UROLOGY | Facility: CLINIC | Age: 79
End: 2019-07-02

## 2019-07-02 ENCOUNTER — OFFICE VISIT (OUTPATIENT)
Dept: OPTOMETRY | Facility: CLINIC | Age: 79
End: 2019-07-02
Payer: MEDICARE

## 2019-07-02 DIAGNOSIS — Z98.41 S/P BILATERAL CATARACT EXTRACTION: ICD-10-CM

## 2019-07-02 DIAGNOSIS — H02.419: ICD-10-CM

## 2019-07-02 DIAGNOSIS — H52.203 ASTIGMATISM OF BOTH EYES, UNSPECIFIED TYPE: ICD-10-CM

## 2019-07-02 DIAGNOSIS — Z98.42 S/P BILATERAL CATARACT EXTRACTION: ICD-10-CM

## 2019-07-02 DIAGNOSIS — H26.491 POSTERIOR CAPSULAR OPACIFICATION NON VISUALLY SIGNIFICANT OF RIGHT EYE: Primary | ICD-10-CM

## 2019-07-02 DIAGNOSIS — Z96.1 PSEUDOPHAKIA, BOTH EYES: ICD-10-CM

## 2019-07-02 PROCEDURE — 92015 PR REFRACTION: ICD-10-PCS | Mod: S$GLB,,, | Performed by: OPTOMETRIST

## 2019-07-02 PROCEDURE — 99999 PR PBB SHADOW E&M-EST. PATIENT-LVL III: ICD-10-PCS | Mod: PBBFAC,,, | Performed by: OPTOMETRIST

## 2019-07-02 PROCEDURE — 92015 DETERMINE REFRACTIVE STATE: CPT | Mod: S$GLB,,, | Performed by: OPTOMETRIST

## 2019-07-02 PROCEDURE — 92014 COMPRE OPH EXAM EST PT 1/>: CPT | Mod: S$GLB,,, | Performed by: OPTOMETRIST

## 2019-07-02 PROCEDURE — 99999 PR PBB SHADOW E&M-EST. PATIENT-LVL III: CPT | Mod: PBBFAC,,, | Performed by: OPTOMETRIST

## 2019-07-02 PROCEDURE — 92014 PR EYE EXAM, EST PATIENT,COMPREHESV: ICD-10-PCS | Mod: S$GLB,,, | Performed by: OPTOMETRIST

## 2019-07-02 NOTE — PROGRESS NOTES
HPI     Concerns About Ocular Health      Additional comments: Patient is here for annual eye examination and   refraction. Patient's wife states that right eye becomes red lasting less   than a week occurring once every few months. No other symptoms per   patient. Does not use any eyes drops. No visual changes.              Comments     Patient's age: 79 y.o. WM   Occupation: Retired  Approximate date of last eye examination: 06/21/2018-   City/State: [facility]  Wears glasses? Yes,       If yes, wears  Full-time or part-time? Part time  Present glasses are: Bifocal, SV Distance, SV Reading? Bifocals lens  Approximate age of present glasses:   Unsure   Got new glasses following last exam, or subsequently?:  No   Any problem with VA with glasses?  No changes  Wears CLs?:  No  Headaches?  No  Eye pain/discomfort?  No                                                                                     Flashes?  No  Floaters?  Yes, occasionally in the right eye  Diplopia/Double vision?  No  Patient's Ocular History:         Any eye surgeries? Retinal Hole in OS with Cryo, Phaco OU          Any eye injury?  No         Any treatment for eye disease?  No  Family history of eye disease?    Mother + Cataract SX  Father  + Pterygium Removal   Significant patient medical history:         1. Diabetes?  No   2. HBP?  Yes, controlled with meds              3. Other (describe):                       Pacemaker                       High Cholesterol - controlled with meds                      Psoriasis                      2 Stents    ! OTC eyedrops currently using:  None   ! Prescription eye meds currently using:  None   ! Any history of allergy/adverse reaction to any eye meds used   previously?  No    ! Any history of allergy/adverse reaction to eyedrops used during prior   eye exam(s)? No    ! Any history of allergy/adverse reaction to Novacaine or similar meds?   No   ! Any history of allergy/adverse reaction to  "Epinephrine or similar meds?   No    ! Patient okay with use of anesthetic eyedrops to check eye pressure?    Yes        ! Patient okay with use of eyedrops to dilate pupils today?  Yes   !  Allergies/Medications/Medical History/Family History reviewed today?    Yes      PD =   64/60  Desired reading distance =   18"                                                                       Last edited by Stephen Dorsey, OD on 7/2/2019 10:55 AM. (History)            Assessment /Plan     For exam results, see Encounter Report.    1. Posterior capsular opacification non visually significant of right eye     2. Acquired mechanical ptosis of eyelid, unspecified laterality     3. S/P bilateral cataract extraction     4. Pseudophakia, both eyes     5. Astigmatism of both eyes, unspecified type                  S/P cataract surgery in both eyes, with bialteral posterior chamber intraocular lens.  Posterior capsular opacification in the right eye.  No compelling need for YAG laser posterior capsulotomy in the right eye at this time.  Monitor    Residual astigmatic refractive error in each eye, with better best-correctable VA in the left eye, as anticipated.    New spectacle lens Rx issued for use as desired.     Bilateral ptosis of upper eyelid, more apparent on the right side, and partially obstructing pupil on both eye.  Mr. Velarde previously declined referral to Dr. Terry for evaluation of need for ptosis repair eyelid surgery.     Otherwise, ocular health appears good in both eyes.  Recheck in one year.            "

## 2019-07-02 NOTE — PATIENT INSTRUCTIONS
S/P cataract surgery in both eyes, with bialteral posterior chamber intraocular lens.  Posterior capsular opacification in the right eye.  No compelling need for YAG laser posterior capsulotomy in the right eye at this time.  Monitor    Residual astigmatic refractive error in each eye, with better best-correctable VA in the left eye, as anticipated.    New spectacle lens Rx issued for use as desired.     Bilateral ptosis of upper eyelid, more apparent on the right side, and partially obstructing pupil on both eye.  Mr. Velarde previously declined referral to Dr. Terry for evaluation of need for ptosis repair eyelid surgery.     Otherwise, ocular health appears good in both eyes.  Recheck in one year.

## 2019-07-03 ENCOUNTER — TELEPHONE (OUTPATIENT)
Dept: NEPHROLOGY | Facility: CLINIC | Age: 79
End: 2019-07-03

## 2019-07-03 ENCOUNTER — OFFICE VISIT (OUTPATIENT)
Dept: INTERNAL MEDICINE | Facility: CLINIC | Age: 79
End: 2019-07-03
Payer: MEDICARE

## 2019-07-03 VITALS
BODY MASS INDEX: 25.83 KG/M2 | HEIGHT: 66 IN | DIASTOLIC BLOOD PRESSURE: 62 MMHG | HEART RATE: 61 BPM | WEIGHT: 160.69 LBS | OXYGEN SATURATION: 96 % | SYSTOLIC BLOOD PRESSURE: 120 MMHG

## 2019-07-03 DIAGNOSIS — G20.C PARKINSONISM, UNSPECIFIED PARKINSONISM TYPE: ICD-10-CM

## 2019-07-03 DIAGNOSIS — Z95.810 ICD (IMPLANTABLE CARDIOVERTER-DEFIBRILLATOR) IN PLACE: ICD-10-CM

## 2019-07-03 DIAGNOSIS — I10 ESSENTIAL HYPERTENSION: Chronic | ICD-10-CM
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
## 2019-07-03 DIAGNOSIS — F02.80 LEWY BODY DEMENTIA WITHOUT BEHAVIORAL DISTURBANCE: Primary | ICD-10-CM

## 2019-07-03 DIAGNOSIS — G31.83 LEWY BODY DEMENTIA WITHOUT BEHAVIORAL DISTURBANCE: Primary | ICD-10-CM

## 2019-07-03 DIAGNOSIS — I25.5 ISCHEMIC CARDIOMYOPATHY: Chronic | ICD-10-CM

## 2019-07-03 PROCEDURE — 3078F DIAST BP <80 MM HG: CPT | Mod: CPTII,S$GLB,, | Performed by: INTERNAL MEDICINE

## 2019-07-03 PROCEDURE — 99214 OFFICE O/P EST MOD 30 MIN: CPT | Mod: S$GLB,,, | Performed by: INTERNAL MEDICINE

## 2019-07-03 PROCEDURE — 99499 RISK ADDL DX/OHS AUDIT: ICD-10-PCS | Mod: S$GLB,,, | Performed by: INTERNAL MEDICINE

## 2019-07-03 PROCEDURE — 1101F PT FALLS ASSESS-DOCD LE1/YR: CPT | Mod: CPTII,S$GLB,, | Performed by: INTERNAL MEDICINE

## 2019-07-03 PROCEDURE — 3074F PR MOST RECENT SYSTOLIC BLOOD PRESSURE < 130 MM HG: ICD-10-PCS | Mod: CPTII,S$GLB,, | Performed by: INTERNAL MEDICINE

## 2019-07-03 PROCEDURE — 99214 PR OFFICE/OUTPT VISIT, EST, LEVL IV, 30-39 MIN: ICD-10-PCS | Mod: S$GLB,,, | Performed by: INTERNAL MEDICINE

## 2019-07-03 PROCEDURE — 99999 PR PBB SHADOW E&M-EST. PATIENT-LVL IV: ICD-10-PCS | Mod: PBBFAC,,, | Performed by: INTERNAL MEDICINE

## 2019-07-03 PROCEDURE — 1101F PR PT FALLS ASSESS DOC 0-1 FALLS W/OUT INJ PAST YR: ICD-10-PCS | Mod: CPTII,S$GLB,, | Performed by: INTERNAL MEDICINE

## 2019-07-03 PROCEDURE — 3074F SYST BP LT 130 MM HG: CPT | Mod: CPTII,S$GLB,, | Performed by: INTERNAL MEDICINE

## 2019-07-03 PROCEDURE — 99999 PR PBB SHADOW E&M-EST. PATIENT-LVL IV: CPT | Mod: PBBFAC,,, | Performed by: INTERNAL MEDICINE

## 2019-07-03 PROCEDURE — 3078F PR MOST RECENT DIASTOLIC BLOOD PRESSURE < 80 MM HG: ICD-10-PCS | Mod: CPTII,S$GLB,, | Performed by: INTERNAL MEDICINE

## 2019-07-03 PROCEDURE — 99499 UNLISTED E&M SERVICE: CPT | Mod: S$GLB,,, | Performed by: INTERNAL MEDICINE

## 2019-07-03 NOTE — PROGRESS NOTES
Subjective:       Patient ID: Mark Anthony Velarde Jr. is a 79 y.o. male.    Chief Complaint: Follow-up    Patient status post ER visit for fall.  He sustained a laceration to the left side of the head requiring sutures.  Those have been removed.  Extensive testing was done including the neck and head.  The patient was stopped from taking his blood thinner because he developed blood in the urine.  He is seeing Urology.  He is probably going to have a scope.  Is white mentions that previous doctor was concerned that Aricept could interact with amiodarone and so it has been stopped for a while but she feels his memory is declining in overall condition is worsening and wants to know if he can go back to it.  They are seeing Cardiology next week and I think it is reasonable to discuss with them.  We talked about his parkinsonism and possible declining balance.  I convinced in the try cane but he is not interested in a walker.  He will be seeing his neurologist soon as well and I think it is important to discuss safety and fall prevention.    Review of Systems   Constitutional: Negative for activity change and unexpected weight change.   HENT: Negative for hearing loss, rhinorrhea and trouble swallowing.    Eyes: Negative for discharge and visual disturbance.   Respiratory: Negative for chest tightness and wheezing.    Cardiovascular: Negative for chest pain and palpitations.   Gastrointestinal: Negative for blood in stool, constipation, diarrhea and vomiting.   Endocrine: Positive for polyuria. Negative for polydipsia.   Genitourinary: Positive for difficulty urinating, hematuria and urgency.   Musculoskeletal: Positive for gait problem. Negative for arthralgias and neck pain.   Neurological: Positive for tremors. Negative for weakness and headaches.   Psychiatric/Behavioral: Positive for confusion. Negative for dysphoric mood.       Objective:      Physical Exam   Constitutional: He is oriented to person, place, and time. He  appears well-developed and well-nourished. No distress.   HENT:   Head: Normocephalic.   Mouth/Throat: Oropharynx is clear and moist. No oropharyngeal exudate.   Healing laceration, small crust   Eyes: Pupils are equal, round, and reactive to light. Conjunctivae and EOM are normal. No scleral icterus.   Neck: Normal range of motion. Neck supple. No thyromegaly present.   No supraclavicular nodes palpated   Cardiovascular: Normal rate, regular rhythm and normal heart sounds.   No murmur heard.  Pulmonary/Chest: Effort normal and breath sounds normal. He has no wheezes.   Abdominal: Soft. Bowel sounds are normal. He exhibits no mass. There is no tenderness.   Musculoskeletal: He exhibits no edema.   Lymphadenopathy:     He has no cervical adenopathy.   Neurological: He is alert and oriented to person, place, and time. He exhibits abnormal muscle tone (Bilateral hand tremor right greater than left). Coordination abnormal.   Skin: No rash noted. No erythema. No pallor.   Psychiatric: He has a normal mood and affect. His behavior is normal.       Assessment:       1. Lewy body dementia without behavioral disturbance    2. Parkinsonism, unspecified Parkinsonism type    3. Ischemic cardiomyopathy LVEF ~45%    4. ICD (implantable cardioverter-defibrillator) in place    5. Essential hypertension        Plan:       Mark Anthony was seen today for follow-up.    Diagnoses and all orders for this visit:    Lewy body dementia without behavioral disturbance    Parkinsonism, unspecified Parkinsonism type    Ischemic cardiomyopathy LVEF ~45%    ICD (implantable cardioverter-defibrillator) in place    Essential hypertension            Will ask new Cardiologist about restarting Aricept while on Amiodarone. Wife notices declining memory and overall function since stopping the Aricept.

## 2019-07-18 ENCOUNTER — HOSPITAL ENCOUNTER (OUTPATIENT)
Dept: UROLOGY | Facility: HOSPITAL | Age: 79
Discharge: HOME | End: 2019-07-18
Attending: UROLOGY
Payer: MEDICARE

## 2019-07-18 VITALS
WEIGHT: 156.31 LBS | DIASTOLIC BLOOD PRESSURE: 69 MMHG | RESPIRATION RATE: 17 BRPM | HEART RATE: 60 BPM | BODY MASS INDEX: 25.12 KG/M2 | SYSTOLIC BLOOD PRESSURE: 140 MMHG | HEIGHT: 66 IN | TEMPERATURE: 98 F

## 2019-07-18 DIAGNOSIS — N30.01 ACUTE CYSTITIS WITH HEMATURIA: Primary | ICD-10-CM

## 2019-07-18 DIAGNOSIS — N40.1 BPH WITH URINARY OBSTRUCTION: ICD-10-CM

## 2019-07-18 DIAGNOSIS — R31.0 GROSS HEMATURIA: ICD-10-CM

## 2019-07-18 DIAGNOSIS — N13.8 BPH WITH URINARY OBSTRUCTION: ICD-10-CM

## 2019-07-18 PROCEDURE — 52000 CYSTOURETHROSCOPY: CPT

## 2019-07-18 PROCEDURE — 87086 URINE CULTURE/COLONY COUNT: CPT

## 2019-07-18 PROCEDURE — 87088 URINE BACTERIA CULTURE: CPT

## 2019-07-18 PROCEDURE — 52000 CYSTOURETHROSCOPY: CPT | Mod: ,,, | Performed by: UROLOGY

## 2019-07-18 PROCEDURE — 27200962 HC CATHETER, RED RUBBER

## 2019-07-18 PROCEDURE — 52000 PR CYSTOURETHROSCOPY: ICD-10-PCS | Mod: ,,, | Performed by: UROLOGY

## 2019-07-18 RX ORDER — CEFTRIAXONE 500 MG/1
1000 INJECTION, POWDER, FOR SOLUTION INTRAMUSCULAR; INTRAVENOUS
Status: COMPLETED | OUTPATIENT
Start: 2019-07-18 | End: 2019-07-18

## 2019-07-18 RX ORDER — LIDOCAINE HYDROCHLORIDE 20 MG/ML
JELLY TOPICAL ONCE
Status: COMPLETED | OUTPATIENT
Start: 2019-07-18 | End: 2019-07-18

## 2019-07-18 RX ORDER — TAMSULOSIN HYDROCHLORIDE 0.4 MG/1
0.4 CAPSULE ORAL NIGHTLY
Qty: 30 CAPSULE | Refills: 11 | Status: SHIPPED | OUTPATIENT
Start: 2019-07-18 | End: 2019-07-30 | Stop reason: SDUPTHER

## 2019-07-18 RX ORDER — DUTASTERIDE 0.5 MG/1
0.5 CAPSULE, LIQUID FILLED ORAL DAILY
Qty: 30 CAPSULE | Refills: 11 | Status: SHIPPED | OUTPATIENT
Start: 2019-07-18 | End: 2020-02-11

## 2019-07-18 RX ORDER — DOXYCYCLINE 100 MG/1
100 CAPSULE ORAL 2 TIMES DAILY
Qty: 14 CAPSULE | Refills: 0 | Status: SHIPPED | OUTPATIENT
Start: 2019-07-18 | End: 2019-07-25

## 2019-07-18 RX ORDER — DOXYCYCLINE HYCLATE 100 MG
100 TABLET ORAL ONCE
Status: COMPLETED | OUTPATIENT
Start: 2019-07-18 | End: 2019-07-18

## 2019-07-18 RX ADMIN — CEFTRIAXONE 1000 MG: 500 INJECTION, POWDER, FOR SOLUTION INTRAMUSCULAR; INTRAVENOUS at 08:07

## 2019-07-18 RX ADMIN — Medication 100 MG: at 08:07

## 2019-07-18 RX ADMIN — LIDOCAINE HYDROCHLORIDE: 20 JELLY TOPICAL at 08:07

## 2019-07-18 NOTE — PROCEDURES
Procedure Date:  07/18/2019      Procedure:  Male Diagnostic Cystourethroscopy    Pre-op diagnosis: gross hematuria, incomplete bladder emptying, BPH with obstruction, UTI  Post-op diagnosis: same  Anesthesia: Local  Surgeon:  Sal Cool MD    Findings:  Urethra:  Normal urethra.   Sphincter: competent.  Prostate: Estimated Length Prostatic Urethra: 6 cm with trilobar obstruction, moderate size intravesical lobe obstruction  Bladder neck: patent with no stricture  Bladder:  Normal bladder.   Normal ureteral orifices bilaterally.   Severe trabeculation with sacculation.     I and O cath: over 200 ml cloudy urine noted.    Description of Procedure:                                                         Informed Consent:                                                            - Risks, benefits and alternatives of procedure discussed with               patient and informed consent obtained.       Patient Position:   - Supine. --- Bladder ---   Prep and Drape:   - Patient prepped and draped in usual sterile fashion using povidone     iodine (Betadine).   Instruments:   - 16 Fr flexible cystoscope with 0 degree lens.   Procedure Details:   - Cystoscope passed under vision into bladder.   - Bladder and urethra examined in their entirety with findings as     above.     Conclusion:  1. UTI  2. Incomplete bladder emptying  3. BPH with obstruction  Urine culture today.  Rocephin 1 g IM today.  Doxycycline for 2 wk.  Will discuss TURP upon cardiology medical clearance  OK to resume Eliquis until we decide to do TURP.    Plan:  Patient was discharged home in a stable condition.  Medications: rocephin and doxy  Follow up:  2 wks

## 2019-07-18 NOTE — PATIENT INSTRUCTIONS
What to Expect After a Cystoscopy  For the next 24-48 hours, you may feel a mild burning when you urinate. This burning is normal and expected. Drink plenty of water to dilute the urine to help relieve the burning sensation. You may also see a small amount of blood in your urine after the procedure.    Unless you are already taking antibiotics, you may be given an antibiotic after the test to prevent infection.    Signs and Symptoms to Report  Call the [facility] at [phone] if you develop any of the following:  · Fever of 101 degrees or higher  · Chills or persistent bleeding  · Inability to urinate

## 2019-07-18 NOTE — INTERVAL H&P NOTE
The patient has been examined and the H&P has been reviewed:    I concur with the findings and no changes have occurred since H&P was written.    CT urogram  Diffuse wall thickening of the bladder, as well as rounded posterior filling defect measuring 2.5 cm, with considerations including nodular impression from the adjacent enlarged prostate, with intrinsic bladder lesion unable to be completely excluded.  Direct visualization with cystoscopy is recommended.    Decreased size of left kidney ; no evidence of solid mass, nephrolithiasis, or obstruction.    There are no hospital problems to display for this patient.

## 2019-07-19 ENCOUNTER — HOSPITAL ENCOUNTER (OUTPATIENT)
Dept: CARDIOLOGY | Facility: CLINIC | Age: 79
Discharge: HOME | End: 2019-07-19
Payer: MEDICARE

## 2019-07-19 ENCOUNTER — OFFICE VISIT (OUTPATIENT)
Dept: ELECTROPHYSIOLOGY | Facility: CLINIC | Age: 79
End: 2019-07-19
Payer: MEDICARE

## 2019-07-19 ENCOUNTER — PATIENT MESSAGE (OUTPATIENT)
Dept: NEUROLOGY | Facility: CLINIC | Age: 79
End: 2019-07-19

## 2019-07-19 ENCOUNTER — CLINICAL SUPPORT (OUTPATIENT)
Dept: CARDIOLOGY | Facility: HOSPITAL | Age: 79
End: 2019-07-19
Attending: INTERNAL MEDICINE
Payer: MEDICARE

## 2019-07-19 VITALS
HEART RATE: 60 BPM | SYSTOLIC BLOOD PRESSURE: 120 MMHG | WEIGHT: 157.44 LBS | BODY MASS INDEX: 25.3 KG/M2 | DIASTOLIC BLOOD PRESSURE: 61 MMHG | HEIGHT: 66 IN

## 2019-07-19 DIAGNOSIS — I25.5 ISCHEMIC CARDIOMYOPATHY: ICD-10-CM

## 2019-07-19 DIAGNOSIS — G30.9 MIXED DEMENTIA: Primary | ICD-10-CM

## 2019-07-19 DIAGNOSIS — Z95.810 ICD (IMPLANTABLE CARDIOVERTER-DEFIBRILLATOR) IN PLACE: ICD-10-CM

## 2019-07-19 DIAGNOSIS — I44.2 CHB (COMPLETE HEART BLOCK): ICD-10-CM

## 2019-07-19 DIAGNOSIS — Z95.810 AICD (AUTOMATIC CARDIOVERTER/DEFIBRILLATOR) PRESENT: ICD-10-CM

## 2019-07-19 DIAGNOSIS — F02.80 MIXED DEMENTIA: Primary | ICD-10-CM

## 2019-07-19 DIAGNOSIS — F01.50 MIXED DEMENTIA: Primary | ICD-10-CM

## 2019-07-19 DIAGNOSIS — I10 ESSENTIAL HYPERTENSION: Chronic | ICD-10-CM

## 2019-07-19 DIAGNOSIS — I25.10 CORONARY ARTERY DISEASE INVOLVING NATIVE CORONARY ARTERY OF NATIVE HEART WITHOUT ANGINA PECTORIS: Chronic | ICD-10-CM

## 2019-07-19 DIAGNOSIS — I42.8 OTHER CARDIOMYOPATHIES: ICD-10-CM

## 2019-07-19 DIAGNOSIS — Z79.01 CURRENT USE OF LONG TERM ANTICOAGULATION: ICD-10-CM

## 2019-07-19 DIAGNOSIS — E78.5 HYPERLIPIDEMIA WITH TARGET LDL LESS THAN 70: Chronic | ICD-10-CM

## 2019-07-19 LAB — BACTERIA UR CULT: ABNORMAL

## 2019-07-19 PROCEDURE — 99499 UNLISTED E&M SERVICE: CPT | Mod: S$GLB,,, | Performed by: INTERNAL MEDICINE

## 2019-07-19 PROCEDURE — 3074F PR MOST RECENT SYSTOLIC BLOOD PRESSURE < 130 MM HG: ICD-10-PCS | Mod: CPTII,S$GLB,, | Performed by: INTERNAL MEDICINE

## 2019-07-19 PROCEDURE — 99214 PR OFFICE/OUTPT VISIT, EST, LEVL IV, 30-39 MIN: ICD-10-PCS | Mod: S$GLB,,, | Performed by: INTERNAL MEDICINE

## 2019-07-19 PROCEDURE — 1100F PTFALLS ASSESS-DOCD GE2>/YR: CPT | Mod: CPTII,S$GLB,, | Performed by: INTERNAL MEDICINE

## 2019-07-19 PROCEDURE — 3078F PR MOST RECENT DIASTOLIC BLOOD PRESSURE < 80 MM HG: ICD-10-PCS | Mod: CPTII,S$GLB,, | Performed by: INTERNAL MEDICINE

## 2019-07-19 PROCEDURE — 3074F SYST BP LT 130 MM HG: CPT | Mod: CPTII,S$GLB,, | Performed by: INTERNAL MEDICINE

## 2019-07-19 PROCEDURE — 99999 PR PBB SHADOW E&M-EST. PATIENT-LVL III: CPT | Mod: PBBFAC,,, | Performed by: INTERNAL MEDICINE

## 2019-07-19 PROCEDURE — 3288F FALL RISK ASSESSMENT DOCD: CPT | Mod: CPTII,S$GLB,, | Performed by: INTERNAL MEDICINE

## 2019-07-19 PROCEDURE — 1100F PR PT FALLS ASSESS DOC 2+ FALLS/FALL W/INJURY/YR: ICD-10-PCS | Mod: CPTII,S$GLB,, | Performed by: INTERNAL MEDICINE

## 2019-07-19 PROCEDURE — 99999 PR PBB SHADOW E&M-EST. PATIENT-LVL III: ICD-10-PCS | Mod: PBBFAC,,, | Performed by: INTERNAL MEDICINE

## 2019-07-19 PROCEDURE — 3078F DIAST BP <80 MM HG: CPT | Mod: CPTII,S$GLB,, | Performed by: INTERNAL MEDICINE

## 2019-07-19 PROCEDURE — 93005 ELECTROCARDIOGRAM TRACING: CPT | Mod: S$GLB,,, | Performed by: INTERNAL MEDICINE

## 2019-07-19 PROCEDURE — 93283 PRGRMG EVAL IMPLANTABLE DFB: CPT

## 2019-07-19 PROCEDURE — 93010 ELECTROCARDIOGRAM REPORT: CPT | Mod: S$GLB,,, | Performed by: INTERNAL MEDICINE

## 2019-07-19 PROCEDURE — 93005 RHYTHM STRIP: ICD-10-PCS | Mod: S$GLB,,, | Performed by: INTERNAL MEDICINE

## 2019-07-19 PROCEDURE — 99499 RISK ADDL DX/OHS AUDIT: ICD-10-PCS | Mod: S$GLB,,, | Performed by: INTERNAL MEDICINE

## 2019-07-19 PROCEDURE — 93010 RHYTHM STRIP: ICD-10-PCS | Mod: S$GLB,,, | Performed by: INTERNAL MEDICINE

## 2019-07-19 PROCEDURE — 3288F PR FALLS RISK ASSESSMENT DOCUMENTED: ICD-10-PCS | Mod: CPTII,S$GLB,, | Performed by: INTERNAL MEDICINE

## 2019-07-19 PROCEDURE — 99214 OFFICE O/P EST MOD 30 MIN: CPT | Mod: S$GLB,,, | Performed by: INTERNAL MEDICINE

## 2019-07-19 NOTE — PROGRESS NOTES
Subjective:    Patient ID:  Mark Anthony Velarde Jr. is a 79 y.o. male who presents for evaluation of Atrial Fibrillation    Prior F Sherlyn Nelda pt    HPI   79 y.o. M  syncope with inducible VT -> ICD  high grade infraHis block  HTN on meds  HL on meds  ICM, CAD/PCI  PAF  dementia    He is RV paced 100%. CHB without escape.  Had a fall and hematuria 6/19; eliquis has been on hold but he's been cleared by Dr Cool (urology) to resume a/c. Dx UTI, BPH.    echo 1/17: 43% LVEF    My interpretation of today's ECG is APVP      Review of Systems   Constitution: Negative. Negative for malaise/fatigue.   HENT: Negative.  Negative for ear pain and tinnitus.    Eyes: Negative for blurred vision.   Cardiovascular: Negative.  Negative for chest pain, dyspnea on exertion, near-syncope, palpitations and syncope.   Respiratory: Negative.  Negative for shortness of breath.    Endocrine: Negative.  Negative for polyuria.   Hematologic/Lymphatic: Does not bruise/bleed easily.   Skin: Negative.  Negative for rash.   Musculoskeletal: Negative.  Negative for joint pain and muscle weakness.   Gastrointestinal: Negative.  Negative for abdominal pain and change in bowel habit.   Genitourinary: Negative for frequency.   Neurological: Negative.  Negative for dizziness and weakness.   Psychiatric/Behavioral: Positive for memory loss. Negative for depression. The patient is not nervous/anxious.    Allergic/Immunologic: Negative for environmental allergies.        Objective:    Physical Exam   Constitutional: He is oriented to person, place, and time. He appears well-developed and well-nourished.   HENT:   Head: Normocephalic and atraumatic.   Eyes: Conjunctivae, EOM and lids are normal. No scleral icterus.   Neck: Normal range of motion. No JVD present. No tracheal deviation present. No thyromegaly present.   Cardiovascular: Normal rate, regular rhythm, normal heart sounds and intact distal pulses.  No extrasystoles are present. PMI is not displaced. Exam  reveals no gallop and no friction rub.   No murmur heard.  Pulses:       Radial pulses are 2+ on the right side, and 2+ on the left side.   Pulmonary/Chest: Effort normal and breath sounds normal. No accessory muscle usage. No tachypnea. No respiratory distress. He has no wheezes. He has no rales.   Abdominal: Soft. Bowel sounds are normal. He exhibits no distension. There is no hepatosplenomegaly. There is no tenderness.   Musculoskeletal: Normal range of motion. He exhibits no edema.   Neurological: He is alert and oriented to person, place, and time. He has normal reflexes. He exhibits normal muscle tone.   Skin: Skin is warm and dry. No rash noted.   Psychiatric: He has a normal mood and affect. His behavior is normal.   Nursing note and vitals reviewed.        Assessment:       1. Mixed dementia    2. Coronary artery disease involving native coronary artery of native heart without angina pectoris    3. Essential hypertension    4. Hyperlipidemia with target LDL less than 70    5. ICD (implantable cardioverter-defibrillator) in place    6. Current use of long term anticoagulation    7. CHB (complete heart block)         Plan:       Restart eliquis.  Continue to follow ICD in ICD clinic. Consider addition of LV lead (and consider downgrade to PPM?) when ANGI reached.    Return in 1 year with echo, or earlier prn.

## 2019-07-23 ENCOUNTER — TELEPHONE (OUTPATIENT)
Dept: NEUROLOGY | Facility: CLINIC | Age: 79
End: 2019-07-23

## 2019-07-23 DIAGNOSIS — G31.83 LEWY BODY DEMENTIA WITHOUT BEHAVIORAL DISTURBANCE: Primary | ICD-10-CM

## 2019-07-23 DIAGNOSIS — F02.80 LEWY BODY DEMENTIA WITHOUT BEHAVIORAL DISTURBANCE: Primary | ICD-10-CM

## 2019-07-30 ENCOUNTER — OFFICE VISIT (OUTPATIENT)
Dept: UROLOGY | Facility: CLINIC | Age: 79
End: 2019-07-30
Payer: MEDICARE

## 2019-07-30 VITALS
SYSTOLIC BLOOD PRESSURE: 113 MMHG | HEART RATE: 64 BPM | WEIGHT: 157.44 LBS | BODY MASS INDEX: 25.3 KG/M2 | DIASTOLIC BLOOD PRESSURE: 64 MMHG | HEIGHT: 66 IN

## 2019-07-30 DIAGNOSIS — R33.9 INCOMPLETE BLADDER EMPTYING: ICD-10-CM

## 2019-07-30 DIAGNOSIS — N13.8 BPH WITH URINARY OBSTRUCTION: ICD-10-CM

## 2019-07-30 DIAGNOSIS — R31.29 HEMATURIA, MICROSCOPIC: Primary | ICD-10-CM

## 2019-07-30 DIAGNOSIS — N40.1 BPH WITH URINARY OBSTRUCTION: ICD-10-CM

## 2019-07-30 PROCEDURE — 99215 OFFICE O/P EST HI 40 MIN: CPT | Mod: 25,S$GLB,, | Performed by: UROLOGY

## 2019-07-30 PROCEDURE — 3288F PR FALLS RISK ASSESSMENT DOCUMENTED: ICD-10-PCS | Mod: CPTII,S$GLB,, | Performed by: UROLOGY

## 2019-07-30 PROCEDURE — 51798 PR MEAS,POST-VOID RES,US,NON-IMAGING: ICD-10-PCS | Mod: S$GLB,,, | Performed by: UROLOGY

## 2019-07-30 PROCEDURE — 3074F PR MOST RECENT SYSTOLIC BLOOD PRESSURE < 130 MM HG: ICD-10-PCS | Mod: CPTII,S$GLB,, | Performed by: UROLOGY

## 2019-07-30 PROCEDURE — 87086 URINE CULTURE/COLONY COUNT: CPT

## 2019-07-30 PROCEDURE — 51701 PR INSERTION OF NON-INDWELLING BLADDER CATHETERIZATION FOR RESIDUAL UR: ICD-10-PCS | Mod: S$GLB,,, | Performed by: UROLOGY

## 2019-07-30 PROCEDURE — 3288F FALL RISK ASSESSMENT DOCD: CPT | Mod: CPTII,S$GLB,, | Performed by: UROLOGY

## 2019-07-30 PROCEDURE — 3078F PR MOST RECENT DIASTOLIC BLOOD PRESSURE < 80 MM HG: ICD-10-PCS | Mod: CPTII,S$GLB,, | Performed by: UROLOGY

## 2019-07-30 PROCEDURE — 99999 PR PBB SHADOW E&M-EST. PATIENT-LVL IV: ICD-10-PCS | Mod: PBBFAC,,, | Performed by: UROLOGY

## 2019-07-30 PROCEDURE — 99999 PR PBB SHADOW E&M-EST. PATIENT-LVL IV: CPT | Mod: PBBFAC,,, | Performed by: UROLOGY

## 2019-07-30 PROCEDURE — 3078F DIAST BP <80 MM HG: CPT | Mod: CPTII,S$GLB,, | Performed by: UROLOGY

## 2019-07-30 PROCEDURE — 51798 US URINE CAPACITY MEASURE: CPT | Mod: S$GLB,,, | Performed by: UROLOGY

## 2019-07-30 PROCEDURE — 1100F PR PT FALLS ASSESS DOC 2+ FALLS/FALL W/INJURY/YR: ICD-10-PCS | Mod: CPTII,S$GLB,, | Performed by: UROLOGY

## 2019-07-30 PROCEDURE — 1100F PTFALLS ASSESS-DOCD GE2>/YR: CPT | Mod: CPTII,S$GLB,, | Performed by: UROLOGY

## 2019-07-30 PROCEDURE — 51701 INSERT BLADDER CATHETER: CPT | Mod: S$GLB,,, | Performed by: UROLOGY

## 2019-07-30 PROCEDURE — 3074F SYST BP LT 130 MM HG: CPT | Mod: CPTII,S$GLB,, | Performed by: UROLOGY

## 2019-07-30 PROCEDURE — 99215 PR OFFICE/OUTPT VISIT, EST, LEVL V, 40-54 MIN: ICD-10-PCS | Mod: 25,S$GLB,, | Performed by: UROLOGY

## 2019-07-30 RX ORDER — TAMSULOSIN HYDROCHLORIDE 0.4 MG/1
0.4 CAPSULE ORAL NIGHTLY
Qty: 90 CAPSULE | Refills: 11 | Status: SHIPPED | OUTPATIENT
Start: 2019-07-30 | End: 2019-07-30 | Stop reason: SDUPTHER

## 2019-07-30 RX ORDER — TAMSULOSIN HYDROCHLORIDE 0.4 MG/1
0.4 CAPSULE ORAL NIGHTLY
Qty: 90 CAPSULE | Refills: 11 | Status: SHIPPED | OUTPATIENT
Start: 2019-07-30 | End: 2020-02-11 | Stop reason: SDUPTHER

## 2019-07-30 NOTE — PROGRESS NOTES
CHIEF COMPLAINT:    Mr. Velarde is a 79 y.o. male presenting to discuss his incomplete bladder emptying, UTI, BPH with obstruciton   PRESENTING ILLNESS:    Mark Anthony Velarde Jr. is a 79 y.o. male with a PMH of dementia, CAD, HTN, CKD, nephrolithiasis who presents to discuss possible prostate surgery.  Hx of Parkinson's disease.  He presents with his wife who helps give the history.    Patient was seen by Yasemin Uribe for evaluation of gross hematuria and urinary retention.    I ended up seeing him for cysto related to hematuria and found that he has a quite enlarged prostate with obstruction.  Procedure Date:  07/18/2019    Procedure:  Male Diagnostic Cystourethroscopy    Pre-op diagnosis: gross hematuria, incomplete bladder emptying, BPH with obstruction, UTI  Surgeon:  Sal Cool MD  Findings:  Urethra:  Normal urethra.   Sphincter: competent.  Prostate: Estimated Length Prostatic Urethra: 6 cm with trilobar obstruction, moderate size intravesical lobe obstruction  Bladder neck: patent with no stricture  Bladder:  Normal bladder.   Normal ureteral orifices bilaterally.   Severe trabeculation with sacculation.   I and O cath: over 200 ml cloudy urine noted.  Conclusion:  1. UTI  2. Incomplete bladder emptying  3. BPH with obstruction  Urine culture today.  Rocephin 1 g IM today.  Doxycycline for 2 wk.  Will discuss TURP upon cardiology medical clearance  OK to resume Eliquis until we decide to do TURP.    Urine culture from 7/18/19 grew COAGULASE-NEGATIVE STAPHYLOCOCCUS SPECIES.    His wife and pt reports that he has been voiding well.  He is back on flomax and has taken Avodart in addition since cysto exam.  He has no urge to void and was not able to give urine sample today.  Thus I did a PVR check by bladder scan.  It revealed PVR of 378 ml per bladder scan.    Previous history:  He had an unwitnessed fall yesterday and presented to the ED on 6/19/19.    When he first gave a urine sample his urine was yellow.   Right before leaving he had to void again and his urine was really red.  A bladder scan was done (>300 mL) and a catheter was placed.   He was on eliquis at the time of the fall but has since stopped it.      He was previously on pradaxa.   He tripped over a stepping stone the week prior to this last fall.  His son witnessed the fall and was able to assist him up.  He did not seek medical attention.     Today he presents for a voiding trial. This morning his urine is yellow.  He experienced hematuria over the weekend.   He does not have any urinary complaints today.  His catheter has been draining well.      He had a CT urogram yesterday as part of his hematuria work up.      CT urogram 6/24/19:   No solid renal masses, nephrolithiasis, or hydronephrosis.  There is a small right simple renal cyst.  The ureters are normal in course and caliber.  The bladder is collapsed with diffuse wall thickening, which can be seen with chronic outlet obstruction as well as cystitis.  Abdi catheter in position.  Just posterior to the Abdi catheter is a round filling defect measuring approximately 2.5 cm.  This is favored to represent nodular impression from enlarged prostate, but cannot rule out intrinsic bladder lesion.  Reproductive organs: Prostatomegaly.    REVIEW OF SYSTEMS:    Constitutional: Negative for fever and chills.   HENT: Negative for hearing loss.   Eyes: Negative for visual disturbance.   Respiratory: Negative for shortness of breath.   Cardiovascular: Negative for chest pain.   Gastrointestinal: Negative for vomiting, and constipation.   Genitourinary: See HPI  Neurological: Negative for dizziness.   Hematological: Does not bruise/bleed easily.   Psychiatric/Behavioral: Positive for confusion.       PATIENT HISTORY:    Past Medical History:   Diagnosis Date    *Atrial fibrillation     Anticoagulant long-term use     Atrial fibrillation - asymptomatic but noted on ICD interrogation ([id number]) - 16 h 40  minutes of afib 2/13/2013    Atrial flutter with controlled response - seen on ICD interrogation - asymptomatic 2/13/2013    Automatic implantable cardioverter-defibrillator in situ 2/13/2013    Basal cell carcinoma     mid forehead    CAD (coronary artery disease) 2/13/2013    Cardiomyopathy     Cataract     Cognitive deficits     mild    H/O syncope -  5/27/2013    History of PTCA - LAD, LCX and PDA PCI in 2006 2/13/2013    HTN (hypertension) 2/13/2013    Hyperlipidemia LDL goal < 70 2/13/2013    Hypertension     ICD (implantable cardiac defibrillator) in place 2/13/2013    Ischemic cardiomyopathy LVEF ~45% 2/13/2013    LBBB (left bundle branch block) 2/13/2013    Memory loss     Psoriasis vulgaris     Syncope and collapse        Past Surgical History:   Procedure Laterality Date    CARDIAC DEFIBRILLATOR PLACEMENT      CATARACT EXTRACTION W/  INTRAOCULAR LENS IMPLANT Right 04/04/16    Dr Michael     CATARACT EXTRACTION W/  INTRAOCULAR LENS IMPLANT Left 05/09/2016    ESOPHAGOGASTRODUODENOSCOPY (EGD) N/A 7/3/2017    Performed by Dee Dee Castillo [facility] at [facility] ENDO (4TH FLR)    INSERTION-INTRAOCULAR LENS (IOL) Left 5/9/2016    Performed by Jud [facility] at [facility] OR    INSERTION-INTRAOCULAR LENS (IOL) Right 4/4/2016    Performed by Jud [facility] at [facility] OR    PHACOEMULSIFICATION-ASPIRATION-CATARACT Left 5/9/2016    Performed by Jud [facility] at [facility] OR    PHACOEMULSIFICATION-ASPIRATION-CATARACT Right 4/4/2016    Performed by Jud [facility] at [facility] OR    REPLACEMENT-GENERATOR-ICD N/A 7/26/2017    Performed by Maurice Ewing MD at [facility] CATH LAB    SKIN BIOPSY      TONSILLECTOMY         Family History   Problem Relation Age of Onset    Psoriasis Father     Dementia Father     Tremor Father     Cataracts Mother     Cancer Mother     Psoriasis Son     Psoriasis Son     Diabetes Maternal Grandmother     Kidney disease Sister     No Known Problems Brother     No Known  Problems Maternal Aunt     No Known Problems Maternal Uncle     No Known Problems Paternal Aunt     No Known Problems Paternal Uncle     No Known Problems Maternal Grandfather     No Known Problems Paternal Grandmother     No Known Problems Paternal Grandfather     Amblyopia Neg Hx     Blindness Neg Hx     Glaucoma Neg Hx     Hypertension Neg Hx     Macular degeneration Neg Hx     Retinal detachment Neg Hx     Strabismus Neg Hx     Stroke Neg Hx     Thyroid disease Neg Hx     Parkinsonism Neg Hx     Celiac disease Neg Hx     Cirrhosis Neg Hx     Colon cancer Neg Hx     Colon polyps Neg Hx     Crohn's disease Neg Hx     Cystic fibrosis Neg Hx     Esophageal cancer Neg Hx     Irritable bowel syndrome Neg Hx     Inflammatory bowel disease Neg Hx     Hemochromatosis Neg Hx     Liver cancer Neg Hx     Liver disease Neg Hx     Rectal cancer Neg Hx     Stomach cancer Neg Hx     Ulcerative colitis Neg Hx     Christiano's disease Neg Hx     Lymphoma Neg Hx     Scleroderma Neg Hx     Rheum arthritis Neg Hx     Multiple sclerosis Neg Hx     Melanoma Neg Hx     Tuberculosis Neg Hx     Lupus Neg Hx        Social History     Socioeconomic History    Marital status:      Spouse name: Not on file    Number of children: Not on file    Years of education: Not on file    Highest education level: Not on file   Occupational History    Occupation: retired   Social Needs    Financial resource strain: Not hard at all    Food insecurity:     Worry: Never true     Inability: Never true    Transportation needs:     Medical: No     Non-medical: No   Tobacco Use    Smoking status: Former Smoker     Last attempt to quit: 1963     Years since quittin.8    Smokeless tobacco: Never Used   Substance and Sexual Activity    Alcohol use: No     Frequency: Monthly or less     Drinks per session: 1 or 2     Binge frequency: Never    Drug use: No    Sexual activity: Not on file   Lifestyle     Physical activity:     Days per week: 6 days     Minutes per session: 50 min    Stress: Not at all   Relationships    Social connections:     Talks on phone: Once a week     Gets together: Once a week     Attends [employer] service: Not on file     Active member of club or organization: Yes     Attends meetings of clubs or organizations: More than 4 times per year     Relationship status:    Other Topics Concern    Not on file   Social History Narrative    Not on file       Allergies:  Arb-angiotensin receptor antagonist and Lisinopril    Medications:    Current Outpatient Medications:     amiodarone (PACERONE) 100 MG Tab, Take 1 tablet (100 mg total) by mouth once daily., Disp: 90 tablet, Rfl: 3    amLODIPine (NORVASC) 5 MG tablet, Take 1 tablet (5 mg total) by mouth once daily., Disp: 90 tablet, Rfl: 3    apixaban (ELIQUIS) 2.5 mg Tab, Take 1 tablet (2.5 mg total) by mouth 2 (two) times daily., Disp: 60 tablet, Rfl: 11    aspirin (ECOTRIN) 81 MG EC tablet, Take 81 mg by mouth once daily., Disp: , Rfl:     atorvastatin (LIPITOR) 40 MG tablet, Take 1 tablet (40 mg total) by mouth once daily., Disp: 90 tablet, Rfl: 3    dutasteride (AVODART) 0.5 mg capsule, Take 1 capsule (0.5 mg total) by mouth once daily., Disp: 30 capsule, Rfl: 11    memantine (NAMENDA) 10 MG Tab, Take 1 tablet (10 mg total) by mouth 2 (two) times daily., Disp: 180 tablet, Rfl: 3    metoprolol tartrate (LOPRESSOR) 25 MG tablet, Take 0.5 tablets (12.5 mg total) by mouth 2 (two) times daily., Disp: 90 tablet, Rfl: 3    multivitamin (MULTIVITAMIN) per tablet, Take 1 tablet by mouth once daily., Disp: , Rfl:     omeprazole (PRILOSEC) 20 MG capsule, Take 1 capsule (20 mg total) by mouth once daily., Disp: 90 capsule, Rfl: 3    tamsulosin (FLOMAX) 0.4 mg Cap, Take 1 capsule (0.4 mg total) by mouth every evening., Disp: 90 capsule, Rfl: 11    PHYSICAL EXAMINATION:    Constitutional: He appears well-developed and well-nourished.   He is in no apparent distress.    Eyes: No scleral icterus noted bilaterally. No discharge bilaterally.    Nose: No rhinorrhea    Cardiovascular: Normal rate.  No pitting edema noted in lower extremities bilaterally    Pulmonary/Chest: Effort normal. No respiratory distress.     Abdominal:  He exhibits no distension.     Lymphadenopathy:        Right: No supraclavicular adenopathy present.        Left: No supraclavicular adenopathy present.     Neurological: He is alert and oriented to person, place, and time.     Skin: Skin is warm and dry.     Psych: Cooperative with normal affect.    Physical Exam      LABS:      Lab Results   Component Value Date    PSA 1.8 08/04/2014    PSA 4.3 (H) 07/02/2014    PSA 1.97 06/12/2013    PSA 1.5 10/14/2010    PSA 1.7 04/01/2009    PSA 2.7 11/14/2006    PSA 2.4 06/24/2005     Procedure  PVR per bladder scan: 378 ml  Cath  ml    IMPRESSION:    Hematuria, microscopic  -     Urine culture  -     US Retroperitoneal Complete (Kidney and; Future; Expected date: 07/30/2019    BPH with urinary obstruction  -     Discontinue: tamsulosin (FLOMAX) 0.4 mg Cap; Take 1 capsule (0.4 mg total) by mouth every evening.  Dispense: 90 capsule; Refill: 11  -     tamsulosin (FLOMAX) 0.4 mg Cap; Take 1 capsule (0.4 mg total) by mouth every evening.  Dispense: 90 capsule; Refill: 11  -     US Retroperitoneal Complete (Kidney and; Future; Expected date: 07/30/2019    Incomplete bladder emptying  -     US Retroperitoneal Complete (Kidney and; Future; Expected date: 07/30/2019    I answered all questions regarding his condition from his wife.  She basically expressed that pt and his family do not want to undergo surgery given his mental and medical conditions.    If I were to consider prostate surgery ( TURP, laser TURP, Rezum Therapy), I feel that I need to do SUDS first to make sure that he has a good bladder function.  Because he has no urge to void even with high PVR for 380 ml, I am concerned  about bladder dysfunction as well as enlarged prostate resulting in incomplete bladder emptying.    At the end, we decided to follow up as he continues Avodart and Flomax.  90 days refills given.  Urine culture today.  Will see him in 6 months with US.  I spent 40 minutes with the patient of which more than half was spent in direct consultation with the patient in regards to our treatment and plan.    PLAN:    Follow up in about 6 months (around 1/30/2020), or US.

## 2019-07-31 LAB — BACTERIA UR CULT: NO GROWTH

## 2019-08-22 ENCOUNTER — HOSPITAL ENCOUNTER (OUTPATIENT)
Dept: RADIOLOGY | Facility: HOSPITAL | Age: 79
Discharge: HOME | End: 2019-08-22
Attending: UROLOGY
Payer: MEDICARE

## 2019-08-22 DIAGNOSIS — R33.9 INCOMPLETE BLADDER EMPTYING: ICD-10-CM

## 2019-08-22 DIAGNOSIS — N40.1 BPH WITH URINARY OBSTRUCTION: ICD-10-CM

## 2019-08-22 DIAGNOSIS — N13.8 BPH WITH URINARY OBSTRUCTION: ICD-10-CM

## 2019-08-22 DIAGNOSIS — R31.29 HEMATURIA, MICROSCOPIC: ICD-10-CM

## 2019-08-22 PROCEDURE — 76770 US RETROPERITONEAL COMPLETE: ICD-10-PCS | Mod: 26,,, | Performed by: RADIOLOGY

## 2019-08-22 PROCEDURE — 76770 US EXAM ABDO BACK WALL COMP: CPT | Mod: 26,,, | Performed by: RADIOLOGY

## 2019-08-22 PROCEDURE — 76770 US EXAM ABDO BACK WALL COMP: CPT | Mod: TC

## 2019-09-04 ENCOUNTER — RESEARCH ENCOUNTER (OUTPATIENT)
Dept: RESEARCH | Facility: HOSPITAL | Age: 79
End: 2019-09-04

## 2019-09-04 ENCOUNTER — OFFICE VISIT (OUTPATIENT)
Dept: NEUROLOGY | Facility: CLINIC | Age: 79
End: 2019-09-04
Payer: MEDICARE

## 2019-09-04 DIAGNOSIS — F02.80 LEWY BODY DEMENTIA WITHOUT BEHAVIORAL DISTURBANCE: Primary | ICD-10-CM

## 2019-09-04 DIAGNOSIS — G31.83 LEWY BODY DEMENTIA WITHOUT BEHAVIORAL DISTURBANCE: Primary | ICD-10-CM

## 2019-09-04 DIAGNOSIS — G20.C PARKINSONISM, UNSPECIFIED PARKINSONISM TYPE: ICD-10-CM

## 2019-09-04 DIAGNOSIS — G30.9 MIXED DEMENTIA: ICD-10-CM

## 2019-09-04 DIAGNOSIS — F02.80 MIXED DEMENTIA: ICD-10-CM

## 2019-09-04 DIAGNOSIS — F01.50 MIXED DEMENTIA: ICD-10-CM

## 2019-09-04 PROCEDURE — 1100F PTFALLS ASSESS-DOCD GE2>/YR: CPT | Mod: CPTII,S$GLB,, | Performed by: PSYCHIATRY & NEUROLOGY

## 2019-09-04 PROCEDURE — 99999 PR PBB SHADOW E&M-EST. PATIENT-LVL II: ICD-10-PCS | Mod: PBBFAC,,,

## 2019-09-04 PROCEDURE — 99499 RISK ADDL DX/OHS AUDIT: ICD-10-PCS | Mod: S$GLB,,, | Performed by: CLINICAL NEUROPSYCHOLOGIST

## 2019-09-04 PROCEDURE — 99499 UNLISTED E&M SERVICE: CPT | Mod: S$GLB,,, | Performed by: CLINICAL NEUROPSYCHOLOGIST

## 2019-09-04 PROCEDURE — 99215 PR OFFICE/OUTPT VISIT, EST, LEVL V, 40-54 MIN: ICD-10-PCS | Mod: S$GLB,,, | Performed by: PSYCHIATRY & NEUROLOGY

## 2019-09-04 PROCEDURE — 96138 PSYCL/NRPSYC TECH 1ST: CPT | Mod: S$GLB,,, | Performed by: CLINICAL NEUROPSYCHOLOGIST

## 2019-09-04 PROCEDURE — 1100F PR PT FALLS ASSESS DOC 2+ FALLS/FALL W/INJURY/YR: ICD-10-PCS | Mod: CPTII,S$GLB,, | Performed by: PSYCHIATRY & NEUROLOGY

## 2019-09-04 PROCEDURE — 96132 PR NEUROPSYCHOLOGIC TEST EVAL SVCS, 1ST HR: ICD-10-PCS | Mod: S$GLB,,, | Performed by: CLINICAL NEUROPSYCHOLOGIST

## 2019-09-04 PROCEDURE — 3288F PR FALLS RISK ASSESSMENT DOCUMENTED: ICD-10-PCS | Mod: CPTII,S$GLB,, | Performed by: PSYCHIATRY & NEUROLOGY

## 2019-09-04 PROCEDURE — 96138 PR PSYCH/NEUROPSYCH TEST ADMIN/SCORING, BY TECH, 2+ TESTS, 1ST 30 MIN: ICD-10-PCS | Mod: S$GLB,,, | Performed by: CLINICAL NEUROPSYCHOLOGIST

## 2019-09-04 PROCEDURE — 99999 PR PBB SHADOW E&M-EST. PATIENT-LVL II: CPT | Mod: PBBFAC,,,

## 2019-09-04 PROCEDURE — 96116 NUBHVL XM PHYS/QHP 1ST HR: CPT | Mod: S$GLB,,, | Performed by: CLINICAL NEUROPSYCHOLOGIST

## 2019-09-04 PROCEDURE — 96132 NRPSYC TST EVAL PHYS/QHP 1ST: CPT | Mod: S$GLB,,, | Performed by: CLINICAL NEUROPSYCHOLOGIST

## 2019-09-04 PROCEDURE — 99499 UNLISTED E&M SERVICE: CPT | Mod: S$GLB,,, | Performed by: NURSE PRACTITIONER

## 2019-09-04 PROCEDURE — 99215 OFFICE O/P EST HI 40 MIN: CPT | Mod: S$GLB,,, | Performed by: PSYCHIATRY & NEUROLOGY

## 2019-09-04 PROCEDURE — 3288F FALL RISK ASSESSMENT DOCD: CPT | Mod: CPTII,S$GLB,, | Performed by: PSYCHIATRY & NEUROLOGY

## 2019-09-04 PROCEDURE — 96116 PR NEUROBEHAVIORAL STATUS EXAM BY PSYCH/PHYS: ICD-10-PCS | Mod: S$GLB,,, | Performed by: CLINICAL NEUROPSYCHOLOGIST

## 2019-09-04 PROCEDURE — 99499 RISK ADDL DX/OHS AUDIT: ICD-10-PCS | Mod: S$GLB,,, | Performed by: NURSE PRACTITIONER

## 2019-09-04 NOTE — PROGRESS NOTES
Name: Mark Anthony Velarde Jr.  MRN: [id number]   [facility]: [id number]      Date: 9-4-19      Referring physician:  No referring provider defined for this encounter.    Subjective:      Chief Complaint: memory    History of Present Illness (HPI):    Mark Anthony Velarde Jr. is a 79 y.o. right-handed male who presents today for a follow-up evaluation of DLB and is accompanied by son and wife of 59 years. Last seen in [facility] 11-7-18.     She says he is not doing well. Son says big changes. Wife says he fell twice in June. She feels he has gone down hill since then. Had hairline fracture of wrist and put in brace, which he immediately took off.     Son thinks he declined since he was stopped on donepezil. Wife says this was stopped on   May 29.      He cont to struggle with finding things in the house. He often points to objects. He used to fix himself a sandwich. Now, he struggles with getting all the items out. She now     Son concerned about his mom as it is no longer felt safe for his father to be alone.     Wife says he is also incontinent now. Seeing urology. Surgery recommended but due to dementia they have been reluctant. She has not had him wear Depends as he has said no.       Review of Systems   Unable to perform ROS: Dementia       Past Medical History: The patient  has a past medical history of *Atrial fibrillation, Anticoagulant long-term use, Atrial fibrillation - asymptomatic but noted on ICD interrogation ([id number]) - 16 h 40 minutes of afib (2/13/2013), Atrial flutter with controlled response - seen on ICD interrogation - asymptomatic (2/13/2013), Automatic implantable cardioverter-defibrillator in situ (2/13/2013), Basal cell carcinoma, CAD (coronary artery disease) (2/13/2013), Cardiomyopathy, Cataract, Cognitive deficits, H/O syncope -  (5/27/2013), History of PTCA - LAD, LCX and PDA PCI in 2006 (2/13/2013), HTN (hypertension) (2/13/2013), Hyperlipidemia LDL goal < 70 (2/13/2013), Hypertension,  ICD (implantable cardiac defibrillator) in place (2/13/2013), Ischemic cardiomyopathy LVEF ~45% (2/13/2013), LBBB (left bundle branch block) (2/13/2013), Memory loss, Psoriasis vulgaris, and Syncope and collapse.    Social History: The patient  reports that he quit smoking about 55 years ago. He has never used smokeless tobacco. He reports that he does not drink alcohol or use drugs.    Family History: Their family history includes Cancer in his mother; Cataracts in his mother; Dementia in his father; Diabetes in his maternal grandmother; Kidney disease in his sister; No Known Problems in his brother, maternal aunt, maternal grandfather, maternal uncle, paternal aunt, paternal grandfather, paternal grandmother, and paternal uncle; Psoriasis in his father, son, and son; Tremor in his father.    Allergies: Arb-angiotensin receptor antagonist and Lisinopril     Meds:   Current Outpatient Medications on File Prior to Visit   Medication Sig Dispense Refill    amiodarone (PACERONE) 100 MG Tab Take 1 tablet (100 mg total) by mouth once daily. 90 tablet 3    amLODIPine (NORVASC) 5 MG tablet Take 1 tablet (5 mg total) by mouth once daily. 90 tablet 3    apixaban (ELIQUIS) 2.5 mg Tab Take 1 tablet (2.5 mg total) by mouth 2 (two) times daily. 60 tablet 11    aspirin (ECOTRIN) 81 MG EC tablet Take 81 mg by mouth once daily.      atorvastatin (LIPITOR) 40 MG tablet Take 1 tablet (40 mg total) by mouth once daily. 90 tablet 3    dutasteride (AVODART) 0.5 mg capsule Take 1 capsule (0.5 mg total) by mouth once daily. 30 capsule 11    memantine (NAMENDA) 10 MG Tab Take 1 tablet (10 mg total) by mouth 2 (two) times daily. 180 tablet 3    metoprolol tartrate (LOPRESSOR) 25 MG tablet Take 0.5 tablets (12.5 mg total) by mouth 2 (two) times daily. 90 tablet 3    multivitamin (MULTIVITAMIN) per tablet Take 1 tablet by mouth once daily.      omeprazole (PRILOSEC) 20 MG capsule Take 1 capsule (20 mg total) by mouth once daily. 90  capsule 3    tamsulosin (FLOMAX) 0.4 mg Cap Take 1 capsule (0.4 mg total) by mouth every evening. 90 capsule 11     No current facility-administered medications on file prior to visit.        Objective:     Physical Exam:      Constitutional  Looks a bit more frail since last visit. Well-nourished, appears stated age     Bright and smiling today.   RR equal and unlabored.   Note of near mild rest tremor BH.   WC today.     Laboratory Results:  Office Visit on 07/30/2019   Component Date Value Ref Range Status    Urine Culture, Routine 07/30/2019 No growth   Final   Hospital Outpatient Visit on 07/18/2019   Component Date Value Ref Range Status    Urine Culture, Routine 07/18/2019 *  Final                    Value:COAGULASE-NEGATIVE STAPHYLOCOCCUS SPECIES  > 100,000 cfu/ml  Susceptibility testing not routinely performed.     Admission on 06/19/2019, Discharged on 06/19/2019   Component Date Value Ref Range Status    WBC 06/19/2019 8.35  3.90 - 12.70 K/uL Final    RBC 06/19/2019 4.27* 4.60 - 6.20 M/uL Final    Hemoglobin 06/19/2019 13.0* 14.0 - 18.0 g/dL Final    Hematocrit 06/19/2019 40.6  40.0 - 54.0 % Final    Mean Corpuscular Volume 06/19/2019 95  82 - 98 fL Final    Mean Corpuscular Hemoglobin 06/19/2019 30.4  27.0 - 31.0 pg Final    Mean Corpuscular Hemoglobin Conc 06/19/2019 32.0  32.0 - 36.0 g/dL Final    RDW 06/19/2019 15.0* 11.5 - 14.5 % Final    Platelets 06/19/2019 236  150 - 350 K/uL Final    MPV 06/19/2019 9.7  9.2 - 12.9 fL Final    Immature Granulocytes 06/19/2019 0.5  0.0 - 0.5 % Final    Gran # (ANC) 06/19/2019 6.5  1.8 - 7.7 K/uL Final    Immature Grans (Abs) 06/19/2019 0.04  0.00 - 0.04 K/uL Final    Lymph # 06/19/2019 0.8* 1.0 - 4.8 K/uL Final    Mono # 06/19/2019 0.7  0.3 - 1.0 K/uL Final    Eos # 06/19/2019 0.2  0.0 - 0.5 K/uL Final    Baso # 06/19/2019 0.05  0.00 - 0.20 K/uL Final    nRBC 06/19/2019 0  0 /100 WBC Final    Gran% 06/19/2019 78.0* 38.0 - 73.0 % Final     Lymph% 06/19/2019 9.6* 18.0 - 48.0 % Final    Mono% 06/19/2019 8.7  4.0 - 15.0 % Final    Eosinophil% 06/19/2019 2.6  0.0 - 8.0 % Final    Basophil% 06/19/2019 0.6  0.0 - 1.9 % Final    Differential Method 06/19/2019 Automated   Final    BNP 06/19/2019 109* 0 - 99 pg/mL Final    Sodium 06/19/2019 140  136 - 145 mmol/L Final    Potassium 06/19/2019 4.5  3.5 - 5.1 mmol/L Final    Chloride 06/19/2019 108  95 - 110 mmol/L Final    CO2 06/19/2019 23  23 - 29 mmol/L Final    Glucose 06/19/2019 86  70 - 110 mg/dL Final    BUN, Bld 06/19/2019 20  8 - 23 mg/dL Final    Creatinine 06/19/2019 1.3  0.5 - 1.4 mg/dL Final    Calcium 06/19/2019 9.1  8.7 - 10.5 mg/dL Final    Total Protein 06/19/2019 6.5  6.0 - 8.4 g/dL Final    Albumin 06/19/2019 3.5  3.5 - 5.2 g/dL Final    Total Bilirubin 06/19/2019 0.5  0.1 - 1.0 mg/dL Final    Alkaline Phosphatase 06/19/2019 96  55 - 135 U/L Final    AST 06/19/2019 22  10 - 40 U/L Final    ALT 06/19/2019 17  10 - 44 U/L Final    Anion Gap 06/19/2019 9  8 - 16 mmol/L Final    eGFR if  06/19/2019 59.2* >60 mL/min/1.73 m^2 Final    eGFR if non African American 06/19/2019 51.2* >60 mL/min/1.73 m^2 Final    Troponin I 06/19/2019 0.013  0.000 - 0.026 ng/mL Final    Specimen UA 06/19/2019 Urine, Clean Catch   Final    Color, UA 06/19/2019 Yellow  Yellow, Straw, Laura Final    Appearance, UA 06/19/2019 Clear  Clear Final    pH, UA 06/19/2019 6.0  5.0 - 8.0 Final    Specific [place], UA 06/19/2019 1.010  1.005 - 1.030 Final    Protein, UA 06/19/2019 Negative  Negative Final    Glucose, UA 06/19/2019 Negative  Negative Final    Ketones, UA 06/19/2019 Negative  Negative Final    Bilirubin (UA) 06/19/2019 Negative  Negative Final    Occult Blood UA 06/19/2019 2+* Negative Final    Nitrite, UA 06/19/2019 Negative  Negative Final    Leukocytes, UA 06/19/2019 Negative  Negative Final    RBC, UA 06/19/2019 19* 0 - 4 /hpf Final    WBC, UA 06/19/2019 4  0 - 5  /hpf Final    Hyaline Casts, UA 06/19/2019 1  0-1/lpf /lpf Final    Microscopic Comment 06/19/2019 SEE COMMENT   Final         Assessment and Plan     Lewy body dementia without behavioral disturbance  -     Ambulatory Referral to Palliative Care    Mixed dementia    Parkinsonism, unspecified Parkinsonism type        Medical Decision Making:    Discussed their concerns about being off donepezil again. I spoke with Mrs. Hopson in July about my concerns about donepezil, amiodarone and his overall heart health. I messaged his cardiologist at that point to get his opinion. He responded to his wife. I did not see this messgae until wife mentioned it today. Dr. Benitez did not see it as much of a problem since he has ICD. Discussed option of re-starting donepezil vs staying off given his advanced stage. For now, they prefer to stay off donepezil. I agree.     Discussed behavior modifications.   Discussed looking into assisted living vs sitters in home.     Discussed [facility]. They are very interested and were able to meet with them today.     Wife is overwhelmed with all of today's discussion. Will also refer to Dr. Claudette Hernandez with palliative care.     Follow up 3 months in memory clinic.         I spent 70 minutes face-to-face with the patient and family with 100% of the time spent with counseling and education regarding:  - results of data, diagnosis, and recommendations stated above  - the prognosis of DLB  - risks and benefits of donepezil  - importance of diet and exercise    Yazmin Israel, CARLTON, NP-C  Division of Movement and Memory Disorders  [facility]  [phone]

## 2019-09-04 NOTE — PROGRESS NOTES
"NEUROBEHAVIORAL STATUS EXAMINATION   MEMORY DISORDERS CLINIC    MEDICAL NECESSITY: Evaluate cognitive and neuropsychiatric symptoms in the setting of dementia that has notably worsened in the past 6-months. See billing below    HISTORY OF PRESENT ILLNESS: Mr. 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 He established care with [facility] in 2014 at the recommendation of his family. He scored 19/30 on the MoCA at that time, suggesting MCI vs dementia. Dr. Israel noted parkinsonism on exam in 1/2017, suggesting DLB as a possibility given the course of his symptoms. He scored 17/30 on the MoCA in 2/2017. At the 2018 Memory Clinic Evaluation, his MoCA was 13/30 and SHIRLEY was 12/18 and he was noted to have Moderate Stage LBD. Review that note for recommendations/plan.      Presently, Mr. Anthony wife and son joined for the follow-up appointment. Cognitively, son and wife reported "big changes" in the past year with a much more accelerated cognitive/functional decline. Additionally, since they stopped donepezil in May 2019 (2/2 cardiac risk) they feel his cognition has declined more quickly. Wife described need for more prompting and one step instructions c/w a now advanced dementia stage.     Neuropsychiatrically, there are no major issues reported on today.     Physically, review Dr. Israel's note for recent falls this summer requiring ER visit.     Functionally, wife and son have noted a more significant decline this year when compared to last few years. Specifically, he liveswith his wife, but cannot be left alone for more than an hour or two. They have 5 children, one of which lives just a few blocks away.     Medication Compliance: She now administers  Appointment Management: Wife manages   Financial Management: Wife exclusively manages    Cooking: He used to make a sandwich, but " wife needs to prompt him now with various pieces to help make a sandwich. She also needs to prompt to eat.   Driving: No longer driving in the past 12-months  Hobbies: He has maintained a vegetable garden for the past 40 years and wife notes much more confusion now in the past year.  Basic ADLs: Wife has to remind him on occasion, but his balance seems fine in the shower. He is having more incontinence which is being managed by Urology.    CURRENT MEDICATIONS:    Current Outpatient Medications:     amiodarone (PACERONE) 100 MG Tab, Take 1 tablet (100 mg total) by mouth once daily., Disp: 90 tablet, Rfl: 3    amLODIPine (NORVASC) 5 MG tablet, Take 1 tablet (5 mg total) by mouth once daily., Disp: 90 tablet, Rfl: 3    apixaban (ELIQUIS) 2.5 mg Tab, Take 1 tablet (2.5 mg total) by mouth 2 (two) times daily., Disp: 60 tablet, Rfl: 11    aspirin (ECOTRIN) 81 MG EC tablet, Take 81 mg by mouth once daily., Disp: , Rfl:     atorvastatin (LIPITOR) 40 MG tablet, Take 1 tablet (40 mg total) by mouth once daily., Disp: 90 tablet, Rfl: 3    dutasteride (AVODART) 0.5 mg capsule, Take 1 capsule (0.5 mg total) by mouth once daily., Disp: 30 capsule, Rfl: 11    memantine (NAMENDA) 10 MG Tab, Take 1 tablet (10 mg total) by mouth 2 (two) times daily., Disp: 180 tablet, Rfl: 3    metoprolol tartrate (LOPRESSOR) 25 MG tablet, Take 0.5 tablets (12.5 mg total) by mouth 2 (two) times daily., Disp: 90 tablet, Rfl: 3    multivitamin (MULTIVITAMIN) per tablet, Take 1 tablet by mouth once daily., Disp: , Rfl:     omeprazole (PRILOSEC) 20 MG capsule, Take 1 capsule (20 mg total) by mouth once daily., Disp: 90 capsule, Rfl: 3    tamsulosin (FLOMAX) 0.4 mg Cap, Take 1 capsule (0.4 mg total) by mouth every evening., Disp: 90 capsule, Rfl: 11       BEHAVIORAL OBSERVATIONS/TEST RESULTS: Mr. Velarde was not oriented to time and variably to place. fully oriented to time. His total score of 10/30 on the MoCA was suggestive of significant  cognitive dysfunction and has declined 3-points. Mr. Anthony total score of 9/18 on the SHIRLEY was indicative of worsening fronto-subcortical dysfunction. He demonstrated intact letter verbal fluency and conceptual reasoning. He was unable to complete the Luria sequence with or without the examiner. He also made errors on contrasting motor programming and go-no-go tasks.     IMPRESSIONS AND PLAN:   Diagnosis:   -Dementia - Now advanced stage. Etiology possibly mixed (Lewy Body Dementia and Alzheimer's Dementia). He has worsening parkinsonism over the past several years.     Recommendations:   1. Discussed behavioral strategies and management in the setting of advanced dementia with wife for 50-minutes (one-step instructions, directive vs asking, making decisions on her own). She is understandably having trouble adjusting to progression and need to be more directive with him.    2. Level of Care - Lengthy discussion on [facility] placement vs staying at home noting that there was no clear decision at this point. However, recommended family explore and prepare given progressive illness.    3. [facility]: Warm handoff to our Care Management Team to help wife and son with complex care management needs.    4. Follow-up - Memory Disorders Clinic in 3-months   5. Medications - Review Dr. Israel's note, but lengthy discussion on risks/benefits of donepezil.   6. Palliative Care - Wife is having trouble assessing/deciding what level of medical intervention for his carious complex care needs (cardiac, urology) should happen given his dementia. Referring to Palliative for discussion with Dr. David Rodriguez, PHD, ABPP  Board Certified in Clinical Neuropsychology  Department of Neurology    APPENDIX  TESTS ADMINISTERED:  Clinical Interview and Review of Records, [place] Cognitive Assessment (MoCA), Frontal Assessment Battery (SHIRLEY),          MoCA     10/30      SHIRLEY    9/18      BILLING     Service Description CPT Code Minutes  Units   Psychiatric diagnostic evaluation by physician [id number]     Neurobehavioral status exam by physician [id number] 1   Each additional hour by physician [id number]  0   Test Evaluation Services --  --   Neuropsychological testing evaluation services by physician [id number] [phone] [id number]   Each additional hour by physician [id number]  2   Test Administration and Scoring --  --   Psychological or neuropsychological test administration and scoring by physician [id number]  0   Each additional 30 minutes by physician [id number]  0   Psychological or neuropsychological test administration and scoring by technician [id number] 1   Each additional 30 minutes by technician [id number]

## 2019-09-11 NOTE — PROGRESS NOTES
Spoke with pt, pts wife, and pts son about the care [facility] research study    Scheduled baseline for 9/12 @ 2:00

## 2019-09-12 ENCOUNTER — RESEARCH ENCOUNTER (OUTPATIENT)
Dept: RESEARCH | Facility: HOSPITAL | Age: 79
End: 2019-09-12

## 2019-09-13 NOTE — PROGRESS NOTES
Subject was seen in clinic today and consented for the following study:     Study title: [facility]  IRB #: [id number]  IRB approval date: 02/13/2019  Sponsor: [employer] [facility]       Informed Consent Process/ involvement in care/ Proxy   Present for discussion: pt, cg, pts son  Does subject have capacity to consent per evaluation: yes  Is LAR Consenting for Subject: no      LAR Determined by: n/a    Notes on signing IFC/Involvement in Care/Proxy  (Capacity is determined per chart review, interview with LAR and patient, along with taking into consideration past practices)   Subject has capacity  Subject and caregiver (on caregiver line, not LAR) sign forms     Subject does not have capacity -  POA  POA signs forms, subject signs as well if able     Subject does not have capacity - No POA  LAR will sign forms and we will attempt to acquire POA as part of the study     Subject is homebound and does not have capacity - POA   POA will sign forms, subject to sign via snail mail or e-consent if able     Subject is homebound and does not have capacity - No POA   LAR will sign forms and we will attempt to acquire POA as part of the study    Subject to sign via snail mail or e-consent if able     POA lives out of state and subject has capacity - POA   Obtain signatures from POA through mailing documents or e-consent, along with obtaining subjects signature and current caregivers signature     LAR lives out of state and subject does not have capacity - No POA  Obtain signatures from LAR through mailing documents, along with obtaining signature of current caregivers. Will attempt to acquire POA as part of the study             Caregiver Name: Margy  Subject ID: [id number]     Prior to the Informed Consent (IC) being signed, or any protocol required testing, procedure, or intervention being performed, the following was done or discussed:    · Purpose of the Study, Qualifications to Participate: yes  · Study Design,  Schedule and Procedures: yes  · Risks, Benefits, Alternative Treatments, Compensation and Costs: yes  · Confidentiality and HIPAA Authorization for Release of Medical Records for the research trial/subject's right/study related injury: yes  · Study related contact information: yes  · Voluntary Participation and Withdrawal from the research trial at any time: yes  · Optional samples/procedures (if applicable): yes  · Patient has been offered the opportunity to ask questions regarding the study and all questions were answered satisfactorily: yes  · Patient and/or LAR verbalizes understanding of the study/procedures and agrees to participate: yes  · CRC and PI contact information given to LAR and/or patient: yes  · Signed copy given to patient and/or LAR: yes  · Copy in patient's chart and original uploaded to Epic: yes     Person Obtaining Consent: Yary MANZO      Baseline Visit:  Questionnaires completed with caregiver: Demographics, self-efficacy, Zarit, PHQ, QDRS, NPI, QualiDem  MMSE and KENDRA-7 with dementia patient  Involvement in Care and Proxy Access forms completed  Inclusion/Exclusion assessed: patient eligible  Medications reviewed  Discussed the following:

## 2019-09-20 ENCOUNTER — LAB VISIT (OUTPATIENT)
Dept: LAB | Facility: HOSPITAL | Age: 79
End: 2019-09-20
Attending: INTERNAL MEDICINE
Payer: MEDICARE

## 2019-09-20 DIAGNOSIS — N18.30 CHRONIC KIDNEY DISEASE, STAGE III (MODERATE): ICD-10-CM

## 2019-09-20 LAB
ANION GAP SERPL CALC-SCNC: 9 MMOL/L (ref 8–16)
BASOPHILS # BLD AUTO: 0.05 K/UL (ref 0–0.2)
BASOPHILS NFR BLD: 0.7 % (ref 0–1.9)
BUN SERPL-MCNC: 21 MG/DL (ref 8–23)
CALCIUM SERPL-MCNC: 9.4 MG/DL (ref 8.7–10.5)
CHLORIDE SERPL-SCNC: 106 MMOL/L (ref 95–110)
CO2 SERPL-SCNC: 27 MMOL/L (ref 23–29)
CREAT SERPL-MCNC: 1.8 MG/DL (ref 0.5–1.4)
DIFFERENTIAL METHOD: ABNORMAL
EOSINOPHIL # BLD AUTO: 0.2 K/UL (ref 0–0.5)
EOSINOPHIL NFR BLD: 2.6 % (ref 0–8)
ERYTHROCYTE [DISTWIDTH] IN BLOOD BY AUTOMATED COUNT: 15.2 % (ref 11.5–14.5)
EST. GFR  (AFRICAN AMERICAN): 39.9 ML/MIN/1.73 M^2
EST. GFR  (NON AFRICAN AMERICAN): 34.5 ML/MIN/1.73 M^2
GLUCOSE SERPL-MCNC: 167 MG/DL (ref 70–110)
HCT VFR BLD AUTO: 42.5 % (ref 40–54)
HGB BLD-MCNC: 12.9 G/DL (ref 14–18)
IMM GRANULOCYTES # BLD AUTO: 0.02 K/UL (ref 0–0.04)
IMM GRANULOCYTES NFR BLD AUTO: 0.3 % (ref 0–0.5)
LYMPHOCYTES # BLD AUTO: 0.9 K/UL (ref 1–4.8)
LYMPHOCYTES NFR BLD: 12.5 % (ref 18–48)
MCH RBC QN AUTO: 29.1 PG (ref 27–31)
MCHC RBC AUTO-ENTMCNC: 30.4 G/DL (ref 32–36)
MCV RBC AUTO: 96 FL (ref 82–98)
MONOCYTES # BLD AUTO: 0.5 K/UL (ref 0.3–1)
MONOCYTES NFR BLD: 7.2 % (ref 4–15)
NEUTROPHILS # BLD AUTO: 5.5 K/UL (ref 1.8–7.7)
NEUTROPHILS NFR BLD: 76.7 % (ref 38–73)
NRBC BLD-RTO: 0 /100 WBC
PLATELET # BLD AUTO: 275 K/UL (ref 150–350)
PMV BLD AUTO: 9.5 FL (ref 9.2–12.9)
POTASSIUM SERPL-SCNC: 4.9 MMOL/L (ref 3.5–5.1)
RBC # BLD AUTO: 4.43 M/UL (ref 4.6–6.2)
SODIUM SERPL-SCNC: 142 MMOL/L (ref 136–145)
WBC # BLD AUTO: 7.21 K/UL (ref 3.9–12.7)

## 2019-09-20 PROCEDURE — 80048 BASIC METABOLIC PNL TOTAL CA: CPT

## 2019-09-20 PROCEDURE — 85025 COMPLETE CBC W/AUTO DIFF WBC: CPT

## 2019-09-20 PROCEDURE — 36415 COLL VENOUS BLD VENIPUNCTURE: CPT

## 2019-09-23 ENCOUNTER — TELEPHONE (OUTPATIENT)
Dept: NEPHROLOGY | Facility: CLINIC | Age: 79
End: 2019-09-23

## 2019-09-23 ENCOUNTER — PATIENT OUTREACH (OUTPATIENT)
Dept: ADMINISTRATIVE | Facility: OTHER | Age: 79
End: 2019-09-23

## 2019-09-23 NOTE — TELEPHONE ENCOUNTER
Preferred Name:   Mark Anthony Velarde Jr.  Male, 79 y.o., 1940  Phone:   [phone] (H)  PCP:   Jeff Yan MD  Language:   English  Need Interp:   No  Allergies Last Reviewed:   09/04/19  Allergies:   Arb-angiotensin Receptor Antagonist,      Lisinopril  Health Maintenance:   Due  Primary Ins.:   [facility]  MRN:   [id number]  Pt Comm Pref:   Patient Portal, Mail  Last MyChart Login:   9/21/2019 10:10 AM  Next Appt:   With Nephrology (Mayito Sequeira MD)  09/25/2019 at 10:40 AM  My Sticky Note:     Specialty Comments:      Message   Received: Today   Message Contents   MD Fanny Aragon RN             Please contact patient and ask him if he is having symptoms of UTI: dysuria, burning feeling, frequency, urgency, strong odor.    Associated Results     Result Notes for Urinalysis     Notes recorded by Mayito Sequeira MD on 9/23/2019 at 2:15 PM CDT  Please contact patient and ask him if he is having symptoms of UTI: dysuria, burning feeling, frequency, urgency, strong odor.   Contains abnormal data Urinalysis   Order: [id number]   Status:  Final result   Visible to patient:  Yes (Patient Portal)   Dx:  Chronic kidney disease, stage III (mo...    Ref Range & Units 3d ago 3mo ago   Specimen UA  Urine, Unspecified  Urine, Clean Catch    Color, UA Yellow, Straw, Laura Laura  Yellow    Appearance, UA Clear CloudyAbnormal   Clear    pH, UA 5.0 - 8.0 7.0  6.0    Specific Gravity, UA 1.005 - 1.030 1.010  1.010    Protein, UA Negative 1+Abnormal   Negative CM   Comment: Recommend a 24 hour urine protein or a urine   protein/creatinine ratio if globulin induced proteinuria is   clinically suspected.    Glucose, UA Negative Negative  Negative    Ketones, UA Negative Negative  Negative    Bilirubin (UA) Negative Negative  Negative    Occult Blood UA Negative 2+Abnormal   2+Abnormal     Nitrite, UA Negative Negative  Negative    Leukocytes, UA Negative 3+Abnormal   Negative     Resulting [place]  [facility]         Specimen Collected: 09/20/19 10:56 Last Resulted: 09/20/19 16:46         Lab Flowsheet       Order Details       View Encounter       Lab and Collection Details       Routing       Result History         CM=Additional comments           Result Notes for Urinalysis Microscopic     Notes recorded by Mayito Sequeira MD on 9/23/2019 at 2:15 PM CDT  Please contact patient and ask him if he is having symptoms of UTI: dysuria, burning feeling, frequency, urgency, strong odor.   Contains abnormal data Urinalysis Microscopic   Order: [id number]   Status:  Final result   Visible to patient:  Yes (Patient Portal)    Ref Range & Units 3d ago 3mo ago   RBC, UA 0 - 4 /hpf 11High   19High     WBC, UA 0 - 5 /hpf >100High   4    WBC Clumps, UA None-Rare ManyAbnormal      Bacteria None-Occ /hpf Occasional     Hyaline Casts, UA 0-1/lpf /lpf 0  1    Microscopic Comment  SEE COMMENT  SEE COMMENT CM   Comment: Other formed elements not mentioned in the report are not   present in the microscopic examination.    Resulting [place]  [facility]         Specimen Collected: 09/20/19 10:56 Last Resulted: 09/20/19 16:46         Lab Flowsheet       Order Details       View Encounter       Lab and Collection Details       Routing       Result History         CM=Additional comments           Result Notes for Protein / creatinine ratio, urine     Notes recorded by Mayito Sequeira MD on 9/23/2019 at 2:15 PM CDT  Please contact patient and ask him if he is having symptoms of UTI: dysuria, burning feeling, frequency, urgency, strong odor.   Contains abnormal data Protein / creatinine ratio, urine   Order: [id number]   Status:  Final result   Visible to patient:  Yes (Patient Portal)   Dx:  Chronic kidney disease, stage III (mo...    Ref Range & Units 3d ago 1yr ago   Protein, Urine Random 0 - 15 mg/dL 47High   <7 CM   Comment: The random urine reference ranges provided were established   for 24 hour urine  collections.  No reference ranges exist for   random urine specimens.  Correlate clinically.    Creatinine, Random Ur 23.0 - 375.0 mg/dL 74.0  75.0 CM   Comment: The random urine reference ranges provided were established   for 24 hour urine collections.  No reference ranges exist for   random urine specimens.  Correlate clinically.    Prot/Creat Ratio, Ur 0.00 - 0.20 0.64High   Unable to calculate    Resulting Agency  [facility]         Specimen Collected: 09/20/19 10:56 Last Resulted: 09/20/19 15:47         Lab Flowsheet       Order Details       View Encounter       Lab and Collection Details       Routing       Result History         CM=Additional comments

## 2019-09-25 ENCOUNTER — OFFICE VISIT (OUTPATIENT)
Dept: NEPHROLOGY | Facility: CLINIC | Age: 79
End: 2019-09-25
Payer: MEDICARE

## 2019-09-25 VITALS
HEIGHT: 66 IN | BODY MASS INDEX: 24.59 KG/M2 | OXYGEN SATURATION: 97 % | WEIGHT: 153 LBS | SYSTOLIC BLOOD PRESSURE: 110 MMHG | HEART RATE: 69 BPM | DIASTOLIC BLOOD PRESSURE: 60 MMHG

## 2019-09-25 DIAGNOSIS — R33.9 INCOMPLETE BLADDER EMPTYING: ICD-10-CM

## 2019-09-25 DIAGNOSIS — R82.81 PYURIA: ICD-10-CM

## 2019-09-25 DIAGNOSIS — N18.30 CHRONIC KIDNEY DISEASE, STAGE III (MODERATE): Primary | ICD-10-CM

## 2019-09-25 PROCEDURE — 99999 PR PBB SHADOW E&M-EST. PATIENT-LVL III: ICD-10-PCS | Mod: PBBFAC,,, | Performed by: INTERNAL MEDICINE

## 2019-09-25 PROCEDURE — 99999 PR PBB SHADOW E&M-EST. PATIENT-LVL III: CPT | Mod: PBBFAC,,, | Performed by: INTERNAL MEDICINE

## 2019-09-25 PROCEDURE — 3074F PR MOST RECENT SYSTOLIC BLOOD PRESSURE < 130 MM HG: ICD-10-PCS | Mod: CPTII,S$GLB,, | Performed by: INTERNAL MEDICINE

## 2019-09-25 PROCEDURE — 3078F PR MOST RECENT DIASTOLIC BLOOD PRESSURE < 80 MM HG: ICD-10-PCS | Mod: CPTII,S$GLB,, | Performed by: INTERNAL MEDICINE

## 2019-09-25 PROCEDURE — 99499 RISK ADDL DX/OHS AUDIT: ICD-10-PCS | Mod: S$GLB,,, | Performed by: INTERNAL MEDICINE

## 2019-09-25 PROCEDURE — 99499 UNLISTED E&M SERVICE: CPT | Mod: S$GLB,,, | Performed by: INTERNAL MEDICINE

## 2019-09-25 PROCEDURE — 1101F PT FALLS ASSESS-DOCD LE1/YR: CPT | Mod: CPTII,S$GLB,, | Performed by: INTERNAL MEDICINE

## 2019-09-25 PROCEDURE — 3074F SYST BP LT 130 MM HG: CPT | Mod: CPTII,S$GLB,, | Performed by: INTERNAL MEDICINE

## 2019-09-25 PROCEDURE — 3078F DIAST BP <80 MM HG: CPT | Mod: CPTII,S$GLB,, | Performed by: INTERNAL MEDICINE

## 2019-09-25 PROCEDURE — 99214 OFFICE O/P EST MOD 30 MIN: CPT | Mod: S$GLB,,, | Performed by: INTERNAL MEDICINE

## 2019-09-25 PROCEDURE — 99214 PR OFFICE/OUTPT VISIT, EST, LEVL IV, 30-39 MIN: ICD-10-PCS | Mod: S$GLB,,, | Performed by: INTERNAL MEDICINE

## 2019-09-25 PROCEDURE — 1101F PR PT FALLS ASSESS DOC 0-1 FALLS W/OUT INJ PAST YR: ICD-10-PCS | Mod: CPTII,S$GLB,, | Performed by: INTERNAL MEDICINE

## 2019-09-25 NOTE — PROGRESS NOTES
NEPHROLOGY PROGRESS NOTE    Subjective:      Patient ID: Mark Anthony Velarde Jr. is a 79 y.o. White male who presents for follow-up evaluation of CKD.     HPI   Arrives with his wife, feeling fine. Reports stable difficulty with urination. Off Pradaxa, now takes Eliquis. The patient denies taking NSAIDs or new antibiotics, recreational drugs, recent episode of dehydration, diarrhea, nausea or vomiting, acute illness, hospitalization or exposure to IV radiocontrast. Had a fall 2 mo ago and visited the ED.     Review of Systems   Constitutional: Negative for activity change, appetite change, chills, diaphoresis, fatigue, fever and unexpected weight change.   HENT: Negative for congestion, facial swelling and trouble swallowing.    Eyes: Negative for pain, discharge, redness and visual disturbance.   Respiratory: Negative for cough, shortness of breath and wheezing.    Cardiovascular: Negative for chest pain, palpitations and leg swelling.   Gastrointestinal: Negative for abdominal distention, abdominal pain, constipation and diarrhea.   Endocrine: Negative for cold intolerance and heat intolerance.   Genitourinary: Negative for decreased urine volume, difficulty urinating, dysuria, flank pain, frequency and urgency.   Musculoskeletal: Negative for arthralgias, joint swelling and myalgias.   Skin: Negative for color change.   Allergic/Immunologic: Negative for immunocompromised state.   Neurological: Negative for dizziness, tremors, syncope, speech difficulty, weakness, light-headedness and numbness.   Hematological: Negative for adenopathy.   Psychiatric/Behavioral: Negative for agitation, confusion, decreased concentration and dysphoric mood.       Objective:   /60 mmHg  Physical Exam   Constitutional: He is oriented to person, place, and time. He appears well-developed and well-nourished.   HENT:   Head: Normocephalic and atraumatic.   Eyes: Pupils are equal, round, and reactive to light. Conjunctivae and EOM are  normal.   Neck: Neck supple.   Cardiovascular: Normal rate, regular rhythm, normal heart sounds and intact distal pulses.   Pulmonary/Chest: Effort normal and breath sounds normal.   Abdominal: Soft. Bowel sounds are normal.   Musculoskeletal: Normal range of motion.   Neurological: He is alert and oriented to person, place, and time. He has normal reflexes.   Skin: Skin is warm and dry.   Psychiatric: He has a normal mood and affect. His behavior is normal.   Nursing note and vitals reviewed.      LABS  Serum Cr 1.8 mg/dL (1.3 - 1.7)  UPCR 0.13 g/g; uWBCs>100/hpf    Assessment:     1. CKD stage 3B eGFR ~ 37 - 50 ml/min. Etiology unknown, possible chronic TI disease, hypoperfusion injury (DCM, AFIB), hypertensive injury.  Stable kidney function over the last 3.5 years with fluctuations between 1.3 to 1.7 mg/dL. No proteinuria. Risk for ESRD progression is low. Avoidance of NSAIDs encouraged.    2. Pyuria. Will order a urine culture. Has urinary retention from enlarged prostate.     Plan:       Continue current regimen, Avoid NSAIDs.   Urine culture  Follow up in 6 months with labs.

## 2019-09-26 ENCOUNTER — CLINICAL SUPPORT (OUTPATIENT)
Dept: CARDIOLOGY | Facility: HOSPITAL | Age: 79
End: 2019-09-26
Payer: MEDICARE

## 2019-09-26 PROCEDURE — 93295 CARDIAC DEVICE CHECK - REMOTE: ICD-10-PCS | Mod: ,,, | Performed by: INTERNAL MEDICINE

## 2019-09-26 PROCEDURE — 93295 DEV INTERROG REMOTE 1/2/MLT: CPT | Mod: ,,, | Performed by: INTERNAL MEDICINE

## 2019-09-26 PROCEDURE — 93296 REM INTERROG EVL PM/IDS: CPT | Performed by: INTERNAL MEDICINE

## 2019-10-07 ENCOUNTER — PATIENT MESSAGE (OUTPATIENT)
Dept: INTERNAL MEDICINE | Facility: CLINIC | Age: 79
End: 2019-10-07

## 2019-10-28 RX ORDER — ATORVASTATIN CALCIUM 40 MG/1
40 TABLET, FILM COATED ORAL DAILY
Qty: 90 TABLET | Refills: 3 | Status: SHIPPED | OUTPATIENT
Start: 2019-10-28

## 2019-11-04 ENCOUNTER — PATIENT MESSAGE (OUTPATIENT)
Dept: INTERNAL MEDICINE | Facility: CLINIC | Age: 79
End: 2019-11-04

## 2019-11-04 ENCOUNTER — TELEPHONE (OUTPATIENT)
Dept: INTERNAL MEDICINE | Facility: CLINIC | Age: 79
End: 2019-11-04

## 2019-11-04 NOTE — TELEPHONE ENCOUNTER
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 The wife had sent an email stating that pt needs assistance with pressing the buttons during voting. The poll commissioner made an exception and permitted wife to help this last time but stated they would need a letter from his doctor stating he needs help in the future. Is this something your office could help to provide for the pt and gc?

## 2019-11-07 ENCOUNTER — HOSPITAL ENCOUNTER (EMERGENCY)
Facility: HOSPITAL | Age: 79
Discharge: HOME | End: 2019-11-07
Attending: EMERGENCY MEDICINE
Payer: MEDICARE

## 2019-11-07 VITALS
TEMPERATURE: 98 F | SYSTOLIC BLOOD PRESSURE: 138 MMHG | DIASTOLIC BLOOD PRESSURE: 67 MMHG | HEART RATE: 75 BPM | RESPIRATION RATE: 16 BRPM | OXYGEN SATURATION: 99 %

## 2019-11-07 DIAGNOSIS — S80.211A ABRASION OF RIGHT KNEE: ICD-10-CM

## 2019-11-07 DIAGNOSIS — S00.93XA CONTUSION OF HEAD, INITIAL ENCOUNTER: ICD-10-CM

## 2019-11-07 DIAGNOSIS — S01.01XA SCALP LACERATION, INITIAL ENCOUNTER: Primary | ICD-10-CM

## 2019-11-07 PROCEDURE — 25000003 PHARM REV CODE 250: Performed by: EMERGENCY MEDICINE

## 2019-11-07 PROCEDURE — 12002 RPR S/N/AX/GEN/TRNK2.6-7.5CM: CPT

## 2019-11-07 PROCEDURE — 12002 RPR S/N/AX/GEN/TRNK2.6-7.5CM: CPT | Mod: ,,, | Performed by: EMERGENCY MEDICINE

## 2019-11-07 PROCEDURE — 12002 PR RESUP NPTERF WND BODY 2.6-7.5 CM: ICD-10-PCS | Mod: ,,, | Performed by: EMERGENCY MEDICINE

## 2019-11-07 PROCEDURE — 99284 PR EMERGENCY DEPT VISIT,LEVEL IV: ICD-10-PCS | Mod: 25,,, | Performed by: EMERGENCY MEDICINE

## 2019-11-07 PROCEDURE — 99284 EMERGENCY DEPT VISIT MOD MDM: CPT | Mod: 25

## 2019-11-07 PROCEDURE — 99284 EMERGENCY DEPT VISIT MOD MDM: CPT | Mod: 25,,, | Performed by: EMERGENCY MEDICINE

## 2019-11-07 RX ORDER — BACITRACIN ZINC 500 UNIT/G
1 OINTMENT IN PACKET (EA) TOPICAL
Status: COMPLETED | OUTPATIENT
Start: 2019-11-07 | End: 2019-11-07

## 2019-11-07 RX ADMIN — BACITRACIN 1 EACH: 500 OINTMENT TOPICAL at 04:11

## 2019-11-07 NOTE — ED NOTES
LOC: The patient is awake, alert and aware of environment with an appropriate affect, the patient is oriented x 3 and speaking appropriately.  APPEARANCE: Patient resting comfortably and in no acute distress, patient is clean and well groomed, patient's clothing is properly fastened.  SKIN: The skin is warm and dry, color consistent with ethnicity, patient has normal skin turgor and moist mucus membranes, skin intact, no breakdown or bruising noted. Abrasion noted to right side of head, bleeding controlled.  MUSCULOSKELETAL: Patient moving all extremities spontaneously, no obvious swelling or deformities noted.  RESPIRATORY: Airway is open and patent, respirations are spontaneous, patient has a normal effort and rate, no accessory muscle use noted.  CARDIAC: Patient has a normal rate and regular rhythm, no periphreal edema noted, capillary refill < 3 seconds.  ABDOMEN: Soft and non tender to palpation, no distention noted, normoactive bowel sounds present in all four quadrants.  NEUROLOGIC:  facial expression is symmetrical, patient moving all extremities spontaneously, normal sensation in all extremities when touched with a finger.  Follows all commands appropriately. Denies headache.

## 2019-11-07 NOTE — ED PROVIDER NOTES
"Encounter Date: 11/7/2019    SCRIBE #1 NOTE: I, Raymon Rahman, am scribing for, and in the presence of,  Dr. Ang. I have scribed the following portions of the note - Other sections scribed: HPI, ROS, PE, MDM.       History     Chief Complaint   Patient presents with    Fall     Pt reports "tripping" on uneven pavement when walking into restaurant today. Pt hit head. + Eloquis.      79-year-old male with past medical history of dementia, CAD, atrial fibrillation, AICD, on Eliquis, presenting with head contusion after mechanical fall.  Per wife patient tripped on uneven ground at a restaurant, striking his head but did not lose consciousness, and was able to ambulate steadily after fall.  Wife notes that patient is not a good historian due to his dementia, and often denies pain. She reports his last tetanus shot was July 2019.  Patient denies headache or other pain.      The history is provided by the spouse.     Review of patient's allergies indicates:   Allergen Reactions    Arb-angiotensin receptor antagonist Other (See Comments)     Hyperkalemia    Lisinopril Diarrhea     Past Medical History:   Diagnosis Date    *Atrial fibrillation     Anticoagulant long-term use     Atrial fibrillation - asymptomatic but noted on ICD interrogation ([id number]) - 16 h 40 minutes of afib 2/13/2013    Atrial flutter with controlled response - seen on ICD interrogation - asymptomatic 2/13/2013    Automatic implantable cardioverter-defibrillator in situ 2/13/2013    Basal cell carcinoma     mid forehead    CAD (coronary artery disease) 2/13/2013    Cardiomyopathy     Cataract     Cognitive deficits     mild    H/O syncope -  5/27/2013    History of PTCA - LAD, LCX and PDA PCI in 2006 2/13/2013    HTN (hypertension) 2/13/2013    Hyperlipidemia LDL goal < 70 2/13/2013    Hypertension     ICD (implantable cardiac defibrillator) in place 2/13/2013    Ischemic cardiomyopathy LVEF ~45% 2/13/2013    LBBB (left " bundle branch block) 2/13/2013    Memory loss     Psoriasis vulgaris     Syncope and collapse      Past Surgical History:   Procedure Laterality Date    CARDIAC DEFIBRILLATOR PLACEMENT      CATARACT EXTRACTION W/  INTRAOCULAR LENS IMPLANT Right 04/04/16    Dr Michael     CATARACT EXTRACTION W/  INTRAOCULAR LENS IMPLANT Left 05/09/2016    SKIN BIOPSY      TONSILLECTOMY       Family History   Problem Relation Age of Onset    Psoriasis Father     Dementia Father     Tremor Father     Cataracts Mother     Cancer Mother     Psoriasis Son     Psoriasis Son     Diabetes Maternal Grandmother     Kidney disease Sister     No Known Problems Brother     No Known Problems Maternal Aunt     No Known Problems Maternal Uncle     No Known Problems Paternal Aunt     No Known Problems Paternal Uncle     No Known Problems Maternal Grandfather     No Known Problems Paternal Grandmother     No Known Problems Paternal Grandfather     Amblyopia Neg Hx     Blindness Neg Hx     Glaucoma Neg Hx     Hypertension Neg Hx     Macular degeneration Neg Hx     Retinal detachment Neg Hx     Strabismus Neg Hx     Stroke Neg Hx     Thyroid disease Neg Hx     Parkinsonism Neg Hx     Celiac disease Neg Hx     Cirrhosis Neg Hx     Colon cancer Neg Hx     Colon polyps Neg Hx     Crohn's disease Neg Hx     Cystic fibrosis Neg Hx     Esophageal cancer Neg Hx     Irritable bowel syndrome Neg Hx     Inflammatory bowel disease Neg Hx     Hemochromatosis Neg Hx     Liver cancer Neg Hx     Liver disease Neg Hx     Rectal cancer Neg Hx     Stomach cancer Neg Hx     Ulcerative colitis Neg Hx     Christiano's disease Neg Hx     Lymphoma Neg Hx     Scleroderma Neg Hx     Rheum arthritis Neg Hx     Multiple sclerosis Neg Hx     Melanoma Neg Hx     Tuberculosis Neg Hx     Lupus Neg Hx      Social History     Tobacco Use    Smoking status: Former Smoker     Last attempt to quit: 9/22/1963     Years since quitting:  56.1    Smokeless tobacco: Never Used   Substance Use Topics    Alcohol use: No     Frequency: Monthly or less     Drinks per session: 1 or 2     Binge frequency: Never    Drug use: No     Review of Systems   Unable to perform ROS: Dementia       Physical Exam     Initial Vitals [11/07/19 1327]   BP Pulse Resp Temp SpO2   114/60 60 15 98.6 °F (37 °C) 96 %      MAP       --         Physical Exam    Nursing note and vitals reviewed.  Constitutional: He appears well-developed and well-nourished. He is not diaphoretic. No distress.   HENT:   Nose: Nose normal.   3cm linear laceration over right frontal parietal scalp with mild contusion surrounding. No maxilofacial tenderness to palpation. No crepitus, deformity, abrasion or laceration.   Eyes: Conjunctivae and EOM are normal. Pupils are equal, round, and reactive to light.   Neck: Normal range of motion. Neck supple.   Cardiovascular: Normal rate and regular rhythm.   Pulmonary/Chest: Breath sounds normal. No respiratory distress. He has no wheezes. He has no rhonchi. He has no rales. He exhibits no tenderness.   Abdominal: Soft. Bowel sounds are normal. He exhibits no distension. There is no tenderness.   Musculoskeletal: Normal range of motion.   Neurological: He is alert and oriented to person, place, and time. He has normal strength.   Skin:   Superficial abrasion on the right knee.  Psoriatic plaque on the left leg.    Psychiatric: He has a normal mood and affect. His behavior is normal. Thought content normal.         ED Course   Lac Repair  Date/Time: 11/7/2019 3:00 PM  Performed by: Marilyn Ang MD  Authorized by: Marilyn Ang MD   Body area: head/neck  Location details: scalp  Foreign bodies: no foreign bodies  Tendon involvement: none  Nerve involvement: none  Vascular damage: no  Patient sedated: no  Irrigation solution: saline  Irrigation method: syringe  Amount of cleaning: standard  Debridement: minimal  Degree of undermining: none  Skin  closure: staples  Approximation: close  Approximation difficulty: simple  Dressing: antibiotic ointment  Patient tolerance: Patient tolerated the procedure well with no immediate complications        Labs Reviewed - No data to display       Imaging Results          CT Head Without Contrast (Final result)  Result time 11/07/19 16:01:49    Final result by Esteban Jerry DO (11/07/19 16:01:49)                 Impression:      No significant change from prior specifically without evidence for acute intracranial hemorrhage or new abnormal parenchymal attenuation..  Clinical correlation and further evaluation as warranted.      Electronically signed by: Esteban Jerry DO  Date:    11/07/2019  Time:    16:01             Narrative:    EXAMINATION:  CT HEAD WITHOUT CONTRAST    CLINICAL HISTORY:  Head trauma, mental status change;Dementia, elderly;    TECHNIQUE:  Multiple sequential 5 mm axial images of the head without contrast.  Coronal and sagittal reformatted imaging from the axial acquisition.    COMPARISON:  06/19/2019    FINDINGS:  There is no evidence for acute intracranial hemorrhage or sulcal effacement.  There is no significant change from prior.  Mild cerebral volume loss appropriate for age.  The ventricles are stable in size without hydrocephalus.  There is no midline shift or mass effect.  Visualized paranasal sinuses and mastoid air cells are clear.                               CT Cervical Spine Without Contrast (Final result)  Result time 11/07/19 16:46:20    Final result by Rito Howard MD (11/07/19 16:46:20)                 Impression:      No CT evidence of cervical spine acute osseous traumatic injury.      Electronically signed by: Rito Howard MD  Date:    11/07/2019  Time:    16:46             Narrative:    EXAMINATION:  CT CERVICAL SPINE WITHOUT CONTRAST    CLINICAL HISTORY:  C-spine trauma, NEXUS/CCR positive, +risk factor(s);Elderly dementia fall;    TECHNIQUE:  Low dose axial images, sagittal  and coronal reformations were performed though the cervical spine.  Contrast was not administered.    COMPARISON:  CT cervical spine 06/19/2019    FINDINGS:  Patient is slightly rotated and tilted within the scanner.  Overall alignment is similar to prior.  Vertebral body and intervertebral disc space heights appear unchanged.  Sagittal alignment is unchanged.  No displaced fracture, dislocation or destructive osseous process.  Dens and lateral masses are well aligned and intact.  Multilevel degenerative disc disease with uncovertebral and facet arthrosis and marginal osteophytes similar in overall configuration when compared to prior.  No prevertebral soft tissue swelling.  No paraspinal mass or fluid collection.  No subcutaneous emphysema or radiodense retained foreign body.    Airway is midline and patent.  Minimal biapical pleuroparenchymal scarring without pneumothorax.  Unchanged 2 mm pulmonary nodule within the medial left upper lung lobe.                               X-Ray Knee 1 or 2 View Right (Final result)  Result time 11/07/19 14:31:43    Final result by Rito Howard MD (11/07/19 14:31:43)                 Impression:      Anterior right knee suspected superficial laceration without acute osseous process or radiodense retained foreign body seen.      Electronically signed by: Rito Howard MD  Date:    11/07/2019  Time:    14:31             Narrative:    EXAMINATION:  XR KNEE 1 OR 2 VIEW RIGHT    CLINICAL HISTORY:  Abrasion, right knee, initial encounter    TECHNIQUE:  AP and lateral views of the right knee were performed.    COMPARISON:  None    FINDINGS:  Overall alignment is within normal limits.  No displaced fracture, dislocation or destructive osseous process.  Mild to moderate tricompartmental degenerative change greatest at the patellofemoral compartment.  No large suprapatellar joint effusion.  Scattered atherosclerotic vascular calcifications noted.  Slight skin irregularity at the anterior  infrapatellar soft tissues which may represent superficial laceration.  No subcutaneous emphysema or radiodense retained foreign body.                                 Medical Decision Making:   History:   I obtained history from: someone other than patient.       <> Summary of History: Wife reports that patient is a poor historian due to dementia  Old Medical Records: I decided to obtain old medical records.  Initial Assessment:   Well-appearing 79-year-old male in no distress, however with linear laceration on frontal parietal scalp.   Differential Diagnosis:   Laceration, contusion, skull fracture, intraparenchymal hemorrhage, SAH, epidural hematoma, subdural hematoma, cervical fracture, knee fracture, sprain  Independently Interpreted Test(s):   I have ordered and independently interpreted X-rays - see prior notes.  Clinical Tests:   Radiological Study: Ordered and Reviewed  ED Management:  CT head and C-spine obtained as patient is poor historian and concern for neck injury given mechanism, although no neurologic deficits and patient is in no distress.    CT head and C-spine show no fracture or intracranial bleeding.  Knee x-ray unremarkable for acute injury. Scalp laceration irrigated and closed with staples, bacitracin ointment applied.  Patient observed to ambulate steadily, stable for discharge home in care of his wife.            Scribe Attestation:   Scribe #1: I performed the above scribed service and the documentation accurately describes the services I performed. I attest to the accuracy of the note.                          Clinical Impression:       ICD-10-CM ICD-9-CM   1. Scalp laceration, initial encounter S01.01XA 873.0   2. Abrasion of right knee S80.211A 916.0   3. Contusion of head, initial encounter S00.93XA 920                             Marilyn Ang MD  11/11/19 1378

## 2019-11-07 NOTE — DISCHARGE INSTRUCTIONS
You were seen in the emergency department after a fall.  Your scalp wound was cleaned and closed with staples.  Please follow-up with your PCP in 7-10 days for wound evaluation and staple removal.  Keep the wound covered with a pressing dressing as best possible to limit bleeding.  After the first 24-48 hours keep the wound clean and dry. Return to the emergency department for worsening bleeding, confusion, drowsiness, or other mental status changes.

## 2019-11-12 NOTE — PROGRESS NOTES
INTERNAL MEDICINE CLINIC - SAME DAY APPOINTMENT  Progress Note    PRESENTING HISTORY     PCP: Jeff Yan MD  Chief Complaint/Reason for Visit:   No chief complaint on file.      History of Present Illness & ROS : Mr. Mark Anthony Velarde Jr. is a 79 y.o. male.    Here for UC visit.   Seen in the ED on 11/7, after a reported Mechanical fall, without LOC. Incurred laceration to scalp, staples placed per ED staff. Here today for removal.   (Noted the CT of Head results, negative for ICH; takes Eliquis)          Review of Systems:  Eyes: denies visual changes at this time denies floaters   ENT: no nasal congestion or sore throat  Respiratory: no cough or shorness of breath  Cardiovascular: no chest pain or palpitations  Gastrointestinal: no nausea or vomiting, no abdominal pain or change in bowel habits  Genitourinary: no hematuria or dysuria; denies frequency  Hematologic/Lymphatic: no easy bruising or lymphadenopathy  Musculoskeletal: no arthralgias or myalgias  Neurological: no seizures or tremors  Endocrine: no heat or cold intolerance      PAST HISTORY:     Past Medical History:   Diagnosis Date    *Atrial fibrillation     Anticoagulant long-term use     Atrial fibrillation - asymptomatic but noted on ICD interrogation ([id number]) - 16 h 40 minutes of afib 2/13/2013    Atrial flutter with controlled response - seen on ICD interrogation - asymptomatic 2/13/2013    Automatic implantable cardioverter-defibrillator in situ 2/13/2013    Basal cell carcinoma     mid forehead    CAD (coronary artery disease) 2/13/2013    Cardiomyopathy     Cataract     Cognitive deficits     mild    H/O syncope -  5/27/2013    History of PTCA - LAD, LCX and PDA PCI in 2006 2/13/2013    HTN (hypertension) 2/13/2013    Hyperlipidemia LDL goal < 70 2/13/2013    Hypertension     ICD (implantable cardiac defibrillator) in place 2/13/2013    Ischemic cardiomyopathy LVEF ~45% 2/13/2013    LBBB (left bundle branch block)  2/13/2013    Memory loss     Psoriasis vulgaris     Syncope and collapse        Past Surgical History:   Procedure Laterality Date    CARDIAC DEFIBRILLATOR PLACEMENT      CATARACT EXTRACTION W/  INTRAOCULAR LENS IMPLANT Right 04/04/16    Dr Michael     CATARACT EXTRACTION W/  INTRAOCULAR LENS IMPLANT Left 05/09/2016    SKIN BIOPSY      TONSILLECTOMY         Family History   Problem Relation Age of Onset    Psoriasis Father     Dementia Father     Tremor Father     Cataracts Mother     Cancer Mother     Psoriasis Son     Psoriasis Son     Diabetes Maternal Grandmother     Kidney disease Sister     No Known Problems Brother     No Known Problems Maternal Aunt     No Known Problems Maternal Uncle     No Known Problems Paternal Aunt     No Known Problems Paternal Uncle     No Known Problems Maternal Grandfather     No Known Problems Paternal Grandmother     No Known Problems Paternal Grandfather     Amblyopia Neg Hx     Blindness Neg Hx     Glaucoma Neg Hx     Hypertension Neg Hx     Macular degeneration Neg Hx     Retinal detachment Neg Hx     Strabismus Neg Hx     Stroke Neg Hx     Thyroid disease Neg Hx     Parkinsonism Neg Hx     Celiac disease Neg Hx     Cirrhosis Neg Hx     Colon cancer Neg Hx     Colon polyps Neg Hx     Crohn's disease Neg Hx     Cystic fibrosis Neg Hx     Esophageal cancer Neg Hx     Irritable bowel syndrome Neg Hx     Inflammatory bowel disease Neg Hx     Hemochromatosis Neg Hx     Liver cancer Neg Hx     Liver disease Neg Hx     Rectal cancer Neg Hx     Stomach cancer Neg Hx     Ulcerative colitis Neg Hx     Christiano's disease Neg Hx     Lymphoma Neg Hx     Scleroderma Neg Hx     Rheum arthritis Neg Hx     Multiple sclerosis Neg Hx     Melanoma Neg Hx     Tuberculosis Neg Hx     Lupus Neg Hx        Social History     Socioeconomic History    Marital status:      Spouse name: Not on file    Number of children: Not on file    Years  of education: Not on file    Highest education level: Not on file   Occupational History    Occupation: retired   Social Needs    Financial resource strain: Not hard at all    Food insecurity:     Worry: Never true     Inability: Never true    Transportation needs:     Medical: No     Non-medical: No   Tobacco Use    Smoking status: Former Smoker     Last attempt to quit: 1963     Years since quittin.1    Smokeless tobacco: Never Used   Substance and Sexual Activity    Alcohol use: No     Frequency: Monthly or less     Drinks per session: 1 or 2     Binge frequency: Never    Drug use: No    Sexual activity: Not on file   Lifestyle    Physical activity:     Days per week: 6 days     Minutes per session: 50 min    Stress: Not at all   Relationships    Social connections:     Talks on phone: Once a week     Gets together: Once a week     Attends Gnosticist service: Not on file     Active member of club or organization: Yes     Attends meetings of clubs or organizations: More than 4 times per year     Relationship status:    Other Topics Concern    Not on file   Social History Narrative    Not on file       MEDICATIONS & ALLERGIES:     Current Outpatient Medications on File Prior to Visit   Medication Sig Dispense Refill    amiodarone (PACERONE) 100 MG Tab Take 1 tablet (100 mg total) by mouth once daily. 90 tablet 3    amLODIPine (NORVASC) 5 MG tablet Take 1 tablet (5 mg total) by mouth once daily. 90 tablet 3    apixaban (ELIQUIS) 2.5 mg Tab Take 1 tablet (2.5 mg total) by mouth 2 (two) times daily. 60 tablet 11    aspirin (ECOTRIN) 81 MG EC tablet Take 81 mg by mouth once daily.      atorvastatin (LIPITOR) 40 MG tablet Take 1 tablet (40 mg total) by mouth once daily. 90 tablet 3    dutasteride (AVODART) 0.5 mg capsule Take 1 capsule (0.5 mg total) by mouth once daily. 30 capsule 11    memantine (NAMENDA) 10 MG Tab Take 1 tablet (10 mg total) by mouth 2 (two) times daily. 180  tablet 3    metoprolol tartrate (LOPRESSOR) 25 MG tablet Take 0.5 tablets (12.5 mg total) by mouth 2 (two) times daily. 90 tablet 3    multivitamin (MULTIVITAMIN) per tablet Take 1 tablet by mouth once daily.      omeprazole (PRILOSEC) 20 MG capsule Take 1 capsule (20 mg total) by mouth once daily. 90 capsule 3    tamsulosin (FLOMAX) 0.4 mg Cap Take 1 capsule (0.4 mg total) by mouth every evening. 90 capsule 11     No current facility-administered medications on file prior to visit.         Review of patient's allergies indicates:   Allergen Reactions    Arb-angiotensin receptor antagonist Other (See Comments)     Hyperkalemia    Lisinopril Diarrhea       Medications Reconciliation:   I have reconciled the patient's home medications with the patient/family. I have updated all changes.  Refer to After-Visit Medication List.    OBJECTIVE:     Vital Signs:  There were no vitals filed for this visit.  Wt Readings from Last 1 Encounters:   09/25/19 1046 69.4 kg (153 lb)     There is no height or weight on file to calculate BMI.             Physical Exam:  General: Well developed, well nourished. No distress.  HEENT: Head is normocephalic, atraumatic; ears are normal.   Eyes: Clear conjunctiva.  Neck: Supple, symmetrical neck; trachea midline.  Lungs: Clear to auscultation bilaterally and normal respiratory effort.  Cardiovascular: Heart with regular rate and rhythm. No murmurs, gallops or rubs  Extremities: No LE edema. Pulses 2+ and symmetric.   Abdomen: Abdomen is soft, non-tender non-distended with normal bowel sounds.  Skin: Skin color, texture, turgor normal. No rashes.  Musculoskeletal: Normal gait.   Lymph Nodes: No cervical or supraclavicular adenopathy.  Neurologic: Normal strength and tone. No focal numbness or weakness.   Psychiatric: Not depressed.        Laboratory  Lab Results   Component Value Date    WBC 7.21 09/20/2019    HGB 12.9 (L) 09/20/2019    HCT 42.5 09/20/2019     09/20/2019    CHOL  120 07/02/2014    TRIG 113 07/02/2014    HDL 40 07/02/2014    ALT 17 06/19/2019    AST 22 06/19/2019     09/20/2019    K 4.9 09/20/2019     09/20/2019    CREATININE 1.8 (H) 09/20/2019    BUN 21 09/20/2019    CO2 27 09/20/2019    TSH 1.295 11/08/2018    PSA 1.8 08/04/2014    INR 1.1 07/19/2017    GLUF 97 07/17/2007    HGBA1C 5.7 (H) 08/16/2018           ASSESSMENT & PLAN:       S/p Scalp Laceration:   Suture removal today      Future Appointments   Date Time Provider Department Center   11/14/2019  9:00 AM [place] [facility] Jose Glasgow PCW   12/25/2019 10:00 AM HOME MONITOR DEVICE CHECK, [facility] Jose [place]   1/30/2020  8:45 AM Yazmin Israel NP [facility] NEURO Jose [place]       After Visit Medication List :     Medication List           Accurate as of November 11, 2019  8:39 PM. If you have any questions, ask your nurse or doctor.               CONTINUE taking these medications    amiodarone 100 MG Tab  Commonly known as:  PACERONE  Take 1 tablet (100 mg total) by mouth once daily.     amLODIPine 5 MG tablet  Commonly known as:  NORVASC  Take 1 tablet (5 mg total) by mouth once daily.     apixaban 2.5 mg Tab  Commonly known as:  ELIQUIS  Take 1 tablet (2.5 mg total) by mouth 2 (two) times daily.     aspirin 81 MG EC tablet  Commonly known as:  ECOTRIN     atorvastatin 40 MG tablet  Commonly known as:  LIPITOR  Take 1 tablet (40 mg total) by mouth once daily.     dutasteride 0.5 mg capsule  Commonly known as:  AVODART  Take 1 capsule (0.5 mg total) by mouth once daily.     memantine 10 MG Tab  Commonly known as:  NAMENDA  Take 1 tablet (10 mg total) by mouth 2 (two) times daily.     metoprolol tartrate 25 MG tablet  Commonly known as:  LOPRESSOR  Take 0.5 tablets (12.5 mg total) by mouth 2 (two) times daily.     omeprazole 20 MG capsule  Commonly known as:  PriLOSEC  Take 1 capsule (20 mg total) by mouth once daily.     ONE DAILY MULTIVITAMIN per tablet  Generic drug:  multivitamin      tamsulosin 0.4 mg Cap  Commonly known as:  FLOMAX  Take 1 capsule (0.4 mg total) by mouth every evening.            Signing Physician:  SAL Zamudio

## 2019-11-14 ENCOUNTER — PROCEDURE VISIT (OUTPATIENT)
Dept: INTERNAL MEDICINE | Facility: CLINIC | Age: 79
End: 2019-11-14
Payer: MEDICARE

## 2019-11-14 ENCOUNTER — OFFICE VISIT (OUTPATIENT)
Dept: INTERNAL MEDICINE | Facility: CLINIC | Age: 79
End: 2019-11-14
Payer: MEDICARE

## 2019-11-14 VITALS
HEART RATE: 60 BPM | OXYGEN SATURATION: 97 % | BODY MASS INDEX: 20.06 KG/M2 | DIASTOLIC BLOOD PRESSURE: 64 MMHG | SYSTOLIC BLOOD PRESSURE: 120 MMHG | WEIGHT: 117.5 LBS | HEIGHT: 64 IN

## 2019-11-14 DIAGNOSIS — Z48.02 ENCOUNTER FOR STAPLE REMOVAL: Primary | ICD-10-CM

## 2019-11-14 DIAGNOSIS — R29.6 FALLS FREQUENTLY: ICD-10-CM

## 2019-11-14 PROCEDURE — 3074F SYST BP LT 130 MM HG: CPT | Mod: CPTII,S$GLB,, | Performed by: NURSE PRACTITIONER

## 2019-11-14 PROCEDURE — 3074F PR MOST RECENT SYSTOLIC BLOOD PRESSURE < 130 MM HG: ICD-10-PCS | Mod: CPTII,S$GLB,, | Performed by: NURSE PRACTITIONER

## 2019-11-14 PROCEDURE — 3078F DIAST BP <80 MM HG: CPT | Mod: CPTII,S$GLB,, | Performed by: NURSE PRACTITIONER

## 2019-11-14 PROCEDURE — 99214 PR OFFICE/OUTPT VISIT, EST, LEVL IV, 30-39 MIN: ICD-10-PCS | Mod: S$GLB,,, | Performed by: NURSE PRACTITIONER

## 2019-11-14 PROCEDURE — 1101F PR PT FALLS ASSESS DOC 0-1 FALLS W/OUT INJ PAST YR: ICD-10-PCS | Mod: CPTII,S$GLB,, | Performed by: NURSE PRACTITIONER

## 2019-11-14 PROCEDURE — 99214 OFFICE O/P EST MOD 30 MIN: CPT | Mod: S$GLB,,, | Performed by: NURSE PRACTITIONER

## 2019-11-14 PROCEDURE — 99999 PR PBB SHADOW E&M-EST. PATIENT-LVL II: CPT | Mod: PBBFAC,,, | Performed by: NURSE PRACTITIONER

## 2019-11-14 PROCEDURE — 3078F PR MOST RECENT DIASTOLIC BLOOD PRESSURE < 80 MM HG: ICD-10-PCS | Mod: CPTII,S$GLB,, | Performed by: NURSE PRACTITIONER

## 2019-11-14 PROCEDURE — 99999 PR PBB SHADOW E&M-EST. PATIENT-LVL II: ICD-10-PCS | Mod: PBBFAC,,, | Performed by: NURSE PRACTITIONER

## 2019-11-14 PROCEDURE — 1101F PT FALLS ASSESS-DOCD LE1/YR: CPT | Mod: CPTII,S$GLB,, | Performed by: NURSE PRACTITIONER

## 2019-11-14 NOTE — PROGRESS NOTES
INTERNAL MEDICINE CLINIC - SAME DAY APPOINTMENT  Progress Note    PRESENTING HISTORY     PCP: Jeff Yan MD  Chief Complaint/Reason for Visit:   No chief complaint on file.      History of Present Illness & ROS : Mr. Mark Anthony Velarde Jr. is a 79 y.o. male.    Here for UC Visit.   Was in the ED on 11/7/2019 for a mechanical fall, w/o LOC while walking into a restaurant, incurred a scalp laceration and had staples placed.     On Eliquis, CT head negative from ED visit.     Here today for staple removal.       Review of Systems:  Eyes: denies visual changes at this time denies floaters   ENT: no nasal congestion or sore throat  Respiratory: no cough or shorness of breath  Cardiovascular: no chest pain or palpitations  Gastrointestinal: no nausea or vomiting, no abdominal pain or change in bowel habits  Genitourinary: no hematuria or dysuria; denies frequency  Hematologic/Lymphatic: no easy bruising or lymphadenopathy  Musculoskeletal: no arthralgias or myalgias  Neurological: no seizures or tremors  Endocrine: no heat or cold intolerance      PAST HISTORY:     Past Medical History:   Diagnosis Date    *Atrial fibrillation     Anticoagulant long-term use     Atrial fibrillation - asymptomatic but noted on ICD interrogation (5-02-20121) - 16 h 40 minutes of afib 2/13/2013    Atrial flutter with controlled response - seen on ICD interrogation - asymptomatic 2/13/2013    Automatic implantable cardioverter-defibrillator in situ 2/13/2013    Basal cell carcinoma     mid forehead    CAD (coronary artery disease) 2/13/2013    Cardiomyopathy     Cataract     Cognitive deficits     mild    H/O syncope -  5/27/2013    History of PTCA - LAD, LCX and PDA PCI in 2006 2/13/2013    HTN (hypertension) 2/13/2013    Hyperlipidemia LDL goal < 70 2/13/2013    Hypertension     ICD (implantable cardiac defibrillator) in place 2/13/2013    Ischemic cardiomyopathy LVEF ~45% 2/13/2013    LBBB (left bundle branch block)  2/13/2013    Memory loss     Psoriasis vulgaris     Syncope and collapse        Past Surgical History:   Procedure Laterality Date    CARDIAC DEFIBRILLATOR PLACEMENT      CATARACT EXTRACTION W/  INTRAOCULAR LENS IMPLANT Right 04/04/16    Dr Michael     CATARACT EXTRACTION W/  INTRAOCULAR LENS IMPLANT Left 05/09/2016    SKIN BIOPSY      TONSILLECTOMY         Family History   Problem Relation Age of Onset    Psoriasis Father     Dementia Father     Tremor Father     Cataracts Mother     Cancer Mother     Psoriasis Son     Psoriasis Son     Diabetes Maternal Grandmother     Kidney disease Sister     No Known Problems Brother     No Known Problems Maternal Aunt     No Known Problems Maternal Uncle     No Known Problems Paternal Aunt     No Known Problems Paternal Uncle     No Known Problems Maternal Grandfather     No Known Problems Paternal Grandmother     No Known Problems Paternal Grandfather     Amblyopia Neg Hx     Blindness Neg Hx     Glaucoma Neg Hx     Hypertension Neg Hx     Macular degeneration Neg Hx     Retinal detachment Neg Hx     Strabismus Neg Hx     Stroke Neg Hx     Thyroid disease Neg Hx     Parkinsonism Neg Hx     Celiac disease Neg Hx     Cirrhosis Neg Hx     Colon cancer Neg Hx     Colon polyps Neg Hx     Crohn's disease Neg Hx     Cystic fibrosis Neg Hx     Esophageal cancer Neg Hx     Irritable bowel syndrome Neg Hx     Inflammatory bowel disease Neg Hx     Hemochromatosis Neg Hx     Liver cancer Neg Hx     Liver disease Neg Hx     Rectal cancer Neg Hx     Stomach cancer Neg Hx     Ulcerative colitis Neg Hx     Christiano's disease Neg Hx     Lymphoma Neg Hx     Scleroderma Neg Hx     Rheum arthritis Neg Hx     Multiple sclerosis Neg Hx     Melanoma Neg Hx     Tuberculosis Neg Hx     Lupus Neg Hx        Social History     Socioeconomic History    Marital status:      Spouse name: Not on file    Number of children: Not on file    Years  of education: Not on file    Highest education level: Not on file   Occupational History    Occupation: retired   Social Needs    Financial resource strain: Not hard at all    Food insecurity:     Worry: Never true     Inability: Never true    Transportation needs:     Medical: No     Non-medical: No   Tobacco Use    Smoking status: Former Smoker     Last attempt to quit: 1963     Years since quittin.1    Smokeless tobacco: Never Used   Substance and Sexual Activity    Alcohol use: No     Frequency: Monthly or less     Drinks per session: 1 or 2     Binge frequency: Never    Drug use: No    Sexual activity: Not on file   Lifestyle    Physical activity:     Days per week: 6 days     Minutes per session: 50 min    Stress: Not at all   Relationships    Social connections:     Talks on phone: Once a week     Gets together: Once a week     Attends Mormon service: Not on file     Active member of club or organization: Yes     Attends meetings of clubs or organizations: More than 4 times per year     Relationship status:    Other Topics Concern    Not on file   Social History Narrative    Not on file       MEDICATIONS & ALLERGIES:     Current Outpatient Medications on File Prior to Visit   Medication Sig Dispense Refill    amiodarone (PACERONE) 100 MG Tab Take 1 tablet (100 mg total) by mouth once daily. 90 tablet 3    amLODIPine (NORVASC) 5 MG tablet Take 1 tablet (5 mg total) by mouth once daily. 90 tablet 3    apixaban (ELIQUIS) 2.5 mg Tab Take 1 tablet (2.5 mg total) by mouth 2 (two) times daily. 60 tablet 11    aspirin (ECOTRIN) 81 MG EC tablet Take 81 mg by mouth once daily.      atorvastatin (LIPITOR) 40 MG tablet Take 1 tablet (40 mg total) by mouth once daily. 90 tablet 3    dutasteride (AVODART) 0.5 mg capsule Take 1 capsule (0.5 mg total) by mouth once daily. 30 capsule 11    FLUZONE HIGH-DOSE , PF, 180 mcg/0.5 mL Syrg ADM 0.5ML IM UTD  0    memantine (NAMENDA)  10 MG Tab Take 1 tablet (10 mg total) by mouth 2 (two) times daily. 180 tablet 3    metoprolol tartrate (LOPRESSOR) 25 MG tablet Take 0.5 tablets (12.5 mg total) by mouth 2 (two) times daily. 90 tablet 3    multivitamin (MULTIVITAMIN) per tablet Take 1 tablet by mouth once daily.      omeprazole (PRILOSEC) 20 MG capsule Take 1 capsule (20 mg total) by mouth once daily. 90 capsule 3    tamsulosin (FLOMAX) 0.4 mg Cap Take 1 capsule (0.4 mg total) by mouth every evening. 90 capsule 11     No current facility-administered medications on file prior to visit.         Review of patient's allergies indicates:   Allergen Reactions    Arb-angiotensin receptor antagonist Other (See Comments)     Hyperkalemia    Lisinopril Diarrhea       Medications Reconciliation:   I have reconciled the patient's home medications with the patient/family. I have updated all changes.  Refer to After-Visit Medication List.    OBJECTIVE:     Vital Signs:  There were no vitals filed for this visit.  Wt Readings from Last 1 Encounters:   11/14/19 0913 53.3 kg (117 lb 8.1 oz)     There is no height or weight on file to calculate BMI.     Wt Readings from Last 3 Encounters:   11/14/19 53.3 kg (117 lb 8.1 oz)   09/25/19 69.4 kg (153 lb)   07/30/19 71.4 kg (157 lb 6.5 oz)     Temp Readings from Last 3 Encounters:   11/07/19 98 °F (36.7 °C) (Oral)   07/18/19 98.2 °F (36.8 °C) (Skin)   06/19/19 98.2 °F (36.8 °C) (Oral)     BP Readings from Last 3 Encounters:   11/14/19 120/64   11/07/19 138/67   09/25/19 110/60     Pulse Readings from Last 3 Encounters:   11/14/19 60   11/07/19 75   09/25/19 69           Physical Exam:  General: Well developed, well nourished. No distress.  HEENT: Head is normocephalic, with 8 well approximated staples to right lateral scalp region, old blood, but no active drainage appreciated; ears are normal.   Eyes: Clear conjunctiva.  Neck: Supple, symmetrical neck; trachea midline.  Lungs: Clear to auscultation bilaterally  and normal respiratory effort.  Cardiovascular: Heart with regular rate and rhythm. No murmurs, gallops or rubs  Extremities: No LE edema. Pulses 2+ and symmetric.   Abdomen: Abdomen is soft, non-tender non-distended with normal bowel sounds.  Skin: Skin color, texture, turgor normal. No rashes.  Musculoskeletal: Normal gait.   Lymph Nodes: No cervical or supraclavicular adenopathy.  Neurologic: Normal strength and tone. No focal numbness or weakness.   Psychiatric: Not depressed.        Laboratory  Lab Results   Component Value Date    WBC 7.21 09/20/2019    HGB 12.9 (L) 09/20/2019    HCT 42.5 09/20/2019     09/20/2019    CHOL 120 07/02/2014    TRIG 113 07/02/2014    HDL 40 07/02/2014    ALT 17 06/19/2019    AST 22 06/19/2019     09/20/2019    K 4.9 09/20/2019     09/20/2019    CREATININE 1.8 (H) 09/20/2019    BUN 21 09/20/2019    CO2 27 09/20/2019    TSH 1.295 11/08/2018    PSA 1.8 08/04/2014    INR 1.1 07/19/2017    GLUF 97 07/17/2007    HGBA1C 5.7 (H) 08/16/2018         ASSESSMENT & PLAN:     Encounter for staple removal  Staples removed w/o any difficulty, x 8. No drainage appreciated before, during or after the procedure. Advised to only use Baby Shampoo to wash hair for the next 3 days.   Advised to notify PCP or this provider if develops any active drainage from sites or bleeding.   Laced with Bacitracin hung post removal. Tolerated procedure well.     Falls frequently      Future Appointments   Date Time Provider Department Center   11/14/2019  3:30 PM [place] [facility] Jose Glasgow PCW   12/25/2019 10:00 AM HOME MONITOR DEVICE CHECK, [facility] JUN Glasgow   1/30/2020  8:45 AM Yazmin Israel NP [facility] NEURO Jose [place]        Medication List           Accurate as of November 14, 2019  9:49 AM. If you have any questions, ask your nurse or doctor.               CONTINUE taking these medications    amiodarone 100 MG Tab  Commonly known as:  PACERONE  Take 1 tablet (100 mg  total) by mouth once daily.     amLODIPine 5 MG tablet  Commonly known as:  NORVASC  Take 1 tablet (5 mg total) by mouth once daily.     apixaban 2.5 mg Tab  Commonly known as:  ELIQUIS  Take 1 tablet (2.5 mg total) by mouth 2 (two) times daily.     aspirin 81 MG EC tablet  Commonly known as:  ECOTRIN     atorvastatin 40 MG tablet  Commonly known as:  LIPITOR  Take 1 tablet (40 mg total) by mouth once daily.     dutasteride 0.5 mg capsule  Commonly known as:  AVODART  Take 1 capsule (0.5 mg total) by mouth once daily.     FLUZONE HIGH-DOSE 2019-20 (PF) 180 mcg/0.5 mL Syrg  Generic drug:  flu vacc [id number](65yr up)PF     memantine 10 MG Tab  Commonly known as:  NAMENDA  Take 1 tablet (10 mg total) by mouth 2 (two) times daily.     metoprolol tartrate 25 MG tablet  Commonly known as:  LOPRESSOR  Take 0.5 tablets (12.5 mg total) by mouth 2 (two) times daily.     omeprazole 20 MG capsule  Commonly known as:  PriLOSEC  Take 1 capsule (20 mg total) by mouth once daily.     ONE DAILY MULTIVITAMIN per tablet  Generic drug:  multivitamin     tamsulosin 0.4 mg Cap  Commonly known as:  FLOMAX  Take 1 capsule (0.4 mg total) by mouth every evening.            Signing Physician:  SAL Zamudio

## 2019-11-18 ENCOUNTER — TELEPHONE (OUTPATIENT)
Dept: INTERNAL MEDICINE | Facility: CLINIC | Age: 79
End: 2019-11-18

## 2019-11-18 DIAGNOSIS — G20.C PARKINSONISM, UNSPECIFIED PARKINSONISM TYPE: ICD-10-CM

## 2019-11-18 DIAGNOSIS — Z91.81 AT HIGH RISK FOR INJURY RELATED TO FALL: ICD-10-CM

## 2019-11-18 DIAGNOSIS — F01.50 MIXED DEMENTIA: ICD-10-CM

## 2019-11-18 DIAGNOSIS — Z91.81: ICD-10-CM

## 2019-11-18 DIAGNOSIS — G30.9 MIXED DEMENTIA: ICD-10-CM

## 2019-11-18 DIAGNOSIS — F02.80 MIXED DEMENTIA: ICD-10-CM

## 2019-11-18 DIAGNOSIS — R26.81 GAIT INSTABILITY: Primary | ICD-10-CM

## 2019-11-18 NOTE — TELEPHONE ENCOUNTER
----- Message from Yary Hilario sent at 11/18/2019 11:25 AM CST -----  Contact: Wife- Margy  Pt is part of the care ecosystem research study. During monthly check in cg stated that pt has been having issues with strength and had a recent fall. During the ED visit the doctor suggested either pt or ot. Would it be possible to put a request in for in home pt or ot?

## 2019-11-18 NOTE — TELEPHONE ENCOUNTER
Do they have special PT OT with that program?  Does he have Home Health besides or something similar? I just need to know how to order the In Home PT OT?

## 2019-11-19 NOTE — TELEPHONE ENCOUNTER
Spoke to Yary Hilario with the ecosystem research. She said just a regular referral is fine. Pt rather have PT or OT done at home. He needs help with strength. Once the referral is placed Yary can fax it to [facility] and take care of it. They just need pt pcp to put the referral in

## 2019-11-21 NOTE — TELEPHONE ENCOUNTER
[facility] sent a message stating they cannot accept  because his insurance plan is not one they accept at this time. I have forward the message to Yary that has been taking care of  with ecosystem research. She is aware of the denial and will look for a place that takes his insurance

## 2019-11-22 NOTE — TELEPHONE ENCOUNTER
"Message from Yary Hilario "Morning! So I found a place called [facility] that will accept Mr Zendejas insurance and does in home pt. They just need- Demographics face sheet, list of meds, clinical notes stating pt is needed (ed visit may work?), the order and eval/treatment plan. the fax number is [phone] is this something you would be able to do for me?"  Everything has been printed and faxed to [phone]  "

## 2019-11-25 ENCOUNTER — TELEPHONE (OUTPATIENT)
Dept: RESEARCH | Facility: HOSPITAL | Age: 79
End: 2019-11-25

## 2019-11-27 ENCOUNTER — TELEPHONE (OUTPATIENT)
Dept: INTERNAL MEDICINE | Facility: CLINIC | Age: 79
End: 2019-11-27

## 2019-11-27 ENCOUNTER — PATIENT MESSAGE (OUTPATIENT)
Dept: ELECTROPHYSIOLOGY | Facility: CLINIC | Age: 79
End: 2019-11-27

## 2019-11-27 ENCOUNTER — TELEPHONE (OUTPATIENT)
Dept: ELECTROPHYSIOLOGY | Facility: CLINIC | Age: 79
End: 2019-11-27

## 2019-11-27 DIAGNOSIS — F02.80 MIXED DEMENTIA: ICD-10-CM

## 2019-11-27 DIAGNOSIS — R26.81 GAIT INSTABILITY: Primary | ICD-10-CM

## 2019-11-27 DIAGNOSIS — G20.C PARKINSONISM, UNSPECIFIED PARKINSONISM TYPE: ICD-10-CM

## 2019-11-27 DIAGNOSIS — F01.50 MIXED DEMENTIA: ICD-10-CM

## 2019-11-27 DIAGNOSIS — G30.9 MIXED DEMENTIA: ICD-10-CM

## 2019-11-27 RX ORDER — AMIODARONE HYDROCHLORIDE 200 MG/1
100 TABLET ORAL DAILY
Qty: 45 TABLET | Refills: 3 | Status: SHIPPED | OUTPATIENT
Start: 2019-11-27 | End: 2020-11-26

## 2019-11-27 NOTE — TELEPHONE ENCOUNTER
----- Message from Marilin Latif sent at 11/27/2019 10:07 AM CST -----  Contact: [facility] [phone]  [facility] is calling to clarify if the orders are for home health or PT?  Please advise, thanks

## 2019-12-02 ENCOUNTER — TELEPHONE (OUTPATIENT)
Dept: INTERNAL MEDICINE | Facility: CLINIC | Age: 79
End: 2019-12-02

## 2019-12-02 NOTE — TELEPHONE ENCOUNTER
----- Message from Marilin Latif sent at 12/2/2019  8:41 AM CST -----  Contact: [facility] [phone]    [facility] is calling to clarify if the orders are for home health or PT?  Please advise, thanks .

## 2019-12-02 NOTE — TELEPHONE ENCOUNTER
----- Message from Mariela Franco sent at 12/2/2019 10:12 AM CST -----  Contact: [facility]/jailene/[phone]  Rep called in regard to getting the office to refax the pt orders for home health. They did not receive it. Fax number [phone]      Please advise

## 2019-12-02 NOTE — TELEPHONE ENCOUNTER
"[facility]. They are requesting a new order for pt physical therapy. It has to say " ambulatory referral to Home health with physical therapy " once this order is changed I can fax it to her and they will fax it to a [facility]  "

## 2019-12-02 NOTE — TELEPHONE ENCOUNTER
I placed the Home Health order with the reason being PT.   Let me know if something needs to be changed.   I think it printed.

## 2019-12-04 ENCOUNTER — TELEPHONE (OUTPATIENT)
Dept: INTERNAL MEDICINE | Facility: CLINIC | Age: 79
End: 2019-12-04

## 2019-12-04 PROCEDURE — G0180 MD CERTIFICATION HHA PATIENT: HCPCS | Mod: ,,, | Performed by: INTERNAL MEDICINE

## 2019-12-04 PROCEDURE — G0180 PR HOME HEALTH MD CERTIFICATION: ICD-10-PCS | Mod: ,,, | Performed by: INTERNAL MEDICINE

## 2019-12-04 NOTE — TELEPHONE ENCOUNTER
----- Message from Marilin Latif sent at 12/4/2019 11:18 AM CST -----  Contact: [facility] [phone]  Rocky is calling to receive a verbal order for Occupational therapy.  Please advise, thanks

## 2019-12-05 RX ORDER — OMEPRAZOLE 20 MG/1
20 CAPSULE, DELAYED RELEASE ORAL DAILY
Qty: 90 CAPSULE | Refills: 3 | Status: SHIPPED | OUTPATIENT
Start: 2019-12-05 | End: 2020-12-04

## 2019-12-05 NOTE — TELEPHONE ENCOUNTER
----- Message from Marilin Latif sent at 12/5/2019  1:54 PM CST -----  Contact: Ethel [phone]  Type: Rx    Name of medication(s): omeprazole (PRILOSEC) 20 MG capsule    Is this a refill? New rx? refill    Pharmacy Name, Phone, & Location:[id number]  [place] - [address] AT [facility] [address]   [phone] (Phone)  [phone] (Fax)        Comments:please advise, thanks

## 2019-12-06 NOTE — TELEPHONE ENCOUNTER
----- Message from Mariela Franco sent at 12/6/2019 10:21 AM CST -----  Contact: doreen [facility]/archie/[phone]  [facility] called in regards to get ot eval orders sent to [facility].       Please advise

## 2019-12-12 ENCOUNTER — TELEPHONE (OUTPATIENT)
Dept: UROLOGY | Facility: CLINIC | Age: 79
End: 2019-12-12

## 2019-12-12 DIAGNOSIS — R33.9 INCOMPLETE BLADDER EMPTYING: ICD-10-CM

## 2019-12-12 DIAGNOSIS — R31.29 HEMATURIA, MICROSCOPIC: Primary | ICD-10-CM

## 2019-12-12 NOTE — TELEPHONE ENCOUNTER
----- Message from Krystyna Lane LPN sent at 12/12/2019  1:13 PM CST -----  Contact: pt wife#[phone]      ----- Message -----  From: Tabitha Rivera  Sent: 12/12/2019  10:15 AM CST  To: Travon HERNANDEZ Staff    Renate Velarde needs a order for US per recall. please call wife Renate

## 2019-12-18 ENCOUNTER — EXTERNAL HOME HEALTH (OUTPATIENT)
Dept: HOME HEALTH SERVICES | Facility: HOSPITAL | Age: 79
End: 2019-12-18
Payer: MEDICARE

## 2019-12-25 ENCOUNTER — CLINICAL SUPPORT (OUTPATIENT)
Dept: CARDIOLOGY | Facility: HOSPITAL | Age: 79
End: 2019-12-25
Attending: INTERNAL MEDICINE
Payer: MEDICARE

## 2019-12-25 DIAGNOSIS — I25.5 ISCHEMIC CARDIOMYOPATHY: ICD-10-CM

## 2019-12-25 DIAGNOSIS — I47.20 VT (VENTRICULAR TACHYCARDIA): ICD-10-CM

## 2019-12-25 DIAGNOSIS — Z95.810 AICD (AUTOMATIC CARDIOVERTER/DEFIBRILLATOR) PRESENT: ICD-10-CM

## 2019-12-25 PROCEDURE — 93295 CARDIAC DEVICE CHECK - REMOTE: ICD-10-PCS | Mod: ,,, | Performed by: INTERNAL MEDICINE

## 2019-12-25 PROCEDURE — 93295 DEV INTERROG REMOTE 1/2/MLT: CPT | Mod: ,,, | Performed by: INTERNAL MEDICINE

## 2019-12-25 PROCEDURE — 93296 REM INTERROG EVL PM/IDS: CPT | Performed by: INTERNAL MEDICINE

## 2019-12-27 ENCOUNTER — TELEPHONE (OUTPATIENT)
Dept: HOME HEALTH SERVICES | Facility: HOSPITAL | Age: 79
End: 2019-12-27

## 2019-12-27 ENCOUNTER — RESEARCH ENCOUNTER (OUTPATIENT)
Dept: RESEARCH | Facility: HOSPITAL | Age: 79
End: 2019-12-27

## 2020-01-02 ENCOUNTER — TELEPHONE (OUTPATIENT)
Dept: INTERNAL MEDICINE | Facility: CLINIC | Age: 80
End: 2020-01-02

## 2020-01-02 NOTE — TELEPHONE ENCOUNTER
Spoke to David Chong Babar Hailee [phone]  She is calling to inform you that patient had a fall. He had a skin tear to his right thumb, no injuries. He is able to move it and do hand exercises. They wrapped it with ointment.   This courtesy call.

## 2020-01-02 NOTE — TELEPHONE ENCOUNTER
----- Message from Marilin Latif sent at 1/2/2020 11:19 AM CST -----  Contact: [facility] [phone]  Francesca is calling to inform MD that patient had a fall. He had a skin tear to his right thumb, no injuries.  Please advise, thanks

## 2020-01-22 ENCOUNTER — HOSPITAL ENCOUNTER (EMERGENCY)
Facility: HOSPITAL | Age: 80
Discharge: HOME | End: 2020-01-22
Attending: EMERGENCY MEDICINE
Payer: MEDICARE

## 2020-01-22 ENCOUNTER — INITIAL CONSULT (OUTPATIENT)
Dept: NEUROLOGY | Facility: CLINIC | Age: 80
End: 2020-01-22
Payer: MEDICARE

## 2020-01-22 VITALS
RESPIRATION RATE: 20 BRPM | WEIGHT: 158.5 LBS | SYSTOLIC BLOOD PRESSURE: 137 MMHG | DIASTOLIC BLOOD PRESSURE: 70 MMHG | OXYGEN SATURATION: 97 % | HEART RATE: 60 BPM | HEIGHT: 63 IN | TEMPERATURE: 98 F | BODY MASS INDEX: 28.08 KG/M2

## 2020-01-22 DIAGNOSIS — S60.222A CONTUSION OF LEFT HAND, INITIAL ENCOUNTER: ICD-10-CM

## 2020-01-22 DIAGNOSIS — F02.80 LEWY BODY DEMENTIA WITHOUT BEHAVIORAL DISTURBANCE: ICD-10-CM

## 2020-01-22 DIAGNOSIS — N39.0 URINARY TRACT INFECTION WITHOUT HEMATURIA, SITE UNSPECIFIED: ICD-10-CM
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
## 2020-01-22 DIAGNOSIS — S00.83XA FACIAL BRUISING, INITIAL ENCOUNTER: ICD-10-CM

## 2020-01-22 DIAGNOSIS — S00.83XA TRAUMATIC HEMATOMA OF FOREHEAD, INITIAL ENCOUNTER: ICD-10-CM

## 2020-01-22 DIAGNOSIS — G31.83 LEWY BODY DEMENTIA WITHOUT BEHAVIORAL DISTURBANCE: ICD-10-CM

## 2020-01-22 DIAGNOSIS — S00.12XA CONTUSION OF LEFT EYELID, INITIAL ENCOUNTER: Primary | ICD-10-CM
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
## 2020-01-22 LAB
BACTERIA #/AREA URNS AUTO: ABNORMAL /HPF
BILIRUB UR QL STRIP: NEGATIVE
CLARITY UR REFRACT.AUTO: ABNORMAL
COLOR UR AUTO: YELLOW
GLUCOSE UR QL STRIP: NEGATIVE
HGB UR QL STRIP: ABNORMAL
HYALINE CASTS UR QL AUTO: 0 /LPF
KETONES UR QL STRIP: NEGATIVE
LEUKOCYTE ESTERASE UR QL STRIP: ABNORMAL
MICROSCOPIC COMMENT: ABNORMAL
NITRITE UR QL STRIP: NEGATIVE
PH UR STRIP: 7 [PH] (ref 5–8)
PROT UR QL STRIP: ABNORMAL
RBC #/AREA URNS AUTO: 40 /HPF (ref 0–4)
SP GR UR STRIP: 1.01 (ref 1–1.03)
URN SPEC COLLECT METH UR: ABNORMAL
WBC #/AREA URNS AUTO: >100 /HPF (ref 0–5)
WBC CLUMPS UR QL AUTO: ABNORMAL

## 2020-01-22 PROCEDURE — 99499 NO LOS: ICD-10-PCS | Mod: S$GLB,,, | Performed by: PSYCHIATRY & NEUROLOGY

## 2020-01-22 PROCEDURE — 99999 PR PBB SHADOW E&M-EST. PATIENT-LVL I: CPT | Mod: PBBFAC,,,

## 2020-01-22 PROCEDURE — 99284 EMERGENCY DEPT VISIT MOD MDM: CPT | Mod: 25

## 2020-01-22 PROCEDURE — 25000003 PHARM REV CODE 250: Performed by: STUDENT IN AN ORGANIZED HEALTH CARE EDUCATION/TRAINING PROGRAM

## 2020-01-22 PROCEDURE — 96116 PR NEUROBEHAVIORAL STATUS EXAM BY PSYCH/PHYS: ICD-10-PCS | Mod: S$GLB,,, | Performed by: CLINICAL NEUROPSYCHOLOGIST

## 2020-01-22 PROCEDURE — 99284 EMERGENCY DEPT VISIT MOD MDM: CPT | Mod: ,,, | Performed by: EMERGENCY MEDICINE

## 2020-01-22 PROCEDURE — 99999 PR PBB SHADOW E&M-EST. PATIENT-LVL I: ICD-10-PCS | Mod: PBBFAC,,,

## 2020-01-22 PROCEDURE — 93010 EKG 12-LEAD: ICD-10-PCS | Mod: ,,, | Performed by: INTERNAL MEDICINE

## 2020-01-22 PROCEDURE — 99284 PR EMERGENCY DEPT VISIT,LEVEL IV: ICD-10-PCS | Mod: ,,, | Performed by: EMERGENCY MEDICINE

## 2020-01-22 PROCEDURE — 99214 OFFICE O/P EST MOD 30 MIN: CPT | Mod: S$GLB,,, | Performed by: NURSE PRACTITIONER

## 2020-01-22 PROCEDURE — 93010 ELECTROCARDIOGRAM REPORT: CPT | Mod: ,,, | Performed by: INTERNAL MEDICINE

## 2020-01-22 PROCEDURE — 99214 PR OFFICE/OUTPT VISIT, EST, LEVL IV, 30-39 MIN: ICD-10-PCS | Mod: S$GLB,,, | Performed by: NURSE PRACTITIONER

## 2020-01-22 PROCEDURE — 99499 UNLISTED E&M SERVICE: CPT | Mod: S$GLB,,, | Performed by: PSYCHIATRY & NEUROLOGY

## 2020-01-22 PROCEDURE — 87086 URINE CULTURE/COLONY COUNT: CPT

## 2020-01-22 PROCEDURE — 87088 URINE BACTERIA CULTURE: CPT

## 2020-01-22 PROCEDURE — 81001 URINALYSIS AUTO W/SCOPE: CPT

## 2020-01-22 PROCEDURE — 96116 NUBHVL XM PHYS/QHP 1ST HR: CPT | Mod: S$GLB,,, | Performed by: CLINICAL NEUROPSYCHOLOGIST

## 2020-01-22 PROCEDURE — 93005 ELECTROCARDIOGRAM TRACING: CPT

## 2020-01-22 RX ORDER — CEPHALEXIN 250 MG/1
500 CAPSULE ORAL EVERY 12 HOURS
Qty: 28 CAPSULE | Refills: 0 | Status: SHIPPED | OUTPATIENT
Start: 2020-01-22 | End: 2020-01-29

## 2020-01-22 RX ORDER — CEPHALEXIN 500 MG/1
500 CAPSULE ORAL
Status: COMPLETED | OUTPATIENT
Start: 2020-01-22 | End: 2020-01-22

## 2020-01-22 RX ADMIN — CEPHALEXIN 500 MG: 500 CAPSULE ORAL at 07:01

## 2020-01-22 NOTE — ED TRIAGE NOTES
Pt had fall Friday into a door, falling to floor on carpet, striking face. No LOC +bruising to face. Pt takes Eliquis. Sent by neurology for further evaluation. Hx of dementia

## 2020-01-22 NOTE — PROGRESS NOTES
"NEUROBEHAVIORAL STATUS EXAMINATION   [facility]    MEDICAL NECESSITY: Evaluate cognitive and neuropsychiatric symptoms in the setting of advance lewy body dementia that has notably worsened in the past 3-months with severe caregiver burden. See billing below    HISTORY OF PRESENT ILLNESS: Mr. 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 He established care with [facility] in 2014 at the recommendation of his family. He scored 19/30 on the MoCA at that time, suggesting MCI vs dementia. Dr. Israel noted parkinsonism on exam in 1/2017, suggesting DLB as a possibility given the course of his symptoms. He scored 17/30 on the MoCA in 2/2017. At the 2018 [facility] Evaluation, his MoCA was 13/30 and SHIRLEY was 12/18 and he was noted to have Moderate Stage LBD. Review that note for recommendations/plan.      On 09/2019, he/wife/son were seen in follow-up. We noted, "-Dementia - Now advanced stage. Etiology possibly mixed (Lewy Body Dementia and Alzheimer's Dementia; MoCA=10/30). He has worsening parkinsonism over the past several years. 1. Discussed behavioral strategies and management in the setting of advanced dementia with wife for 50-minutes (one-step instructions, directive vs asking, making decisions on her own). She is understandably having trouble adjusting to progression and need to be more directive with him. 2. Level of Care - Lengthy discussion on [facility] placement vs staying at home noting that there was no clear decision at this point. However, recommended family explore and prepare given progressive illness. 3. [facility]: Warm handoff to our Care Management Team to help wife and son with complex care management needs. 4. Palliative Care - Wife is having trouble assessing/deciding what level of medical intervention for his carious complex care needs " (cardiac, urology) should happen given his dementia. Referring to Palliative for discussion with Dr. Hernandez    Presently, Mr. Anthony wife and son joined for the follow-up appointment. Patient presented with raccoon eyes and multiple facial contusions 2/2 recent fall. We urged ER visit for CT and evaluation, but they were adamant to have follow-up today. We met with them for 30-minutes and discussed the following:    Cognitively, son and wife reported ongoing worsening of cognition and severe loss now in functional status (e.g., he requires help with bathroom, needs near total supervision). Wife is still having trouble understanding this is beyond her ability to mange. Adult children having trouble stepping in to help mother understand her limitations and affect on her caregiver burden. After fall, family denied any acute change in mentation.      Neuropsychiatrically, no major issues. Visual hallucinations are continuing, but not distressing.    Physically, review Dr. Israel's note for recent fall.     Functionally, wife and son have noted a more significant decline such that they can't leave him alone at all. He is now requiring total support for all ADL.    Medication Compliance: She now administers  Appointment Management: Wife manages   Financial Management: Wife exclusively manages    Cooking: Totally dependent  Driving: No longer driving in the past 18-months  Hobbies: No interest now and sleeping/tv.  Basic ADLs: Wife is now having to manage or home health.    CURRENT MEDICATIONS:    Current Outpatient Medications:     amiodarone (PACERONE) 200 MG Tab, Take 0.5 tablets (100 mg total) by mouth once daily., Disp: 45 tablet, Rfl: 3    amLODIPine (NORVASC) 5 MG tablet, Take 1 tablet (5 mg total) by mouth once daily., Disp: 90 tablet, Rfl: 3    apixaban (ELIQUIS) 2.5 mg Tab, Take 1 tablet (2.5 mg total) by mouth 2 (two) times daily., Disp: 60 tablet, Rfl: 11    aspirin (ECOTRIN) 81 MG EC tablet, Take 81 mg by  mouth once daily., Disp: , Rfl:     atorvastatin (LIPITOR) 40 MG tablet, Take 1 tablet (40 mg total) by mouth once daily., Disp: 90 tablet, Rfl: 3    dutasteride (AVODART) 0.5 mg capsule, Take 1 capsule (0.5 mg total) by mouth once daily., Disp: 30 capsule, Rfl: 11    FLUZONE HIGH-DOSE 2019-20, PF, 180 mcg/0.5 mL Syrg, ADM 0.5ML IM UTD, Disp: , Rfl: 0    memantine (NAMENDA) 10 MG Tab, Take 1 tablet (10 mg total) by mouth 2 (two) times daily., Disp: 180 tablet, Rfl: 3    metoprolol tartrate (LOPRESSOR) 25 MG tablet, Take 0.5 tablets (12.5 mg total) by mouth 2 (two) times daily., Disp: 90 tablet, Rfl: 3    multivitamin (MULTIVITAMIN) per tablet, Take 1 tablet by mouth once daily., Disp: , Rfl:     omeprazole (PRILOSEC) 20 MG capsule, Take 1 capsule (20 mg total) by mouth once daily., Disp: 90 capsule, Rfl: 3    tamsulosin (FLOMAX) 0.4 mg Cap, Take 1 capsule (0.4 mg total) by mouth every evening., Disp: 90 capsule, Rfl: 11       BEHAVIORAL OBSERVATIONS/TEST RESULTS: Mr. Velarde was not oriented to time and variably to place.  Speech was minimal and when produced slow. He could follow only one step commands. He did not endorse any pain.     IMPRESSIONS AND PLAN:   Diagnosis:    -Dementia - Now advanced stage. Etiology possibly mixed (Lewy Body Dementia and Alzheimer's Dementia). He has worsening parkinsonism over the past several years.     Recommendations:   1. Discussed behavioral strategies briefly, but mainly focused on caregiver burden, limitations given wife's age and health status, and focused on need for adult children to provide much more support.   2. Recommended family meeting to review daily support plan since they are still adamant against A[facility].   3. Will take them to ER now for evaluation  4. Follow-up with [facility] for support and me for family meeting about enhanced support for wife in the home to ensure safety ([facility] to coordinate. .   5. Follow-up - [facility] Disorders Clinic in  3-months    Dickson Rodriguez, PHD, ABPP  Board Certified in Clinical Neuropsychology  Department of Neurology        BILLING     Service Description CPT Code Minutes Units   Psychiatric diagnostic evaluation by physician [id number]     Neurobehavioral status exam by physician [id number] [phone] 1   Each additional hour by physician [id number]  0   Test Evaluation Services --  --   Neuropsychological testing evaluation services by physician [id number]     Each additional hour by physician [id number]     Test Administration and Scoring --  --   Psychological or neuropsychological test administration and scoring by physician [id number]  0   Each additional 30 minutes by physician [id number]  0   Psychological or neuropsychological test administration and scoring by technician [id number]     Each additional 30 minutes by technician [id number]

## 2020-01-22 NOTE — PROGRESS NOTES
Name: Mark Anthony Velarde Jr.  MRN: [id number]   CSN: [id number]      Date: 1-      Referring physician:  Yazmin Israel, NP  [address] [place] [address]    Subjective:      Chief Complaint: dementia progressing    History of Present Illness (HPI):    Mark Anthony Velarde Jr. is a 79 y.o. right-handed male who presents today for a follow-up evaluation of Dementia and is accompanied by son and wife.      He fell Friday. Did not take him to ED. Has large bruising on his face and around eyes.       Wife says he contiues to decline. Dementia getting worse.   Leg does not want to lift. Can't get shoe/sock on foot anymore. She now has to help with this.  She was just having to help him shampoo his hair but now has to help with the whole bath.     Hallucinations since Monday. Saw soldiers. Saw shirts hanging in living room. Maintained insight.     Does not have help in home. Has been in contact with  for Aging in [place] but is still on waiting list.     Has 5 children. 4 live in in [facility] and 1 in [place].    After last visit, they had a family meeting and they all agreed that he would stay home for as long as possible. Wife wants him home until he does not know where he is any longer. However, there has been no additional assistance in the home and everything is the same with the wife continuing to be the sole caregiver and is clearly overwhelmed.      tehe conssensus is that the whant him home  He says his demands are becomingmmre andmore.   She asks about availablity to take him to memory care spot for few hours. (we don't recommend this at this point due to advanced dementia- will likely increase confusion).    Review of Systems   Unable to perform ROS: Dementia       Past Medical History: The patient  has a past medical history of *Atrial fibrillation, Anticoagulant long-term use, Atrial fibrillation - asymptomatic but noted on ICD interrogation ([id number]) - 16 h 40 minutes of afib (2/13/2013), Atrial  flutter with controlled response - seen on ICD interrogation - asymptomatic (2/13/2013), Automatic implantable cardioverter-defibrillator in situ (2/13/2013), Basal cell carcinoma, CAD (coronary artery disease) (2/13/2013), Cardiomyopathy, Cataract, Cognitive deficits, H/O syncope -  (5/27/2013), History of PTCA - LAD, LCX and PDA PCI in 2006 (2/13/2013), HTN (hypertension) (2/13/2013), Hyperlipidemia LDL goal < 70 (2/13/2013), Hypertension, ICD (implantable cardiac defibrillator) in place (2/13/2013), Ischemic cardiomyopathy LVEF ~45% (2/13/2013), LBBB (left bundle branch block) (2/13/2013), Memory loss, Psoriasis vulgaris, and Syncope and collapse.    Social History: The patient  reports that he quit smoking about 56 years ago. He has never used smokeless tobacco. He reports that he does not drink alcohol or use drugs.    Family History: Their family history includes Cancer in his mother; Cataracts in his mother; Dementia in his father; Diabetes in his maternal grandmother; Kidney disease in his sister; No Known Problems in his brother, maternal aunt, maternal grandfather, maternal uncle, paternal aunt, paternal grandfather, paternal grandmother, and paternal uncle; Psoriasis in his father, son, and son; Tremor in his father.    Allergies: Arb-angiotensin receptor antagonist and Lisinopril     Meds:   Current Outpatient Medications on File Prior to Visit   Medication Sig Dispense Refill    amiodarone (PACERONE) 200 MG Tab Take 0.5 tablets (100 mg total) by mouth once daily. 45 tablet 3    amLODIPine (NORVASC) 5 MG tablet Take 1 tablet (5 mg total) by mouth once daily. 90 tablet 3    apixaban (ELIQUIS) 2.5 mg Tab Take 1 tablet (2.5 mg total) by mouth 2 (two) times daily. 60 tablet 11    aspirin (ECOTRIN) 81 MG EC tablet Take 81 mg by mouth once daily.      atorvastatin (LIPITOR) 40 MG tablet Take 1 tablet (40 mg total) by mouth once daily. 90 tablet 3    dutasteride (AVODART) 0.5 mg capsule Take 1 capsule  (0.5 mg total) by mouth once daily. 30 capsule 11    FLUZONE HIGH-DOSE 2019-20, PF, 180 mcg/0.5 mL Syrg ADM 0.5ML IM UTD  0    memantine (NAMENDA) 10 MG Tab Take 1 tablet (10 mg total) by mouth 2 (two) times daily. 180 tablet 3    metoprolol tartrate (LOPRESSOR) 25 MG tablet Take 0.5 tablets (12.5 mg total) by mouth 2 (two) times daily. 90 tablet 3    multivitamin (MULTIVITAMIN) per tablet Take 1 tablet by mouth once daily.      omeprazole (PRILOSEC) 20 MG capsule Take 1 capsule (20 mg total) by mouth once daily. 90 capsule 3    tamsulosin (FLOMAX) 0.4 mg Cap Take 1 capsule (0.4 mg total) by mouth every evening. 90 capsule 11     No current facility-administered medications on file prior to visit.        Objective:     Physical Exam:      Constitutional  Well-developed, well-nourished, appears stated age   Cardiovascular  Radial pulses 2+ and symmetric, no LE edema bilaterally     Awake, alert.   Extensive facial bruising, racoon eyes.   Speaks very little. Denies pain.   Near moderate hypophonia. No dysarthria.   Slight cogwheel rigidity throughout.   Mild bradykinesia with CHAD in hands.   Moderate global bradykinesia.   WC.     Laboratory Results:  Admission on 01/22/2020, Discharged on 01/22/2020   Component Date Value Ref Range Status    Specimen UA 01/22/2020 Urine, Clean Catch   Final    Color, UA 01/22/2020 Yellow  Yellow, Straw, Laura Final    Appearance, UA 01/22/2020 Cloudy* Clear Final    pH, UA 01/22/2020 7.0  5.0 - 8.0 Final    Specific Gravity, UA 01/22/2020 1.015  1.005 - 1.030 Final    Protein, UA 01/22/2020 1+* Negative Final    Glucose, UA 01/22/2020 Negative  Negative Final    Ketones, UA 01/22/2020 Negative  Negative Final    Bilirubin (UA) 01/22/2020 Negative  Negative Final    Occult Blood UA 01/22/2020 2+* Negative Final    Nitrite, UA 01/22/2020 Negative  Negative Final    Leukocytes, UA 01/22/2020 3+* Negative Final    RBC, UA 01/22/2020 40* 0 - 4 /hpf Final    WBC, UA  "01/22/2020 >100* 0 - 5 /hpf Final    WBC Clumps, UA 01/22/2020 Many* None-Rare Final    Bacteria 01/22/2020 Rare  None-Occ /hpf Final    Hyaline Casts, UA 01/22/2020 0  0-1/lpf /lpf Final    Microscopic Comment 01/22/2020 SEE COMMENT   Final    Urine Culture, Routine 01/22/2020 *  Final                    Value:COAGULASE-NEGATIVE STAPHYLOCOCCUS SPECIES  > 100,000 cfu/ml  Susceptibility testing not routinely performed.           Assessment and Plan     Fall, initial encounter    Facial bruising, initial encounter    Lewy body dementia without behavioral disturbance        Medical Decision Making:    Mr. Velarde has extensive bruising on his face and around his eyes as a result of a fall last. They did not seek medical attention. He is on Elaquis. I urged that he go to the ED and we could reschedule this visit. Mrs. Velarde does not want to take him to the ED. I have explanied that he could have bleeding on the brain and this can be life threatening. She asks if this would be so bad since he has dementia. Going to the ED and not having visit in memory clinic today is not acceptable for her as she has things she needs discuss as his dementia is getting worse and he cannot  his foot. She will take him to the ED after they complete the visit today.      We have again discussed progression of dementia. It will get worse, not better. His inability to lift his leg is a manifestation of the dementia.     We have again discussed care giver burden. Son says they had a family meeting after last visit and they decided that he should stay home as long as possible. Wife feels like as long as he knows where he is, he should be home. However, she is clearly burdened about all the "one more things" she has to do for him because he can no longer do on his own. Now is the time to have the children assisting in his care, even if she is hesitant for the help. She is clearly burdened.      I spent 30 minutes face-to-face with the " patient with >50% of the time spent with counseling and education regarding:  - results of data, diagnosis, and recommendations stated above  - the prognosis of dementia  -concern for fall with the extensive facial bruising and on Elaquis, concerning for bleeding on brain and poss of UTI        Yazmin Israel, CARLTON, NP-C  Division of Movement and Memory Disorders  [facility]  [phone]

## 2020-01-23 NOTE — DISCHARGE INSTRUCTIONS
Please call and follow up with you primary doctor in one week and take antibiotic for your urine infection.

## 2020-01-24 LAB — BACTERIA UR CULT: ABNORMAL

## 2020-01-27 ENCOUNTER — TELEPHONE (OUTPATIENT)
Dept: NEUROLOGY | Facility: CLINIC | Age: 80
End: 2020-01-27

## 2020-01-31 ENCOUNTER — OFFICE VISIT (OUTPATIENT)
Dept: INTERNAL MEDICINE | Facility: CLINIC | Age: 80
End: 2020-01-31
Payer: MEDICARE

## 2020-01-31 VITALS
HEART RATE: 60 BPM | OXYGEN SATURATION: 98 % | HEIGHT: 66 IN | WEIGHT: 168.44 LBS | SYSTOLIC BLOOD PRESSURE: 118 MMHG | DIASTOLIC BLOOD PRESSURE: 66 MMHG | BODY MASS INDEX: 27.07 KG/M2

## 2020-01-31 DIAGNOSIS — I10 ESSENTIAL HYPERTENSION: Chronic | ICD-10-CM

## 2020-01-31 DIAGNOSIS — G30.9 MIXED DEMENTIA: Primary | ICD-10-CM

## 2020-01-31 DIAGNOSIS — I44.2 CHB (COMPLETE HEART BLOCK): ICD-10-CM

## 2020-01-31 DIAGNOSIS — G31.83 LEWY BODY DEMENTIA WITHOUT BEHAVIORAL DISTURBANCE: ICD-10-CM

## 2020-01-31 DIAGNOSIS — G20.C PARKINSONISM, UNSPECIFIED PARKINSONISM TYPE: ICD-10-CM

## 2020-01-31 DIAGNOSIS — F01.50 MIXED DEMENTIA: Primary | ICD-10-CM

## 2020-01-31 DIAGNOSIS — F02.80 LEWY BODY DEMENTIA WITHOUT BEHAVIORAL DISTURBANCE: ICD-10-CM

## 2020-01-31 DIAGNOSIS — F02.80 MIXED DEMENTIA: Primary | ICD-10-CM

## 2020-01-31 PROCEDURE — 1126F AMNT PAIN NOTED NONE PRSNT: CPT | Mod: S$GLB,,, | Performed by: INTERNAL MEDICINE

## 2020-01-31 PROCEDURE — 1159F MED LIST DOCD IN RCRD: CPT | Mod: S$GLB,,, | Performed by: INTERNAL MEDICINE

## 2020-01-31 PROCEDURE — 99213 OFFICE O/P EST LOW 20 MIN: CPT | Mod: S$GLB,,, | Performed by: INTERNAL MEDICINE

## 2020-01-31 PROCEDURE — 3078F PR MOST RECENT DIASTOLIC BLOOD PRESSURE < 80 MM HG: ICD-10-PCS | Mod: CPTII,S$GLB,, | Performed by: INTERNAL MEDICINE

## 2020-01-31 PROCEDURE — 3074F PR MOST RECENT SYSTOLIC BLOOD PRESSURE < 130 MM HG: ICD-10-PCS | Mod: CPTII,S$GLB,, | Performed by: INTERNAL MEDICINE

## 2020-01-31 PROCEDURE — 3078F DIAST BP <80 MM HG: CPT | Mod: CPTII,S$GLB,, | Performed by: INTERNAL MEDICINE

## 2020-01-31 PROCEDURE — 99499 RISK ADDL DX/OHS AUDIT: ICD-10-PCS | Mod: S$GLB,,, | Performed by: INTERNAL MEDICINE

## 2020-01-31 PROCEDURE — 99499 UNLISTED E&M SERVICE: CPT | Mod: S$GLB,,, | Performed by: INTERNAL MEDICINE

## 2020-01-31 PROCEDURE — 1101F PR PT FALLS ASSESS DOC 0-1 FALLS W/OUT INJ PAST YR: ICD-10-PCS | Mod: CPTII,S$GLB,, | Performed by: INTERNAL MEDICINE

## 2020-01-31 PROCEDURE — 3074F SYST BP LT 130 MM HG: CPT | Mod: CPTII,S$GLB,, | Performed by: INTERNAL MEDICINE

## 2020-01-31 PROCEDURE — 99213 PR OFFICE/OUTPT VISIT, EST, LEVL III, 20-29 MIN: ICD-10-PCS | Mod: S$GLB,,, | Performed by: INTERNAL MEDICINE

## 2020-01-31 PROCEDURE — 99999 PR PBB SHADOW E&M-EST. PATIENT-LVL III: CPT | Mod: PBBFAC,,, | Performed by: INTERNAL MEDICINE

## 2020-01-31 PROCEDURE — 1101F PT FALLS ASSESS-DOCD LE1/YR: CPT | Mod: CPTII,S$GLB,, | Performed by: INTERNAL MEDICINE

## 2020-01-31 PROCEDURE — 1159F PR MEDICATION LIST DOCUMENTED IN MEDICAL RECORD: ICD-10-PCS | Mod: S$GLB,,, | Performed by: INTERNAL MEDICINE

## 2020-01-31 PROCEDURE — 99999 PR PBB SHADOW E&M-EST. PATIENT-LVL III: ICD-10-PCS | Mod: PBBFAC,,, | Performed by: INTERNAL MEDICINE

## 2020-01-31 PROCEDURE — 1126F PR PAIN SEVERITY QUANTIFIED, NO PAIN PRESENT: ICD-10-PCS | Mod: S$GLB,,, | Performed by: INTERNAL MEDICINE

## 2020-01-31 NOTE — PROGRESS NOTES
Subjective:       Patient ID: Mark Anthony Velarde Jr. is a 79 y.o. male.    Chief Complaint: Follow-up (ER for a Fall)    Here for ER follow-up after fall.  He is attended by his wife and daughter.  He tripped going through his door way and landed on the right face.  Initially a hematoma formed.  The patient seemed okay and his family checked him out and he was not going to the emergency room.  He saw neuropsychiatry a few days later who insisted on the ER visit.  He has some arthritis in his wrist but no fractures and other than the hematoma no fracture to the face or skull.  It is slowly improving.  He uses a walker and he is enrolled in the [employer] program and will be getting a transport wheelchair through them.  He will be seeing Nephrology Neurology and Cardiology in the spring.  Continues with tremor and parkinsonism and mild mixed dementia    Review of Systems   Constitutional: Positive for activity change. Negative for unexpected weight change.   HENT: Negative for hearing loss, rhinorrhea and trouble swallowing.    Eyes: Negative for discharge and visual disturbance.   Respiratory: Negative for chest tightness and wheezing.    Cardiovascular: Negative for chest pain and palpitations.   Gastrointestinal: Negative for blood in stool, constipation, diarrhea and vomiting.   Endocrine: Negative for polydipsia and polyuria.   Genitourinary: Positive for difficulty urinating (Seeing Urology). Negative for hematuria.   Musculoskeletal: Negative for arthralgias, joint swelling and neck pain.   Neurological: Positive for weakness. Negative for headaches.   Hematological: Bruises/bleeds easily.   Psychiatric/Behavioral: Positive for confusion ( chronic) and dysphoric mood.       Objective:      Physical Exam   HENT:   Nose: Nose normal.   Mouth/Throat: Oropharynx is clear and moist.   Resolving hematoma left forehead with bruising tracking down the face to the neck   Neck: Normal range of motion. No thyromegaly  present.   Cardiovascular: Normal rate and regular rhythm.   Pulmonary/Chest: Effort normal and breath sounds normal.   Abdominal: Soft. Bowel sounds are normal. There is no tenderness.   Lymphadenopathy:     He has no cervical adenopathy.   Neurological: He is alert. Coordination (Tremor of both hands) abnormal.   Skin: Capillary refill takes less than 2 seconds. No erythema.   Bruising of the face   Psychiatric: He has a normal mood and affect.       Assessment:       1. Mixed dementia    2. Essential hypertension    3. Parkinsonism, unspecified Parkinsonism type    4. CHB (complete heart block)    5. Lewy body dementia without behavioral disturbance        Plan:       Mark Anthony was seen today for follow-up.    Diagnoses and all orders for this visit:    Mixed dementia    Essential hypertension    Parkinsonism, unspecified Parkinsonism type  Comments:  Continue to see Neurology      CHB (complete heart block)  Comments:  Continue to see Cardiology    Lewy body dementia without behavioral disturbance  Comments:  Continue to see Neurology and neuropsychiatry

## 2020-02-03 ENCOUNTER — TELEPHONE (OUTPATIENT)
Dept: INTERNAL MEDICINE | Facility: CLINIC | Age: 80
End: 2020-02-03

## 2020-02-03 DIAGNOSIS — F01.50 MIXED DEMENTIA: Primary | ICD-10-CM

## 2020-02-03 DIAGNOSIS — G20.C PARKINSONISM, UNSPECIFIED PARKINSONISM TYPE: ICD-10-CM

## 2020-02-03 DIAGNOSIS — F02.80 MIXED DEMENTIA: Primary | ICD-10-CM

## 2020-02-03 DIAGNOSIS — G30.9 MIXED DEMENTIA: Primary | ICD-10-CM

## 2020-02-03 NOTE — TELEPHONE ENCOUNTER
----- Message from Yary Hilario sent at 2/3/2020  9:41 AM CST -----  Pt is enrolled in [facility]   We are in the process of coming up with a plan with family to provide more in home support and care for them.      Would you be able to write a script for a transport chair and get it to [facility]?     Would you think pt would benefit from either palliative care or hospice?

## 2020-02-06 ENCOUNTER — TELEPHONE (OUTPATIENT)
Dept: UROLOGY | Facility: CLINIC | Age: 80
End: 2020-02-06

## 2020-02-06 ENCOUNTER — HOSPITAL ENCOUNTER (OUTPATIENT)
Dept: RADIOLOGY | Facility: HOSPITAL | Age: 80
Discharge: HOME | End: 2020-02-06
Attending: UROLOGY
Payer: MEDICARE

## 2020-02-06 DIAGNOSIS — R33.9 INCOMPLETE BLADDER EMPTYING: ICD-10-CM

## 2020-02-06 DIAGNOSIS — N18.30 CRI (CHRONIC RENAL INSUFFICIENCY), STAGE 3 (MODERATE): ICD-10-CM

## 2020-02-06 DIAGNOSIS — R31.29 HEMATURIA, MICROSCOPIC: ICD-10-CM

## 2020-02-06 DIAGNOSIS — N20.0 KIDNEY STONE: Primary | ICD-10-CM

## 2020-02-06 PROCEDURE — 76770 US EXAM ABDO BACK WALL COMP: CPT | Mod: 26,,, | Performed by: RADIOLOGY

## 2020-02-06 PROCEDURE — 76770 US EXAM ABDO BACK WALL COMP: CPT | Mod: TC

## 2020-02-06 PROCEDURE — 76770 US RETROPERITONEAL COMPLETE: ICD-10-PCS | Mod: 26,,, | Performed by: RADIOLOGY

## 2020-02-06 NOTE — TELEPHONE ENCOUNTER
US  Progression of advanced renal disease of the left kidney, significantly worse in comparison to the right kidney.  Evaluation for renal artery stenosis on the left is suggested.    Two nonobstructive nephroliths are seen.    Bladder wall irregularity similar to the prior exam.    Mild prostatomegaly.    Kidney stone  -     CT Renal Stone Study ABD Pelvis WO; Future; Expected date: 02/06/2020    CRI (chronic renal insufficiency), stage 3 (moderate)  -     CT Renal Stone Study ABD Pelvis WO; Future; Expected date: 02/06/2020    please have him do CT RSS prior to his visit.

## 2020-02-07 ENCOUNTER — TELEPHONE (OUTPATIENT)
Dept: RESEARCH | Facility: HOSPITAL | Age: 80
End: 2020-02-07

## 2020-02-09 ENCOUNTER — PATIENT OUTREACH (OUTPATIENT)
Dept: ADMINISTRATIVE | Facility: OTHER | Age: 80
End: 2020-02-09

## 2020-02-10 ENCOUNTER — HOSPITAL ENCOUNTER (OUTPATIENT)
Dept: RADIOLOGY | Facility: HOSPITAL | Age: 80
Discharge: HOME | End: 2020-02-10
Attending: UROLOGY
Payer: MEDICARE

## 2020-02-10 DIAGNOSIS — N18.30 CRI (CHRONIC RENAL INSUFFICIENCY), STAGE 3 (MODERATE): ICD-10-CM

## 2020-02-10 DIAGNOSIS — N20.0 KIDNEY STONE: ICD-10-CM

## 2020-02-10 PROCEDURE — 74176 CT RENAL STONE STUDY ABD PELVIS WO: ICD-10-PCS | Mod: 26,,, | Performed by: RADIOLOGY

## 2020-02-10 PROCEDURE — 74176 CT ABD & PELVIS W/O CONTRAST: CPT | Mod: TC

## 2020-02-10 PROCEDURE — 74176 CT ABD & PELVIS W/O CONTRAST: CPT | Mod: 26,,, | Performed by: RADIOLOGY

## 2020-02-10 NOTE — PROGRESS NOTES
LINKS immunization registry failedLINKS immunization registry, Care Everywhere and Health Maintenance updated.  Chart reviewed for overdue [facility] health maintenance testing., Care Everywhere and Health Maintenance updated.  Chart reviewed for overdue [facility] health maintenance testing.

## 2020-02-11 ENCOUNTER — OFFICE VISIT (OUTPATIENT)
Dept: UROLOGY | Facility: CLINIC | Age: 80
End: 2020-02-11
Payer: MEDICARE

## 2020-02-11 VITALS
DIASTOLIC BLOOD PRESSURE: 61 MMHG | BODY MASS INDEX: 27.16 KG/M2 | HEART RATE: 59 BPM | HEIGHT: 66 IN | WEIGHT: 169 LBS | SYSTOLIC BLOOD PRESSURE: 113 MMHG

## 2020-02-11 DIAGNOSIS — N40.1 BPH WITH URINARY OBSTRUCTION: ICD-10-CM

## 2020-02-11 DIAGNOSIS — I70.0 ATHEROSCLEROSIS OF AORTA: ICD-10-CM

## 2020-02-11 DIAGNOSIS — N13.8 BPH WITH URINARY OBSTRUCTION: ICD-10-CM

## 2020-02-11 DIAGNOSIS — N18.30 CRI (CHRONIC RENAL INSUFFICIENCY), STAGE 3 (MODERATE): Primary | ICD-10-CM

## 2020-02-11 DIAGNOSIS — N32.89 BLADDER WALL THICKENING: ICD-10-CM

## 2020-02-11 PROCEDURE — 99215 PR OFFICE/OUTPT VISIT, EST, LEVL V, 40-54 MIN: ICD-10-PCS | Mod: S$GLB,,, | Performed by: UROLOGY

## 2020-02-11 PROCEDURE — 99499 RISK ADDL DX/OHS AUDIT: ICD-10-PCS | Mod: S$GLB,,, | Performed by: UROLOGY

## 2020-02-11 PROCEDURE — 1159F MED LIST DOCD IN RCRD: CPT | Mod: S$GLB,,, | Performed by: UROLOGY

## 2020-02-11 PROCEDURE — 3078F DIAST BP <80 MM HG: CPT | Mod: CPTII,S$GLB,, | Performed by: UROLOGY

## 2020-02-11 PROCEDURE — 1101F PT FALLS ASSESS-DOCD LE1/YR: CPT | Mod: CPTII,S$GLB,, | Performed by: UROLOGY

## 2020-02-11 PROCEDURE — 1159F PR MEDICATION LIST DOCUMENTED IN MEDICAL RECORD: ICD-10-PCS | Mod: S$GLB,,, | Performed by: UROLOGY

## 2020-02-11 PROCEDURE — 99999 PR PBB SHADOW E&M-EST. PATIENT-LVL III: CPT | Mod: PBBFAC,,, | Performed by: UROLOGY

## 2020-02-11 PROCEDURE — 99215 OFFICE O/P EST HI 40 MIN: CPT | Mod: S$GLB,,, | Performed by: UROLOGY

## 2020-02-11 PROCEDURE — 3078F PR MOST RECENT DIASTOLIC BLOOD PRESSURE < 80 MM HG: ICD-10-PCS | Mod: CPTII,S$GLB,, | Performed by: UROLOGY

## 2020-02-11 PROCEDURE — 3074F PR MOST RECENT SYSTOLIC BLOOD PRESSURE < 130 MM HG: ICD-10-PCS | Mod: CPTII,S$GLB,, | Performed by: UROLOGY

## 2020-02-11 PROCEDURE — 99999 PR PBB SHADOW E&M-EST. PATIENT-LVL III: ICD-10-PCS | Mod: PBBFAC,,, | Performed by: UROLOGY

## 2020-02-11 PROCEDURE — 1101F PR PT FALLS ASSESS DOC 0-1 FALLS W/OUT INJ PAST YR: ICD-10-PCS | Mod: CPTII,S$GLB,, | Performed by: UROLOGY

## 2020-02-11 PROCEDURE — 99499 UNLISTED E&M SERVICE: CPT | Mod: S$GLB,,, | Performed by: UROLOGY

## 2020-02-11 PROCEDURE — 3074F SYST BP LT 130 MM HG: CPT | Mod: CPTII,S$GLB,, | Performed by: UROLOGY

## 2020-02-11 RX ORDER — DUTASTERIDE 0.5 MG/1
0.5 CAPSULE, LIQUID FILLED ORAL DAILY
Qty: 90 CAPSULE | Refills: 3 | Status: SHIPPED | OUTPATIENT
Start: 2020-02-11 | End: 2020-10-23 | Stop reason: SDUPTHER

## 2020-02-11 RX ORDER — DUTASTERIDE 0.5 MG/1
CAPSULE, LIQUID FILLED ORAL
COMMUNITY
Start: 2020-02-09 | End: 2020-02-11 | Stop reason: SDUPTHER

## 2020-02-11 RX ORDER — TAMSULOSIN HYDROCHLORIDE 0.4 MG/1
0.4 CAPSULE ORAL NIGHTLY
Qty: 90 CAPSULE | Refills: 3 | Status: SHIPPED | OUTPATIENT
Start: 2020-02-11

## 2020-02-11 NOTE — PROGRESS NOTES
CHIEF COMPLAINT:    Mr. Velarde is a 79 y.o. male presenting to discuss  Blood in urine.  his incomplete bladder emptying, UTI, BPH with obstruciton   PRESENTING ILLNESS:    Mark Anthony Velarde Jr. is a 79 y.o. male with a PMH of dementia, CAD, HTN, CKD, nephrolithiasis who presents to discuss possible prostate surgery.  Hx of Parkinson's disease.  He presents with his wife, his son and daughter who helps give the history.    Patient was seen by Yasemin Uribe for evaluation of gross hematuria and urinary retention.    I ended up seeing him for cysto related to hematuria and found that he has a quite enlarged prostate with obstruction.    Procedure Date:  07/18/2019  Procedure:  Male Diagnostic Cystourethroscopy  Pre-op diagnosis: gross hematuria, incomplete bladder emptying, BPH with obstruction, UTI  Surgeon:  Sal Cool MD  Findings:  Urethra:  Normal urethra.   Sphincter: competent.  Prostate: Estimated Length Prostatic Urethra: 6 cm with trilobar obstruction, moderate size intravesical lobe obstruction  Bladder neck: patent with no stricture  Bladder:  Normal bladder.   Normal ureteral orifices bilaterally.   Severe trabeculation with sacculation.   I and O cath: over 200 ml cloudy urine noted.  Conclusion:  1. UTI  2. Incomplete bladder emptying  3. BPH with obstruction  Urine culture today.  Rocephin 1 g IM today.  Doxycycline for 2 wk.  Will discuss TURP upon cardiology medical clearance  OK to resume Eliquis until we decide to do TURP.    Urine culture from 7/18/19 grew COAGULASE-NEGATIVE STAPHYLOCOCCUS SPECIES.    His wife and pt reports that he has been voiding well.  He is back on flomax and has taken Avodart in addition since cysto exam.  He has no urge to void and was not able to give urine sample today.  Thus I did a PVR check by bladder scan.  It revealed PVR of 378 ml per bladder scan.    Previous history:  He had an unwitnessed fall yesterday and presented to the ED on 6/19/19.    When he first gave a  urine sample his urine was yellow.  Right before leaving he had to void again and his urine was really red.  A bladder scan was done (>300 mL) and a catheter was placed.   He was on eliquis at the time of the fall but has since stopped it.      He was previously on pradaxa.   He tripped over a stepping stone the week prior to this last fall.  His son witnessed the fall and was able to assist him up.  He did not seek medical attention.     Today he presents for a voiding trial. This morning his urine is yellow.  He experienced hematuria over the weekend.   He does not have any urinary complaints today.  His catheter has been draining well.      He had a CT urogram yesterday as part of his hematuria work up.      CT urogram 6/24/19:   No solid renal masses, nephrolithiasis, or hydronephrosis.  There is a small right simple renal cyst.  The ureters are normal in course and caliber.  The bladder is collapsed with diffuse wall thickening, which can be seen with chronic outlet obstruction as well as cystitis.  Abdi catheter in position.  Just posterior to the Abdi catheter is a round filling defect measuring approximately 2.5 cm.  This is favored to represent nodular impression from enlarged prostate, but cannot rule out intrinsic bladder lesion.  Reproductive organs: Prostatomegaly.    REVIEW OF SYSTEMS:    Constitutional: Negative for fever and chills.   HENT: Negative for hearing loss.   Eyes: Negative for visual disturbance.   Respiratory: Negative for shortness of breath.   Cardiovascular: Negative for chest pain.   Gastrointestinal: Negative for vomiting, and constipation.   Genitourinary: See HPI  Neurological: Negative for dizziness.   Hematological: Does not bruise/bleed easily.   Psychiatric/Behavioral: Positive for confusion.       PATIENT HISTORY:    Past Medical History:   Diagnosis Date    *Atrial fibrillation     Anticoagulant long-term use     Atrial fibrillation - asymptomatic but noted on ICD  interrogation ([id number]) - 16 h 40 minutes of afib 2/13/2013    Atrial flutter with controlled response - seen on ICD interrogation - asymptomatic 2/13/2013    Automatic implantable cardioverter-defibrillator in situ 2/13/2013    Basal cell carcinoma     mid forehead    CAD (coronary artery disease) 2/13/2013    Cardiomyopathy     Cataract     Cognitive deficits     mild    H/O syncope -  5/27/2013    History of PTCA - LAD, LCX and PDA PCI in 2006 2/13/2013    HTN (hypertension) 2/13/2013    Hyperlipidemia LDL goal < 70 2/13/2013    Hypertension     ICD (implantable cardiac defibrillator) in place 2/13/2013    Ischemic cardiomyopathy LVEF ~45% 2/13/2013    LBBB (left bundle branch block) 2/13/2013    Memory loss     Psoriasis vulgaris     Syncope and collapse        Past Surgical History:   Procedure Laterality Date    CARDIAC DEFIBRILLATOR PLACEMENT      CATARACT EXTRACTION W/  INTRAOCULAR LENS IMPLANT Right 04/04/16    Dr Michael     CATARACT EXTRACTION W/  INTRAOCULAR LENS IMPLANT Left 05/09/2016    SKIN BIOPSY      TONSILLECTOMY         Family History   Problem Relation Age of Onset    Psoriasis Father     Dementia Father     Tremor Father     Cataracts Mother     Cancer Mother     Psoriasis Son     Psoriasis Son     Diabetes Maternal Grandmother     Kidney disease Sister     No Known Problems Brother     No Known Problems Maternal Aunt     No Known Problems Maternal Uncle     No Known Problems Paternal Aunt     No Known Problems Paternal Uncle     No Known Problems Maternal Grandfather     No Known Problems Paternal Grandmother     No Known Problems Paternal Grandfather     Amblyopia Neg Hx     Blindness Neg Hx     Glaucoma Neg Hx     Hypertension Neg Hx     Macular degeneration Neg Hx     Retinal detachment Neg Hx     Strabismus Neg Hx     Stroke Neg Hx     Thyroid disease Neg Hx     Parkinsonism Neg Hx     Celiac disease Neg Hx     Cirrhosis Neg Hx     Colon  cancer Neg Hx     Colon polyps Neg Hx     Crohn's disease Neg Hx     Cystic fibrosis Neg Hx     Esophageal cancer Neg Hx     Irritable bowel syndrome Neg Hx     Inflammatory bowel disease Neg Hx     Hemochromatosis Neg Hx     Liver cancer Neg Hx     Liver disease Neg Hx     Rectal cancer Neg Hx     Stomach cancer Neg Hx     Ulcerative colitis Neg Hx     Christiano's disease Neg Hx     Lymphoma Neg Hx     Scleroderma Neg Hx     Rheum arthritis Neg Hx     Multiple sclerosis Neg Hx     Melanoma Neg Hx     Tuberculosis Neg Hx     Lupus Neg Hx        Social History     Socioeconomic History    Marital status:      Spouse name: Not on file    Number of children: Not on file    Years of education: Not on file    Highest education level: Not on file   Occupational History    Occupation: retired   Social Needs    Financial resource strain: Not hard at all    Food insecurity:     Worry: Never true     Inability: Never true    Transportation needs:     Medical: No     Non-medical: No   Tobacco Use    Smoking status: Former Smoker     Last attempt to quit: 1963     Years since quittin.4    Smokeless tobacco: Never Used   Substance and Sexual Activity    Alcohol use: No     Frequency: Monthly or less     Drinks per session: 1 or 2     Binge frequency: Never    Drug use: No    Sexual activity: Not on file   Lifestyle    Physical activity:     Days per week: 6 days     Minutes per session: 50 min    Stress: Not at all   Relationships    Social connections:     Talks on phone: Once a week     Gets together: Once a week     Attends Gnosticist service: Not on file     Active member of club or organization: Yes     Attends meetings of clubs or organizations: More than 4 times per year     Relationship status:    Other Topics Concern    Not on file   Social History Narrative    Not on file       Allergies:  Arb-angiotensin receptor antagonist and  Lisinopril    Medications:    Current Outpatient Medications:     amiodarone (PACERONE) 200 MG Tab, Take 0.5 tablets (100 mg total) by mouth once daily., Disp: 45 tablet, Rfl: 3    amLODIPine (NORVASC) 5 MG tablet, Take 1 tablet (5 mg total) by mouth once daily., Disp: 90 tablet, Rfl: 3    apixaban (ELIQUIS) 2.5 mg Tab, Take 1 tablet (2.5 mg total) by mouth 2 (two) times daily., Disp: 60 tablet, Rfl: 11    aspirin (ECOTRIN) 81 MG EC tablet, Take 81 mg by mouth once daily., Disp: , Rfl:     atorvastatin (LIPITOR) 40 MG tablet, Take 1 tablet (40 mg total) by mouth once daily., Disp: 90 tablet, Rfl: 3    dutasteride (AVODART) 0.5 mg capsule, Take 1 capsule (0.5 mg total) by mouth once daily., Disp: 90 capsule, Rfl: 3    FLUZONE HIGH-DOSE 2019-20, PF, 180 mcg/0.5 mL Syrg, ADM 0.5ML IM UTD, Disp: , Rfl: 0    memantine (NAMENDA) 10 MG Tab, Take 1 tablet (10 mg total) by mouth 2 (two) times daily., Disp: 180 tablet, Rfl: 3    metoprolol tartrate (LOPRESSOR) 25 MG tablet, Take 0.5 tablets (12.5 mg total) by mouth 2 (two) times daily., Disp: 90 tablet, Rfl: 3    multivitamin (MULTIVITAMIN) per tablet, Take 1 tablet by mouth once daily., Disp: , Rfl:     omeprazole (PRILOSEC) 20 MG capsule, Take 1 capsule (20 mg total) by mouth once daily., Disp: 90 capsule, Rfl: 3    tamsulosin (FLOMAX) 0.4 mg Cap, Take 1 capsule (0.4 mg total) by mouth every evening., Disp: 90 capsule, Rfl: 3    PHYSICAL EXAMINATION:    Constitutional: He appears well-developed and well-nourished.  He is in no apparent distress.    Eyes: No scleral icterus noted bilaterally. No discharge bilaterally.    Nose: No rhinorrhea    Cardiovascular: Normal rate.  No pitting edema noted in lower extremities bilaterally    Pulmonary/Chest: Effort normal. No respiratory distress.     Abdominal:  He exhibits no distension.     Lymphadenopathy:        Right: No supraclavicular adenopathy present.        Left: No supraclavicular adenopathy present.      Neurological: He is alert and oriented to person, place, and time.     Skin: Skin is warm and dry.     Psych: Cooperative with normal affect.    Physical Exam      LABS:      Lab Results   Component Value Date    PSA 1.8 08/04/2014    PSA 4.3 (H) 07/02/2014    PSA 1.97 06/12/2013    PSA 1.5 10/14/2010    PSA 1.7 04/01/2009    PSA 2.7 11/14/2006    PSA 2.4 06/24/2005     Procedure  PVR per bladder scan: 378 ml  Cath  ml    US 2/6/20  Progression of advanced renal disease of the left kidney, significantly worse in comparison to the right kidney.  Evaluation for renal artery stenosis on the left is suggested.  Two nonobstructive nephroliths are seen.  Bladder wall irregularity similar to the prior exam.  Mild prostatomegaly.    CT RSS 2/10/20  1.  No nephrolithiasis or hydronephrosis.    2.  Diffuse bladder wall thickening.  Findings can be seen in the setting of chronic outlet obstruction, noting prostatomegaly.  Similar findings can be seen in setting of cystitis.  Recommend clinical correlation and further evaluation as warranted.    3.  Findings characteristic of chronic medical renal disease on the left.    4.  Extensive aortoiliac calcific atherosclerosis noting intimal calcification in the infrarenal abdominal aorta suggesting possible remote small dissection.    IMPRESSION:    CRI (chronic renal insufficiency), stage 3 (moderate)  -     Basic metabolic panel; Future; Expected date: 02/11/2020    Bladder wall thickening    BPH with urinary obstruction  -     dutasteride (AVODART) 0.5 mg capsule; Take 1 capsule (0.5 mg total) by mouth once daily.  Dispense: 90 capsule; Refill: 3  -     tamsulosin (FLOMAX) 0.4 mg Cap; Take 1 capsule (0.4 mg total) by mouth every evening.  Dispense: 90 capsule; Refill: 3    Atherosclerosis of aorta    I answered all questions regarding his condition from his wife.  She basically expressed that pt and his family do not want to undergo surgery given his mental and medical  conditions.    If I were to consider prostate surgery ( TURP, laser TURP, Rezum Therapy), I feel that I need to do SUDS first to make sure that he has a good bladder function.  Because he has no urge to void even with high PVR for 380 ml, I am concerned about bladder dysfunction as well as enlarged prostate resulting in incomplete bladder emptying.    At the end, we decided to follow up as he continues Avodart and Flomax.  90 days refills given.    He can follow up with Dr. Sequeira, his nephrologist.  I suspect that left kidney is involved with atherosclerosis disease involving the opening of left pardeep artery at the aorta.    I spent 40 minutes with the patient of which more than half was spent in direct consultation with the patient in regards to our treatment and plan.    PLAN:    Follow up in about 1 year (around 2/11/2021) for BMP.

## 2020-02-18 DIAGNOSIS — I10 ESSENTIAL HYPERTENSION: Chronic | ICD-10-CM

## 2020-02-18 RX ORDER — MEMANTINE HYDROCHLORIDE 10 MG/1
10 TABLET ORAL 2 TIMES DAILY
Qty: 180 TABLET | Refills: 3 | Status: SHIPPED | OUTPATIENT
Start: 2020-02-18 | End: 2021-02-17

## 2020-02-18 RX ORDER — AMLODIPINE BESYLATE 5 MG/1
5 TABLET ORAL DAILY
Qty: 90 TABLET | Refills: 3 | Status: SHIPPED | OUTPATIENT
Start: 2020-02-18 | End: 2021-03-09 | Stop reason: SDUPTHER

## 2020-02-18 NOTE — TELEPHONE ENCOUNTER
----- Message from Colton Neal sent at 2/18/2020 10:48 AM CST -----  Contact: Pharmacy   Caller calling to get an update on the medication refill on (memantine (NAMENDA)10MG Tab ) [facility] [place]     [id number] - [place] - [address] AT [facility] [address]  [address] [address]  Phone: [phone] Fax: [phone]

## 2020-02-18 NOTE — TELEPHONE ENCOUNTER
----- Message from Colton Neal sent at 2/18/2020 10:48 AM CST -----  Contact: Pharmacy   Caller calling to get an update on the medication refill on (memantine (NAMENDA)10MG Tab ) [facility] [place]     [id number] - [place] - [address] AT [facility] [address]  [address] [address]  Phone: [phone] Fax: [phone]

## 2020-02-20 ENCOUNTER — PATIENT MESSAGE (OUTPATIENT)
Dept: NEUROLOGY | Facility: CLINIC | Age: 80
End: 2020-02-20

## 2020-02-26 ENCOUNTER — HOSPITAL ENCOUNTER (EMERGENCY)
Facility: HOSPITAL | Age: 80
Discharge: HOME | End: 2020-02-27
Attending: EMERGENCY MEDICINE
Payer: MEDICARE

## 2020-02-26 DIAGNOSIS — T14.90XA TRAUMA: ICD-10-CM

## 2020-02-26 DIAGNOSIS — S09.90XA TRAUMATIC INJURY OF HEAD, INITIAL ENCOUNTER: Primary | ICD-10-CM

## 2020-02-26 DIAGNOSIS — R55 SYNCOPE: ICD-10-CM

## 2020-02-26 DIAGNOSIS — N30.00 ACUTE CYSTITIS WITHOUT HEMATURIA: ICD-10-CM

## 2020-02-26 DIAGNOSIS — R60.9 SWELLING: ICD-10-CM

## 2020-02-26 LAB
ALBUMIN SERPL BCP-MCNC: 4 G/DL (ref 3.5–5.2)
ALP SERPL-CCNC: 98 U/L (ref 55–135)
ALT SERPL W/O P-5'-P-CCNC: 45 U/L (ref 10–44)
ANION GAP SERPL CALC-SCNC: 10 MMOL/L (ref 8–16)
AST SERPL-CCNC: 46 U/L (ref 10–40)
BACTERIA #/AREA URNS AUTO: ABNORMAL /HPF
BASOPHILS # BLD AUTO: 0.09 K/UL (ref 0–0.2)
BASOPHILS NFR BLD: 1 % (ref 0–1.9)
BILIRUB SERPL-MCNC: 0.4 MG/DL (ref 0.1–1)
BILIRUB UR QL STRIP: NEGATIVE
BUN SERPL-MCNC: 25 MG/DL (ref 8–23)
CALCIUM SERPL-MCNC: 9.4 MG/DL (ref 8.7–10.5)
CHLORIDE SERPL-SCNC: 106 MMOL/L (ref 95–110)
CLARITY UR REFRACT.AUTO: ABNORMAL
CO2 SERPL-SCNC: 23 MMOL/L (ref 23–29)
COLOR UR AUTO: YELLOW
CREAT SERPL-MCNC: 1.8 MG/DL (ref 0.5–1.4)
DIFFERENTIAL METHOD: ABNORMAL
EOSINOPHIL # BLD AUTO: 0.2 K/UL (ref 0–0.5)
EOSINOPHIL NFR BLD: 2.8 % (ref 0–8)
ERYTHROCYTE [DISTWIDTH] IN BLOOD BY AUTOMATED COUNT: 16.8 % (ref 11.5–14.5)
EST. GFR  (AFRICAN AMERICAN): 39.9 ML/MIN/1.73 M^2
EST. GFR  (NON AFRICAN AMERICAN): 34.5 ML/MIN/1.73 M^2
GLUCOSE SERPL-MCNC: 102 MG/DL (ref 70–110)
GLUCOSE UR QL STRIP: NEGATIVE
HCT VFR BLD AUTO: 37.5 % (ref 40–54)
HGB BLD-MCNC: 11.5 G/DL (ref 14–18)
HGB UR QL STRIP: ABNORMAL
IMM GRANULOCYTES # BLD AUTO: 0.04 K/UL (ref 0–0.04)
IMM GRANULOCYTES NFR BLD AUTO: 0.5 % (ref 0–0.5)
INR PPP: 1.1 (ref 0.8–1.2)
KETONES UR QL STRIP: NEGATIVE
LEUKOCYTE ESTERASE UR QL STRIP: ABNORMAL
LYMPHOCYTES # BLD AUTO: 0.9 K/UL (ref 1–4.8)
LYMPHOCYTES NFR BLD: 10.8 % (ref 18–48)
MCH RBC QN AUTO: 30.7 PG (ref 27–31)
MCHC RBC AUTO-ENTMCNC: 30.7 G/DL (ref 32–36)
MCV RBC AUTO: 100 FL (ref 82–98)
MICROSCOPIC COMMENT: ABNORMAL
MONOCYTES # BLD AUTO: 0.6 K/UL (ref 0.3–1)
MONOCYTES NFR BLD: 7.1 % (ref 4–15)
NEUTROPHILS # BLD AUTO: 6.7 K/UL (ref 1.8–7.7)
NEUTROPHILS NFR BLD: 77.8 % (ref 38–73)
NITRITE UR QL STRIP: NEGATIVE
NRBC BLD-RTO: 0 /100 WBC
PH UR STRIP: 6 [PH] (ref 5–8)
PLATELET # BLD AUTO: 216 K/UL (ref 150–350)
PMV BLD AUTO: 10.4 FL (ref 9.2–12.9)
POTASSIUM SERPL-SCNC: 4.7 MMOL/L (ref 3.5–5.1)
PROT SERPL-MCNC: 7.6 G/DL (ref 6–8.4)
PROT UR QL STRIP: NEGATIVE
PROTHROMBIN TIME: 11.7 SEC (ref 9–12.5)
RBC # BLD AUTO: 3.74 M/UL (ref 4.6–6.2)
RBC #/AREA URNS AUTO: 6 /HPF (ref 0–4)
SODIUM SERPL-SCNC: 139 MMOL/L (ref 136–145)
SP GR UR STRIP: 1.01 (ref 1–1.03)
URN SPEC COLLECT METH UR: ABNORMAL
WBC # BLD AUTO: 8.6 K/UL (ref 3.9–12.7)
WBC #/AREA URNS AUTO: >100 /HPF (ref 0–5)
WBC CLUMPS UR QL AUTO: ABNORMAL

## 2020-02-26 PROCEDURE — 85025 COMPLETE CBC W/AUTO DIFF WBC: CPT

## 2020-02-26 PROCEDURE — 99285 PR EMERGENCY DEPT VISIT,LEVEL V: ICD-10-PCS | Mod: ,,, | Performed by: EMERGENCY MEDICINE

## 2020-02-26 PROCEDURE — 93005 ELECTROCARDIOGRAM TRACING: CPT

## 2020-02-26 PROCEDURE — 63600175 PHARM REV CODE 636 W HCPCS: Performed by: STUDENT IN AN ORGANIZED HEALTH CARE EDUCATION/TRAINING PROGRAM

## 2020-02-26 PROCEDURE — 93010 ELECTROCARDIOGRAM REPORT: CPT | Mod: ,,, | Performed by: INTERNAL MEDICINE

## 2020-02-26 PROCEDURE — 80053 COMPREHEN METABOLIC PANEL: CPT

## 2020-02-26 PROCEDURE — 93010 EKG 12-LEAD: ICD-10-PCS | Mod: ,,, | Performed by: INTERNAL MEDICINE

## 2020-02-26 PROCEDURE — 96366 THER/PROPH/DIAG IV INF ADDON: CPT

## 2020-02-26 PROCEDURE — 99285 EMERGENCY DEPT VISIT HI MDM: CPT | Mod: 25

## 2020-02-26 PROCEDURE — 85610 PROTHROMBIN TIME: CPT

## 2020-02-26 PROCEDURE — 96365 THER/PROPH/DIAG IV INF INIT: CPT

## 2020-02-26 PROCEDURE — 81001 URINALYSIS AUTO W/SCOPE: CPT

## 2020-02-26 PROCEDURE — 99285 EMERGENCY DEPT VISIT HI MDM: CPT | Mod: ,,, | Performed by: EMERGENCY MEDICINE

## 2020-02-26 PROCEDURE — 87086 URINE CULTURE/COLONY COUNT: CPT

## 2020-02-26 RX ADMIN — CEFTRIAXONE 2 G: 2 INJECTION, SOLUTION INTRAVENOUS at 08:02

## 2020-02-27 VITALS
HEART RATE: 62 BPM | TEMPERATURE: 98 F | OXYGEN SATURATION: 100 % | DIASTOLIC BLOOD PRESSURE: 75 MMHG | SYSTOLIC BLOOD PRESSURE: 149 MMHG | RESPIRATION RATE: 20 BRPM

## 2020-02-27 LAB
BACTERIA UR CULT: NORMAL
BACTERIA UR CULT: NORMAL

## 2020-02-27 RX ORDER — CEPHALEXIN 500 MG/1
500 CAPSULE ORAL EVERY 12 HOURS
Qty: 14 CAPSULE | Refills: 0 | Status: SHIPPED | OUTPATIENT
Start: 2020-02-27 | End: 2020-03-05

## 2020-02-27 NOTE — ED TRIAGE NOTES
"Per wife, pt was outside and might of slipped on a stepping stone and hit his head. Pt stated "a tree stump fell on on him". Wife is not sure if that happened. Pt has a history of dementia. Pt was on the ground for 30 minutes. Hematoma to left forehead, bleeding controlled. Pt on blood thinners. Denies LOC    LOC: The patient is awake, alert, and oriented to place, time, situation. Affect is appropriate.  Speech is appropriate and clear.     APPEARANCE: Patient resting comfortably in no acute distress.  Patient is clean and well groomed.    SKIN: The skin is warm and dry; color consistent with ethnicity.  Patient has normal skin turgor and moist mucus membranes. Hematoma to the left forehead, abrasion to the left knee    MUSCULOSKELETAL: Patient moving upper and lower extremities without difficulty.  Denies weakness.     RESPIRATORY: Airway is open and patent. Respirations spontaneous, even, easy, and non-labored.  Patient has a normal effort and rate.  No accessory muscle use noted. Denies cough.     CARDIAC:  Normal rhythm and rate noted.  No peripheral edema noted. No complaints of chest pain.      ABDOMEN: Soft and non tender to palpation.  No distention noted.     NEUROLOGIC: Eyes open spontaneously.  Behavior appropriate to situation.  Follows commands; facial expression symmetrical.  Purposeful motor response noted; normal sensation in all extremities.History of dementia         "

## 2020-02-27 NOTE — DISCHARGE INSTRUCTIONS
Take antibiotics twice a day for 7 days.  Return to ED if changes in mental status or any concerns.  Follow-up with your primary care doctor in 1 week.

## 2020-02-27 NOTE — ED PROVIDER NOTES
Encounter Date: 2/26/2020       History     Chief Complaint   Patient presents with    Fall     hematoma to left forehead, bleeding now controled, hit head on stepping stone, had bm on self and ems noted to have blood in feces     HPI   Mr. Mark Anthony Velarde is an 78yo M with hx of HTN, CAD s/p PCI (2006), Afib/Aflutter s/p ICD and pacemaker (on eliquis) who presents following an unwitnessed fall with L frontal head hematoma. Wife reports going to take a nap around 2:30pm at which time the patient was eating lunch and returned around 4pm to find him outside on the concrete face down and covered in blood. Patient denies losing consciousness, but cannot recall exactly how he fell. He has a history of multiple falls in the past due to not using his home cane/walker. He does not know how long he was down. Denies preceding dizziness/lightheadedness, syncope, CP, palpitations, or SOB. Denies HA, confusion, slurred speech, urinary or bowel incontinence following the event. However, EMS reports patient had a BM on himself. Patient has baseline dementia, but family at bedside states he is at his baseline mentation.     Review of patient's allergies indicates:   Allergen Reactions    Arb-angiotensin receptor antagonist Other (See Comments)     Hyperkalemia    Lisinopril Diarrhea     Past Medical History:   Diagnosis Date    *Atrial fibrillation     Anticoagulant long-term use     Atrial fibrillation - asymptomatic but noted on ICD interrogation ([id number]) - 16 h 40 minutes of afib 2/13/2013    Atrial flutter with controlled response - seen on ICD interrogation - asymptomatic 2/13/2013    Automatic implantable cardioverter-defibrillator in situ 2/13/2013    Basal cell carcinoma     mid forehead    CAD (coronary artery disease) 2/13/2013    Cardiomyopathy     Cataract     Cognitive deficits     mild    H/O syncope -  5/27/2013    History of PTCA - LAD, LCX and PDA PCI in 2006 2/13/2013    HTN (hypertension)  2/13/2013    Hyperlipidemia LDL goal < 70 2/13/2013    Hypertension     ICD (implantable cardiac defibrillator) in place 2/13/2013    Ischemic cardiomyopathy LVEF ~45% 2/13/2013    LBBB (left bundle branch block) 2/13/2013    Memory loss     Psoriasis vulgaris     Syncope and collapse      Past Surgical History:   Procedure Laterality Date    CARDIAC DEFIBRILLATOR PLACEMENT      CATARACT EXTRACTION W/  INTRAOCULAR LENS IMPLANT Right 04/04/16    Dr Michael     CATARACT EXTRACTION W/  INTRAOCULAR LENS IMPLANT Left 05/09/2016    SKIN BIOPSY      TONSILLECTOMY       Family History   Problem Relation Age of Onset    Psoriasis Father     Dementia Father     Tremor Father     Cataracts Mother     Cancer Mother     Psoriasis Son     Psoriasis Son     Diabetes Maternal Grandmother     Kidney disease Sister     No Known Problems Brother     No Known Problems Maternal Aunt     No Known Problems Maternal Uncle     No Known Problems Paternal Aunt     No Known Problems Paternal Uncle     No Known Problems Maternal Grandfather     No Known Problems Paternal Grandmother     No Known Problems Paternal Grandfather     Amblyopia Neg Hx     Blindness Neg Hx     Glaucoma Neg Hx     Hypertension Neg Hx     Macular degeneration Neg Hx     Retinal detachment Neg Hx     Strabismus Neg Hx     Stroke Neg Hx     Thyroid disease Neg Hx     Parkinsonism Neg Hx     Celiac disease Neg Hx     Cirrhosis Neg Hx     Colon cancer Neg Hx     Colon polyps Neg Hx     Crohn's disease Neg Hx     Cystic fibrosis Neg Hx     Esophageal cancer Neg Hx     Irritable bowel syndrome Neg Hx     Inflammatory bowel disease Neg Hx     Hemochromatosis Neg Hx     Liver cancer Neg Hx     Liver disease Neg Hx     Rectal cancer Neg Hx     Stomach cancer Neg Hx     Ulcerative colitis Neg Hx     Christiano's disease Neg Hx     Lymphoma Neg Hx     Scleroderma Neg Hx     Rheum arthritis Neg Hx     Multiple sclerosis Neg Hx      Melanoma Neg Hx     Tuberculosis Neg Hx     Lupus Neg Hx      Social History     Tobacco Use    Smoking status: Former Smoker     Last attempt to quit: 1963     Years since quittin.4    Smokeless tobacco: Never Used   Substance Use Topics    Alcohol use: No     Frequency: Monthly or less     Drinks per session: 1 or 2     Binge frequency: Never    Drug use: No     Review of Systems   Constitutional: Negative for activity change, chills and fever.   HENT: Negative for congestion, rhinorrhea, sore throat and trouble swallowing.    Eyes: Negative for visual disturbance.   Respiratory: Negative for cough and shortness of breath.    Cardiovascular: Negative for chest pain and palpitations.   Gastrointestinal: Negative for abdominal pain, constipation, diarrhea, nausea and vomiting.   Genitourinary: Negative for difficulty urinating, dysuria and hematuria.   Musculoskeletal: Positive for gait problem. Negative for back pain and neck pain.   Skin: Negative for rash and wound.   Neurological: Negative for dizziness, syncope, weakness, numbness and headaches.   Hematological: Bruises/bleeds easily.   Psychiatric/Behavioral: Negative for agitation and confusion.       Physical Exam     Initial Vitals [20 1708]   BP Pulse Resp Temp SpO2   (!) 142/74 62 18 97.7 °F (36.5 °C) 98 %      MAP       --         Physical Exam    Constitutional: He appears well-developed and well-nourished. He is not diaphoretic. No distress.   HENT:   Right Ear: External ear normal.   Left Ear: External ear normal.   Mouth/Throat: Oropharynx is clear and moist.   Large hematoma covered in dried blood on L frontal head  (hemostasis achieved)   Eyes: Conjunctivae and EOM are normal. Pupils are equal, round, and reactive to light.   Neck: Normal range of motion. Neck supple. No tracheal deviation present.   No cervical, thoracic, lumbar spine tenderness   Cardiovascular: Normal rate, regular rhythm, normal heart sounds and intact  distal pulses.   No murmur heard.  Pulmonary/Chest: Breath sounds normal. No respiratory distress. He has no wheezes. He has no rales.   Abdominal: Soft. He exhibits no distension. There is no tenderness. There is no rebound and no guarding.   Musculoskeletal: Normal range of motion. He exhibits no edema or tenderness.   No deformities  BUE and BLE with 5/5 strength   Neurological: He is alert. No sensory deficit. GCS score is 15. GCS eye subscore is 4. GCS verbal subscore is 5. GCS motor subscore is 6.   Oriented to self and place   Skin: Skin is warm and dry.   Psychiatric: He has a normal mood and affect.         ED Course   Procedures  Labs Reviewed - No data to display       Imaging Results    None          Medical Decision Making:   Initial Assessment:   80yo M with hx of HTN, CAD s/p PCI (2006), Afib/Aflutter s/p ICD and pacemaker (on eliquis) who presents following an unwitnessed fall with L frontal head hematoma.  Differential Diagnosis:   DDx: mechanical fall 2/2 debility vs syncope vs seizure  hematoma vs intracranial hemorrhage vs traumatic fracture  Independently Interpreted Test(s):   I have ordered and independently interpreted X-rays - see prior notes.  I have ordered and independently interpreted EKG Reading(s) - see prior notes  Clinical Tests:   Lab Tests: Ordered  Radiological Study: Ordered  Medical Tests: Ordered  ED Management:  CBC, CMP, PT/INR - unremarkable  EKG without ST changes    CXR - clear, no evidence of pneumothorax, effusion, or rib fractures  XR b/l hips and pelvis - no evidence of fractures or dislocation  XR b/l knees - no evidence of fractures or dislocation  CT head without concern for intraparenchymal hemorrhage.    UA concerning for UTI. Given 2g IV ceftriaxone.    Patient presented s/p mechanical fall with L frontal hematoma. Mentating at baseline and CTH negative for hemorrhage. All other imaging reviewed and no evidence of fractures/dislocations. Patient is stable to be  discharged home with family. Prescribed 7 day course of Keflex for UTI. Advised to return to ED if any changes in mental status or any other concerns. Follow-up with PCP within 1 week.                 Attending Attestation:   Physician Attestation Statement for Resident:  As the supervising MD   Physician Attestation Statement: I have personally seen and examined this patient.   I agree with the above history. -:   As the supervising MD I agree with the above PE.    As the supervising MD I agree with the above treatment, course, plan, and disposition.   -: Briefly this is an 79-year-old male with a history of atrial fibrillation on anticoagulation who presents status post a fall at home.  He has dementia so history is limited, it is unknown if he had syncope or mechanical fall.  He is amnestic to the event.  He was found prone on the ground by his wife.  Primary survey is intact. Secondary survey is notable for a large left forehead hematoma with a skin avulsion.  He has swelling and tenderness over the bilateral knees, and he has scattered abrasions over his left hand, forearm, and bilateral lower extremities. His tetanus shot is up-to-date.  Given his age and anticoagulated status, we cannot clinically clear him, so we will obtain a CT head and C-spine.  We will obtain x-rays of the chest, pelvis and bilateral knees given that he is overall an unreliable  of pain due to his dementia.  Plan for labs to evaluate for syncope etiologies, such as CBC, CMP and urinalysis as well as EKG and we will keep him on cardiac monitoring.    ED course:  Patient's labs are suggestive of urinary tract infection, and this may have predisposed him to gait instability.  He has also put supposed to used a walker or cane, which he refuses to do at home.  I had a long conversation with the patient's son at bedside about risks versus benefits of continued therapeutic anticoagulation if he continues to have gait instability.  I  recommended that they continue this conversation with his primary care doctor in follow-up, and that if there is no head bleed tonight, this could be deferred to the outpatient setting with his provider that no some past.  Son voiced understanding.  CT head and C-spine at time of sign off to the oncoming team is still pending, with disposition pending results of this.  His x-rays by my independent interpretation did not show any acute fracture.  We have provided wound care and we have Steri-Strips his skin avulsions after irrigation.  He does not have any lacerations that require suturing.  If his head CT and C-spine CT are unremarkable for acute injury, and he is able to ambulate safely, I think he can be discharged with plans to follow up with his primary doctor.  If he is unable to ambulate safely, we will plan to bring him into the hospital for inpatient physical therapy.                                  Clinical Impression:       ICD-10-CM ICD-9-CM   1. Traumatic injury of head, initial encounter S09.90XA 959.01   2. Syncope R55 780.2   3. Trauma T14.90XA 959.9   4. Swelling R60.9 782.3   5. Acute cystitis without hematuria N30.00 595.0                                Joy Herr MD  Resident  02/27/20 0047       Meme Vicnent MD  02/27/20 1222

## 2020-02-27 NOTE — ED NOTES
Pt resting in stretcher. Pt on continuous BP, cardiac and pulse ox monitoring. VSS. Will continue to monitor

## 2020-03-02 ENCOUNTER — PATIENT MESSAGE (OUTPATIENT)
Dept: NEUROLOGY | Facility: CLINIC | Age: 80
End: 2020-03-02

## 2020-03-11 ENCOUNTER — HOSPITAL ENCOUNTER (EMERGENCY)
Facility: HOSPITAL | Age: 80
Discharge: HOME | End: 2020-03-11
Attending: EMERGENCY MEDICINE
Payer: MEDICARE

## 2020-03-11 ENCOUNTER — PATIENT MESSAGE (OUTPATIENT)
Dept: CARDIOLOGY | Facility: CLINIC | Age: 80
End: 2020-03-11

## 2020-03-11 VITALS
SYSTOLIC BLOOD PRESSURE: 102 MMHG | HEART RATE: 62 BPM | BODY MASS INDEX: 27.17 KG/M2 | TEMPERATURE: 98 F | RESPIRATION RATE: 18 BRPM | HEIGHT: 66 IN | WEIGHT: 169.06 LBS | DIASTOLIC BLOOD PRESSURE: 52 MMHG | OXYGEN SATURATION: 98 %

## 2020-03-11 DIAGNOSIS — S00.03XA SCALP HEMATOMA, INITIAL ENCOUNTER: Primary | ICD-10-CM
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
## 2020-03-11 LAB
ANION GAP SERPL CALC-SCNC: 9 MMOL/L (ref 8–16)
BASOPHILS # BLD AUTO: 0.1 K/UL (ref 0–0.2)
BASOPHILS NFR BLD: 1.6 % (ref 0–1.9)
BUN SERPL-MCNC: 27 MG/DL (ref 8–23)
CALCIUM SERPL-MCNC: 8.6 MG/DL (ref 8.7–10.5)
CHLORIDE SERPL-SCNC: 108 MMOL/L (ref 95–110)
CO2 SERPL-SCNC: 23 MMOL/L (ref 23–29)
CREAT SERPL-MCNC: 2.1 MG/DL (ref 0.5–1.4)
DIFFERENTIAL METHOD: ABNORMAL
EOSINOPHIL # BLD AUTO: 0.3 K/UL (ref 0–0.5)
EOSINOPHIL NFR BLD: 4.5 % (ref 0–8)
ERYTHROCYTE [DISTWIDTH] IN BLOOD BY AUTOMATED COUNT: 16.8 % (ref 11.5–14.5)
EST. GFR  (AFRICAN AMERICAN): 33.1 ML/MIN/1.73 M^2
EST. GFR  (NON AFRICAN AMERICAN): 28.7 ML/MIN/1.73 M^2
GLUCOSE SERPL-MCNC: 165 MG/DL (ref 70–110)
HCT VFR BLD AUTO: 30.5 % (ref 40–54)
HGB BLD-MCNC: 9.3 G/DL (ref 14–18)
IMM GRANULOCYTES # BLD AUTO: 0.02 K/UL (ref 0–0.04)
IMM GRANULOCYTES NFR BLD AUTO: 0.3 % (ref 0–0.5)
INR PPP: 1.2 (ref 0.8–1.2)
LYMPHOCYTES # BLD AUTO: 1.3 K/UL (ref 1–4.8)
LYMPHOCYTES NFR BLD: 20.7 % (ref 18–48)
MCH RBC QN AUTO: 30.9 PG (ref 27–31)
MCHC RBC AUTO-ENTMCNC: 30.5 G/DL (ref 32–36)
MCV RBC AUTO: 101 FL (ref 82–98)
MONOCYTES # BLD AUTO: 0.4 K/UL (ref 0.3–1)
MONOCYTES NFR BLD: 5.9 % (ref 4–15)
NEUTROPHILS # BLD AUTO: 4.3 K/UL (ref 1.8–7.7)
NEUTROPHILS NFR BLD: 67 % (ref 38–73)
NRBC BLD-RTO: 0 /100 WBC
PLATELET # BLD AUTO: 209 K/UL (ref 150–350)
PMV BLD AUTO: 10.1 FL (ref 9.2–12.9)
POTASSIUM SERPL-SCNC: 4.8 MMOL/L (ref 3.5–5.1)
PROTHROMBIN TIME: 12.1 SEC (ref 9–12.5)
RBC # BLD AUTO: 3.01 M/UL (ref 4.6–6.2)
SODIUM SERPL-SCNC: 140 MMOL/L (ref 136–145)
WBC # BLD AUTO: 6.43 K/UL (ref 3.9–12.7)

## 2020-03-11 PROCEDURE — 93005 ELECTROCARDIOGRAM TRACING: CPT

## 2020-03-11 PROCEDURE — 63600175 PHARM REV CODE 636 W HCPCS: Performed by: EMERGENCY MEDICINE

## 2020-03-11 PROCEDURE — 96360 HYDRATION IV INFUSION INIT: CPT

## 2020-03-11 PROCEDURE — 99285 PR EMERGENCY DEPT VISIT,LEVEL V: ICD-10-PCS | Mod: ,,, | Performed by: EMERGENCY MEDICINE

## 2020-03-11 PROCEDURE — 99285 EMERGENCY DEPT VISIT HI MDM: CPT | Mod: ,,, | Performed by: EMERGENCY MEDICINE

## 2020-03-11 PROCEDURE — 99284 EMERGENCY DEPT VISIT MOD MDM: CPT | Mod: 25

## 2020-03-11 PROCEDURE — 85025 COMPLETE CBC W/AUTO DIFF WBC: CPT

## 2020-03-11 PROCEDURE — 85610 PROTHROMBIN TIME: CPT

## 2020-03-11 PROCEDURE — 80048 BASIC METABOLIC PNL TOTAL CA: CPT

## 2020-03-11 PROCEDURE — 93010 ELECTROCARDIOGRAM REPORT: CPT | Mod: ,,, | Performed by: INTERNAL MEDICINE

## 2020-03-11 PROCEDURE — 93010 EKG 12-LEAD: ICD-10-PCS | Mod: ,,, | Performed by: INTERNAL MEDICINE

## 2020-03-11 RX ADMIN — SODIUM CHLORIDE 500 ML: 0.9 INJECTION, SOLUTION INTRAVENOUS at 08:03

## 2020-03-11 NOTE — ED PROVIDER NOTES
Encounter Date: 3/11/2020       History     Chief Complaint   Patient presents with    Head Injury     Pt found lying in large amount of blood, hematoma to left side of forehead. Pt denies fall. Pt confused, disoriented to time and  which is not pt's baseline. +blood thinners     79-year-old male with a history dementia and heart disease on Eliquis presents after being found in bed by his wife in a pool of blood.  Two weeks ago patient fell from standing.  He has not followed up since this original fall.  He had a known hematoma to the left frontal region.  Wife thinks it may have opened up and drained.  She denies he fell overnight.  Patient unable to give meaningful history or ROS secondary to dementia.  The patients available PMH, PSH, Social History, medications, allergies, and triage vital signs were reviewed just prior to their medical evaluation.        Review of patient's allergies indicates:   Allergen Reactions    Arb-angiotensin receptor antagonist Other (See Comments)     Hyperkalemia    Lisinopril Diarrhea     Past Medical History:   Diagnosis Date    *Atrial fibrillation     Anticoagulant long-term use     Atrial fibrillation - asymptomatic but noted on ICD interrogation () - 16 h 40 minutes of afib 2013    Atrial flutter with controlled response - seen on ICD interrogation - asymptomatic 2013    Automatic implantable cardioverter-defibrillator in situ 2013    Basal cell carcinoma     mid forehead    CAD (coronary artery disease) 2013    Cardiomyopathy     Cataract     Cognitive deficits     mild    H/O syncope -  2013    History of PTCA - LAD, LCX and PDA PCI in 2006 2013    HTN (hypertension) 2013    Hyperlipidemia LDL goal < 70 2013    Hypertension     ICD (implantable cardiac defibrillator) in place 2013    Ischemic cardiomyopathy LVEF ~45% 2013    LBBB (left bundle branch block) 2013    Memory loss      Psoriasis vulgaris     Syncope and collapse      Past Surgical History:   Procedure Laterality Date    CARDIAC DEFIBRILLATOR PLACEMENT      CATARACT EXTRACTION W/  INTRAOCULAR LENS IMPLANT Right 16    Dr Michael     CATARACT EXTRACTION W/  INTRAOCULAR LENS IMPLANT Left 2016    SKIN BIOPSY      TONSILLECTOMY       Family History   Problem Relation Age of Onset    Psoriasis Father     Dementia Father     Tremor Father     Cataracts Mother     Cancer Mother     Psoriasis Son     Psoriasis Son     Diabetes Maternal Grandmother     Kidney disease Sister     No Known Problems Brother     No Known Problems Maternal Aunt     No Known Problems Maternal Uncle     No Known Problems Paternal Aunt     No Known Problems Paternal Uncle     No Known Problems Maternal Grandfather     No Known Problems Paternal Grandmother     No Known Problems Paternal Grandfather     Amblyopia Neg Hx     Blindness Neg Hx     Glaucoma Neg Hx     Hypertension Neg Hx     Macular degeneration Neg Hx     Retinal detachment Neg Hx     Strabismus Neg Hx     Stroke Neg Hx     Thyroid disease Neg Hx     Parkinsonism Neg Hx     Celiac disease Neg Hx     Cirrhosis Neg Hx     Colon cancer Neg Hx     Colon polyps Neg Hx     Crohn's disease Neg Hx     Cystic fibrosis Neg Hx     Esophageal cancer Neg Hx     Irritable bowel syndrome Neg Hx     Inflammatory bowel disease Neg Hx     Hemochromatosis Neg Hx     Liver cancer Neg Hx     Liver disease Neg Hx     Rectal cancer Neg Hx     Stomach cancer Neg Hx     Ulcerative colitis Neg Hx     Christiano's disease Neg Hx     Lymphoma Neg Hx     Scleroderma Neg Hx     Rheum arthritis Neg Hx     Multiple sclerosis Neg Hx     Melanoma Neg Hx     Tuberculosis Neg Hx     Lupus Neg Hx      Social History     Tobacco Use    Smoking status: Former Smoker     Last attempt to quit: 1963     Years since quittin.5    Smokeless tobacco: Never Used   Substance Use  Topics    Alcohol use: No     Frequency: Monthly or less     Drinks per session: 1 or 2     Binge frequency: Never    Drug use: No     Review of Systems   Unable to perform ROS: Dementia       Physical Exam     Initial Vitals   BP Pulse Resp Temp SpO2   03/11/20 0614 03/11/20 0614 03/11/20 0614 03/11/20 0615 03/11/20 0614   120/70 60 18 97.5 °F (36.4 °C) 99 %      MAP       --                Physical Exam    Nursing note and vitals reviewed.  Constitutional: He appears well-developed and well-nourished. He is not diaphoretic. No distress.   HENT:   Nose: Nose normal.   Left frontal hematoma, fresh blood to scalp   Eyes: Right eye exhibits no discharge. Left eye exhibits no discharge.   pallor   Neck: Normal range of motion. Neck supple.   No midline pain   Cardiovascular: Normal rate, regular rhythm and normal heart sounds. Exam reveals no gallop and no friction rub.    No murmur heard.  Pulmonary/Chest: Breath sounds normal. No respiratory distress. He has no wheezes. He has no rhonchi. He has no rales.   Abdominal: Soft. He exhibits no distension. There is no tenderness. There is no rebound and no guarding.   Musculoskeletal: Normal range of motion. He exhibits no edema or tenderness.   Palpated all extremities, no ttp   Neurological: He is alert. GCS eye subscore is 4. GCS verbal subscore is 5. GCS motor subscore is 6.   dementia   Skin: Skin is warm and dry. No rash noted. No erythema.   Psychiatric: He has a normal mood and affect. His behavior is normal. Judgment and thought content normal.         ED Course   Procedures  Labs Reviewed   BASIC METABOLIC PANEL - Abnormal; Notable for the following components:       Result Value    Glucose 165 (*)     BUN, Bld 27 (*)     Creatinine 2.1 (*)     Calcium 8.6 (*)     eGFR if  33.1 (*)     eGFR if non  28.7 (*)     All other components within normal limits   CBC W/ AUTO DIFFERENTIAL - Abnormal; Notable for the following components:     RBC 3.01 (*)     Hemoglobin 9.3 (*)     Hematocrit 30.5 (*)     Mean Corpuscular Volume 101 (*)     Mean Corpuscular Hemoglobin Conc 30.5 (*)     RDW 16.8 (*)     All other components within normal limits   PROTIME-INR     EKG Readings: (Independently Interpreted)   Heart Rate: 60.   paced     ECG Results          EKG 12-lead (In process)  Result time 03/11/20 09:12:52    In process by Interface, Lab In [facility] (03/11/20 09:12:52)                 Narrative:    Test Reason : W19.XXXA,    Vent. Rate : 060 BPM     Atrial Rate : 312 BPM     P-R Int : 200 ms          QRS Dur : 184 ms      QT Int : 528 ms       P-R-T Axes : 000 267 068 degrees     QTc Int : 528 ms    Atrial-paced rhythm  Right bundle branch block  Possible Lateral infarct (cited on or before 11-MAR-2020)  Inferior infarct (cited on or before 11-MAR-2020)  Abnormal ECG  When compared with ECG of 26-FEB-2020 18:54,  Previous ECG has undetermined rhythm, needs review  Right bundle branch block has replaced Nonspecific intraventricular block  Questionable change in initial forces of Lateral leads    Referred By: [email or web address]   SELF           Confirmed By:                   In process by Interface, Lab In [facility] (03/11/20 08:52:48)                 Narrative:    Test Reason : W19.XXXA,    Vent. Rate : 060 BPM     Atrial Rate : 312 BPM     P-R Int : 200 ms          QRS Dur : 184 ms      QT Int : 528 ms       P-R-T Axes : 000 267 068 degrees     QTc Int : 528 ms    Atrial-paced rhythm  Right bundle branch block  Possible Lateral infarct (cited on or before 11-MAR-2020)  Inferior infarct (cited on or before 11-MAR-2020)  Abnormal ECG  When compared with ECG of 26-FEB-2020 18:54,  Previous ECG has undetermined rhythm, needs review  Right bundle branch block has replaced Nonspecific intraventricular block  Questionable change in initial forces of Lateral leads    Referred By: [email or web address]   SELF           Confirmed By:                             Imaging Results           CT Head Without Contrast (Final result)  Result time 03/11/20 07:17:21    Final result by Karson Ballesteros MD (03/11/20 07:17:21)                 Impression:      Chronic intracranial changes are noted there is no evidence for acute intracranial process.    Extracranial soft tissue injury again noted without evidence for underlying calvarial fracture.      Electronically signed by: Karson Ballesteros  Date:    03/11/2020  Time:    07:17             Narrative:    EXAMINATION:  CT HEAD WITHOUT CONTRAST    CLINICAL HISTORY:  Maxface trauma blunt;    TECHNIQUE:  Low dose axial images were obtained through the head.  Coronal and sagittal reformations were also performed. Contrast was not administered.    COMPARISON:  February 26, 2020    FINDINGS:  Artifact is present diminishing sensitivity of the exam.  The ventricular system, sulcal pattern and parenchymal attenuation characteristics are consistent with chronic change appearing stable compared to the prior study there is no evidence for superimposed acute process.  There is no evidence for intracranial mass, mass effect or midline shift and there is no evidence for acute intracranial hemorrhage.  Appropriate CSF spaces are seen at the skull base.    As on the prior examination there is extracranial left frontal soft tissue injury, with extracranial soft tissue hematoma.  There is no evidence for underlying calvarial fracture.  The osseous structures appear intact.  The mastoid air cells appear appropriate when accounting for averaging, as do the paranasal sinuses.  The orbits appear intact.                               CT Cervical Spine Without Contrast (Final result)  Result time 03/11/20 07:27:32    Final result by Karson Ballesteros MD (03/11/20 07:27:32)                 Impression:      Multilevel chronic change of the cervical spine noted, correlation for any specific level of symptomatology is needed.    On close evaluation of available imaging when accounting  for chronic change and artifact there is no evidence for acute cervical spine fracture deformity.      Electronically signed by: Karson Ballesteros  Date:    03/11/2020  Time:    07:27             Narrative:    EXAMINATION:  CT CERVICAL SPINE WITHOUT CONTRAST    CLINICAL HISTORY:  C-spine trauma, NEXUS/CCR positive, low risk;    TECHNIQUE:  Low dose axial images, sagittal and coronal reformations were performed though the cervical spine.  Contrast was not administered.    COMPARISON:  February 26, 2020    FINDINGS:  Multilevel chronic endplate changes noted, there is multilevel loss of disc space height.  There is straightening of the cervical spine.  Mild spondylolisthesis is noted, stable appearance.  There is no evidence for high-grade spondylolisthesis, there is no evidence for high-grade or acute compression fracture deformity.  Facet arthropathy is noted there is no evidence for facet dislocation or facet fracture deformity.  The occipital condyles articulate appropriately with the superior articular facets of C1 at the craniocervical junction.  Multilevel prominent chronic change of the cervical spine is again noted stable appearance, correlation for any specific level of symptomatology is needed.  On close evaluation of available imaging when accounting for chronic change and artifact there is no evidence for acute cervical spine fracture deformity.                                 Medical Decision Making:   History:   I obtained history from: someone other than patient.       <> Summary of History: Wife assists HPI  Old Medical Records: I decided to obtain old medical records.  Old Records Summarized: records from previous admission(s).       <> Summary of Records: Previous ED note  Clinical Tests:   Lab Tests: Reviewed and Ordered  Radiological Study: Ordered and Reviewed  Medical Tests: Ordered and Reviewed  ED Management:  79-year-old male presents with bleeding from his scalp.  Vitals normal.  Physical exam  as above.  Labs unremarkable.  CT without bleed or fracture.  Suspect his hematoma opened causing bleeding.  Now resolved.  Patient passed walk of life in the room.  I had extensive conversation with the family at bedside.  They are comfortable taking the patient home.  Will discontinue Eliquis at this time.  They will call primary physician today to arrange close follow-up to discuss further.  Patient will return to ED for worsening symptoms, inability to eat/drink, fever greater than 100.4, or any other concerns.  Did bedside teaching with return precautions.  All questions answered.  The patient acknowledges understanding.  Gave verbal discharge instructions.                                 Clinical Impression:   Final diagnoses:  [W19.XXXA] Fall  [S00.03XA] Scalp hematoma, initial encounter (Primary)      Disposition:   Disposition: Discharged  Condition: Stable     ED Disposition Condition    Discharge Stable        ED Prescriptions     None        Follow-up Information     Follow up With Specialties Details Why Contact Info    Follow up with primary physician as soon as possible.  Call tomorrow for an appointment.        [facility]  Return to ED for worsening symptoms, inability to eat/drink, fever greater than 100.4, or any other concerns. [address] [facility] [id number] [phone]                        Level of Complexity:  High, level 5.             Raymon Sheriff MD  03/11/20 0471

## 2020-03-11 NOTE — ED TRIAGE NOTES
Per EMS, wife called this morning stating that patient was surrounded in a pool of blood around his head. +blood thinners. Pt fell on Ash Wednesday and has large hematoma to L side of his forehead, old bruising to LUE and L ribs. Pt denies any pain. EMS reports that wife states patient has not been ambulating much since his fall. Pt has history of dementia.    Patient Identifiers for Mark Anthony Velarde  checked and correct  LOC: The patient is awake, alert and aware of environment with an appropriate affect, patient is oriented to self and place only  APPEARANCE: Patient resting comfortably and in no acute distress, patient has large amount of blood to L side of head and well groomed, patient's clothing is properly fastened.  SKIN: The skin is warm and dry, patient has normal skin turgor and moist mucus membranes,no rashes or lesions.Skin Intact , No Breakdown Noted. Healing bruises to L side of face, L ribs and LUE  Musculoskeletal :  Normal range of motion noted. Moves all extremeties well, No swelling or tenderness noted  RESPIRATORY: Airway is open and patent, respirations are spontaneous, patient has a normal effort and rate.  CARDIAC: Patient has a normal rate and rhythm, no periphreal edema noted, capillary refill < 3 seconds.   ABDOMEN: Soft and non tender to palpation, no distention noted.   PULSES: 2+  And symmetrical in all extremeties  NEUROLOGIC: PERRL. facial expression is symmetrical, patient moving all extremities, normal sensation in all extremities when touched with a finger.The patient is awake, alert and cooperative with a calm affect, patient is aware of environment.    Will continue to monitor

## 2020-03-11 NOTE — ED NOTES
Dried blood to patients head and neck cleaned with sanitation wipes, no new injury or active bleeding noted. Pt denies pain.

## 2020-03-11 NOTE — ED NOTES
"Assumed patient care.   Pt resting on stretcher in NAD, respirations even and unlabored. Stretcher locked and in lowest position, side rails x 2, call bell within reach. Cardiac monitor, pulse ox and BP cuff applied cycling Q 30 minute vitals. Pt offered restroom assistance, pt states no needs at this time, pt wife at the bedside, updated on wait for blood work and CT scan results. Will continue to monitor and update pt on plan of care.     LOC: The patient is awake, alert and aware of environment with an appropriate affect, the patient is oriented to person and situation, disoriented to time and place. Pt states "1941" for birth year, able to accurately recite age, repeats birthday when asked current year.   APPEARANCE: Patient resting comfortably and in no acute distress, patient remains in hospital gown.   SKIN: The skin is warm and dry, color consistent with ethnicity, patient has normal skin turgor and moist mucus membranes, hematoma noted to left forehead with scab in place, no active bleeding noted at this time, bruising noted to patient face, arms and legs bilaterally.  MUSCULOSKELETAL: Patient moving all extremities well, no obvious swelling or deformities noted.   RESPIRATORY: Airway is open and patent; respirations are spontaneous, patient has a normal effort and rate, no accessory muscle use noted.   NEUROLOGIC: PERRL, pupils pinpoint bilaterally, eyes open spontaneously, behavior appropriate to situation, follows commands, facial expression symmetrical, bilateral hand grasp equal and even, purposeful motor response noted, normal sensation in all extremities when touched with a finger. Pt denies dizziness, headache, numbness, tingling. Pt wife reports patient passed out this AM while attempting to sit up from lying position in bed, unable to provide time frame.         "

## 2020-03-13 ENCOUNTER — TELEPHONE (OUTPATIENT)
Dept: NEUROLOGY | Facility: CLINIC | Age: 80
End: 2020-03-13

## 2020-03-13 NOTE — TELEPHONE ENCOUNTER
NEUROBEHAVIORAL STATUS EXAMINATION - CONFIDENTIAL  MEMORY CARE CLINIC    REFERRAL SOURCE: Yazmin Israel, [place], NP-C  MEDICAL NECESSITY:  Evaluate cognitive functioning in the setting of dementia  DATE CONDUCTED: 3/13/2020  PRESENT AT VISIT: Wife, Son    SUMMARY/TREATMENT PLAN   Patient has established dementia diagnosis (see below or Problem List). Appointment was changed from in-person to phone/virtual due to COVID-19 pandemic.     Diagnoses  Major Neurocognitive Disorder, probable Lewy Body Dementia.    -Level of Care: Appropriately managed. Requires 24/7 supervision due to level of cognitive impairment and fall risk.    -Food/Drink Intake: He continues to require prompts to eat and drink, but offers no resistance when put in front of him. Family should continue to provide and encourage food/drink.    -Sleep: He is sleeping well through the night with no reported problems.    -Memory Clinic Follow-up: As needed, but many conversations could be done virtually or over the phone. Balance is progressively worsening, which may require a visit with PCP or neurology.    -Follow-up with PCP for medical issues    Rito Mejias Psy.D., ABPP  Board Certified in Clinical Neuropsychology  Department of Neurology    HISTORY OF PRESENT ILLNESS: Mr. Mark Anthony Velarde Jr. is a 79 y.o. male who was referred to memory care clinic in the setting of dementia (probable Lewy Body Dementia). He was previously seen in assessment clinic in 1/2020. He has presented to the ED several times since that appointment due to falls. A blood thinner was discontinued after he pulled a scab off in the middle of the night and bled all over the bed. He is followed by [facility].     -He goes to bed from 830pm-630am, takes a 2 hour nap.   -Quick release on the bathroom in the middle of the night. He does not wear depends.   -Visiting [place] 5 days per week, 4 hours per day. 5 children, one of which is always with him on the weekends.   -Falling regularly  since 6/2019.  -Child locks on the doors to avoid wandering.     DAILY FUNCTIONING:  SUPPORT  Primary Caregiver/Support: Wife  Living Situation: Wife. Wife and son have noted a more significant decline such that they can't leave him alone at all. He is now requiring total support for all ADL.     Medication Compliance: She now administers  Appointment Management: Wife manages   Financial Management: Wife exclusively manages    Cooking: Totally dependent  Driving: No longer driving in the past 18-months  Hobbies: No interest now and sleeping/tv.  Basic ADLs: Wife is now having to manage or home health.    MEDICAL HISTORY: Mr. Velarde  has a past medical history of Atrial fibrillation, Anticoagulant long-term use, Atrial fibrillation - asymptomatic but noted on ICD interrogation ([id number]) - 16 h 40 minutes of afib (2/13/2013), Atrial flutter with controlled response - seen on ICD interrogation - asymptomatic (2/13/2013), Automatic implantable cardioverter-defibrillator in situ (2/13/2013), Basal cell carcinoma, CAD (coronary artery disease) (2/13/2013), Cardiomyopathy, Cataract, Cognitive deficits, H/O syncope -  (5/27/2013), History of PTCA - LAD, LCX and PDA PCI in 2006 (2/13/2013), HTN (hypertension) (2/13/2013), Hyperlipidemia LDL goal < 70 (2/13/2013), Hypertension, ICD (implantable cardiac defibrillator) in place (2/13/2013), Ischemic cardiomyopathy LVEF ~45% (2/13/2013), LBBB (left bundle branch block) (2/13/2013), Memory loss, Psoriasis vulgaris, and Syncope and collapse.    CURRENT MEDICATIONS:    Current Outpatient Medications:     amiodarone (PACERONE) 200 MG Tab, Take 0.5 tablets (100 mg total) by mouth once daily., Disp: 45 tablet, Rfl: 3    amLODIPine (NORVASC) 5 MG tablet, Take 1 tablet (5 mg total) by mouth once daily., Disp: 90 tablet, Rfl: 3    aspirin (ECOTRIN) 81 MG EC tablet, Take 81 mg by mouth once daily., Disp: , Rfl:     atorvastatin (LIPITOR) 40 MG tablet, Take 1 tablet (40 mg total) by  mouth once daily., Disp: 90 tablet, Rfl: 3    dutasteride (AVODART) 0.5 mg capsule, Take 1 capsule (0.5 mg total) by mouth once daily., Disp: 90 capsule, Rfl: 3    FLUZONE HIGH-DOSE 2019-20, PF, 180 mcg/0.5 mL Syrg, ADM 0.5ML IM UTD, Disp: , Rfl: 0    memantine (NAMENDA) 10 MG Tab, Take 1 tablet (10 mg total) by mouth 2 (two) times daily., Disp: 180 tablet, Rfl: 3    metoprolol tartrate (LOPRESSOR) 25 MG tablet, Take 0.5 tablets (12.5 mg total) by mouth 2 (two) times daily., Disp: 90 tablet, Rfl: 3    multivitamin (MULTIVITAMIN) per tablet, Take 1 tablet by mouth once daily., Disp: , Rfl:     omeprazole (PRILOSEC) 20 MG capsule, Take 1 capsule (20 mg total) by mouth once daily., Disp: 90 capsule, Rfl: 3    tamsulosin (FLOMAX) 0.4 mg Cap, Take 1 capsule (0.4 mg total) by mouth every evening., Disp: 90 capsule, Rfl: 3

## 2020-03-21 ENCOUNTER — PATIENT MESSAGE (OUTPATIENT)
Dept: INTERNAL MEDICINE | Facility: CLINIC | Age: 80
End: 2020-03-21

## 2020-03-23 ENCOUNTER — OFFICE VISIT (OUTPATIENT)
Dept: INTERNAL MEDICINE | Facility: CLINIC | Age: 80
End: 2020-03-23
Payer: MEDICARE

## 2020-03-23 ENCOUNTER — PATIENT MESSAGE (OUTPATIENT)
Dept: INTERNAL MEDICINE | Facility: CLINIC | Age: 80
End: 2020-03-23

## 2020-03-23 DIAGNOSIS — G20.C PARKINSONISM, UNSPECIFIED PARKINSONISM TYPE: ICD-10-CM

## 2020-03-23 DIAGNOSIS — F02.80 LEWY BODY DEMENTIA WITHOUT BEHAVIORAL DISTURBANCE: ICD-10-CM

## 2020-03-23 DIAGNOSIS — T14.8XXA HEMATOMA: Primary | ICD-10-CM

## 2020-03-23 DIAGNOSIS — G31.83 LEWY BODY DEMENTIA WITHOUT BEHAVIORAL DISTURBANCE: ICD-10-CM

## 2020-03-23 DIAGNOSIS — I48.0 PAROXYSMAL ATRIAL FIBRILLATION: Chronic | ICD-10-CM

## 2020-03-23 PROCEDURE — 99213 OFFICE O/P EST LOW 20 MIN: CPT | Mod: 95,,, | Performed by: INTERNAL MEDICINE

## 2020-03-23 PROCEDURE — 1159F PR MEDICATION LIST DOCUMENTED IN MEDICAL RECORD: ICD-10-PCS | Mod: 95,,, | Performed by: INTERNAL MEDICINE

## 2020-03-23 PROCEDURE — 1101F PT FALLS ASSESS-DOCD LE1/YR: CPT | Mod: CPTII,95,, | Performed by: INTERNAL MEDICINE

## 2020-03-23 PROCEDURE — 99213 PR OFFICE/OUTPT VISIT, EST, LEVL III, 20-29 MIN: ICD-10-PCS | Mod: 95,,, | Performed by: INTERNAL MEDICINE

## 2020-03-23 PROCEDURE — 1101F PR PT FALLS ASSESS DOC 0-1 FALLS W/OUT INJ PAST YR: ICD-10-PCS | Mod: CPTII,95,, | Performed by: INTERNAL MEDICINE

## 2020-03-23 PROCEDURE — 1159F MED LIST DOCD IN RCRD: CPT | Mod: 95,,, | Performed by: INTERNAL MEDICINE

## 2020-03-23 NOTE — PROGRESS NOTES
Subjective:       Patient ID: Mark Anthony Velarde Jr. is a 79 y.o. male.    Chief Complaint: Follow-up (Hematoma)    The patient location is: home  The chief complaint leading to consultation is: hematoma  Visit type: Virtual visit with synchronous audio and video  Total time spent with patient: 20 minutes  Each patient to whom he or she provides medical services by telemedicine is:  (1) informed of the relationship between the physician and patient and the respective role of any other health care provider with respect to management of the patient; and (2) notified that he or she may decline to receive medical services by telemedicine and may withdraw from such care at any time.    Notes: Hematoma improving.  I was able to see it on the Video.   Occas UTI. Has seen Urology. Currently doing well.   Wanted an order for a walker. I sent that in. [facility] [employer] system is in place.   Pt's wife and daughter jeana Dangelo suggested Palliative Care consult so I placed the orders.     Due for second Pneumo 23    Able to see patient wife and daughter on the video call.  He was able to speak, denies any major problems of pain.  It appears that there was a small abrasion and scab above the left forehead scalp hematoma that a few times it was disrupted and there was bleeding.  That has stopped and the emergency room stopped his blood thinner temporarily so hopefully that will continue to improve but I asked the family to stop doing warm compresses and let the scabbed resolved on its own to minimize the risk of bleeding.  They would like palliative care and a walker.  This was both recommended through care because system and Neurology and I will assist with that.    Review of Systems   Constitutional: Negative for chills, fatigue, fever and unexpected weight change.   HENT: Negative for nosebleeds and trouble swallowing.    Eyes: Negative for pain and visual disturbance.   Respiratory: Negative for cough, shortness of breath and  wheezing.    Cardiovascular: Negative for chest pain and palpitations.   Gastrointestinal: Negative for abdominal pain, constipation, diarrhea, nausea and vomiting.   Genitourinary: Positive for frequency ( occasional). Negative for difficulty urinating, dysuria and hematuria.   Musculoskeletal: Positive for arthralgias and gait problem. Negative for neck pain.   Skin: Positive for wound (Hematoma left forehead). Negative for rash.   Neurological: Negative for dizziness and headaches.        Impaired memory   Hematological: Does not bruise/bleed easily.   Psychiatric/Behavioral: Negative for dysphoric mood, sleep disturbance and suicidal ideas.       Objective:      Physical Exam   Constitutional: He appears well-developed and well-nourished.   Patient able to speak on video visit.  Scalp hematoma visualized with crust, no active bleeding.  No redness or drainage.   Neurological: He is alert.   Skin: No rash noted.   Crusted area on left scalp hematoma   Psychiatric: He has a normal mood and affect. His behavior is normal.       Assessment:       1. Hematoma    2. Lewy body dementia without behavioral disturbance    3. Parkinsonism, unspecified Parkinsonism type    4. Paroxysmal atrial fibrillation        Plan:       Mark Anthony was seen today for follow-up.    Diagnoses and all orders for this visit:    Hematoma  -     WALKER FOR HOME USE  -     Ambulatory referral/consult to Palliative Care; Future    Lewy body dementia without behavioral disturbance  -     WALKER FOR HOME USE  -     Ambulatory referral/consult to Palliative Care; Future    Parkinsonism, unspecified Parkinsonism type    Paroxysmal atrial fibrillation

## 2020-03-24 ENCOUNTER — CLINICAL SUPPORT (OUTPATIENT)
Dept: CARDIOLOGY | Facility: HOSPITAL | Age: 80
End: 2020-03-24
Attending: INTERNAL MEDICINE
Payer: MEDICARE

## 2020-03-24 DIAGNOSIS — I47.20 VT (VENTRICULAR TACHYCARDIA): ICD-10-CM

## 2020-03-24 DIAGNOSIS — Z95.810 AICD (AUTOMATIC CARDIOVERTER/DEFIBRILLATOR) PRESENT: ICD-10-CM

## 2020-03-24 DIAGNOSIS — I25.5 ISCHEMIC CARDIOMYOPATHY: ICD-10-CM

## 2020-03-24 PROCEDURE — 93295 DEV INTERROG REMOTE 1/2/MLT: CPT | Mod: ,,, | Performed by: INTERNAL MEDICINE

## 2020-03-24 PROCEDURE — 93296 REM INTERROG EVL PM/IDS: CPT | Performed by: INTERNAL MEDICINE

## 2020-03-24 PROCEDURE — 93295 CARDIAC DEVICE CHECK - REMOTE: ICD-10-PCS | Mod: ,,, | Performed by: INTERNAL MEDICINE

## 2020-04-03 ENCOUNTER — PATIENT MESSAGE (OUTPATIENT)
Dept: NEPHROLOGY | Facility: CLINIC | Age: 80
End: 2020-04-03

## 2020-04-03 ENCOUNTER — TELEPHONE (OUTPATIENT)
Dept: NEPHROLOGY | Facility: CLINIC | Age: 80
End: 2020-04-03

## 2020-04-03 NOTE — TELEPHONE ENCOUNTER
Preferred Name:   Mark Anthony Velarde Jr.  Male, 79 y.o., 1940  Phone:   [phone] (H)  PCP:   Jeff Yan MD  Language:   English  Need Interp:   No  Allergies Last Reviewed:   03/23/20  Allergies:   Arb-angiotensin Receptor Antagonist,      Lisinopril  Health Maintenance:   Due  Primary Ins.:   [employer] [facility] MEDICARE  MRN:   [id number]  Pt Comm Pref:   Patient Portal, Mail  Last MyChart Login:   3/23/2020 9:39 AM, Mobile  Next Appt:   With Nephrology (Mayito Sequeira MD)  04/15/2020 at 4:40 PM  My Sticky Note:     Specialty Comments:     Non-Urgent Medical   Mark Anthony Velarde Jr.   Sent: Fri April 03, 2020  4:08 PM   To: JESSICA CAZARES Staff   Non-Urgent Medical     Margy Velarde (proxy for Mark Anthony Velarde Jr.)  Mayito Sequeira MD 11 minutes ago (4:08 PM)         This message is being sent by Margy Velarde on behalf of Mark Anthony Alarcon Jr., Dr. has an appt. with you on Wed., the15th. It  seems foolhardy to keep this appt. in view o f the pandemic. What is your advice: a video visit now or just postpone until late summer?   Thanks, Margy Velarde          Responsible Party     Pool - Fabby CAZARES Staff  No one has taken responsibility for this message.

## 2020-04-26 ENCOUNTER — TELEPHONE (OUTPATIENT)
Dept: RESEARCH | Facility: HOSPITAL | Age: 80
End: 2020-04-26

## 2020-04-27 NOTE — TELEPHONE ENCOUNTER
[facility]  Month 7  Date Call was made 4/24/20    3/24/20-  Spoke to cg and she was excited because she did not think pt was going to be seeing for his follow up appointment. Their daughter came over and she helped them complete a video visit so pcp could see the wound. Everything looks great and they are trying to get palliative care set up.     4/24/20  PT continues to deteriorate.  He is having more physical issues, particularly going from a sitting to a standing position and vice versa.  He really cannot do this anymore without assistance.  She says that the sitter with [facility] is very helpful and they are usually there 4.5 to 5 hours a  day (Monday thru Friday), but CG is reluctant to increase the hours due to privacy reasons and not financial.  She states they don't have any needs right now and they are doing as well as possible.       Month 6   Date Call was made 3/13/20    They were supposed to be seen in assessment clinic but we switched everything to over the phone due to COVID.   PT was in the ED 2x this month. Both for falls. THe second the scab came off from his head and they were unable to get the bleeding to stop.   WE talked about pts safety and some things that could be such as better lighting or a night light. Having someone with him at all times. Also spoke about asking pcp if pt would be eligible for palliative care of hospice.   Went over symptoms to look for with COVID and about the importance of staying in.

## 2020-06-02 ENCOUNTER — TELEPHONE (OUTPATIENT)
Dept: INTERNAL MEDICINE | Facility: CLINIC | Age: 80
End: 2020-06-02

## 2020-06-02 ENCOUNTER — TELEPHONE (OUTPATIENT)
Dept: RESEARCH | Facility: HOSPITAL | Age: 80
End: 2020-06-02

## 2020-06-02 NOTE — TELEPHONE ENCOUNTER
I placed an order for Palliative Care at our last visit/   Is that something your group has access to?

## 2020-06-03 NOTE — TELEPHONE ENCOUNTER
[facility]   Event Name: Month 8    Date call was made 4/22/20    Cg was in a very talkative mood today and seems very calm and not so anxious. She says that things have been staying stable. Pts memory has been decreasing and he is now forgetting how to sit down and stand up. They purchased a new reclining chair to help lift him when gets up, it has been a tremendous help.   They still have the man from [facility] coming a few times a week. CG loves that it is consistent and it is the same person. Still waiting to hear something from the palliative care order that the pcp had placed. But she wants to wait until restrictions are lifted or eased up a little with covid. Over all doing great and no current issues. She was also excited and telling me how wonderful her daughter in law was with preparing at least two meals a week for them!

## 2020-06-09 ENCOUNTER — CARE AT HOME (OUTPATIENT)
Dept: HOME HEALTH SERVICES | Facility: CLINIC | Age: 80
End: 2020-06-09
Payer: MEDICARE

## 2020-06-09 DIAGNOSIS — R53.81 DEBILITY: ICD-10-CM

## 2020-06-09 DIAGNOSIS — R53.83 FATIGUE, UNSPECIFIED TYPE: ICD-10-CM

## 2020-06-09 DIAGNOSIS — Z71.89 COUNSELING REGARDING ADVANCE CARE PLANNING AND GOALS OF CARE: ICD-10-CM

## 2020-06-09 DIAGNOSIS — R25.1 TREMOR: ICD-10-CM

## 2020-06-09 DIAGNOSIS — R26.81 UNSTEADY GAIT: ICD-10-CM

## 2020-06-09 DIAGNOSIS — N40.0 BPH WITHOUT OBSTRUCTION/LOWER URINARY TRACT SYMPTOMS: ICD-10-CM

## 2020-06-09 DIAGNOSIS — R06.02 SOB (SHORTNESS OF BREATH) ON EXERTION: ICD-10-CM

## 2020-06-09 DIAGNOSIS — Z51.5 PALLIATIVE CARE ENCOUNTER: Primary | ICD-10-CM

## 2020-06-09 DIAGNOSIS — R13.10 DYSPHAGIA, UNSPECIFIED TYPE: ICD-10-CM

## 2020-06-09 PROCEDURE — 1101F PT FALLS ASSESS-DOCD LE1/YR: CPT | Mod: CPTII,S$GLB,, | Performed by: NURSE PRACTITIONER

## 2020-06-09 PROCEDURE — 1101F PR PT FALLS ASSESS DOC 0-1 FALLS W/OUT INJ PAST YR: ICD-10-PCS | Mod: CPTII,S$GLB,, | Performed by: NURSE PRACTITIONER

## 2020-06-09 PROCEDURE — 99350 PR HOME VISIT,ESTAB PATIENT,LEVEL IV: ICD-10-PCS | Mod: S$GLB,,, | Performed by: NURSE PRACTITIONER

## 2020-06-09 PROCEDURE — 3078F DIAST BP <80 MM HG: CPT | Mod: CPTII,S$GLB,, | Performed by: NURSE PRACTITIONER

## 2020-06-09 PROCEDURE — 99497 PR ADVNCD CARE PLAN 30 MIN: ICD-10-PCS | Mod: 25,S$GLB,, | Performed by: NURSE PRACTITIONER

## 2020-06-09 PROCEDURE — 1159F PR MEDICATION LIST DOCUMENTED IN MEDICAL RECORD: ICD-10-PCS | Mod: S$GLB,,, | Performed by: NURSE PRACTITIONER

## 2020-06-09 PROCEDURE — 99350 HOME/RES VST EST HIGH MDM 60: CPT | Mod: S$GLB,,, | Performed by: NURSE PRACTITIONER

## 2020-06-09 PROCEDURE — 99497 ADVNCD CARE PLAN 30 MIN: CPT | Mod: 25,S$GLB,, | Performed by: NURSE PRACTITIONER

## 2020-06-09 PROCEDURE — 3078F PR MOST RECENT DIASTOLIC BLOOD PRESSURE < 80 MM HG: ICD-10-PCS | Mod: CPTII,S$GLB,, | Performed by: NURSE PRACTITIONER

## 2020-06-09 PROCEDURE — 3075F PR MOST RECENT SYSTOLIC BLOOD PRESS GE 130-139MM HG: ICD-10-PCS | Mod: CPTII,S$GLB,, | Performed by: NURSE PRACTITIONER

## 2020-06-09 PROCEDURE — 3075F SYST BP GE 130 - 139MM HG: CPT | Mod: CPTII,S$GLB,, | Performed by: NURSE PRACTITIONER

## 2020-06-09 PROCEDURE — 1159F MED LIST DOCD IN RCRD: CPT | Mod: S$GLB,,, | Performed by: NURSE PRACTITIONER

## 2020-06-09 NOTE — PROGRESS NOTES
[facility]  Palliative Care Home Visit    Visit Date: 2020  Encounter Provider: Claudette Gilliam DNP, FNP-C  PCP:  Jeff Yan MD    Subjective:      Patient ID: Mark Anthony Velarde Jr. is a 80 y.o. male.    Consult Requested By:  Jeff Yan M.D.  Reason for Consult:  Palliative Care     Chief Complaint: Established Palliative Care      Outpatient Palliative Care Encounter:    PMHx:  Mr. Mark Anthony Velarde Jr is an 81 y/o male with PMHx of Parkinson's with Lewy body dementia, mixed dementia, atrial fibrillation, long term anticoagulant use, atrial flutter, defibrillator (2013), coronary artery disease, cardiomyopathy, cognitive deficits, syncope, hypertension 2013, Ischemic cardiomyopathy, Left bundle branch block, psoriasis vulgaris, hyperlipidemia, basal cell carcinoma mid forehead, complete heart block, atherosclerosis of aorta, chronic renal insufficiency stag 3, BPH wit urinary obstruction, anemia, dysphagia, Vitamin D deficiency, right leg swelling, and duodenitis.     PSHx:  Surgical history includes cardiac defibrillator placement, cataract extraction with right intraocular lens implant 2016, Cataract extraction with left intraocular lens implant 2016, skin biopsy, and tonsillectomy.       Social History:  Patient  to his wife for 60 years; has 5 adult children (4 sons and 1 daughter), all living.Reports having 8 grandchildren (7 girls and 1 boy). Patient has 1 sibling (sister), who is . 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 Patient is not a  . He lives at home with his wife.       Impression:  Patient is seen today to establish palliative care services. Patient's wife is present. Patient is sitting up in recliner chair. He is awake, alert, and oriented x 1 person, confused and forgetful. Wife reports patient has private sitters through [facility] Monday through Friday 4.5 hours. Sitters help dress and bathe patient. Patient has [facility]  coming to the house. No c/o pain at this time. Wife states  does not experience much pain.  SOB with exertion. Wife reports patient becomes sad when he becomes aware of his situation. Patient fell and hit his head on 2/26/2020 and had a hematoma on his head.     Patient has difficulty urinating, trouble starting to urinate and difficulty emptying bladder.   Patient becomes restless and agitated in the afternoon.     Goals of Care:  I initiated the process of advance care planning today and explained the importance of this process to the patient and family.  I introduced the concept of advance directives to the patient, as well. Then the patient received detailed information about the importance of designating a Health Care Power of  (HCPOA). She was also instructed to communicate with this person about his wishes for future healthcare, should he become sick and lose decision-making capacity.    I Introduced LaPOST form with patient/family, explaining this is the patient's wishes, and this form will be uploaded into the patient's [facility] Chart and the [place] Registry when completed. Wife completed LaPOSt on today 's visit. DNR code Status.    Discussed palliative care vs. Hospice benefits with wife.     Goals of care include wanting patient to be pain free and would like him to be able to ambulate      PLAN:  1. Palliative care to follow-up with patient in 4 weeks on 7/9/2020  2. Continue all medications  3. Fall precautions at all times  4. Keep BLE elevated  5. Offer supportive care to patient's wife  6. In emergency, call 911 or go to ED. Notify PCP's office    Review of Systems   Constitutional: Positive for activity change (not walking as much; unsteady), appetite change (appetite is still good) and fatigue. Negative for chills, fever and unexpected weight change.   HENT: Positive for trouble swallowing (takes prilosec). Negative for drooling, postnasal drip, rhinorrhea, sinus pressure and sore  throat.    Eyes: Negative for visual disturbance.   Respiratory: Positive for cough (mild cough) and shortness of breath (SOB with ambulation). Negative for chest tightness.    Cardiovascular: Positive for leg swelling (right foot).   Gastrointestinal: Negative for abdominal distention, abdominal pain, constipation, diarrhea and nausea.   Genitourinary: Positive for difficulty urinating (starting stream; emptying bladder). Negative for hematuria.   Musculoskeletal: Positive for arthralgias and myalgias. Negative for back pain.        Occasional knee pain   Skin: Negative for wound.   Neurological: Positive for tremors, speech difficulty (hoarseness) and weakness. Negative for dizziness, seizures, numbness and headaches.   Psychiatric/Behavioral: Positive for agitation and confusion (worse at night). Negative for hallucinations.       Assessments:  · Environmental: two-story home, clean, uncluttered, good lighting  · Functional Status: able to feed himself; needs assistance with bathing and dressing  · Safety: Fall precautions; someone is always present  · Nutritional: Eats 3 meals a day; snacks throughout the day  · Home Health/DME/Supplies: walker, cane, wheelchair, shower chair, grab bars, lift chair    Symptom Assessment (ESAS 0-10 scale)     ESAS 0 1 2 3 4 5 6 7 8 9 10   Pain x             Dyspnea     x         Anxiety x             Nausea x             Depression     x          Anorexia x             Fatigue       x       Insomnia x             Restlessness     x          Agitation    x            Constipation    no  Bowel Management Plan (BMP): no  Diarrhea        no  Comments: LBM 6/9 normal    Performance Status:  PPS Score 50  Karnofsky Score:  50  FAST: 6d  NYHA:      History:  Past Medical History:   Diagnosis Date    *Atrial fibrillation     Anticoagulant long-term use     Atrial fibrillation - asymptomatic but noted on ICD interrogation ([id number]) - 16 h 40 minutes of afib 2/13/2013    Atrial  flutter with controlled response - seen on ICD interrogation - asymptomatic 2/13/2013    Automatic implantable cardioverter-defibrillator in situ 2/13/2013    Basal cell carcinoma     mid forehead    CAD (coronary artery disease) 2/13/2013    Cardiomyopathy     Cataract     Cognitive deficits     mild    H/O syncope -  5/27/2013    History of PTCA - LAD, LCX and PDA PCI in 2006 2/13/2013    HTN (hypertension) 2/13/2013    Hyperlipidemia LDL goal < 70 2/13/2013    Hypertension     ICD (implantable cardiac defibrillator) in place 2/13/2013    Ischemic cardiomyopathy LVEF ~45% 2/13/2013    LBBB (left bundle branch block) 2/13/2013    Memory loss     Psoriasis vulgaris     Syncope and collapse      Family History   Problem Relation Age of Onset    Psoriasis Father     Dementia Father     Tremor Father     Cataracts Mother     Cancer Mother     Psoriasis Son     Psoriasis Son     Diabetes Maternal Grandmother     Kidney disease Sister     No Known Problems Brother     No Known Problems Maternal Aunt     No Known Problems Maternal Uncle     No Known Problems Paternal Aunt     No Known Problems Paternal Uncle     No Known Problems Maternal Grandfather     No Known Problems Paternal Grandmother     No Known Problems Paternal Grandfather     Amblyopia Neg Hx     Blindness Neg Hx     Glaucoma Neg Hx     Hypertension Neg Hx     Macular degeneration Neg Hx     Retinal detachment Neg Hx     Strabismus Neg Hx     Stroke Neg Hx     Thyroid disease Neg Hx     Parkinsonism Neg Hx     Celiac disease Neg Hx     Cirrhosis Neg Hx     Colon cancer Neg Hx     Colon polyps Neg Hx     Crohn's disease Neg Hx     Cystic fibrosis Neg Hx     Esophageal cancer Neg Hx     Irritable bowel syndrome Neg Hx     Inflammatory bowel disease Neg Hx     Hemochromatosis Neg Hx     Liver cancer Neg Hx     Liver disease Neg Hx     Rectal cancer Neg Hx     Stomach cancer Neg Hx     Ulcerative colitis  Neg Hx     Christiano's disease Neg Hx     Lymphoma Neg Hx     Scleroderma Neg Hx     Rheum arthritis Neg Hx     Multiple sclerosis Neg Hx     Melanoma Neg Hx     Tuberculosis Neg Hx     Lupus Neg Hx      Past Surgical History:   Procedure Laterality Date    CARDIAC DEFIBRILLATOR PLACEMENT      CATARACT EXTRACTION W/  INTRAOCULAR LENS IMPLANT Right 04/04/16    Dr Michael     CATARACT EXTRACTION W/  INTRAOCULAR LENS IMPLANT Left 05/09/2016    SKIN BIOPSY      TONSILLECTOMY       Review of patient's allergies indicates:   Allergen Reactions    Arb-angiotensin receptor antagonist Other (See Comments)     Hyperkalemia    Lisinopril Diarrhea       Medications:    Current Outpatient Medications:     amiodarone (PACERONE) 200 MG Tab, Take 0.5 tablets (100 mg total) by mouth once daily., Disp: 45 tablet, Rfl: 3    amLODIPine (NORVASC) 5 MG tablet, Take 1 tablet (5 mg total) by mouth once daily., Disp: 90 tablet, Rfl: 3    aspirin (ECOTRIN) 81 MG EC tablet, Take 81 mg by mouth once daily., Disp: , Rfl:     atorvastatin (LIPITOR) 40 MG tablet, Take 1 tablet (40 mg total) by mouth once daily., Disp: 90 tablet, Rfl: 3    dutasteride (AVODART) 0.5 mg capsule, Take 1 capsule (0.5 mg total) by mouth once daily., Disp: 90 capsule, Rfl: 3    memantine (NAMENDA) 10 MG Tab, Take 1 tablet (10 mg total) by mouth 2 (two) times daily., Disp: 180 tablet, Rfl: 3    metoprolol tartrate (LOPRESSOR) 25 MG tablet, Take 0.5 tablets (12.5 mg total) by mouth 2 (two) times daily., Disp: 90 tablet, Rfl: 3    multivitamin (MULTIVITAMIN) per tablet, Take 1 tablet by mouth once daily., Disp: , Rfl:     omeprazole (PRILOSEC) 20 MG capsule, Take 1 capsule (20 mg total) by mouth once daily., Disp: 90 capsule, Rfl: 3    tamsulosin (FLOMAX) 0.4 mg Cap, Take 1 capsule (0.4 mg total) by mouth every evening., Disp: 90 capsule, Rfl: 3    FLUZONE HIGH-DOSE 2019-20, PF, 180 mcg/0.5 mL Syrg, ADM 0.5ML IM UTD, Disp: , Rfl: 0    24h Oral Morphine  Equivalents (OME):  n/a    Objective:     Physical Exam:    There is no height or weight on file to calculate BMI.    Physical Exam  Vitals signs and nursing note reviewed.   HENT:      Head: Normocephalic and atraumatic.      Nose: Nose normal.      Mouth/Throat:      Mouth: Mucous membranes are moist.      Pharynx: Oropharynx is clear.   Eyes:      Extraocular Movements: Extraocular movements intact.      Conjunctiva/sclera: Conjunctivae normal.   Neck:      Musculoskeletal: Normal range of motion and neck supple.   Cardiovascular:      Rate and Rhythm: Normal rate and regular rhythm.      Pulses: Normal pulses.      Heart sounds: Murmur (grade III/VI) present.   Pulmonary:      Effort: Pulmonary effort is normal.      Breath sounds: Normal breath sounds.      Comments: BBS CTA  Abdominal:      General: Abdomen is flat. Bowel sounds are normal.      Palpations: Abdomen is soft.   Musculoskeletal: Normal range of motion.      Right lower leg: Edema (trace edema) present.      Left lower leg: Edema (trace edema) present.      Comments: Bilateral hand  right stronger than left   Skin:     General: Skin is warm.      Capillary Refill: Capillary refill takes 2 to 3 seconds.      Coloration: Skin is pale.   Neurological:      General: No focal deficit present.      Mental Status: He is alert.      Motor: Weakness present.      Coordination: Coordination abnormal.      Gait: Gait abnormal.      Comments: Awake, alert and oriented x 1 self; forgetful and confused; bilateral upper extremity tremors   Psychiatric:         Mood and Affect: Mood normal.         Behavior: Behavior normal.         Labs:  CBC:   WBC   Date Value Ref Range Status   03/11/2020 6.43 3.90 - 12.70 K/uL Final       Hemoglobin   Date Value Ref Range Status   03/11/2020 9.3 (L) 14.0 - 18.0 g/dL Final       Hematocrit   Date Value Ref Range Status   03/11/2020 30.5 (L) 40.0 - 54.0 % Final       Mean Corpuscular Volume   Date Value Ref Range Status    03/11/2020 101 (H) 82 - 98 fL Final       Platelets   Date Value Ref Range Status   03/11/2020 209 150 - 350 K/uL Final       LFT:   Lab Results   Component Value Date    AST 46 (H) 02/26/2020    ALKPHOS 98 02/26/2020    BILITOT 0.4 02/26/2020       Albumin:   Albumin   Date Value Ref Range Status   02/26/2020 4.0 3.5 - 5.2 g/dL Final     Protein:   Total Protein   Date Value Ref Range Status   02/26/2020 7.6 6.0 - 8.4 g/dL Final       Radiology:  I have reviewed all pertinent imaging results/findings within the past 24 hours.    Psychosocial/Cultural/Spiritual:   · F- Kristin and Belief:  [employer]   · I - Importance: yes  · C - Community: [facility] [employer]  · A - Address in Care: no spiritual needs identified      Assessment:     1. Palliative care encounter    2. Counseling regarding advance care planning and goals of care    3. Debility    4. Unsteady gait    5. Dysphagia, unspecified type    6. Fatigue, unspecified type    7. SOB (shortness of breath) on exertion    8. Tremor    9. BPH without obstruction/lower urinary tract symptoms        Plan:     Ethical / Legal: Advance Care Planning   · Surrogate decision maker:  Name: Alvarado Velarde, Relationship: son  · Code Status:  DNR  · LaPOST:  Completed LaPOST on 6/9/2020  · Other advance directive: Living will completed 2/8/2020,   · Capacity to make medical decisions:  yes,   · Conflict: none       [place] was seen today for establish care.    Diagnoses and all orders for this visit:    Palliative care encounter  -initiated Palliative care services  -discussed palliative care and hospice benefits  -completed LaPOST form  -DNR code status    Counseling regarding advance care planning and goals of care  -goals of care pain free and able to ambulate   -pt has POA/living will completed previously 2020      Were controlled substances prescribed?  No       Total face-to-face time was 60 min, >50% of this was spent on counseling and coordination of care. The following  issues were discussed: Parkinson's, dementia, BPH, unsteady gait    An additional 30 minutes of the visit was spent in advanced care planning. Reviewed LaPOST and completed. Reviewed Ads form; discussed hospice benefits      Follow Up Appointments:   Future Appointments   Date Time Provider Department Center   6/22/2020 10:00 AM HOME MONITOR DEVICE CHECK, [facility] [place]   7/29/2020  9:00 AM Dominic Benitez MD [facility] [place]       Patient and family agreed via verbal consent to access medical records and for assessment and treatment during this visit.        NP wore facial mask throughout entire visit    Attestation: Screening criteria to assess the level of the patient's risk for infection with COVID-19 as recommended by the CDC at the time of the above documented home visit concluded appropriateness to proceed.     Universal precautions were maintained at all times, including provider use of >60% alcohol gel hand  immediately prior to entry and upon departing the patient's home as well as cleaning of equipment used in home visit with antibacterial/germicidal disposable wipes.    Patient has not been exposed to anyone with the virus (to the patient's knowledge)  Patient has not been out of the country in the last 14 days.    Patient denies any of the following symptoms: headaches, fever, body aches, abdominal pain, diarrhea    +for the following symptoms (not new symptoms) +SOB with exertion, generalized weakness      Signature:  Claudette Gilliam DNP, FNP-C  [facility]

## 2020-06-21 VITALS
TEMPERATURE: 98 F | DIASTOLIC BLOOD PRESSURE: 70 MMHG | RESPIRATION RATE: 20 BRPM | SYSTOLIC BLOOD PRESSURE: 132 MMHG | HEART RATE: 65 BPM | OXYGEN SATURATION: 95 %

## 2020-06-22 ENCOUNTER — CLINICAL SUPPORT (OUTPATIENT)
Dept: CARDIOLOGY | Facility: HOSPITAL | Age: 80
End: 2020-06-22
Attending: INTERNAL MEDICINE
Payer: MEDICARE

## 2020-06-22 DIAGNOSIS — I47.20 VT (VENTRICULAR TACHYCARDIA): ICD-10-CM

## 2020-06-22 DIAGNOSIS — I25.5 ISCHEMIC CARDIOMYOPATHY: ICD-10-CM

## 2020-06-22 DIAGNOSIS — Z95.810 AICD (AUTOMATIC CARDIOVERTER/DEFIBRILLATOR) PRESENT: ICD-10-CM

## 2020-06-22 PROCEDURE — 93296 REM INTERROG EVL PM/IDS: CPT | Performed by: INTERNAL MEDICINE

## 2020-06-22 PROCEDURE — 93295 CARDIAC DEVICE CHECK - REMOTE: ICD-10-PCS | Mod: ,,, | Performed by: INTERNAL MEDICINE

## 2020-06-22 PROCEDURE — 93295 DEV INTERROG REMOTE 1/2/MLT: CPT | Mod: ,,, | Performed by: INTERNAL MEDICINE

## 2020-06-22 NOTE — PATIENT INSTRUCTIONS
INSTRUCTIONS:    1. Follow-up with palliative care NP in 4 weeks on 7/9  2. Continue all medications  3. Fall precautions at all times  4. F/u with PCP as needed  5. Family and patient to practice social distancing  6. Patient to have 6 feet between each other  7. Keep BLE elevated  8. Someone to stay with patient at all times  9. Consider Hospice evaluation   10. In an Emergency, call 911 or go to ED; notify PCP's office

## 2020-07-08 ENCOUNTER — TELEPHONE (OUTPATIENT)
Dept: RESEARCH | Facility: HOSPITAL | Age: 80
End: 2020-07-08

## 2020-07-08 NOTE — TELEPHONE ENCOUNTER
[facility]   Event Name: Month 9    Date call was made 6/26/20  phone call planned for 6/26  Spoke to the cg briefly as she did not have help this morning and needed to get back to the pt.   Things are staying steady and they are doing pretty well. She has messaged Nephrology  and Optomology declining pts yearly appointments. She has decided not to put pt though any unnecessary visits and as long as he is staying stable she wants to let things be.   They are still having help come into the home and she is very thankful for this and states it helps her out a lot and giver her a break both physically and mentally. Her daughter in law is still bringing them meals a few times a week.

## 2020-07-16 ENCOUNTER — HOSPITAL ENCOUNTER (INPATIENT)
Facility: HOSPITAL | Age: 80
LOS: 7 days | Discharge: NURSING, REHAB OR LONG-TERM CARE | DRG: 948 | End: 2020-07-23
Attending: EMERGENCY MEDICINE | Admitting: EMERGENCY MEDICINE
Payer: MEDICARE

## 2020-07-16 DIAGNOSIS — R15.9 INCONTINENCE OF FECES, UNSPECIFIED FECAL INCONTINENCE TYPE: ICD-10-CM

## 2020-07-16 DIAGNOSIS — E78.5 HYPERLIPIDEMIA WITH TARGET LDL LESS THAN 70: Chronic | ICD-10-CM

## 2020-07-16 DIAGNOSIS — F02.80 LEWY BODY DEMENTIA WITHOUT BEHAVIORAL DISTURBANCE: Chronic | ICD-10-CM

## 2020-07-16 DIAGNOSIS — Z51.5 PALLIATIVE CARE ENCOUNTER: ICD-10-CM

## 2020-07-16 DIAGNOSIS — Z71.89 GOALS OF CARE, COUNSELING/DISCUSSION: ICD-10-CM

## 2020-07-16 DIAGNOSIS — Z71.89 ADVANCED CARE PLANNING/COUNSELING DISCUSSION: ICD-10-CM

## 2020-07-16 DIAGNOSIS — R53.1 WEAKNESS: Primary | ICD-10-CM
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
## 2020-07-16 DIAGNOSIS — R78.81 BACTEREMIA: ICD-10-CM

## 2020-07-16 DIAGNOSIS — G31.83 LEWY BODY DEMENTIA WITHOUT BEHAVIORAL DISTURBANCE: Chronic | ICD-10-CM

## 2020-07-16 DIAGNOSIS — Z78.9 ADVANCED DIRECTIVE PLACED IN CHART THIS ADMISSION: ICD-10-CM

## 2020-07-16 PROBLEM — Z79.01 CURRENT USE OF LONG TERM ANTICOAGULATION: Chronic | Status: ACTIVE | Noted: 2017-01-13

## 2020-07-16 PROBLEM — F01.50 MIXED DEMENTIA: Chronic | Status: ACTIVE | Noted: 2018-05-16

## 2020-07-16 PROBLEM — G30.9 MIXED DEMENTIA: Chronic | Status: ACTIVE | Noted: 2018-05-16

## 2020-07-16 PROBLEM — G20.C PARKINSONISM: Chronic | Status: ACTIVE | Noted: 2018-05-16

## 2020-07-16 LAB
ALBUMIN SERPL BCP-MCNC: 3.8 G/DL (ref 3.5–5.2)
ALP SERPL-CCNC: 86 U/L (ref 55–135)
ALT SERPL W/O P-5'-P-CCNC: 23 U/L (ref 10–44)
ANION GAP SERPL CALC-SCNC: 10 MMOL/L (ref 8–16)
AST SERPL-CCNC: 46 U/L (ref 10–40)
BACTERIA #/AREA URNS AUTO: NORMAL /HPF
BASOPHILS # BLD AUTO: 0.04 K/UL (ref 0–0.2)
BASOPHILS NFR BLD: 0.4 % (ref 0–1.9)
BILIRUB SERPL-MCNC: 0.7 MG/DL (ref 0.1–1)
BILIRUB UR QL STRIP: NEGATIVE
BNP SERPL-MCNC: 229 PG/ML (ref 0–99)
BUN SERPL-MCNC: 22 MG/DL (ref 8–23)
CALCIUM SERPL-MCNC: 8.8 MG/DL (ref 8.7–10.5)
CHLORIDE SERPL-SCNC: 104 MMOL/L (ref 95–110)
CLARITY UR REFRACT.AUTO: CLEAR
CO2 SERPL-SCNC: 22 MMOL/L (ref 23–29)
COLOR UR AUTO: YELLOW
CREAT SERPL-MCNC: 1.8 MG/DL (ref 0.5–1.4)
DIFFERENTIAL METHOD: ABNORMAL
EOSINOPHIL # BLD AUTO: 0 K/UL (ref 0–0.5)
EOSINOPHIL NFR BLD: 0 % (ref 0–8)
ERYTHROCYTE [DISTWIDTH] IN BLOOD BY AUTOMATED COUNT: 18.6 % (ref 11.5–14.5)
EST. GFR  (AFRICAN AMERICAN): 39.6 ML/MIN/1.73 M^2
EST. GFR  (NON AFRICAN AMERICAN): 34.3 ML/MIN/1.73 M^2
GLUCOSE SERPL-MCNC: 102 MG/DL (ref 70–110)
GLUCOSE UR QL STRIP: NEGATIVE
HCT VFR BLD AUTO: 37.5 % (ref 40–54)
HGB BLD-MCNC: 11.3 G/DL (ref 14–18)
HGB UR QL STRIP: ABNORMAL
IMM GRANULOCYTES # BLD AUTO: 0.06 K/UL (ref 0–0.04)
IMM GRANULOCYTES NFR BLD AUTO: 0.6 % (ref 0–0.5)
KETONES UR QL STRIP: NEGATIVE
LEUKOCYTE ESTERASE UR QL STRIP: NEGATIVE
LYMPHOCYTES # BLD AUTO: 0.6 K/UL (ref 1–4.8)
LYMPHOCYTES NFR BLD: 5.3 % (ref 18–48)
MAGNESIUM SERPL-MCNC: 2 MG/DL (ref 1.6–2.6)
MCH RBC QN AUTO: 28.5 PG (ref 27–31)
MCHC RBC AUTO-ENTMCNC: 30.1 G/DL (ref 32–36)
MCV RBC AUTO: 95 FL (ref 82–98)
MICROSCOPIC COMMENT: NORMAL
MONOCYTES # BLD AUTO: 0.8 K/UL (ref 0.3–1)
MONOCYTES NFR BLD: 7.9 % (ref 4–15)
NEUTROPHILS # BLD AUTO: 8.9 K/UL (ref 1.8–7.7)
NEUTROPHILS NFR BLD: 85.8 % (ref 38–73)
NITRITE UR QL STRIP: NEGATIVE
NRBC BLD-RTO: 0 /100 WBC
PH UR STRIP: 6 [PH] (ref 5–8)
PHOSPHATE SERPL-MCNC: 3 MG/DL (ref 2.7–4.5)
PLATELET # BLD AUTO: 182 K/UL (ref 150–350)
PMV BLD AUTO: 10.3 FL (ref 9.2–12.9)
POTASSIUM SERPL-SCNC: 4.5 MMOL/L (ref 3.5–5.1)
PROT SERPL-MCNC: 7.2 G/DL (ref 6–8.4)
PROT UR QL STRIP: NEGATIVE
RBC # BLD AUTO: 3.97 M/UL (ref 4.6–6.2)
RBC #/AREA URNS AUTO: 2 /HPF (ref 0–4)
SARS-COV-2 RDRP RESP QL NAA+PROBE: NEGATIVE
SODIUM SERPL-SCNC: 136 MMOL/L (ref 136–145)
SP GR UR STRIP: 1.01 (ref 1–1.03)
SQUAMOUS #/AREA URNS AUTO: 0 /HPF
TROPONIN I SERPL DL<=0.01 NG/ML-MCNC: 0.02 NG/ML (ref 0–0.03)
URN SPEC COLLECT METH UR: ABNORMAL
WBC # BLD AUTO: 10.36 K/UL (ref 3.9–12.7)
WBC #/AREA URNS AUTO: 1 /HPF (ref 0–5)

## 2020-07-16 PROCEDURE — 99285 PR EMERGENCY DEPT VISIT,LEVEL V: ICD-10-PCS | Mod: ,,, | Performed by: EMERGENCY MEDICINE

## 2020-07-16 PROCEDURE — 93005 ELECTROCARDIOGRAM TRACING: CPT

## 2020-07-16 PROCEDURE — 96372 THER/PROPH/DIAG INJ SC/IM: CPT

## 2020-07-16 PROCEDURE — 96361 HYDRATE IV INFUSION ADD-ON: CPT

## 2020-07-16 PROCEDURE — 11000001 HC ACUTE MED/SURG PRIVATE ROOM

## 2020-07-16 PROCEDURE — 83880 ASSAY OF NATRIURETIC PEPTIDE: CPT

## 2020-07-16 PROCEDURE — 93010 EKG 12-LEAD: ICD-10-PCS | Mod: ,,, | Performed by: INTERNAL MEDICINE

## 2020-07-16 PROCEDURE — 84484 ASSAY OF TROPONIN QUANT: CPT

## 2020-07-16 PROCEDURE — 99285 EMERGENCY DEPT VISIT HI MDM: CPT | Mod: ,,, | Performed by: EMERGENCY MEDICINE

## 2020-07-16 PROCEDURE — 80053 COMPREHEN METABOLIC PANEL: CPT

## 2020-07-16 PROCEDURE — 84100 ASSAY OF PHOSPHORUS: CPT

## 2020-07-16 PROCEDURE — 83735 ASSAY OF MAGNESIUM: CPT

## 2020-07-16 PROCEDURE — 93010 ELECTROCARDIOGRAM REPORT: CPT | Mod: ,,, | Performed by: INTERNAL MEDICINE

## 2020-07-16 PROCEDURE — G0378 HOSPITAL OBSERVATION PER HR: HCPCS

## 2020-07-16 PROCEDURE — 25000003 PHARM REV CODE 250: Performed by: EMERGENCY MEDICINE

## 2020-07-16 PROCEDURE — 81001 URINALYSIS AUTO W/SCOPE: CPT

## 2020-07-16 PROCEDURE — 99220 PR INITIAL OBSERVATION CARE,LEVL III: CPT | Mod: ,,, | Performed by: PHYSICIAN ASSISTANT

## 2020-07-16 PROCEDURE — 87040 BLOOD CULTURE FOR BACTERIA: CPT | Mod: 59

## 2020-07-16 PROCEDURE — 99220 PR INITIAL OBSERVATION CARE,LEVL III: ICD-10-PCS | Mod: ,,, | Performed by: PHYSICIAN ASSISTANT

## 2020-07-16 PROCEDURE — 99285 EMERGENCY DEPT VISIT HI MDM: CPT | Mod: 25

## 2020-07-16 PROCEDURE — U0002 COVID-19 LAB TEST NON-CDC: HCPCS

## 2020-07-16 PROCEDURE — 85025 COMPLETE CBC W/AUTO DIFF WBC: CPT

## 2020-07-16 RX ORDER — METOPROLOL TARTRATE 25 MG/1
12.5 TABLET ORAL 2 TIMES DAILY
Status: DISCONTINUED | OUTPATIENT
Start: 2020-07-17 | End: 2020-07-23 | Stop reason: HOSPADM

## 2020-07-16 RX ORDER — ASPIRIN 81 MG/1
81 TABLET ORAL DAILY
Status: DISCONTINUED | OUTPATIENT
Start: 2020-07-17 | End: 2020-07-23 | Stop reason: HOSPADM

## 2020-07-16 RX ORDER — AMOXICILLIN 250 MG
1 CAPSULE ORAL 2 TIMES DAILY
Status: DISCONTINUED | OUTPATIENT
Start: 2020-07-16 | End: 2020-07-20

## 2020-07-16 RX ORDER — GLUCAGON 1 MG
1 KIT INJECTION
Status: DISCONTINUED | OUTPATIENT
Start: 2020-07-17 | End: 2020-07-23 | Stop reason: HOSPADM

## 2020-07-16 RX ORDER — IBUPROFEN 200 MG
16 TABLET ORAL
Status: DISCONTINUED | OUTPATIENT
Start: 2020-07-17 | End: 2020-07-23 | Stop reason: HOSPADM

## 2020-07-16 RX ORDER — AMIODARONE HYDROCHLORIDE 100 MG/1
100 TABLET ORAL DAILY
Status: DISCONTINUED | OUTPATIENT
Start: 2020-07-17 | End: 2020-07-23 | Stop reason: HOSPADM

## 2020-07-16 RX ORDER — ATORVASTATIN CALCIUM 40 MG/1
40 TABLET, FILM COATED ORAL DAILY
Status: DISCONTINUED | OUTPATIENT
Start: 2020-07-17 | End: 2020-07-23 | Stop reason: HOSPADM

## 2020-07-16 RX ORDER — AMIODARONE HYDROCHLORIDE 100 MG/1
100 TABLET ORAL DAILY
Status: DISCONTINUED | OUTPATIENT
Start: 2020-07-17 | End: 2020-07-16

## 2020-07-16 RX ORDER — SODIUM CHLORIDE 0.9 % (FLUSH) 0.9 %
5 SYRINGE (ML) INJECTION
Status: DISCONTINUED | OUTPATIENT
Start: 2020-07-17 | End: 2020-07-23 | Stop reason: HOSPADM

## 2020-07-16 RX ORDER — TALC
6 POWDER (GRAM) TOPICAL NIGHTLY PRN
Status: DISCONTINUED | OUTPATIENT
Start: 2020-07-17 | End: 2020-07-23 | Stop reason: HOSPADM

## 2020-07-16 RX ORDER — DUTASTERIDE 0.5 MG/1
0.5 CAPSULE, LIQUID FILLED ORAL DAILY
Status: DISCONTINUED | OUTPATIENT
Start: 2020-07-17 | End: 2020-07-23 | Stop reason: HOSPADM

## 2020-07-16 RX ORDER — IPRATROPIUM BROMIDE AND ALBUTEROL SULFATE 2.5; .5 MG/3ML; MG/3ML
3 SOLUTION RESPIRATORY (INHALATION) EVERY 4 HOURS PRN
Status: DISCONTINUED | OUTPATIENT
Start: 2020-07-17 | End: 2020-07-23 | Stop reason: HOSPADM

## 2020-07-16 RX ORDER — ACETAMINOPHEN 325 MG/1
650 TABLET ORAL EVERY 4 HOURS PRN
Status: DISCONTINUED | OUTPATIENT
Start: 2020-07-17 | End: 2020-07-23 | Stop reason: HOSPADM

## 2020-07-16 RX ORDER — PANTOPRAZOLE SODIUM 40 MG/1
40 TABLET, DELAYED RELEASE ORAL DAILY
Status: DISCONTINUED | OUTPATIENT
Start: 2020-07-17 | End: 2020-07-23 | Stop reason: HOSPADM

## 2020-07-16 RX ORDER — ONDANSETRON 8 MG/1
8 TABLET, ORALLY DISINTEGRATING ORAL EVERY 8 HOURS PRN
Status: DISCONTINUED | OUTPATIENT
Start: 2020-07-17 | End: 2020-07-23 | Stop reason: HOSPADM

## 2020-07-16 RX ORDER — MEMANTINE HYDROCHLORIDE 10 MG/1
10 TABLET ORAL 2 TIMES DAILY
Status: DISCONTINUED | OUTPATIENT
Start: 2020-07-17 | End: 2020-07-23 | Stop reason: HOSPADM

## 2020-07-16 RX ORDER — ENOXAPARIN SODIUM 100 MG/ML
40 INJECTION SUBCUTANEOUS EVERY 24 HOURS
Status: DISCONTINUED | OUTPATIENT
Start: 2020-07-17 | End: 2020-07-23 | Stop reason: HOSPADM

## 2020-07-16 RX ORDER — AMLODIPINE BESYLATE 5 MG/1
5 TABLET ORAL DAILY
Status: DISCONTINUED | OUTPATIENT
Start: 2020-07-17 | End: 2020-07-18

## 2020-07-16 RX ORDER — PROMETHAZINE HYDROCHLORIDE 25 MG/1
25 TABLET ORAL EVERY 6 HOURS PRN
Status: DISCONTINUED | OUTPATIENT
Start: 2020-07-17 | End: 2020-07-23 | Stop reason: HOSPADM

## 2020-07-16 RX ORDER — SODIUM CHLORIDE 0.9 % (FLUSH) 0.9 %
10 SYRINGE (ML) INJECTION
Status: DISCONTINUED | OUTPATIENT
Start: 2020-07-16 | End: 2020-07-23 | Stop reason: HOSPADM

## 2020-07-16 RX ORDER — IBUPROFEN 200 MG
24 TABLET ORAL
Status: DISCONTINUED | OUTPATIENT
Start: 2020-07-17 | End: 2020-07-23 | Stop reason: HOSPADM

## 2020-07-16 RX ORDER — TAMSULOSIN HYDROCHLORIDE 0.4 MG/1
0.4 CAPSULE ORAL NIGHTLY
Status: DISCONTINUED | OUTPATIENT
Start: 2020-07-17 | End: 2020-07-23 | Stop reason: HOSPADM

## 2020-07-16 RX ADMIN — SODIUM CHLORIDE 500 ML: 0.9 INJECTION, SOLUTION INTRAVENOUS at 08:07

## 2020-07-16 NOTE — ED NOTES
"Patient identifiers verified and correct for Mark Anthony Velarde Jr. 80 y.o. male  Chief Complaint   Patient presents with    Fatigue     EMS reports that family called 911 secondary to patient "being in bed all morning". Patient with generalized weakness.      Past Medical History:   Diagnosis Date    *Atrial fibrillation     Anticoagulant long-term use     Atrial fibrillation - asymptomatic but noted on ICD interrogation ([id number]) - 16 h 40 minutes of afib 2/13/2013    Atrial flutter with controlled response - seen on ICD interrogation - asymptomatic 2/13/2013    Automatic implantable cardioverter-defibrillator in situ 2/13/2013    Basal cell carcinoma     mid forehead    CAD (coronary artery disease) 2/13/2013    Cardiomyopathy     Cataract     Cognitive deficits     mild    H/O syncope -  5/27/2013    History of PTCA - LAD, LCX and PDA PCI in 2006 2/13/2013    HTN (hypertension) 2/13/2013    Hyperlipidemia LDL goal < 70 2/13/2013    Hypertension     ICD (implantable cardiac defibrillator) in place 2/13/2013    Ischemic cardiomyopathy LVEF ~45% 2/13/2013    LBBB (left bundle branch block) 2/13/2013    Memory loss     Psoriasis vulgaris     Syncope and collapse      Allergies reported   Review of patient's allergies indicates:   Allergen Reactions    Arb-angiotensin receptor antagonist Other (See Comments)     Hyperkalemia    Lisinopril Diarrhea       LOC: The patient is awake, alert and aware of environment with an appropriate affect, the patient is oriented x 1 (person only) and speaking appropriately.   APPEARANCE: Patient appears comfortable and in no acute distress, patient is clean and well groomed.  SKIN: The skin is warm and dry, color consistent with ethnicity, patient has normal skin turgor and moist mucus membranes.  MUSCULOSKELETAL: Patient moving all extremities spontaneously, no swelling noted.  RESPIRATORY: Airway is open and patent, respirations are spontaneous, patient has a " "normal effort and rate, no accessory muscle use noted, pt placed on continuous pulse ox with O2 sats noted at 97% on room air.  CARDIAC: Pt placed on cardiac monitor. Patient has a normal rate and regular rhythm, no edema noted, capillary refill < 3 seconds.   GASTRO: Soft and non tender to palpation, no distention noted, normoactive bowel sounds present in all four quadrants. Pt states bowel movements have been regular.  : Pt denies any pain or frequency with urination.  NEURO: Pt opens eyes spontaneously, behavior appropriate to situation, follows commands, facial expression symmetrical, bilateral hand grasp equal and even, purposeful motor response noted, normal sensation in all extremities when touched with a finger.     Fatigue (EMS reports that family called 911 secondary to patient "being in bed all morning". Patient with generalized weakness. ) Pt is slightly confused and only oriented to person, not oriented to place, situation, or time.  "

## 2020-07-16 NOTE — ED TRIAGE NOTES
"Fatigue (EMS reports that family called 911 secondary to patient "being in bed all morning". Patient with generalized weakness. ) Pt is slightly confused and only oriented to person, not oriented to place, situation, or time.  "

## 2020-07-16 NOTE — ED NOTES
Spoke with daughter, received new information that pt is usually continent and wakes up ready for breakfast in the am but this am pt's wife woke to pt being incontinent of bladder followed by bowel incontinence of diarrhea, along with generalized weakness and lethargy. Pt just wanted to go back to sleep and daughter states this is very unusual for him. Dr. Ang notified of new information. Will continue to monitor.

## 2020-07-17 PROBLEM — R53.1 WEAKNESS: Chronic | Status: ACTIVE | Noted: 2020-07-16

## 2020-07-17 LAB
ALBUMIN SERPL BCP-MCNC: 3.3 G/DL (ref 3.5–5.2)
ALP SERPL-CCNC: 79 U/L (ref 55–135)
ALT SERPL W/O P-5'-P-CCNC: 28 U/L (ref 10–44)
ANION GAP SERPL CALC-SCNC: 9 MMOL/L (ref 8–16)
AST SERPL-CCNC: 54 U/L (ref 10–40)
BASOPHILS # BLD AUTO: 0.04 K/UL (ref 0–0.2)
BASOPHILS NFR BLD: 0.5 % (ref 0–1.9)
BILIRUB SERPL-MCNC: 0.7 MG/DL (ref 0.1–1)
BUN SERPL-MCNC: 22 MG/DL (ref 8–23)
CALCIUM SERPL-MCNC: 8.5 MG/DL (ref 8.7–10.5)
CHLORIDE SERPL-SCNC: 108 MMOL/L (ref 95–110)
CO2 SERPL-SCNC: 23 MMOL/L (ref 23–29)
CREAT SERPL-MCNC: 1.6 MG/DL (ref 0.5–1.4)
DIFFERENTIAL METHOD: ABNORMAL
EOSINOPHIL # BLD AUTO: 0 K/UL (ref 0–0.5)
EOSINOPHIL NFR BLD: 0.5 % (ref 0–8)
ERYTHROCYTE [DISTWIDTH] IN BLOOD BY AUTOMATED COUNT: 18.7 % (ref 11.5–14.5)
EST. GFR  (AFRICAN AMERICAN): 45.7 ML/MIN/1.73 M^2
EST. GFR  (NON AFRICAN AMERICAN): 39.5 ML/MIN/1.73 M^2
ESTIMATED AVG GLUCOSE: 126 MG/DL (ref 68–131)
GLUCOSE SERPL-MCNC: 83 MG/DL (ref 70–110)
HBA1C MFR BLD HPLC: 6 % (ref 4–5.6)
HCT VFR BLD AUTO: 38.1 % (ref 40–54)
HGB BLD-MCNC: 11.4 G/DL (ref 14–18)
IMM GRANULOCYTES # BLD AUTO: 0.03 K/UL (ref 0–0.04)
IMM GRANULOCYTES NFR BLD AUTO: 0.4 % (ref 0–0.5)
LYMPHOCYTES # BLD AUTO: 0.5 K/UL (ref 1–4.8)
LYMPHOCYTES NFR BLD: 7.2 % (ref 18–48)
MAGNESIUM SERPL-MCNC: 2.1 MG/DL (ref 1.6–2.6)
MCH RBC QN AUTO: 28.8 PG (ref 27–31)
MCHC RBC AUTO-ENTMCNC: 29.9 G/DL (ref 32–36)
MCV RBC AUTO: 96 FL (ref 82–98)
MONOCYTES # BLD AUTO: 0.6 K/UL (ref 0.3–1)
MONOCYTES NFR BLD: 8.3 % (ref 4–15)
NEUTROPHILS # BLD AUTO: 6.2 K/UL (ref 1.8–7.7)
NEUTROPHILS NFR BLD: 83.1 % (ref 38–73)
NRBC BLD-RTO: 0 /100 WBC
OB PNL STL: NEGATIVE
PHOSPHATE SERPL-MCNC: 3.4 MG/DL (ref 2.7–4.5)
PLATELET # BLD AUTO: 170 K/UL (ref 150–350)
PMV BLD AUTO: 10.4 FL (ref 9.2–12.9)
POTASSIUM SERPL-SCNC: 4.1 MMOL/L (ref 3.5–5.1)
PROT SERPL-MCNC: 6.5 G/DL (ref 6–8.4)
RBC # BLD AUTO: 3.96 M/UL (ref 4.6–6.2)
SODIUM SERPL-SCNC: 140 MMOL/L (ref 136–145)
WBC # BLD AUTO: 7.45 K/UL (ref 3.9–12.7)
WBC #/AREA STL HPF: NORMAL /[HPF]

## 2020-07-17 PROCEDURE — 87425 ROTAVIRUS AG IA: CPT

## 2020-07-17 PROCEDURE — 87329 GIARDIA AG IA: CPT

## 2020-07-17 PROCEDURE — 82272 OCCULT BLD FECES 1-3 TESTS: CPT

## 2020-07-17 PROCEDURE — 11000001 HC ACUTE MED/SURG PRIVATE ROOM

## 2020-07-17 PROCEDURE — 96372 THER/PROPH/DIAG INJ SC/IM: CPT | Mod: 59

## 2020-07-17 PROCEDURE — 83993 ASSAY FOR CALPROTECTIN FECAL: CPT

## 2020-07-17 PROCEDURE — G0378 HOSPITAL OBSERVATION PER HR: HCPCS

## 2020-07-17 PROCEDURE — 89125 SPECIMEN FAT STAIN: CPT

## 2020-07-17 PROCEDURE — 82656 EL-1 FECAL QUAL/SEMIQ: CPT

## 2020-07-17 PROCEDURE — 94761 N-INVAS EAR/PLS OXIMETRY MLT: CPT

## 2020-07-17 PROCEDURE — 63600175 PHARM REV CODE 636 W HCPCS: Performed by: HOSPITALIST

## 2020-07-17 PROCEDURE — 80053 COMPREHEN METABOLIC PANEL: CPT

## 2020-07-17 PROCEDURE — 87040 BLOOD CULTURE FOR BACTERIA: CPT

## 2020-07-17 PROCEDURE — 83735 ASSAY OF MAGNESIUM: CPT

## 2020-07-17 PROCEDURE — 84100 ASSAY OF PHOSPHORUS: CPT

## 2020-07-17 PROCEDURE — 83036 HEMOGLOBIN GLYCOSYLATED A1C: CPT

## 2020-07-17 PROCEDURE — 96365 THER/PROPH/DIAG IV INF INIT: CPT

## 2020-07-17 PROCEDURE — 89055 LEUKOCYTE ASSESSMENT FECAL: CPT

## 2020-07-17 PROCEDURE — 63600175 PHARM REV CODE 636 W HCPCS: Performed by: PHYSICIAN ASSISTANT

## 2020-07-17 PROCEDURE — 36415 COLL VENOUS BLD VENIPUNCTURE: CPT

## 2020-07-17 PROCEDURE — 25000003 PHARM REV CODE 250: Performed by: PHYSICIAN ASSISTANT

## 2020-07-17 PROCEDURE — 87045 FECES CULTURE AEROBIC BACT: CPT

## 2020-07-17 PROCEDURE — 85025 COMPLETE CBC W/AUTO DIFF WBC: CPT

## 2020-07-17 PROCEDURE — 87046 STOOL CULTR AEROBIC BACT EA: CPT | Mod: 59

## 2020-07-17 PROCEDURE — 87427 SHIGA-LIKE TOXIN AG IA: CPT

## 2020-07-17 PROCEDURE — 87338 HPYLORI STOOL AG IA: CPT

## 2020-07-17 PROCEDURE — 96366 THER/PROPH/DIAG IV INF ADDON: CPT

## 2020-07-17 PROCEDURE — 99226 PR SUBSEQUENT OBSERVATION CARE,LEVEL III: ICD-10-PCS | Mod: ,,, | Performed by: PHYSICIAN ASSISTANT

## 2020-07-17 PROCEDURE — 99226 PR SUBSEQUENT OBSERVATION CARE,LEVEL III: CPT | Mod: ,,, | Performed by: PHYSICIAN ASSISTANT

## 2020-07-17 RX ADMIN — VANCOMYCIN HYDROCHLORIDE 1250 MG: 1.25 INJECTION, POWDER, LYOPHILIZED, FOR SOLUTION INTRAVENOUS at 05:07

## 2020-07-17 RX ADMIN — METOPROLOL TARTRATE 12.5 MG: 25 TABLET, FILM COATED ORAL at 10:07

## 2020-07-17 RX ADMIN — AMLODIPINE BESYLATE 5 MG: 5 TABLET ORAL at 10:07

## 2020-07-17 RX ADMIN — DOCUSATE SODIUM 50MG AND SENNOSIDES 8.6MG 1 TABLET: 8.6; 5 TABLET, FILM COATED ORAL at 10:07

## 2020-07-17 RX ADMIN — METOPROLOL TARTRATE 12.5 MG: 25 TABLET, FILM COATED ORAL at 08:07

## 2020-07-17 RX ADMIN — MEMANTINE HYDROCHLORIDE 10 MG: 10 TABLET ORAL at 10:07

## 2020-07-17 RX ADMIN — ENOXAPARIN SODIUM 40 MG: 100 INJECTION SUBCUTANEOUS at 05:07

## 2020-07-17 RX ADMIN — AMIODARONE HYDROCHLORIDE 100 MG: 100 TABLET ORAL at 01:07

## 2020-07-17 RX ADMIN — DUTASTERIDE 0.5 MG: 0.5 CAPSULE, LIQUID FILLED ORAL at 10:07

## 2020-07-17 RX ADMIN — MEMANTINE HYDROCHLORIDE 10 MG: 10 TABLET ORAL at 08:07

## 2020-07-17 RX ADMIN — DOCUSATE SODIUM 50MG AND SENNOSIDES 8.6MG 1 TABLET: 8.6; 5 TABLET, FILM COATED ORAL at 08:07

## 2020-07-17 RX ADMIN — PANTOPRAZOLE SODIUM 40 MG: 40 TABLET, DELAYED RELEASE ORAL at 10:07

## 2020-07-17 RX ADMIN — ATORVASTATIN CALCIUM 40 MG: 40 TABLET, FILM COATED ORAL at 10:07

## 2020-07-17 RX ADMIN — TAMSULOSIN HYDROCHLORIDE 0.4 MG: 0.4 CAPSULE ORAL at 08:07

## 2020-07-17 RX ADMIN — ASPIRIN 81 MG: 81 TABLET, COATED ORAL at 10:07

## 2020-07-17 RX ADMIN — Medication 6 MG: at 08:07

## 2020-07-17 RX ADMIN — THERA TABS 1 TABLET: TAB at 10:07

## 2020-07-17 NOTE — ASSESSMENT & PLAN NOTE
AICD present    - No evidence of decompensation.    Echo 1/2017    1 - Upper limit of normal left ventricular enlargement.     2 - Mildly to moderately depressed left ventricular systolic function (EF 40-45%).     3 - Eccentric hypertrophy.     4 - Normal left ventricular diastolic function.     5 - Normal right ventricular systolic function .     6 - Mild left atrial enlargement.     7 - Trivial to mild aortic regurgitation.     8 - Mild mitral regurgitation.     9 - Trivial tricuspid regurgitation.     10 - Trivial pulmonic regurgitation.     11 - The estimated PA systolic pressure is 33 mmHg.     12 - TDI as per text.

## 2020-07-17 NOTE — PROGRESS NOTES
"[facility] Hospital Medicine  Progress Note    Patient Name: Mark Anthony Velarde Jr.  MRN: [id number]  Patient Class: OP- Observation   Admission Date: 7/16/2020  Length of Stay: 0 days  Attending Physician: Jason Blanc MD  Primary Care Provider: Jeff Yan MD    [facility] Medicine Team: [facility] E Cheri Gupta PA-C    Subjective:     Principal Problem:Weakness        HPI:  Mark Anthony Velarde Jr. Is an 79 yo M with a PMH including HTN, HLd, CAD s/p PTCA to LAD, LCX and PAD in 2006, ischemic cardiomyopathy EF ~ 45%, afib not currently anticoagulated, lewy body dementia and parkinsonism presents with generalized weakness per family. Patient was brought in by EMS who reports family called due to patient "being in bed all morning." Per ED triage notes, patient was slightly confused when he was brought in and oriented to person only, not oriented to place, situation, or time. Family reported baseline oriented to person and place.  ED nurse spoke with the daughter at 17:59 who reported the patient is normally continent and wakes up for breakfast in the morning. His wife stated that this morning she was woken up by the patient having bladder incontinence and while ambulating him to the bathroom she noticed bowel incontinence (diarrhea). Also noticed generalized weakness and lethargy stating the patient just wanted to sleep which is not usual for him.   Currently he is oriented to person only. He denies CP, SOB, NVD, abdominal pain.     Overview/Hospital Course:  Patient with Lewy Body Dementia admitted for weakness/altered baseline. On admission 0/4 SIRS. UA and CXR without signs of infection. COVID negative. CTH no acute infarct or bleed seen. CT cervical spine no acute fracture. XRAY of lumbar and thoracic spine no acute fracture. BCx2 were collected, 1 BCx showed GPC in clusters resembling Staph. Repeat BCx were obtained, and patient was started on IV Vancomycin, pharmacy consulted. Continue to " "follow Bcx    Interval History: No reported events overnight, pt seen at bedside with daughter present, mentation back to baseline. Following commands, AOx2. Appropriately conversational. Notified by nursing BCx showing GPC 1/4 bottles. Repeat BCx drawn and Vanc started. Nursing does report episode of loose stool-- stool studies ordered    Review of Systems   Unable to perform ROS: Dementia     Objective:     Vital Signs (Most Recent):  Temp: 96.7 °F (35.9 °C) (07/17/20 1226)  Pulse: 64 (07/17/20 1226)  Resp: 17 (07/17/20 1226)  BP: (!) 130/58 (07/17/20 1226)  SpO2: 96 % (07/17/20 1226) Vital Signs (24h Range):  Temp:  [96.1 °F (35.6 °C)-99.5 °F (37.5 °C)] 96.7 °F (35.9 °C)  Pulse:  [64-76] 64  Resp:  [16-20] 17  SpO2:  [94 %-97 %] 96 %  BP: (114-134)/(57-93) 130/58     Weight: 81.4 kg (179 lb 7.3 oz)  Body mass index is 28.11 kg/m².  No intake or output data in the 24 hours ending 07/17/20 1604   Physical Exam  Constitutional:       General: He is not in acute distress.     Appearance: He is well-developed.   HENT:      Head: Normocephalic and atraumatic.   Eyes:      Extraocular Movements: Extraocular movements intact.   Neck:      Musculoskeletal: Neck supple.      Thyroid: No thyromegaly.   Cardiovascular:      Rate and Rhythm: Normal rate and regular rhythm.   Pulmonary:      Effort: Pulmonary effort is normal.      Breath sounds: Normal breath sounds.   Abdominal:      General: Bowel sounds are normal.      Palpations: Abdomen is soft. There is no hepatomegaly or splenomegaly.   Musculoskeletal:      Comments: Bilateral UE rigidity   Skin:     General: Skin is warm and dry.   Neurological:      Mental Status: He is confused.      Cranial Nerves: No cranial nerve deficit.      Sensory: No sensory deficit.      Comments: Oriented to self, when given multiple choice he is able to choose "hospital"  Family at bedside says pt is at baseline   Psychiatric:         Cognition and Memory: Cognition is impaired. Memory " is impaired.         Significant Labs: All pertinent labs within the past 24 hours have been reviewed.    Significant Imaging: I have reviewed all pertinent imaging results/findings within the past 24 hours.      Assessment/Plan:      * Weakness  80 year old male admitted to observation for weakness.   - CTH no acute infarct or intracranial hemorrhage  - UA unremarkable for infection  - CXR without signs of infection  - XR thoracic and lumbar spine -- no acute fractures  - Consult    - Fall precautions, aspiration precautions, delirium precautions.  - See 6/9/2020 note for palliative consult.  - BCx2 were ordered - 1 BCx grew Gram positive cocci in clusters resembling Staph   7/17:   - Repeat BCx ordered   - Started on IV Vancomycin, pharmacy consulted  - Stool Studies ordered (nursing reports episode of loose stool)    Lewy body dementia  Parkinsonism    - Memantine 10mg BID    DELIRIUM BEHAVIOR MANAGEMENT  - Minimize use of restraints; if physical restraints necessary, please utilize medical/chemical prns for agitation.  - Keep shades open and room lit during day and room dim at night in order to promote healthy circadian rhythms.  - Encourage family at bedside  - Keep whiteboard in patient's room current with the date and name of the members of patient's team for easy patient self re-orientation.  - Avoid benzodiazepines, antihistamines, anticholinergics, hypnotics, and minimize opiates while controlling for pain as these medications may exacerbate delirium.    Atrial fibrillation   - Amiodarone 100mg qd  - Patient is no longer on chronic anticoagulation, likely secondary to fall risk.    Ischemic cardiomyopathy LVEF ~45%  AICD present    - No evidence of decompensation.    Echo 1/2017    1 - Upper limit of normal left ventricular enlargement.     2 - Mildly to moderately depressed left ventricular systolic function (EF 40-45%).     3 - Eccentric hypertrophy.     4 - Normal left ventricular diastolic  function.     5 - Normal right ventricular systolic function .     6 - Mild left atrial enlargement.     7 - Trivial to mild aortic regurgitation.     8 - Mild mitral regurgitation.     9 - Trivial tricuspid regurgitation.     10 - Trivial pulmonic regurgitation.     11 - The estimated PA systolic pressure is 33 mmHg.     12 - TDI as per text.     Hyperlipidemia with target LDL less than 70  - Atorvastatin 40mg qd    HTN (hypertension)  - Amlodipine 5mg qd, Lopressor 12.5mg BID  BP stable, will monitor    CAD (coronary artery disease)  History of PTCA - LAD, LCX and PDA PCI in 2006    - Continue secondary prevention, ASA daily.      VTE Risk Mitigation (From admission, onward)         Ordered     enoxaparin injection 40 mg  Every 24 hours      07/16/20 2307     Place MATTHIAS hose  Until discontinued      07/16/20 2307     IP VTE HIGH RISK PATIENT  Once      07/16/20 2307     Place sequential compression device  Until discontinued      07/16/20 2307                    Discussed with staff  Cheri Gupta PA-C  Department of Hospital Medicine   [facility]

## 2020-07-17 NOTE — NURSING
Pt arrived to the unit via stretcher per hospital transport. Pt transferred to hospital bed per max assist x 4 staff. Pt tolerated well. Pt VSS. Pt AAO x1 self. Pt NAD; respirations 18, even and unlabored. Pt has no wounds present. BUE bruising. Pt unable to complete admission assessment due to hx of dementia and poor historian. Pt given a warm blanket and bed alarm set, bed wheels locked, SR up x 3, call light within reach. Safety measures implemented.Will continue to monitor.

## 2020-07-17 NOTE — PROGRESS NOTES
Pharmacokinetic Initial Assessment: IV Vancomycin    Assessment/Plan:    Initiate intravenous vancomycin with loading dose of 1250 mg once followed by a maintenance dose of vancomycin 1250mg IV every 24 hours     Desired empiric serum trough concentration is 15 to 20 mcg/mL     Draw vancomycin trough level 30 min prior to third dose on 7/19 at approximately 1600     Pharmacy will continue to follow and monitor vancomycin.      Please contact pharmacy at extension [phone] with any questions regarding this assessment.     Thank you for the consult,   Pancho Berg       Patient brief summary:  Mark Anthony Velarde Jr. is a 80 y.o. male initiated on antimicrobial therapy with IV Vancomycin for treatment of suspected bacteremia    Drug Allergies:   Review of patient's allergies indicates:   Allergen Reactions    Arb-angiotensin receptor antagonist Other (See Comments)     Hyperkalemia    Lisinopril Diarrhea       Actual Body Weight:   81 kg    Renal Function:   Estimated Creatinine Clearance: 36.4 mL/min (A) (based on SCr of 1.6 mg/dL (H)).,     Dialysis Method (if applicable):  N/A    CBC (last 72 hours):  Recent Labs   Lab Result Units 07/16/20  1437 07/17/20  0623   WBC K/uL 10.36 7.45   Hemoglobin g/dL 11.3* 11.4*   Hemoglobin A1C %  --  6.0*   Hematocrit % 37.5* 38.1*   Platelets K/uL 182 170   Gran% % 85.8* 83.1*   Lymph% % 5.3* 7.2*   Mono% % 7.9 8.3   Eosinophil% % 0.0 0.5   Basophil% % 0.4 0.5   Differential Method  Automated Automated       Metabolic Panel (last 72 hours):  Recent Labs   Lab Result Units 07/16/20  1437 07/16/20  1548 07/17/20  0623   Sodium mmol/L 136  --  140   Potassium mmol/L 4.5  --  4.1   Chloride mmol/L 104  --  108   CO2 mmol/L 22*  --  23   Glucose mg/dL 102  --  83   Glucose, UA   --  Negative  --    BUN, Bld mg/dL 22  --  22   Creatinine mg/dL 1.8*  --  1.6*   Albumin g/dL 3.8  --  3.3*   Total Bilirubin mg/dL 0.7  --  0.7   Alkaline Phosphatase U/L 86  --  79   AST U/L 46*  --  54*   ALT  U/L 23  --  28   Magnesium mg/dL 2.0  --  2.1   Phosphorus mg/dL 3.0  --  3.4       Drug levels (last 3 results):  No results for input(s): VANCOMYCINRA, VANCOMYCINPE, VANCOMYCINTR in the last 72 hours.    Microbiologic Results:  Microbiology Results (last 7 days)     Procedure Component Value Units Date/Time    Blood culture [[id number]]     Order Status: Sent Specimen: Blood     Blood culture [[id number]]     Order Status: Sent Specimen: Blood     Blood Culture #2 **CANNOT BE ORDERED STAT** [[id number]] Collected: 07/16/20 1721    Order Status: Completed Specimen: Blood from Peripheral, Hand, Right Updated: 07/17/20 1457     Blood Culture, Routine Gram stain aer bottle: Gram positive cocci in clusters resembling Staph       Results called to and read back by:Cezar Oleary 07/17/2020 14:56    Blood Culture #1 **CANNOT BE ORDERED STAT** [[id number]] Collected: 07/16/20 1709    Order Status: Completed Specimen: Blood from Peripheral, Hand, Left Updated: 07/17/20 0315     Blood Culture, Routine No Growth to date

## 2020-07-17 NOTE — ASSESSMENT & PLAN NOTE
80 year old male admitted to observation for weakness.   - CTH no acute infarct or intracranial hemorrhage  - UA unremarkable for infection  - CXR without signs of infection  - XR thoracic and lumbar spine -- no acute fractures  - Consult    - Fall precautions, aspiration precautions, delirium precautions.  - See 6/9/2020 note for palliative consult.  - BCx2 were ordered - 1 BCx grew Gram positive cocci in clusters resembling Staph   7/17:   - Repeat BCx ordered   - Started on IV Vancomycin, pharmacy consulted  - Stool Studies ordered (nursing reports episode of loose stool)

## 2020-07-17 NOTE — ED PROVIDER NOTES
"Encounter Date: 7/16/2020       History     Chief Complaint   Patient presents with    Fatigue     EMS reports that family called 911 secondary to patient "being in bed all morning". Patient with generalized weakness.      80M with PMH dementia, AFib, AICD, CAD s/p PTCA, presenting with 1 day of weakness. Family reports pt was very weak today, symptoms constant. EMS reports family states he is normally fairly independent although poor memory, but today too weak to get out of bed. Pt denies HA, SOB, CP, abd pain, nausea.         Review of patient's allergies indicates:   Allergen Reactions    Arb-angiotensin receptor antagonist Other (See Comments)     Hyperkalemia    Lisinopril Diarrhea     Past Medical History:   Diagnosis Date    *Atrial fibrillation     Anticoagulant long-term use     Atrial fibrillation - asymptomatic but noted on ICD interrogation (5-02-20121) - 16 h 40 minutes of afib 2/13/2013    Atrial flutter with controlled response - seen on ICD interrogation - asymptomatic 2/13/2013    Automatic implantable cardioverter-defibrillator in situ 2/13/2013    Basal cell carcinoma     mid forehead    CAD (coronary artery disease) 2/13/2013    Cardiomyopathy     Cataract     Cognitive deficits     mild    H/O syncope -  5/27/2013    History of PTCA - LAD, LCX and PDA PCI in 2006 2/13/2013    HTN (hypertension) 2/13/2013    Hyperlipidemia LDL goal < 70 2/13/2013    Hypertension     ICD (implantable cardiac defibrillator) in place 2/13/2013    Ischemic cardiomyopathy LVEF ~45% 2/13/2013    LBBB (left bundle branch block) 2/13/2013    Memory loss     Psoriasis vulgaris     Syncope and collapse      Past Surgical History:   Procedure Laterality Date    CARDIAC DEFIBRILLATOR PLACEMENT      CATARACT EXTRACTION W/  INTRAOCULAR LENS IMPLANT Right 04/04/16    Dr Michael     CATARACT EXTRACTION W/  INTRAOCULAR LENS IMPLANT Left 05/09/2016    SKIN BIOPSY      TONSILLECTOMY       Family History "   Problem Relation Age of Onset    Psoriasis Father     Dementia Father     Tremor Father     Cataracts Mother     Cancer Mother     Psoriasis Son     Psoriasis Son     Diabetes Maternal Grandmother     Kidney disease Sister     No Known Problems Brother     No Known Problems Maternal Aunt     No Known Problems Maternal Uncle     No Known Problems Paternal Aunt     No Known Problems Paternal Uncle     No Known Problems Maternal Grandfather     No Known Problems Paternal Grandmother     No Known Problems Paternal Grandfather     Amblyopia Neg Hx     Blindness Neg Hx     Glaucoma Neg Hx     Hypertension Neg Hx     Macular degeneration Neg Hx     Retinal detachment Neg Hx     Strabismus Neg Hx     Stroke Neg Hx     Thyroid disease Neg Hx     Parkinsonism Neg Hx     Celiac disease Neg Hx     Cirrhosis Neg Hx     Colon cancer Neg Hx     Colon polyps Neg Hx     Crohn's disease Neg Hx     Cystic fibrosis Neg Hx     Esophageal cancer Neg Hx     Irritable bowel syndrome Neg Hx     Inflammatory bowel disease Neg Hx     Hemochromatosis Neg Hx     Liver cancer Neg Hx     Liver disease Neg Hx     Rectal cancer Neg Hx     Stomach cancer Neg Hx     Ulcerative colitis Neg Hx     Christiano's disease Neg Hx     Lymphoma Neg Hx     Scleroderma Neg Hx     Rheum arthritis Neg Hx     Multiple sclerosis Neg Hx     Melanoma Neg Hx     Tuberculosis Neg Hx     Lupus Neg Hx      Social History     Tobacco Use    Smoking status: Former Smoker     Quit date: 1963     Years since quittin.8    Smokeless tobacco: Never Used   Substance Use Topics    Alcohol use: No     Frequency: Monthly or less     Drinks per session: 1 or 2     Binge frequency: Never    Drug use: No     Review of Systems   Unable to perform ROS: Dementia       Physical Exam     Initial Vitals [20 1143]   BP Pulse Resp Temp SpO2   120/78 80 16 98.2 °F (36.8 °C) 99 %      MAP       --         Physical  Exam    Nursing note and vitals reviewed.  Constitutional: He appears well-developed and well-nourished. He is not diaphoretic. No distress.   Very globally weakened and fatigued   HENT:   Head: Normocephalic and atraumatic.   Nose: Nose normal.   Eyes: Conjunctivae and EOM are normal. Pupils are equal, round, and reactive to light. No scleral icterus.   Neck: Normal range of motion. Neck supple.   Cardiovascular: Normal rate, regular rhythm and intact distal pulses.   Pulmonary/Chest: Breath sounds normal. No respiratory distress. He has no wheezes. He has no rhonchi. He has no rales. He exhibits no tenderness.   Abdominal: Soft. Bowel sounds are normal. He exhibits no distension. There is no abdominal tenderness.   Musculoskeletal: No tenderness or edema.      Comments: Bilateral upper extremities rigidly held against chest with resting tremors   Neurological: He is alert. He has normal strength. No sensory deficit.   Following commands and moving all extremities with normal strength and ROM. No midline CTL TTP. Oriented to person and place (baseline per family).    Skin: Skin is warm and dry. Capillary refill takes less than 2 seconds. No rash noted. No pallor.   Psychiatric:   Calm and cooperative         ED Course   Procedures  Labs Reviewed   CBC W/ AUTO DIFFERENTIAL - Abnormal; Notable for the following components:       Result Value    RBC 3.97 (*)     Hemoglobin 11.3 (*)     Hematocrit 37.5 (*)     Mean Corpuscular Hemoglobin Conc 30.1 (*)     RDW 18.6 (*)     Immature Granulocytes 0.6 (*)     Gran # (ANC) 8.9 (*)     Immature Grans (Abs) 0.06 (*)     Lymph # 0.6 (*)     Gran% 85.8 (*)     Lymph% 5.3 (*)     All other components within normal limits   COMPREHENSIVE METABOLIC PANEL - Abnormal; Notable for the following components:    CO2 22 (*)     Creatinine 1.8 (*)     AST 46 (*)     eGFR if  39.6 (*)     eGFR if non  34.3 (*)     All other components within normal limits    URINALYSIS, REFLEX TO URINE CULTURE - Abnormal; Notable for the following components:    Occult Blood UA 1+ (*)     All other components within normal limits    Narrative:     Specimen Source->Urine   B-TYPE NATRIURETIC PEPTIDE - Abnormal; Notable for the following components:     (*)     All other components within normal limits   CULTURE, BLOOD   CULTURE, BLOOD   MAGNESIUM   PHOSPHORUS   TROPONIN I   SARS-COV-2 RNA AMPLIFICATION, QUAL   URINALYSIS MICROSCOPIC    Narrative:     Specimen Source->Urine     EKG Readings: (Independently Interpreted)   Paced rhythm at 84, intraventricular block (pacer spikes present, not A-fib)     ECG Results          EKG 12-lead (In process)  Result time 07/16/20 14:05:08    In process by Interface, Lab In [facility] (07/16/20 14:05:08)                 Narrative:    Test Reason : R53.1,    Vent. Rate : 084 BPM     Atrial Rate : 468 BPM     P-R Int : 000 ms          QRS Dur : 188 ms      QT Int : 442 ms       P-R-T Axes : 000 -84 096 degrees     QTc Int : 522 ms    Age and gender specific analysis  Atrial fibrillation with premature ventricular or aberrantly conducted  complexes  Nonspecific intraventricular block  Possible Lateral infarct ,age undetermined  Abnormal ECG  When compared with ECG of 11-MAR-2020 06:34,  Atrial fibrillation has replaced Electronic ventricular pacemaker    Referred By: [email or web address]   SELF           Confirmed By:                             Imaging Results          CT Head Without Contrast (Final result)  Result time 07/16/20 19:30:40    Final result by Rito Howard MD (07/16/20 19:30:40)                 Impression:      No acute large vascular territory infarct or intracranial hemorrhage identified.  Further evaluation/follow-up as warranted.    Pontine subtle hypoattenuating focus in a region of streak artifact which could represent age-indeterminate lacunar type infarct or artifact.  Correlate clinically.      Electronically signed by: Rito Howard  MD  Date:    07/16/2020  Time:    19:30             Narrative:    EXAMINATION:  CT HEAD WITHOUT CONTRAST    CLINICAL HISTORY:  Altered mental status;Elderly;    TECHNIQUE:  Low dose axial CT images obtained throughout the head without intravenous contrast. Sagittal and coronal reconstructions were performed.    COMPARISON:  Head CT 03/11/2020    FINDINGS:  Patient is somewhat rotated and tilted within the scanner.    Intracranial compartment:    Age-appropriate mild generalized cerebral volume loss.  The ventricles are midline and stable in size and configuration without distortion by mass effect or acute hydrocephalus.  No extra-axial blood or fluid collections.    Grossly stable distribution of patchy hypoattenuation of the subcortical and periventricular white matter, likely sequela of chronic small vessel ischemic change.  Subtle small hypoattenuating focus at the midline inferior aspect of the [place] in a region of streak artifact..  No parenchymal mass, hemorrhage, edema or major vascular distribution infarct.    Skull/extracranial contents (limited evaluation): No fracture. Mastoid air cells and paranasal sinuses are essentially clear.                               X-Ray Chest AP Portable (Final result)  Result time 07/16/20 13:28:05    Final result by Gerry Arndt III, MD (07/16/20 13:28:05)                 Impression:      No acute process seen.      Electronically signed by: Gerry Arndt MD  Date:    07/16/2020  Time:    13:28             Narrative:    EXAMINATION:  XR CHEST AP PORTABLE    CLINICAL HISTORY:  Weakness    FINDINGS:  There is a pacer.  Heart size is normal.  Lungs are clear.  The bones show nothing unusual.                                 Medical Decision Making:   History:   I obtained history from: someone other than patient and EMS provider.       <> Summary of History: ADDITIONAL HPI:  Daughter reports to nurse that pt had been incontinent of urine and stool, with diarrhea, and global  weakness. Baseline is very alert and continent.     Wife notes that he wet bed last night, and she noted his legs twitching rapidly. She does note several weeks of arm tremors. Reports that two nights ago she found him asleep on the floor, unknown fall, but not complaining of any pain.     Old Medical Records: I decided to obtain old medical records.  Old Records Summarized: records from clinic visits.  Initial Assessment:   Pt is alert but appears fatigued, follows commands and oriented to name only, no FNDs  Differential Diagnosis:   UTI, PNA, other infection, electrolyte or metabolic abnormality, worsening of dementia,CHF, less likely CVA or sepsis as pt afebrile and VSS (but will draw blood cx as pt poor historian), fracture, ICH, no abd TTP concerning for intra-abdominal pathology and incontinence may be 2/2 infection  Independently Interpreted Test(s):   I have ordered and independently interpreted X-rays - see summary below.       <> Summary of X-Ray Reading(s): No consolidations  I have ordered and independently interpreted EKG Reading(s) - see prior notes  Clinical Tests:   Lab Tests: Ordered and Reviewed  Radiological Study: Ordered and Reviewed  Medical Tests: Ordered and Reviewed  ED Management:  Will obtain labs including blood cx, given report of being found on ground will obtain CT head and XRs, give IVF                   ED Course as of Jul 16 2054   Thu Jul 16, 2020   1639 Increased from 1 yr ago (100s)   BNP(!): 229 [JR]   1639 Baseline     Creatinine(!): 1.8 [JR]   1639 Troponin I [JR]   1640 Urinalysis Microscopic [JR]   1640 Brain natriuretic peptide(!) [JR]   1640 Urinalysis, Reflex to Urine Culture Urine, Clean Catch(!) [JR]   1640 Baseline      [JR]   1641 No consolidations   X-Ray Chest AP Portable [JR]      ED Course User Index  [JR] Marilyn Ang MD                Clinical Impression:       ICD-10-CM ICD-9-CM   1. Weakness  R53.1 780.79   2. Incontinence of feces, unspecified fecal  incontinence type  R15.9 787.60   3. Fall  W19.XXXA E888.9           ED Disposition Condition    Observation                           Marilyn Ang MD  07/18/20 5108

## 2020-07-17 NOTE — H&P
"[facility] Hospital Medicine  History & Physical    Patient Name: Mark Anthony Velarde Jr.  MRN: [id number]  Admission Date: 7/16/2020  Attending Physician: Jason Blanc MD   Primary Care Provider: Jeff Yan MD    [facility] Medicine Team: [facility] HOSP MED E Alda Shepherd PA-C     Patient information was obtained from past medical records and ER records.     Subjective:     Principal Problem:Weakness    Chief Complaint:   Chief Complaint   Patient presents with    Fatigue     EMS reports that family called 911 secondary to patient "being in bed all morning". Patient with generalized weakness.         HPI: Mark Anthony Velarde Jr. Is an 79 yo M with a PMH including HTN, HLd, CAD s/p PTCA to LAD, LCX and PAD in 2006, ischemic cardiomyopathy EF ~ 45%, afib not currently anticoagulated, lewy body dementia and parkinsonism presents with generalized weakness per family. Patient was brought in by EMS who reports family called due to patient "being in bed all morning." Per ED triage notes, patient was slightly confused when he was brought in and oriented to person only, not oriented to place, situation, or time. Family reported baseline oriented to person and place.  ED nurse spoke with the daughter at 17:59 who reported the patient is normally continent and wakes up for breakfast in the morning. His wife stated that this morning she was woken up by the patient having bladder incontinence and while ambulating him to the bathroom she noticed bowel incontinence (diarrhea). Also noticed generalized weakness and lethargy stating the patient just wanted to sleep which is not usual for him.   Currently he is oriented to person only. He denies CP, SOB, NVD, abdominal pain.     Past Medical History:   Diagnosis Date    *Atrial fibrillation     Anticoagulant long-term use     Atrial fibrillation - asymptomatic but noted on ICD interrogation (5-02-20121) - 16 h 40 minutes of afib 2/13/2013    Atrial flutter with " controlled response - seen on ICD interrogation - asymptomatic 2/13/2013    Automatic implantable cardioverter-defibrillator in situ 2/13/2013    Basal cell carcinoma     mid forehead    CAD (coronary artery disease) 2/13/2013    Cardiomyopathy     Cataract     Cognitive deficits     mild    H/O syncope -  5/27/2013    History of PTCA - LAD, LCX and PDA PCI in 2006 2/13/2013    HTN (hypertension) 2/13/2013    Hyperlipidemia LDL goal < 70 2/13/2013    Hypertension     ICD (implantable cardiac defibrillator) in place 2/13/2013    Ischemic cardiomyopathy LVEF ~45% 2/13/2013    LBBB (left bundle branch block) 2/13/2013    Memory loss     Psoriasis vulgaris     Syncope and collapse        Past Surgical History:   Procedure Laterality Date    CARDIAC DEFIBRILLATOR PLACEMENT      CATARACT EXTRACTION W/  INTRAOCULAR LENS IMPLANT Right 04/04/16    Dr Michael     CATARACT EXTRACTION W/  INTRAOCULAR LENS IMPLANT Left 05/09/2016    SKIN BIOPSY      TONSILLECTOMY         Review of patient's allergies indicates:   Allergen Reactions    Arb-angiotensin receptor antagonist Other (See Comments)     Hyperkalemia    Lisinopril Diarrhea       No current facility-administered medications on file prior to encounter.      Current Outpatient Medications on File Prior to Encounter   Medication Sig    amiodarone (PACERONE) 100 MG Tab TAKE 1 TABLET BY MOUTH EVERY DAY    amiodarone (PACERONE) 200 MG Tab Take 0.5 tablets (100 mg total) by mouth once daily.    amLODIPine (NORVASC) 5 MG tablet Take 1 tablet (5 mg total) by mouth once daily.    aspirin (ECOTRIN) 81 MG EC tablet Take 81 mg by mouth once daily.    atorvastatin (LIPITOR) 40 MG tablet Take 1 tablet (40 mg total) by mouth once daily.    dutasteride (AVODART) 0.5 mg capsule Take 1 capsule (0.5 mg total) by mouth once daily.    FLUZONE HIGH-DOSE 2019-20, PF, 180 mcg/0.5 mL Syrg ADM 0.5ML IM UTD    memantine (NAMENDA) 10 MG Tab Take 1 tablet (10 mg total) by  mouth 2 (two) times daily.    metoprolol tartrate (LOPRESSOR) 25 MG tablet Take 0.5 tablets (12.5 mg total) by mouth 2 (two) times daily.    multivitamin (MULTIVITAMIN) per tablet Take 1 tablet by mouth once daily.    omeprazole (PRILOSEC) 20 MG capsule Take 1 capsule (20 mg total) by mouth once daily.    tamsulosin (FLOMAX) 0.4 mg Cap Take 1 capsule (0.4 mg total) by mouth every evening.     Family History     Problem Relation (Age of Onset)    Cancer Mother    Cataracts Mother    Dementia Father    Diabetes Maternal Grandmother    Kidney disease Sister    No Known Problems Brother, Maternal Aunt, Maternal Uncle, Paternal Aunt, Paternal Uncle, Maternal Grandfather, Paternal Grandmother, Paternal Grandfather    Psoriasis Father, Son, Son    Tremor Father        Tobacco Use    Smoking status: Former Smoker     Quit date: 1963     Years since quittin.8    Smokeless tobacco: Never Used   Substance and Sexual Activity    Alcohol use: No     Frequency: Monthly or less     Drinks per session: 1 or 2     Binge frequency: Never    Drug use: No    Sexual activity: Not on file     Review of Systems   Unable to perform ROS: Dementia     Objective:     Vital Signs (Most Recent):  Temp: 97.3 °F (36.3 °C) (20)  Pulse: 73 (20)  Resp: 16 (20)  BP: (!) 133/93 (20)  SpO2: (!) 94 % (20) Vital Signs (24h Range):  Temp:  [97.3 °F (36.3 °C)-99.5 °F (37.5 °C)] 97.3 °F (36.3 °C)  Pulse:  [73-80] 73  Resp:  [16-18] 16  SpO2:  [94 %-99 %] 94 %  BP: (120-169)/(60-93) 133/93     Weight: 81.4 kg (179 lb 7.3 oz)  Body mass index is 28.11 kg/m².    Physical Exam  Constitutional:       General: He is not in acute distress.     Appearance: He is well-developed.   HENT:      Head: Normocephalic and atraumatic.   Eyes:      Pupils: Pupils are equal, round, and reactive to light.   Neck:      Musculoskeletal: Neck supple.      Thyroid: No thyromegaly.      Vascular: No carotid  bruit.   Cardiovascular:      Rate and Rhythm: Normal rate and regular rhythm.      Heart sounds: No murmur. No gallop.    Pulmonary:      Effort: Pulmonary effort is normal. No respiratory distress.      Breath sounds: Normal breath sounds. No wheezing.   Abdominal:      General: Bowel sounds are normal. There is no distension.      Palpations: There is no hepatomegaly or splenomegaly.      Tenderness: There is no abdominal tenderness.   Musculoskeletal:      Comments: Bilateral UE rigidity   Skin:     General: Skin is warm and dry.      Findings: No rash.   Neurological:      Mental Status: He is lethargic and confused.      Cranial Nerves: No cranial nerve deficit.      Sensory: No sensory deficit.      Comments: Oriented to self only   Psychiatric:         Behavior: Behavior normal.         Cognition and Memory: Cognition is impaired. Memory is impaired.           CRANIAL NERVES     CN III, IV, VI   Pupils are equal, round, and reactive to light.       Significant Labs:   CBC:   Recent Labs   Lab 07/16/20  1437   WBC 10.36   HGB 11.3*   HCT 37.5*        CMP:   Recent Labs   Lab 07/16/20  1437      K 4.5      CO2 22*      BUN 22   CREATININE 1.8*   CALCIUM 8.8   PROT 7.2   ALBUMIN 3.8   BILITOT 0.7   ALKPHOS 86   AST 46*   ALT 23   ANIONGAP 10   EGFRNONAA 34.3*     Troponin:   Recent Labs   Lab 07/16/20  1437   TROPONINI 0.024     Urine Studies:   Recent Labs   Lab 07/16/20  1548   COLORU Yellow   APPEARANCEUA Clear   PHUR 6.0   SPECGRAV 1.015   PROTEINUA Negative   GLUCUA Negative   KETONESU Negative   BILIRUBINUA Negative   OCCULTUA 1+*   NITRITE Negative   LEUKOCYTESUR Negative   RBCUA 2   WBCUA 1   BACTERIA Rare   SQUAMEPITHEL 0       Significant Imaging: I have reviewed all pertinent imaging results/findings within the past 24 hours.    Assessment/Plan:     * Weakness  - CT head: no acute infarct or intracranial hemorrhage  - UA unremarkable for infection  - XR thoracic and lumbar  spine -- pending   - Consult    - Fall precautions, aspiration precautions, delirium precautions.  - See 6/9/2020 note for palliative consult.      Lewy body dementia  Parkinsonism    - Memantine 10mg BID    [facility]  - Minimize use of restraints; if physical restraints necessary, please utilize medical/chemical prns for agitation.  - Keep shades open and room lit during day and room dim at night in order to promote healthy circadian rhythms.  - Encourage family at bedside  - Keep whiteboard in patient's room current with the date and name of the members of patient's team for easy patient self re-orientation.  - Avoid benzodiazepines, antihistamines, anticholinergics, hypnotics, and minimize opiates while controlling for pain as these medications may exacerbate delirium.      Atrial fibrillation   - Amiodarone 100mg qd  - Patient is no longer on chronic anticoagulation, likely secondary to fall risk.      Ischemic cardiomyopathy LVEF ~45%  AICD present    - No evidence of decompensation.    Echo 1/2017    1 - Upper limit of normal left ventricular enlargement.     2 - Mildly to moderately depressed left ventricular systolic function (EF 40-45%).     3 - Eccentric hypertrophy.     4 - Normal left ventricular diastolic function.     5 - Normal right ventricular systolic function .     6 - Mild left atrial enlargement.     7 - Trivial to mild aortic regurgitation.     8 - Mild mitral regurgitation.     9 - Trivial tricuspid regurgitation.     10 - Trivial pulmonic regurgitation.     11 - The estimated PA systolic pressure is 33 mmHg.     12 - TDI as per text.       Hyperlipidemia with target LDL less than 70  - Atorvastatin 40mg qd      HTN (hypertension)  - Amlodipine 5mg qd, Lopressor 12.5mg BID        CAD (coronary artery disease)  History of PTCA - LAD, LCX and PDA PCI in 2006    - Continue secondary prevention, ASA daily.      VTE Risk Mitigation (From admission, onward)          Ordered     enoxaparin injection 40 mg  Every 24 hours      07/16/20 2307     Place MATTHIAS hose  Until discontinued      07/16/20 2307     IP VTE HIGH RISK PATIENT  Once      07/16/20 2307     Place sequential compression device  Until discontinued      07/16/20 2307                   Alda Shepherd PA-C  Department of Hospital Medicine   [facility]

## 2020-07-17 NOTE — HPI
"Mark Anthony Velarde Jr. Is an 81 yo M with a PMH including HTN, HLd, CAD s/p PTCA to LAD, LCX and PAD in 2006, ischemic cardiomyopathy EF ~ 45%, afib not currently anticoagulated, lewy body dementia and parkinsonism presents with generalized weakness per family. Patient was brought in by EMS who reports family called due to patient "being in bed all morning." Per ED triage notes, patient was slightly confused when he was brought in and oriented to person only, not oriented to place, situation, or time. Family reported baseline oriented to person and place.  ED nurse spoke with the daughter at 17:59 who reported the patient is normally continent and wakes up for breakfast in the morning. His wife stated that this morning she was woken up by the patient having bladder incontinence and while ambulating him to the bathroom she noticed bowel incontinence (diarrhea). Also noticed generalized weakness and lethargy stating the patient just wanted to sleep which is not usual for him.   Currently he is oriented to person only. He denies CP, SOB, NVD, abdominal pain.   "

## 2020-07-17 NOTE — ASSESSMENT & PLAN NOTE
Parkinsonism    - Memantine 10mg BID    [facility]  - Minimize use of restraints; if physical restraints necessary, please utilize medical/chemical prns for agitation.  - Keep shades open and room lit during day and room dim at night in order to promote healthy circadian rhythms.  - Encourage family at bedside  - Keep whiteboard in patient's room current with the date and name of the members of patient's team for easy patient self re-orientation.  - Avoid benzodiazepines, antihistamines, anticholinergics, hypnotics, and minimize opiates while controlling for pain as these medications may exacerbate delirium.

## 2020-07-17 NOTE — PLAN OF CARE
Patient discharging home with no needs.       07/17/20 1446   Final Note   Assessment Type Discharge Planning Assessment   Anticipated Discharge Disposition Home       Janie Rowley LMSW  [facility]  [id number]

## 2020-07-17 NOTE — SUBJECTIVE & OBJECTIVE
"Interval History: No reported events overnight, pt seen at bedside with daughter present, mentation back to baseline. Following commands, AOx2. Appropriately conversational. Notified by nursing BCx showing GPC 1/4 bottles. Repeat BCx drawn and Vanc started. Nursing does report episode of loose stool-- stool studies ordered    Review of Systems   Unable to perform ROS: Dementia     Objective:     Vital Signs (Most Recent):  Temp: 96.7 °F (35.9 °C) (07/17/20 1226)  Pulse: 64 (07/17/20 1226)  Resp: 17 (07/17/20 1226)  BP: (!) 130/58 (07/17/20 1226)  SpO2: 96 % (07/17/20 1226) Vital Signs (24h Range):  Temp:  [96.1 °F (35.6 °C)-99.5 °F (37.5 °C)] 96.7 °F (35.9 °C)  Pulse:  [64-76] 64  Resp:  [16-20] 17  SpO2:  [94 %-97 %] 96 %  BP: (114-134)/(57-93) 130/58     Weight: 81.4 kg (179 lb 7.3 oz)  Body mass index is 28.11 kg/m².  No intake or output data in the 24 hours ending 07/17/20 1604   Physical Exam  Constitutional:       General: He is not in acute distress.     Appearance: He is well-developed.   HENT:      Head: Normocephalic and atraumatic.   Eyes:      Extraocular Movements: Extraocular movements intact.   Neck:      Musculoskeletal: Neck supple.      Thyroid: No thyromegaly.   Cardiovascular:      Rate and Rhythm: Normal rate and regular rhythm.   Pulmonary:      Effort: Pulmonary effort is normal.      Breath sounds: Normal breath sounds.   Abdominal:      General: Bowel sounds are normal.      Palpations: Abdomen is soft. There is no hepatomegaly or splenomegaly.   Musculoskeletal:      Comments: Bilateral UE rigidity   Skin:     General: Skin is warm and dry.   Neurological:      Mental Status: He is confused.      Cranial Nerves: No cranial nerve deficit.      Sensory: No sensory deficit.      Comments: Oriented to self, when given multiple choice he is able to choose "hospital"  Family at bedside says pt is at baseline   Psychiatric:         Cognition and Memory: Cognition is impaired. Memory is impaired. "         Significant Labs: All pertinent labs within the past 24 hours have been reviewed.    Significant Imaging: I have reviewed all pertinent imaging results/findings within the past 24 hours.

## 2020-07-17 NOTE — ASSESSMENT & PLAN NOTE
- CT head: no acute infarct or intracranial hemorrhage  - UA unremarkable for infection  - XR thoracic and lumbar spine -- pending   - Consult    - Fall precautions, aspiration precautions, delirium precautions.  - See 6/9/2020 note for palliative consult.

## 2020-07-17 NOTE — ASSESSMENT & PLAN NOTE
- Amiodarone 100mg qd  - Patient is no longer on chronic anticoagulation, likely secondary to fall risk.

## 2020-07-17 NOTE — PLAN OF CARE
CM met with patient at the bedside to discuss discharge planning assessment. Pt lives with spouse and has transportation home at discharge.  Pt verified PCP and Pharmacy. CM will continue to follow for discharge needs.         07/17/20 1045   Discharge Assessment   Assessment Type Discharge Planning Assessment   Confirmed/corrected address and phone number on facesheet? Yes   Assessment information obtained from? Patient   Expected Length of Stay (days) 2   Communicated expected length of stay with patient/caregiver yes   Prior to hospitilization cognitive status: Alert/Oriented   Prior to hospitalization functional status: Independent   Current cognitive status: Alert/Oriented   Current Functional Status: Independent   Lives With spouse   Able to Return to Prior Arrangements yes   Is patient able to care for self after discharge? Yes   Patient's perception of discharge disposition home or selfcare   Readmission Within the Last 30 Days no previous admission in last 30 days   Patient currently being followed by outpatient case management? No   Patient currently receives any other outside agency services? No   Equipment Currently Used at Home none   Do you have any problems affording any of your prescribed medications? No   Is the patient taking medications as prescribed? yes   Does the patient have transportation home? Yes   Transportation Anticipated family or friend will provide   Does the patient receive services at the [facility]? No   Discharge Plan A Home with family   Discharge Plan B Home Health   DME Needed Upon Discharge  none   Patient/Family in Agreement with Plan yes

## 2020-07-17 NOTE — PLAN OF CARE
Pt AAO x1; self only. Pt NAD; respirations 18, even, and unlabored. Pt skin warm, dry, and intact; bruising present on BUE. Pt VSS. Pt calm and cooperative.Pt IV accesses in Bilateral hands flush without difficulty. Pt denies any needs. Delirium precautions implemented and safety precautions implemented.Pt slept throughout the night. Pt given jenny light within reach, bed wheels locked, bed alarm set, SR up x 2. Will continue to monitor.

## 2020-07-17 NOTE — SUBJECTIVE & OBJECTIVE
Past Medical History:   Diagnosis Date    *Atrial fibrillation     Anticoagulant long-term use     Atrial fibrillation - asymptomatic but noted on ICD interrogation ([id number]) - 16 h 40 minutes of afib 2/13/2013    Atrial flutter with controlled response - seen on ICD interrogation - asymptomatic 2/13/2013    Automatic implantable cardioverter-defibrillator in situ 2/13/2013    Basal cell carcinoma     mid forehead    CAD (coronary artery disease) 2/13/2013    Cardiomyopathy     Cataract     Cognitive deficits     mild    H/O syncope -  5/27/2013    History of PTCA - LAD, LCX and PDA PCI in 2006 2/13/2013    HTN (hypertension) 2/13/2013    Hyperlipidemia LDL goal < 70 2/13/2013    Hypertension     ICD (implantable cardiac defibrillator) in place 2/13/2013    Ischemic cardiomyopathy LVEF ~45% 2/13/2013    LBBB (left bundle branch block) 2/13/2013    Memory loss     Psoriasis vulgaris     Syncope and collapse        Past Surgical History:   Procedure Laterality Date    CARDIAC DEFIBRILLATOR PLACEMENT      CATARACT EXTRACTION W/  INTRAOCULAR LENS IMPLANT Right 04/04/16    Dr Michael     CATARACT EXTRACTION W/  INTRAOCULAR LENS IMPLANT Left 05/09/2016    SKIN BIOPSY      TONSILLECTOMY         Review of patient's allergies indicates:   Allergen Reactions    Arb-angiotensin receptor antagonist Other (See Comments)     Hyperkalemia    Lisinopril Diarrhea       No current facility-administered medications on file prior to encounter.      Current Outpatient Medications on File Prior to Encounter   Medication Sig    amiodarone (PACERONE) 100 MG Tab TAKE 1 TABLET BY MOUTH EVERY DAY    amiodarone (PACERONE) 200 MG Tab Take 0.5 tablets (100 mg total) by mouth once daily.    amLODIPine (NORVASC) 5 MG tablet Take 1 tablet (5 mg total) by mouth once daily.    aspirin (ECOTRIN) 81 MG EC tablet Take 81 mg by mouth once daily.    atorvastatin (LIPITOR) 40 MG tablet Take 1 tablet (40 mg total) by mouth  once daily.    dutasteride (AVODART) 0.5 mg capsule Take 1 capsule (0.5 mg total) by mouth once daily.    FLUZONE HIGH-DOSE , PF, 180 mcg/0.5 mL Syrg ADM 0.5ML IM UTD    memantine (NAMENDA) 10 MG Tab Take 1 tablet (10 mg total) by mouth 2 (two) times daily.    metoprolol tartrate (LOPRESSOR) 25 MG tablet Take 0.5 tablets (12.5 mg total) by mouth 2 (two) times daily.    multivitamin (MULTIVITAMIN) per tablet Take 1 tablet by mouth once daily.    omeprazole (PRILOSEC) 20 MG capsule Take 1 capsule (20 mg total) by mouth once daily.    tamsulosin (FLOMAX) 0.4 mg Cap Take 1 capsule (0.4 mg total) by mouth every evening.     Family History     Problem Relation (Age of Onset)    Cancer Mother    Cataracts Mother    Dementia Father    Diabetes Maternal Grandmother    Kidney disease Sister    No Known Problems Brother, Maternal Aunt, Maternal Uncle, Paternal Aunt, Paternal Uncle, Maternal Grandfather, Paternal Grandmother, Paternal Grandfather    Psoriasis Father, Son, Son    Tremor Father        Tobacco Use    Smoking status: Former Smoker     Quit date: 1963     Years since quittin.8    Smokeless tobacco: Never Used   Substance and Sexual Activity    Alcohol use: No     Frequency: Monthly or less     Drinks per session: 1 or 2     Binge frequency: Never    Drug use: No    Sexual activity: Not on file     Review of Systems   Unable to perform ROS: Dementia     Objective:     Vital Signs (Most Recent):  Temp: 97.3 °F (36.3 °C) (20)  Pulse: 73 (20)  Resp: 16 (20)  BP: (!) 133/93 (20)  SpO2: (!) 94 % (20) Vital Signs (24h Range):  Temp:  [97.3 °F (36.3 °C)-99.5 °F (37.5 °C)] 97.3 °F (36.3 °C)  Pulse:  [73-80] 73  Resp:  [16-18] 16  SpO2:  [94 %-99 %] 94 %  BP: (120-169)/(60-93) 133/93     Weight: 81.4 kg (179 lb 7.3 oz)  Body mass index is 28.11 kg/m².    Physical Exam  Constitutional:       General: He is not in acute distress.     Appearance:  He is well-developed.   HENT:      Head: Normocephalic and atraumatic.   Eyes:      Pupils: Pupils are equal, round, and reactive to light.   Neck:      Musculoskeletal: Neck supple.      Thyroid: No thyromegaly.      Vascular: No carotid bruit.   Cardiovascular:      Rate and Rhythm: Normal rate and regular rhythm.      Heart sounds: No murmur. No gallop.    Pulmonary:      Effort: Pulmonary effort is normal. No respiratory distress.      Breath sounds: Normal breath sounds. No wheezing.   Abdominal:      General: Bowel sounds are normal. There is no distension.      Palpations: There is no hepatomegaly or splenomegaly.      Tenderness: There is no abdominal tenderness.   Musculoskeletal:      Comments: Bilateral UE rigidity   Skin:     General: Skin is warm and dry.      Findings: No rash.   Neurological:      Mental Status: He is lethargic and confused.      Cranial Nerves: No cranial nerve deficit.      Sensory: No sensory deficit.      Comments: Oriented to self only   Psychiatric:         Behavior: Behavior normal.         Cognition and Memory: Cognition is impaired. Memory is impaired.           CRANIAL NERVES     CN III, IV, VI   Pupils are equal, round, and reactive to light.       Significant Labs:   CBC:   Recent Labs   Lab 07/16/20  1437   WBC 10.36   HGB 11.3*   HCT 37.5*        CMP:   Recent Labs   Lab 07/16/20  1437      K 4.5      CO2 22*      BUN 22   CREATININE 1.8*   CALCIUM 8.8   PROT 7.2   ALBUMIN 3.8   BILITOT 0.7   ALKPHOS 86   AST 46*   ALT 23   ANIONGAP 10   EGFRNONAA 34.3*     Troponin:   Recent Labs   Lab 07/16/20  1437   TROPONINI 0.024     Urine Studies:   Recent Labs   Lab 07/16/20  1548   COLORU Yellow   APPEARANCEUA Clear   PHUR 6.0   SPECGRAV 1.015   PROTEINUA Negative   GLUCUA Negative   KETONESU Negative   BILIRUBINUA Negative   OCCULTUA 1+*   NITRITE Negative   LEUKOCYTESUR Negative   RBCUA 2   WBCUA 1   BACTERIA Rare   SQUAMEPITHEL 0       Significant  Imaging: I have reviewed all pertinent imaging results/findings within the past 24 hours.

## 2020-07-18 LAB
ALBUMIN SERPL BCP-MCNC: 3.3 G/DL (ref 3.5–5.2)
ALP SERPL-CCNC: 82 U/L (ref 55–135)
ALT SERPL W/O P-5'-P-CCNC: 35 U/L (ref 10–44)
ANION GAP SERPL CALC-SCNC: 11 MMOL/L (ref 8–16)
ASCENDING AORTA: 2.9 CM
AST SERPL-CCNC: 58 U/L (ref 10–40)
AV MEAN GRADIENT: 3 MMHG
AV PEAK GRADIENT: 4 MMHG
AV VELOCITY RATIO: 0.65
BACTERIA BLD CULT: ABNORMAL
BASOPHILS # BLD AUTO: 0.03 K/UL (ref 0–0.2)
BASOPHILS NFR BLD: 0.4 % (ref 0–1.9)
BILIRUB SERPL-MCNC: 0.6 MG/DL (ref 0.1–1)
BSA FOR ECHO PROCEDURE: 1.93 M2
BUN SERPL-MCNC: 25 MG/DL (ref 8–23)
CALCIUM SERPL-MCNC: 8.1 MG/DL (ref 8.7–10.5)
CHLORIDE SERPL-SCNC: 107 MMOL/L (ref 95–110)
CO2 SERPL-SCNC: 19 MMOL/L (ref 23–29)
CREAT SERPL-MCNC: 1.8 MG/DL (ref 0.5–1.4)
CV ECHO LV RWT: 0.33 CM
DIFFERENTIAL METHOD: ABNORMAL
DOP CALC AO PEAK VEL: 1.02 M/S
DOP CALC AO VTI: 21.75 CM
DOP CALC LVOT AREA: 3.2 CM2
DOP CALC LVOT DIAMETER: 2.03 CM
DOP CALC LVOT PEAK VEL: 0.66 M/S
E WAVE DECELERATION TIME: 181.23 MSEC
E/A RATIO: 1.4
E/E' RATIO: 13.57 M/S
ECHO LV POSTERIOR WALL: 0.79 CM (ref 0.6–1.1)
EOSINOPHIL # BLD AUTO: 0.1 K/UL (ref 0–0.5)
EOSINOPHIL NFR BLD: 0.8 % (ref 0–8)
ERYTHROCYTE [DISTWIDTH] IN BLOOD BY AUTOMATED COUNT: 18.5 % (ref 11.5–14.5)
EST. GFR  (AFRICAN AMERICAN): 39.6 ML/MIN/1.73 M^2
EST. GFR  (NON AFRICAN AMERICAN): 34.3 ML/MIN/1.73 M^2
FRACTIONAL SHORTENING: 22 % (ref 28–44)
GLUCOSE SERPL-MCNC: 183 MG/DL (ref 70–110)
HCT VFR BLD AUTO: 33.7 % (ref 40–54)
HGB BLD-MCNC: 10.5 G/DL (ref 14–18)
IMM GRANULOCYTES # BLD AUTO: 0.02 K/UL (ref 0–0.04)
IMM GRANULOCYTES NFR BLD AUTO: 0.3 % (ref 0–0.5)
INTERVENTRICULAR SEPTUM: 0.88 CM (ref 0.6–1.1)
IVRT: 140.82 MSEC
LA MAJOR: 6.18 CM
LA MINOR: 6.07 CM
LA WIDTH: 4.47 CM
LEFT ATRIUM SIZE: 4.05 CM
LEFT ATRIUM VOLUME INDEX: 49.6 ML/M2
LEFT ATRIUM VOLUME: 94.24 CM3
LEFT INTERNAL DIMENSION IN SYSTOLE: 3.69 CM (ref 2.1–4)
LEFT VENTRICLE DIASTOLIC VOLUME INDEX: 54.97 ML/M2
LEFT VENTRICLE DIASTOLIC VOLUME: 104.51 ML
LEFT VENTRICLE MASS INDEX: 69 G/M2
LEFT VENTRICLE SYSTOLIC VOLUME INDEX: 30.3 ML/M2
LEFT VENTRICLE SYSTOLIC VOLUME: 57.61 ML
LEFT VENTRICULAR INTERNAL DIMENSION IN DIASTOLE: 4.74 CM (ref 3.5–6)
LEFT VENTRICULAR MASS: 131.12 G
LV LATERAL E/E' RATIO: 13.57 M/S
LV SEPTAL E/E' RATIO: 13.57 M/S
LYMPHOCYTES # BLD AUTO: 0.5 K/UL (ref 1–4.8)
LYMPHOCYTES NFR BLD: 6 % (ref 18–48)
MCH RBC QN AUTO: 28.5 PG (ref 27–31)
MCHC RBC AUTO-ENTMCNC: 31.2 G/DL (ref 32–36)
MCV RBC AUTO: 92 FL (ref 82–98)
MONOCYTES # BLD AUTO: 0.5 K/UL (ref 0.3–1)
MONOCYTES NFR BLD: 6.3 % (ref 4–15)
MV PEAK A VEL: 0.68 M/S
MV PEAK E VEL: 0.95 M/S
MV STENOSIS PRESSURE HALF TIME: 52.56 MS
MV VALVE AREA P 1/2 METHOD: 4.19 CM2
NEUTROPHILS # BLD AUTO: 6.5 K/UL (ref 1.8–7.7)
NEUTROPHILS NFR BLD: 86.2 % (ref 38–73)
NRBC BLD-RTO: 0 /100 WBC
PISA TR MAX VEL: 2.49 M/S
PLATELET # BLD AUTO: 163 K/UL (ref 150–350)
PMV BLD AUTO: 10.3 FL (ref 9.2–12.9)
POTASSIUM SERPL-SCNC: 3.9 MMOL/L (ref 3.5–5.1)
PROT SERPL-MCNC: 6.6 G/DL (ref 6–8.4)
RA MAJOR: 4.62 CM
RA PRESSURE: 3 MMHG
RA WIDTH: 3.63 CM
RBC # BLD AUTO: 3.68 M/UL (ref 4.6–6.2)
RIGHT VENTRICULAR END-DIASTOLIC DIMENSION: 3.1 CM
RV AG STL QL IA.RAPID: NEGATIVE
RV TISSUE DOPPLER FREE WALL SYSTOLIC VELOCITY 1 (APICAL 4 CHAMBER VIEW): 11.39 CM/S
SINUS: 3.01 CM
SODIUM SERPL-SCNC: 137 MMOL/L (ref 136–145)
STJ: 2.64 CM
TDI LATERAL: 0.07 M/S
TDI SEPTAL: 0.07 M/S
TDI: 0.07 M/S
TR MAX PG: 25 MMHG
TRICUSPID ANNULAR PLANE SYSTOLIC EXCURSION: 1.49 CM
TV REST PULMONARY ARTERY PRESSURE: 28 MMHG
WBC # BLD AUTO: 7.48 K/UL (ref 3.9–12.7)

## 2020-07-18 PROCEDURE — 97535 SELF CARE MNGMENT TRAINING: CPT

## 2020-07-18 PROCEDURE — 87209 SMEAR COMPLEX STAIN: CPT

## 2020-07-18 PROCEDURE — 99226 PR SUBSEQUENT OBSERVATION CARE,LEVEL III: CPT | Mod: ,,, | Performed by: PHYSICIAN ASSISTANT

## 2020-07-18 PROCEDURE — 87040 BLOOD CULTURE FOR BACTERIA: CPT

## 2020-07-18 PROCEDURE — G0378 HOSPITAL OBSERVATION PER HR: HCPCS

## 2020-07-18 PROCEDURE — 80053 COMPREHEN METABOLIC PANEL: CPT

## 2020-07-18 PROCEDURE — 63600175 PHARM REV CODE 636 W HCPCS: Performed by: PHYSICIAN ASSISTANT

## 2020-07-18 PROCEDURE — 11000001 HC ACUTE MED/SURG PRIVATE ROOM

## 2020-07-18 PROCEDURE — 96366 THER/PROPH/DIAG IV INF ADDON: CPT

## 2020-07-18 PROCEDURE — 96372 THER/PROPH/DIAG INJ SC/IM: CPT

## 2020-07-18 PROCEDURE — 87449 NOS EACH ORGANISM AG IA: CPT

## 2020-07-18 PROCEDURE — 92610 EVALUATE SWALLOWING FUNCTION: CPT

## 2020-07-18 PROCEDURE — 99226 PR SUBSEQUENT OBSERVATION CARE,LEVEL III: ICD-10-PCS | Mod: ,,, | Performed by: PHYSICIAN ASSISTANT

## 2020-07-18 PROCEDURE — 85025 COMPLETE CBC W/AUTO DIFF WBC: CPT

## 2020-07-18 PROCEDURE — 25000003 PHARM REV CODE 250: Performed by: PHYSICIAN ASSISTANT

## 2020-07-18 PROCEDURE — 63600175 PHARM REV CODE 636 W HCPCS: Performed by: HOSPITALIST

## 2020-07-18 PROCEDURE — 87324 CLOSTRIDIUM AG IA: CPT

## 2020-07-18 RX ADMIN — METOPROLOL TARTRATE 12.5 MG: 25 TABLET, FILM COATED ORAL at 10:07

## 2020-07-18 RX ADMIN — METOPROLOL TARTRATE 12.5 MG: 25 TABLET, FILM COATED ORAL at 07:07

## 2020-07-18 RX ADMIN — PANTOPRAZOLE SODIUM 40 MG: 40 TABLET, DELAYED RELEASE ORAL at 07:07

## 2020-07-18 RX ADMIN — ASPIRIN 81 MG: 81 TABLET, COATED ORAL at 07:07

## 2020-07-18 RX ADMIN — AMLODIPINE BESYLATE 5 MG: 5 TABLET ORAL at 07:07

## 2020-07-18 RX ADMIN — VANCOMYCIN HYDROCHLORIDE 1250 MG: 1.25 INJECTION, POWDER, LYOPHILIZED, FOR SOLUTION INTRAVENOUS at 04:07

## 2020-07-18 RX ADMIN — ACETAMINOPHEN 650 MG: 325 TABLET ORAL at 12:07

## 2020-07-18 RX ADMIN — TAMSULOSIN HYDROCHLORIDE 0.4 MG: 0.4 CAPSULE ORAL at 10:07

## 2020-07-18 RX ADMIN — AMIODARONE HYDROCHLORIDE 100 MG: 100 TABLET ORAL at 09:07

## 2020-07-18 RX ADMIN — ENOXAPARIN SODIUM 40 MG: 100 INJECTION SUBCUTANEOUS at 04:07

## 2020-07-18 RX ADMIN — MEMANTINE HYDROCHLORIDE 10 MG: 10 TABLET ORAL at 10:07

## 2020-07-18 RX ADMIN — DUTASTERIDE 0.5 MG: 0.5 CAPSULE, LIQUID FILLED ORAL at 07:07

## 2020-07-18 RX ADMIN — MEMANTINE HYDROCHLORIDE 10 MG: 10 TABLET ORAL at 07:07

## 2020-07-18 RX ADMIN — ATORVASTATIN CALCIUM 40 MG: 40 TABLET, FILM COATED ORAL at 07:07

## 2020-07-18 RX ADMIN — THERA TABS 1 TABLET: TAB at 07:07

## 2020-07-18 NOTE — ASSESSMENT & PLAN NOTE
- Amlodipine 5mg qd, Lopressor 12.5mg BID  BP stable, will monitor  7/18: hold norvasc in setting of low Bps, continue MTP for now as pt seems to have hx afib rvr episodes

## 2020-07-18 NOTE — ASSESSMENT & PLAN NOTE
80 year old male admitted to observation for weakness. >> bacteremia? Unclear source?   - CTH no acute infarct or intracranial hemorrhage  - UA unremarkable for infection  - CXR without signs of infection  - XR thoracic and lumbar spine -- no acute fractures  - Consult    - Fall precautions, aspiration precautions, delirium precautions.  - See 6/9/2020 note for palliative consult.  - BCx2 were ordered - 1 BCx grew Gram positive cocci in clusters resembling Staph> follow sensitivities  7/17-18:   - Repeat BCx ordered : NGTD  - Started on IV Vancomycin 7/17, pharmacy consulted  - pt febrile overnight 7/18  - pt with PPM/AICD: TTE ordered  - Stool Studies ordered (nursing reports episode of loose stool)

## 2020-07-18 NOTE — SUBJECTIVE & OBJECTIVE
Interval History: Overnight patient febrile 101.6F. BCx from admit showing 1 bottle growing GPC resembling staph, continue Vanc. Pt seen at bedside this AM, more confused than prior exam but no focal neuro deficits. TTE ordered. Stool studies pending    Review of Systems   Unable to perform ROS: Dementia     Objective:     Vital Signs (Most Recent):  Temp: 99.5 °F (37.5 °C) (07/18/20 0733)  Pulse: 73 (07/18/20 0733)  Resp: 20 (07/18/20 0733)  BP: (!) 102/56 (07/18/20 0733)  SpO2: (!) 92 % (07/18/20 1040) Vital Signs (24h Range):  Temp:  [96.7 °F (35.9 °C)-101.6 °F (38.7 °C)] 99.5 °F (37.5 °C)  Pulse:  [64-86] 73  Resp:  [17-20] 20  SpO2:  [90 %-96 %] 92 %  BP: (102-141)/(56-72) 102/56     Weight: 78.8 kg (173 lb 11.6 oz)  Body mass index is 27.21 kg/m².    Intake/Output Summary (Last 24 hours) at 7/18/2020 1155  Last data filed at 7/18/2020 1000  Gross per 24 hour   Intake 370 ml   Output --   Net 370 ml      Physical Exam  Constitutional:       General: He is not in acute distress.     Appearance: He is well-developed. He is not ill-appearing.   HENT:      Head: Normocephalic and atraumatic.   Neck:      Musculoskeletal: Neck supple.      Thyroid: No thyromegaly.   Cardiovascular:      Rate and Rhythm: Normal rate and regular rhythm.   Pulmonary:      Effort: Pulmonary effort is normal. No respiratory distress.      Breath sounds: Normal breath sounds.   Abdominal:      General: Bowel sounds are normal. There is no distension.      Palpations: Abdomen is soft. There is no hepatomegaly or splenomegaly.      Tenderness: There is no abdominal tenderness.   Musculoskeletal:      Comments: Bilateral UE rigidity   Skin:     General: Skin is warm and dry.   Neurological:      General: No focal deficit present.      Mental Status: He is disoriented and confused.      Cranial Nerves: No cranial nerve deficit.      Sensory: No sensory deficit.      Comments: Only oriented to self this AM, follows simple commands; unsure of  birthday and place   Psychiatric:         Cognition and Memory: Cognition is impaired. Memory is impaired.         Significant Labs: All pertinent labs within the past 24 hours have been reviewed.    Significant Imaging: I have reviewed all pertinent imaging results/findings within the past 24 hours.

## 2020-07-18 NOTE — PLAN OF CARE
Problem: SLP Goal  Goal: SLP Goal  Description: Speech Language Pathology Goals  Goals expected to be met by 7/25/20    1. Pt will tolerate a regular diet with thin liquids w/o overt S/S aspiration, MIN A  2. Educate Pt and family on aspiration precautions and SLP POC      Outcome: Ongoing, Progressing     SLP Bedside Swallow Study completed. Pt presents with risk of aspiration 2/2 decreased attention. He requires 1:1 assistance with all PO 2/2 decreased UE coordination. REC: continue current., regular diet with thin liquids with medications whole one at a time ok, provided strict 1:1 assistance with all PO. ST to f/u to monitor.     CHRISTIAN Carroll., [facility]-SLP  Speech-Language Pathology  Pager: [phone]  7/18/2020

## 2020-07-18 NOTE — PROGRESS NOTES
"[facility] Hospital Medicine  Progress Note    Patient Name: Mark Anthony Velarde Jr.  MRN: [id number]  Patient Class: OP- Observation   Admission Date: 7/16/2020  Length of Stay: 0 days  Attending Physician: Jason Blanc MD  Primary Care Provider: Jeff Yan MD    [facility] Medicine Team: [facility] Cheri Gupta PA-C    Subjective:     Principal Problem:Weakness        HPI:  Mark Anthony Velarde Jr. Is an 79 yo M with a PMH including HTN, HLd, CAD s/p PTCA to LAD, LCX and PAD in 2006, ischemic cardiomyopathy EF ~ 45%, afib not currently anticoagulated, lewy body dementia and parkinsonism presents with generalized weakness per family. Patient was brought in by EMS who reports family called due to patient "being in bed all morning." Per ED triage notes, patient was slightly confused when he was brought in and oriented to person only, not oriented to place, situation, or time. Family reported baseline oriented to person and place.  ED nurse spoke with the daughter at 17:59 who reported the patient is normally continent and wakes up for breakfast in the morning. His wife stated that this morning she was woken up by the patient having bladder incontinence and while ambulating him to the bathroom she noticed bowel incontinence (diarrhea). Also noticed generalized weakness and lethargy stating the patient just wanted to sleep which is not usual for him.   Currently he is oriented to person only. He denies CP, SOB, NVD, abdominal pain.     Overview/Hospital Course:  Patient with Lewy Body Dementia admitted for weakness/altered baseline. On admission 0/4 SIRS. UA and CXR without signs of infection. COVID negative. CTH no acute infarct or bleed seen. CT cervical spine no acute fracture. XRAY of lumbar and thoracic spine no acute fracture. BCx2 were collected, 1 BCx showed GPC in clusters resembling Staph. Repeat BCx were obtained, and patient was started on IV Vancomycin, pharmacy consulted. Continue to " follow Bcx. TTE ordered given patient with AICD/PPM. Stool studies pending.    Interval History: Overnight patient febrile 101.6F. BCx from admit showing 1 bottle growing GPC resembling staph, continue Vanc. Pt seen at bedside this AM, more confused than prior exam but no focal neuro deficits. TTE ordered. Stool studies pending    Review of Systems   Unable to perform ROS: Dementia     Objective:     Vital Signs (Most Recent):  Temp: 99.5 °F (37.5 °C) (07/18/20 0733)  Pulse: 73 (07/18/20 0733)  Resp: 20 (07/18/20 0733)  BP: (!) 102/56 (07/18/20 0733)  SpO2: (!) 92 % (07/18/20 1040) Vital Signs (24h Range):  Temp:  [96.7 °F (35.9 °C)-101.6 °F (38.7 °C)] 99.5 °F (37.5 °C)  Pulse:  [64-86] 73  Resp:  [17-20] 20  SpO2:  [90 %-96 %] 92 %  BP: (102-141)/(56-72) 102/56     Weight: 78.8 kg (173 lb 11.6 oz)  Body mass index is 27.21 kg/m².    Intake/Output Summary (Last 24 hours) at 7/18/2020 1155  Last data filed at 7/18/2020 1000  Gross per 24 hour   Intake 370 ml   Output --   Net 370 ml      Physical Exam  Constitutional:       General: He is not in acute distress.     Appearance: He is well-developed. He is not ill-appearing.   HENT:      Head: Normocephalic and atraumatic.   Neck:      Musculoskeletal: Neck supple.      Thyroid: No thyromegaly.   Cardiovascular:      Rate and Rhythm: Normal rate and regular rhythm.   Pulmonary:      Effort: Pulmonary effort is normal. No respiratory distress.      Breath sounds: Normal breath sounds.   Abdominal:      General: Bowel sounds are normal. There is no distension.      Palpations: Abdomen is soft. There is no hepatomegaly or splenomegaly.      Tenderness: There is no abdominal tenderness.   Musculoskeletal:      Comments: Bilateral UE rigidity   Skin:     General: Skin is warm and dry.   Neurological:      General: No focal deficit present.      Mental Status: He is disoriented and confused.      Cranial Nerves: No cranial nerve deficit.      Sensory: No sensory deficit.       Comments: Only oriented to self this AM, follows simple commands; unsure of birthday and place   Psychiatric:         Cognition and Memory: Cognition is impaired. Memory is impaired.         Significant Labs: All pertinent labs within the past 24 hours have been reviewed.    Significant Imaging: I have reviewed all pertinent imaging results/findings within the past 24 hours.      Assessment/Plan:      * Weakness  80 year old male admitted to observation for weakness. >> bacteremia? Unclear source?   - CTH no acute infarct or intracranial hemorrhage  - UA unremarkable for infection  - CXR without signs of infection  - XR thoracic and lumbar spine -- no acute fractures  - Consult    - Fall precautions, aspiration precautions, delirium precautions.  - See 6/9/2020 note for palliative consult.  - BCx2 were ordered - 1 BCx grew Gram positive cocci in clusters resembling Staph> follow sensitivities  7/17-18:   - Repeat BCx ordered : NGTD  - Started on IV Vancomycin 7/17, pharmacy consulted  - pt febrile overnight 7/18  - pt with PPM/AICD: TTE ordered  - Stool Studies ordered (nursing reports episode of loose stool)    Lewy body dementia  Parkinsonism    - Memantine 10mg BID    DELIRIUM BEHAVIOR MANAGEMENT  - Minimize use of restraints; if physical restraints necessary, please utilize medical/chemical prns for agitation.  - Keep shades open and room lit during day and room dim at night in order to promote healthy circadian rhythms.  - Encourage family at bedside  - Keep whiteboard in patient's room current with the date and name of the members of patient's team for easy patient self re-orientation.  - Avoid benzodiazepines, antihistamines, anticholinergics, hypnotics, and minimize opiates while controlling for pain as these medications may exacerbate delirium.    Atrial fibrillation   - Amiodarone 100mg qd; MTP 12.5mg   - Patient is no longer on chronic anticoagulation, likely secondary to fall  risk.    Ischemic cardiomyopathy LVEF ~45%  AICD present    - No evidence of decompensation.    Echo 1/2017    1 - Upper limit of normal left ventricular enlargement.     2 - Mildly to moderately depressed left ventricular systolic function (EF 40-45%).     3 - Eccentric hypertrophy.     4 - Normal left ventricular diastolic function.     5 - Normal right ventricular systolic function .     6 - Mild left atrial enlargement.     7 - Trivial to mild aortic regurgitation.     8 - Mild mitral regurgitation.     9 - Trivial tricuspid regurgitation.     10 - Trivial pulmonic regurgitation.     11 - The estimated PA systolic pressure is 33 mmHg.     12 - TDI as per text.     Hyperlipidemia with target LDL less than 70  - Atorvastatin 40mg qd    HTN (hypertension)  - Amlodipine 5mg qd, Lopressor 12.5mg BID  BP stable, will monitor  7/18: hold norvasc in setting of low Bps, continue MTP for now as pt seems to have hx afib rvr episodes    CAD (coronary artery disease)  History of PTCA - LAD, LCX and PDA PCI in 2006    - Continue secondary prevention, ASA daily.      VTE Risk Mitigation (From admission, onward)         Ordered     enoxaparin injection 40 mg  Every 24 hours      07/16/20 2307     Place MATTHIAS hose  Until discontinued      07/16/20 2307     IP VTE HIGH RISK PATIENT  Once      07/16/20 2307     Place sequential compression device  Until discontinued      07/16/20 2307                    Discussed with staff  Cheri Gupta PA-C  Department of Hospital Medicine   [facility]

## 2020-07-18 NOTE — NURSING
Weepy and disoriented this morning, slightly more alert in afternoon, waxing/waning unchanged per pt's daughter. VSS. Tolerating diet, took meds w/o incident. Incontinent at times, +BM, care provided. Bed alarm going off in PM -found pt's daughter attempting to get him OOB w/o assist. Told daughter that ambulation was unsafe w/o max assist but she insisted he get up anyway to go to the bathroom. Once x2 RN/PCT present, pt OOB to/from bathroom x2 MAX assist s/t weak, unsteady, shuffling gait. Pt needed a LOT of instruction to get from bed to bathroom and back again, once sitting on edge of bed did not have enough trunk support to sit upright without falling backwards. Repositioned patient and replaced bed alarm. Instructed daughter & pt to call for help and use call bell if wanting to get OOB; indicated understanding. Safety maintained. See flowsheet for detail.

## 2020-07-18 NOTE — ASSESSMENT & PLAN NOTE
- Amiodarone 100mg qd; MTP 12.5mg   - Patient is no longer on chronic anticoagulation, likely secondary to fall risk.

## 2020-07-18 NOTE — PT/OT/SLP EVAL
Speech Language Pathology Evaluation  Bedside Swallow    Patient Name:  Mark Anthony Velarde Jr.   MRN:  [id number]  Admitting Diagnosis: Weakness. Diagnoses of Incontinence of feces, unspecified fecal incontinence type, Fall, and Bacteremia were also pertinent to this visit.    Recommendations:                 General Recommendations:  ST to follow up to monitor tolerance, educate Pt and family on aspiration precautions   Diet recommendations:  Regular, Thin   Aspiration Precautions: 1 bite/sip at a time, Assistance with meals, Eliminate distractions, Feed only when awake/alert, Frequent oral care, HOB to 90 degrees, Meds whole 1 at a time, Remain upright 30 minutes post meal, Small bites/sips and Strict aspiration precautions   General Precautions: Standard, aspiration, fall  Communication strategies:  provide increased time to answer and go to room if call light pushed    History:     Past Medical History:   Diagnosis Date    *Atrial fibrillation     Anticoagulant long-term use     Atrial fibrillation - asymptomatic but noted on ICD interrogation (5-02-20121) - 16 h 40 minutes of afib 2/13/2013    Atrial flutter with controlled response - seen on ICD interrogation - asymptomatic 2/13/2013    Automatic implantable cardioverter-defibrillator in situ 2/13/2013    Basal cell carcinoma     mid forehead    CAD (coronary artery disease) 2/13/2013    Cardiomyopathy     Cataract     Cognitive deficits     mild    H/O syncope -  5/27/2013    History of PTCA - LAD, LCX and PDA PCI in 2006 2/13/2013    HTN (hypertension) 2/13/2013    Hyperlipidemia LDL goal < 70 2/13/2013    Hypertension     ICD (implantable cardiac defibrillator) in place 2/13/2013    Ischemic cardiomyopathy LVEF ~45% 2/13/2013    LBBB (left bundle branch block) 2/13/2013    Memory loss     Psoriasis vulgaris     Syncope and collapse        Past Surgical History:   Procedure Laterality Date    CARDIAC DEFIBRILLATOR PLACEMENT      CATARACT  "EXTRACTION W/  INTRAOCULAR LENS IMPLANT Right 04/04/16    Dr Michael     CATARACT EXTRACTION W/  INTRAOCULAR LENS IMPLANT Left 05/09/2016    SKIN BIOPSY      TONSILLECTOMY         Social History: Patient lives with his Spouse in home in [place], [place].    Prior Intubation HX:  None this admission    Chest X-Rays: 7/16/20: No acute process seen.    Prior diet: regular and thin    Subjective     SLP reviewed Pt with RN, RN reported Pt tolerating medications, did not eat breakfast  Pt presents calm. Cooperative  He explains, "That would be nice"  Daughter explains, "I've been feeding him here with his tremor"    Pain/Comfort:  · Pain Rating 1: 0/10    Objective:     Oral Musculature Evaluation  · Oral Musculature: general weakness, right weakness  · Dentition: present and adequate  · Secretion Management: adequate  · Mucosal Quality: adequate  · Oral Labial Strength and Mobility: functional seal  · Lingual Strength and Mobility: functional protrusion, functional lateral movement  · Volitional Cough: present  · Volitional Swallow: elicited  · Voice Prior to PO Intake: clear, adequate intensity    Bedside Swallow Eval:   Consistencies Assessed:  · Thin liquids : straw sips single x4, continuous x1  · Solids : bites of cookie x2     Oral Phase:   · WNL    Pharyngeal Phase:   · no overt clinical signs/symptoms of aspiration    Compensatory Strategies  · minimal distractions     Treatment: Pt found awake in bed with Daughter at bedside. Pt was alert and oriented x2. SLP noted completed lunch meal tray for regular diet with thin liquids at bedside. Pt with decreased command following 2/2 underlying dementia and minimal verbal interaction with ST. Daughter reported Pt ate regular foods (sandwiches, toast, pasta) at home. Daughter further explained Pt with good appetite. No overt S?S aspiration with trials of solids or thin liquids. SLP educated Pt and Daughter on definition, risks and overt clinical signs of aspiration, " implementation of standard aspiration precautions, and SLP POC.  Pt with minimal verbal understanding 2/2 underlying cognition while Daughter cerbalized understanding of education provided and was agreement with SLP POC. No questions noted. White board updated. Patient remained upright in bed with call light in reach and Daughter in room upon SLP exit from room.     Assessment:     Mark Anthony Velarde Jr. is a 80 y.o. male with an SLP diagnosis of risk of aspiration.  He presents with lethargy and decreased sustianed attention. 1:1 assistance with all PO is required for safety.    Goals:   Multidisciplinary Problems     SLP Goals        Problem: SLP Goal    Goal Priority Disciplines Outcome   SLP Goal     SLP Ongoing, Progressing   Description: Speech Language Pathology Goals  Goals expected to be met by 7/25/20    1. Pt will tolerate a regular diet with thin liquids w/o overt S/S aspiration, MIN A  2. Educate Pt and family on aspiration precautions and SLP POC                       Plan:     · Patient to be seen:  3 x/week   · Plan of Care expires:  08/17/20  · Plan of Care reviewed with:  patient   · SLP Follow-Up:  Yes       Discharge recommendations:  other (see comments)(no additional needs anticipated)     Time Tracking:     SLP Treatment Date:   07/18/20  Speech Start Time:  1405  Speech Stop Time:  1424     Speech Total Time (min):  19 min    Billable Minutes: Eval Swallow and Oral Function 8 and Seld Care/Home Management Training 11    CHRISTIAN Carroll., [facility]-SLP  Speech-Language Pathology  Pager: [phone]    07/18/2020

## 2020-07-19 LAB
ALBUMIN SERPL BCP-MCNC: 3.2 G/DL (ref 3.5–5.2)
ALP SERPL-CCNC: 72 U/L (ref 55–135)
ALT SERPL W/O P-5'-P-CCNC: 32 U/L (ref 10–44)
ANION GAP SERPL CALC-SCNC: 7 MMOL/L (ref 8–16)
AST SERPL-CCNC: 45 U/L (ref 10–40)
BASOPHILS # BLD AUTO: 0.03 K/UL (ref 0–0.2)
BASOPHILS NFR BLD: 0.5 % (ref 0–1.9)
BILIRUB SERPL-MCNC: 0.5 MG/DL (ref 0.1–1)
BUN SERPL-MCNC: 25 MG/DL (ref 8–23)
C DIFF GDH STL QL: NEGATIVE
C DIFF TOX A+B STL QL IA: NEGATIVE
CALCIUM SERPL-MCNC: 8.6 MG/DL (ref 8.7–10.5)
CHLORIDE SERPL-SCNC: 109 MMOL/L (ref 95–110)
CO2 SERPL-SCNC: 22 MMOL/L (ref 23–29)
CREAT SERPL-MCNC: 1.7 MG/DL (ref 0.5–1.4)
DIFFERENTIAL METHOD: ABNORMAL
EOSINOPHIL # BLD AUTO: 0.2 K/UL (ref 0–0.5)
EOSINOPHIL NFR BLD: 2.8 % (ref 0–8)
ERYTHROCYTE [DISTWIDTH] IN BLOOD BY AUTOMATED COUNT: 18.2 % (ref 11.5–14.5)
EST. GFR  (AFRICAN AMERICAN): 42.5 ML/MIN/1.73 M^2
EST. GFR  (NON AFRICAN AMERICAN): 36.7 ML/MIN/1.73 M^2
FAT STL SUDAN IV STN: NORMAL
GLUCOSE SERPL-MCNC: 123 MG/DL (ref 70–110)
HCT VFR BLD AUTO: 32.9 % (ref 40–54)
HGB BLD-MCNC: 10.3 G/DL (ref 14–18)
IMM GRANULOCYTES # BLD AUTO: 0.02 K/UL (ref 0–0.04)
IMM GRANULOCYTES NFR BLD AUTO: 0.3 % (ref 0–0.5)
LYMPHOCYTES # BLD AUTO: 0.5 K/UL (ref 1–4.8)
LYMPHOCYTES NFR BLD: 7.9 % (ref 18–48)
MCH RBC QN AUTO: 28.6 PG (ref 27–31)
MCHC RBC AUTO-ENTMCNC: 31.3 G/DL (ref 32–36)
MCV RBC AUTO: 91 FL (ref 82–98)
MONOCYTES # BLD AUTO: 0.6 K/UL (ref 0.3–1)
MONOCYTES NFR BLD: 9.5 % (ref 4–15)
NEUTROPHILS # BLD AUTO: 5 K/UL (ref 1.8–7.7)
NEUTROPHILS NFR BLD: 79 % (ref 38–73)
NRBC BLD-RTO: 0 /100 WBC
PLATELET # BLD AUTO: 182 K/UL (ref 150–350)
PMV BLD AUTO: 11 FL (ref 9.2–12.9)
POTASSIUM SERPL-SCNC: 3.8 MMOL/L (ref 3.5–5.1)
PROT SERPL-MCNC: 6.6 G/DL (ref 6–8.4)
RBC # BLD AUTO: 3.6 M/UL (ref 4.6–6.2)
SODIUM SERPL-SCNC: 138 MMOL/L (ref 136–145)
WBC # BLD AUTO: 6.32 K/UL (ref 3.9–12.7)

## 2020-07-19 PROCEDURE — 80053 COMPREHEN METABOLIC PANEL: CPT

## 2020-07-19 PROCEDURE — 97163 PT EVAL HIGH COMPLEX 45 MIN: CPT

## 2020-07-19 PROCEDURE — 96372 THER/PROPH/DIAG INJ SC/IM: CPT

## 2020-07-19 PROCEDURE — 97166 OT EVAL MOD COMPLEX 45 MIN: CPT

## 2020-07-19 PROCEDURE — 99226 PR SUBSEQUENT OBSERVATION CARE,LEVEL III: CPT | Mod: ,,, | Performed by: PHYSICIAN ASSISTANT

## 2020-07-19 PROCEDURE — 11000001 HC ACUTE MED/SURG PRIVATE ROOM

## 2020-07-19 PROCEDURE — 85025 COMPLETE CBC W/AUTO DIFF WBC: CPT

## 2020-07-19 PROCEDURE — G0378 HOSPITAL OBSERVATION PER HR: HCPCS

## 2020-07-19 PROCEDURE — 63600175 PHARM REV CODE 636 W HCPCS: Performed by: HOSPITALIST

## 2020-07-19 PROCEDURE — 63600175 PHARM REV CODE 636 W HCPCS: Performed by: PHYSICIAN ASSISTANT

## 2020-07-19 PROCEDURE — 25000003 PHARM REV CODE 250: Performed by: PHYSICIAN ASSISTANT

## 2020-07-19 PROCEDURE — 97530 THERAPEUTIC ACTIVITIES: CPT

## 2020-07-19 PROCEDURE — 96375 TX/PRO/DX INJ NEW DRUG ADDON: CPT

## 2020-07-19 PROCEDURE — 36415 COLL VENOUS BLD VENIPUNCTURE: CPT

## 2020-07-19 PROCEDURE — 99226 PR SUBSEQUENT OBSERVATION CARE,LEVEL III: ICD-10-PCS | Mod: ,,, | Performed by: PHYSICIAN ASSISTANT

## 2020-07-19 RX ORDER — LOPERAMIDE HYDROCHLORIDE 2 MG/1
2 CAPSULE ORAL 4 TIMES DAILY PRN
Status: DISCONTINUED | OUTPATIENT
Start: 2020-07-19 | End: 2020-07-23 | Stop reason: HOSPADM

## 2020-07-19 RX ADMIN — TAMSULOSIN HYDROCHLORIDE 0.4 MG: 0.4 CAPSULE ORAL at 08:07

## 2020-07-19 RX ADMIN — ASPIRIN 81 MG: 81 TABLET, COATED ORAL at 07:07

## 2020-07-19 RX ADMIN — MEMANTINE HYDROCHLORIDE 10 MG: 10 TABLET ORAL at 08:07

## 2020-07-19 RX ADMIN — METOPROLOL TARTRATE 12.5 MG: 25 TABLET, FILM COATED ORAL at 07:07

## 2020-07-19 RX ADMIN — METOPROLOL TARTRATE 12.5 MG: 25 TABLET, FILM COATED ORAL at 08:07

## 2020-07-19 RX ADMIN — DUTASTERIDE 0.5 MG: 0.5 CAPSULE, LIQUID FILLED ORAL at 07:07

## 2020-07-19 RX ADMIN — ATORVASTATIN CALCIUM 40 MG: 40 TABLET, FILM COATED ORAL at 07:07

## 2020-07-19 RX ADMIN — THERA TABS 1 TABLET: TAB at 07:07

## 2020-07-19 RX ADMIN — HUMAN ALBUMIN MICROSPHERES AND PERFLUTREN 0.66 MG: 10; .22 INJECTION, SOLUTION INTRAVENOUS at 11:07

## 2020-07-19 RX ADMIN — PANTOPRAZOLE SODIUM 40 MG: 40 TABLET, DELAYED RELEASE ORAL at 07:07

## 2020-07-19 RX ADMIN — AMIODARONE HYDROCHLORIDE 100 MG: 100 TABLET ORAL at 09:07

## 2020-07-19 RX ADMIN — ENOXAPARIN SODIUM 40 MG: 100 INJECTION SUBCUTANEOUS at 04:07

## 2020-07-19 RX ADMIN — MEMANTINE HYDROCHLORIDE 10 MG: 10 TABLET ORAL at 07:07

## 2020-07-19 NOTE — NURSING
Mental status continues to wax and wane, unchanged from yesterday. VSS. Poor PO today. Encouraged fluids. Inc care provided - flexiseal remains for loose stools. T&R q2h. Safety maintained w/ bed alarm on. Fall precautions reviewed w/ pts family when they visited. See flowsheet for detail.

## 2020-07-19 NOTE — PT/OT/SLP EVAL
Physical Therapy Evaluation    Patient Name:  Mark Anthony Velarde Jr.   MRN:  [id number]    Recommendations:     Discharge Recommendations:  nursing facility, skilled   Discharge Equipment Recommendations: (TBD)   Barriers to discharge: Inaccessible home and Decreased caregiver support    Assessment:     Mark Anthony Velarde Jr. is a 80 y.o. male admitted with a medical diagnosis of Weakness.  He presents with the following impairments/functional limitations:  weakness, impaired endurance, impaired self care skills, impaired functional mobilty, gait instability, impaired balance, impaired cognition, decreased upper extremity function, decreased lower extremity function, decreased safety awareness .    Pt was limited t/o session by fear of movement but did end up participating well. He is a poor historian so PLOF is unknown. Pt is currently limited by the below listed deficits requiring ModA for transfers and side steps. He is appropriate for acute PT services and will begin PT POC.    Rehab Prognosis: Good; patient would benefit from acute skilled PT services to address these deficits and reach maximum level of function.    Recent Surgery: * No surgery found *      Plan:     During this hospitalization, patient to be seen 3 x/week to address the identified rehab impairments via gait training, therapeutic activities, therapeutic exercises and progress toward the following goals:    · Plan of Care Expires:  08/17/20    Subjective     Chief Complaint: weakness  Patient/Family Comments/goals: to get stronger  Pain/Comfort:  · Pain Rating 1: 0/10  · Pain Rating 2: 0/10    Patients cultural, spiritual, Hoahaoism conflicts given the current situation: no    Living Environment:  Pt is a poor historian so info needs to be checked. Pt reports he lives w/ his wife and children in a 2 story home w/ steps to enter.  Prior to admission, patients level of function was unknown.  Equipment used at home: (unknown, pt is a poor historian).  DME  owned (not currently used): unknown.  Upon discharge, patient will have assistance from unknown.    Objective:     Communicated with nursing prior to session.  Patient found supine with bowel management system  upon PT entry to room.    General Precautions: Standard, aspiration, fall   Orthopedic Precautions:N/A   Braces: N/A     Exams:  · Cognitive Exam:  Patient is oriented to partially to person (unable to state birth year), disoriented to place, time, situation  · Gross Motor Coordination:  impaired  · Postural Exam:  Patient presented with the following abnormalities:    · -       Rounded shoulders  · -       Forward head  · -       Posterior pelvic tilt  · Sensation:    · -       Intact  · RLE ROM: WFL except knee extension decreased  · RLE Strength: grossly 4/5  · LLE ROM: WFL except knee ext decreased  · LLE Strength: grossly 4/5    Functional Mobility:  · Bed Mobility:    · Supine>sit on bed w/ MaxA for trunk and BLE  · Sit>supine on bed w/ SBA  · Transfers:  · Sit<>stand to/from EOB w/ RW and ModA(X2) to rise  · Gait:   · ~3 side steps w/ RW and ModA(X2) for stability due to posterior lean  · Cues for posture   · Balance:   · Static sit at EOB w/ Alcira, progressing to SBA, due to posterior lean  · Static stand w/ RW and ModA for stability due to posterior lean    Therapeutic Activities and Exercises:   Pt was educated on PT role and POC. Pt verb understanding but will need reinforcement due to impaired cognition.    AM-PAC 6 CLICK MOBILITY  Total Score:10     Patient left supine with all lines intact, call button in reach and bed alarm on.    GOALS:   Multidisciplinary Problems     Physical Therapy Goals        Problem: Physical Therapy Goal    Goal Priority Disciplines Outcome Goal Variances Interventions   Physical Therapy Goal     PT, PT/OT Ongoing, Progressing     Description: Goals to be met by: 20     Patient will increase functional independence with mobility by performin. Supine to sit  with MInimal Assistance  2. Sit to supine with Set-up [place]  3. Sit to stand transfer with Contact Guard Assistance  4. Bed to chair transfer with Contact Guard Assistance using Rolling Walker  5. Gait  x 20 feet with Contact Guard Assistance using Rolling Walker.                      History:     Past Medical History:   Diagnosis Date    *Atrial fibrillation     Anticoagulant long-term use     Atrial fibrillation - asymptomatic but noted on ICD interrogation (5-02-20121) - 16 h 40 minutes of afib 2/13/2013    Atrial flutter with controlled response - seen on ICD interrogation - asymptomatic 2/13/2013    Automatic implantable cardioverter-defibrillator in situ 2/13/2013    Basal cell carcinoma     mid forehead    CAD (coronary artery disease) 2/13/2013    Cardiomyopathy     Cataract     Cognitive deficits     mild    H/O syncope -  5/27/2013    History of PTCA - LAD, LCX and PDA PCI in 2006 2/13/2013    HTN (hypertension) 2/13/2013    Hyperlipidemia LDL goal < 70 2/13/2013    Hypertension     ICD (implantable cardiac defibrillator) in place 2/13/2013    Ischemic cardiomyopathy LVEF ~45% 2/13/2013    LBBB (left bundle branch block) 2/13/2013    Memory loss     Psoriasis vulgaris     Syncope and collapse        Past Surgical History:   Procedure Laterality Date    CARDIAC DEFIBRILLATOR PLACEMENT      CATARACT EXTRACTION W/  INTRAOCULAR LENS IMPLANT Right 04/04/16    Dr Michael     CATARACT EXTRACTION W/  INTRAOCULAR LENS IMPLANT Left 05/09/2016    SKIN BIOPSY      TONSILLECTOMY         Time Tracking:     PT Received On: 07/19/20  PT Start Time: 0948     PT Stop Time: 1007  PT Total Time (min): 19 min     Billable Minutes: Evaluation 10, Therapeutic Activity 9 and Total Time 19      Esha Herbert, PT  07/19/2020

## 2020-07-19 NOTE — ASSESSMENT & PLAN NOTE
AICD present    - No evidence of decompensation.  Repeat TTE shows rEF 43%, WMA    Echo 1/2017    1 - Upper limit of normal left ventricular enlargement.     2 - Mildly to moderately depressed left ventricular systolic function (EF 40-45%).     3 - Eccentric hypertrophy.     4 - Normal left ventricular diastolic function.     5 - Normal right ventricular systolic function .     6 - Mild left atrial enlargement.     7 - Trivial to mild aortic regurgitation.     8 - Mild mitral regurgitation.     9 - Trivial tricuspid regurgitation.     10 - Trivial pulmonic regurgitation.     11 - The estimated PA systolic pressure is 33 mmHg.     12 - TDI as per text.

## 2020-07-19 NOTE — PLAN OF CARE
Problem: Occupational Therapy Goal  Goal: Occupational Therapy Goal  Description: Goals to be met by: 08/01/2020     Patient will increase functional independence with ADLs by performing:    Feeding with Set-up Assistance and Supervision.  UE Dressing with Set-up Assistance and Supervision.  Grooming while seated at sink with Set-up Assistance and Supervision.  Toileting from bedside commode with Moderate Assistance for hygiene and clothing management.   Toilet transfer to bedside commode with Minimal Assistance.  Upper extremity exercise program 3x12 reps per handout, with supervision.    Outcome: Ongoing, Progressing    Pt was agreeable to OT/PT evaluation.  Goals established to assist pt with returning to PLOF regarding ADLs and func mobility.  Pt will benefit from skilled OT services in order to increase his level of safety and independence with ADLs and mobility.      Rebeca Zafar, OT  7/19/2020

## 2020-07-19 NOTE — SUBJECTIVE & OBJECTIVE
Interval History: Afebrile, no signs of infection. Starting immodium for diarrhea, C. Diff negative and stool studies NGTD. Pt seen at bedside resting in NAD, more oriented than yesterday. Discussed goals of care with family who are interested in discussing hospice with pall care- consult placed    Review of Systems   Unable to perform ROS: Dementia     Objective:     Vital Signs (Most Recent):  Temp: 98.5 °F (36.9 °C) (07/19/20 1123)  Pulse: 66 (07/19/20 1123)  Resp: 18 (07/19/20 1123)  BP: 114/71 (07/19/20 1123)  SpO2: (!) 94 % (07/19/20 1123) Vital Signs (24h Range):  Temp:  [96.9 °F (36.1 °C)-98.5 °F (36.9 °C)] 98.5 °F (36.9 °C)  Pulse:  [62-73] 66  Resp:  [14-18] 18  SpO2:  [91 %-94 %] 94 %  BP: (114-146)/(62-78) 114/71     Weight: 78.5 kg (173 lb)  Body mass index is 27.1 kg/m².    Intake/Output Summary (Last 24 hours) at 7/19/2020 1447  Last data filed at 7/19/2020 1403  Gross per 24 hour   Intake 910 ml   Output 400 ml   Net 510 ml      Physical Exam  Constitutional:       General: He is not in acute distress.     Appearance: He is well-developed. He is not ill-appearing.   HENT:      Head: Normocephalic and atraumatic.   Neck:      Musculoskeletal: Neck supple.      Thyroid: No thyromegaly.   Cardiovascular:      Rate and Rhythm: Normal rate and regular rhythm.   Pulmonary:      Effort: Pulmonary effort is normal. No respiratory distress.   Abdominal:      General: Bowel sounds are normal.      Palpations: Abdomen is soft. There is no hepatomegaly or splenomegaly.      Tenderness: There is no abdominal tenderness.   Musculoskeletal:      Comments: Bilateral UE rigidity   Skin:     General: Skin is warm and dry.   Neurological:      General: No focal deficit present.      Mental Status: He is disoriented and confused.      Sensory: No sensory deficit.      Comments:  oriented to self this AM   Psychiatric:         Cognition and Memory: Cognition is impaired. Memory is impaired.         Significant Labs: All  pertinent labs within the past 24 hours have been reviewed.    Significant Imaging: I have reviewed all pertinent imaging results/findings within the past 24 hours.

## 2020-07-19 NOTE — PROGRESS NOTES
"[facility] Hospital Medicine  Progress Note    Patient Name: Mark Anthony Velarde Jr.  MRN: [id number]  Patient Class: OP- Observation   Admission Date: 7/16/2020  Length of Stay: 0 days  Attending Physician: Jason Blanc MD  Primary Care Provider: Jeff Yan MD    [facility] Medicine Team: [facility] MED E Cheri Gupta PA-C    Subjective:     Principal Problem:Weakness        HPI:  Mark Anthony Velarde Jr. Is an 81 yo M with a PMH including HTN, HLd, CAD s/p PTCA to LAD, LCX and PAD in 2006, ischemic cardiomyopathy EF ~ 45%, afib not currently anticoagulated, lewy body dementia and parkinsonism presents with generalized weakness per family. Patient was brought in by EMS who reports family called due to patient "being in bed all morning." Per ED triage notes, patient was slightly confused when he was brought in and oriented to person only, not oriented to place, situation, or time. Family reported baseline oriented to person and place.  ED nurse spoke with the daughter at 17:59 who reported the patient is normally continent and wakes up for breakfast in the morning. His wife stated that this morning she was woken up by the patient having bladder incontinence and while ambulating him to the bathroom she noticed bowel incontinence (diarrhea). Also noticed generalized weakness and lethargy stating the patient just wanted to sleep which is not usual for him.   Currently he is oriented to person only. He denies CP, SOB, NVD, abdominal pain.     Overview/Hospital Course:  Patient with Lewy Body Dementia admitted for weakness/altered baseline. On admission 0/4 SIRS. UA and CXR without signs of infection. COVID negative. CTH no acute infarct or bleed seen. CT cervical spine no acute fracture. XRAY of lumbar and thoracic spine no acute fracture. BCx2 were collected, 1 BCx showed GPC final result contaminant. Repeat BCx were obtained, and patient was started on IV Vancomycin, pharmacy consulted. Repeat Bcx " NGTD. TTE ordered given patient with AICD/PPM, no signs of vegetation, TTE relatively unchanged. Stool studies NGTD, C. Diff negative. Given functional and mental decline, goals of care discussed with family who wish to further discuss hospice/end of life care with palliative care (pall care has been following outpt). Pall care consulted.    Interval History: Afebrile, no signs of infection. Starting immodium for diarrhea, C. Diff negative and stool studies NGTD. Pt seen at bedside resting in NAD, more oriented than yesterday. Discussed goals of care with family who are interested in discussing hospice with pall care- consult placed    Review of Systems   Unable to perform ROS: Dementia     Objective:     Vital Signs (Most Recent):  Temp: 98.5 °F (36.9 °C) (07/19/20 1123)  Pulse: 66 (07/19/20 1123)  Resp: 18 (07/19/20 1123)  BP: 114/71 (07/19/20 1123)  SpO2: (!) 94 % (07/19/20 1123) Vital Signs (24h Range):  Temp:  [96.9 °F (36.1 °C)-98.5 °F (36.9 °C)] 98.5 °F (36.9 °C)  Pulse:  [62-73] 66  Resp:  [14-18] 18  SpO2:  [91 %-94 %] 94 %  BP: (114-146)/(62-78) 114/71     Weight: 78.5 kg (173 lb)  Body mass index is 27.1 kg/m².    Intake/Output Summary (Last 24 hours) at 7/19/2020 1447  Last data filed at 7/19/2020 1403  Gross per 24 hour   Intake 910 ml   Output 400 ml   Net 510 ml      Physical Exam  Constitutional:       General: He is not in acute distress.     Appearance: He is well-developed. He is not ill-appearing.   HENT:      Head: Normocephalic and atraumatic.   Neck:      Musculoskeletal: Neck supple.      Thyroid: No thyromegaly.   Cardiovascular:      Rate and Rhythm: Normal rate and regular rhythm.   Pulmonary:      Effort: Pulmonary effort is normal. No respiratory distress.   Abdominal:      General: Bowel sounds are normal.      Palpations: Abdomen is soft. There is no hepatomegaly or splenomegaly.      Tenderness: There is no abdominal tenderness.   Musculoskeletal:      Comments: Bilateral UE rigidity    Skin:     General: Skin is warm and dry.   Neurological:      General: No focal deficit present.      Mental Status: He is disoriented and confused.      Sensory: No sensory deficit.      Comments:  oriented to self this AM   Psychiatric:         Cognition and Memory: Cognition is impaired. Memory is impaired.         Significant Labs: All pertinent labs within the past 24 hours have been reviewed.    Significant Imaging: I have reviewed all pertinent imaging results/findings within the past 24 hours.      Assessment/Plan:      * Weakness  80 year old male admitted to observation for weakness. >> bacteremia? Unclear source?   - CTH no acute infarct or intracranial hemorrhage  - UA unremarkable for infection  - CXR without signs of infection  - XR thoracic and lumbar spine -- no acute fractures  - Consult    - Fall precautions, aspiration precautions, delirium precautions.  - See 6/9/2020 note for palliative consult.  - BCx2 were ordered - 1 BCx grew Gram positive cocci in clusters resembling Staph>contaminant  7/17-19:   - pt febrile 7/18 but afebrile since  - Repeat BCx ordered : NGTD  - Started on IV Vancomycin 7/17, pharmacy consulted; stopped 7/19 as patient has been afebrile without WBC, no infectious source identified  - pt with PPM/AICD: TTE no signs of vegetations or significant changes from previous (WMA on previous TTE)  - Stool Studies ordered (nursing reports episode of loose stool): C. Diff negative, cx NGTD> start imodium   *due to mental and physical decline (see parkinsons/dementia below), family is interested in goals of care discussion with palliative/possibly transitioning to hospice: pall care consulted    Lewy body dementia  Parkinsonism    - Memantine 10mg BID    DELIRIUM BEHAVIOR MANAGEMENT  - Minimize use of restraints; if physical restraints necessary, please utilize medical/chemical prns for agitation.  - Keep shades open and room lit during day and room dim at night in order  to promote healthy circadian rhythms.  - Encourage family at bedside  - Keep whiteboard in patient's room current with the date and name of the members of patient's team for easy patient self re-orientation.  - Avoid benzodiazepines, antihistamines, anticholinergics, hypnotics, and minimize opiates while controlling for pain as these medications may exacerbate delirium.    Atrial fibrillation   - Amiodarone 100mg qd; MTP 12.5mg   - Patient is no longer on chronic anticoagulation, likely secondary to fall risk.    Ischemic cardiomyopathy LVEF ~45%  AICD present    - No evidence of decompensation.  Repeat TTE shows rEF 43%, WMA    Echo 1/2017    1 - Upper limit of normal left ventricular enlargement.     2 - Mildly to moderately depressed left ventricular systolic function (EF 40-45%).     3 - Eccentric hypertrophy.     4 - Normal left ventricular diastolic function.     5 - Normal right ventricular systolic function .     6 - Mild left atrial enlargement.     7 - Trivial to mild aortic regurgitation.     8 - Mild mitral regurgitation.     9 - Trivial tricuspid regurgitation.     10 - Trivial pulmonic regurgitation.     11 - The estimated PA systolic pressure is 33 mmHg.     12 - TDI as per text.     Hyperlipidemia with target LDL less than 70  - Atorvastatin 40mg qd    HTN (hypertension)  - Amlodipine 5mg qd, Lopressor 12.5mg BID  BP stable, will monitor  7/18: hold norvasc in setting of low Bps, continue MTP for now as pt seems to have hx afib rvr episodes    CAD (coronary artery disease)  History of PTCA - LAD, LCX and PDA PCI in 2006    - Continue secondary prevention, ASA daily.      VTE Risk Mitigation (From admission, onward)         Ordered     enoxaparin injection 40 mg  Every 24 hours      07/16/20 2307     Place MATTHIAS hose  Until discontinued      07/16/20 2307     IP VTE HIGH RISK PATIENT  Once      07/16/20 2307     Place sequential compression device  Until discontinued      07/16/20 2307                       Cheri Gupta PA-C  Department of Hospital Medicine   [facility]

## 2020-07-19 NOTE — ASSESSMENT & PLAN NOTE
80 year old male admitted to observation for weakness. >> bacteremia? Unclear source?   - CTH no acute infarct or intracranial hemorrhage  - UA unremarkable for infection  - CXR without signs of infection  - XR thoracic and lumbar spine -- no acute fractures  - Consult    - Fall precautions, aspiration precautions, delirium precautions.  - See 6/9/2020 note for palliative consult.  - BCx2 were ordered - 1 BCx grew Gram positive cocci in clusters resembling Staph>contaminant  7/17-19:   - pt febrile 7/18 but afebrile since  - Repeat BCx ordered : NGTD  - Started on IV Vancomycin 7/17, pharmacy consulted; stopped 7/19 as patient has been afebrile without WBC, no infectious source identified  - pt with PPM/AICD: TTE no signs of vegetations or significant changes from previous (WMA on previous TTE)  - Stool Studies ordered (nursing reports episode of loose stool): C. Diff negative, cx NGTD> start imodium   *due to mental and physical decline (see parkinsons/dementia below), family is interested in goals of care discussion with palliative/possibly transitioning to hospice: pall care consulted

## 2020-07-19 NOTE — PT/OT/SLP EVAL
Occupational Therapy   Evaluation    Name: Mark Anthony Velarde Jr.  MRN: [id number]  Admitting Diagnosis:  Weakness      Recommendations:     Discharge Recommendations: nursing facility, skilled  Discharge Equipment Recommendations:  (TBD at next level of care)  Barriers to discharge:  Decreased caregiver support(unknown if he will have physical assistance or if home is accessible)    Assessment:     Mark Anthony Velarde Jr. is a 80 y.o. male with a medical diagnosis of Weakness.  He presents with the following performance deficits affecting function: weakness, impaired endurance, impaired sensation, impaired self care skills, impaired functional mobilty, gait instability, impaired balance, impaired cognition, decreased coordination, decreased upper extremity function, decreased lower extremity function, decreased safety awareness, pain, abnormal tone, decreased ROM, impaired coordination, impaired fine motor.  Pt was agreeable to OT/PT evaluation.  Pt unable to provide accurate history regarding his home environment or PLOF (family not present in room at time of eval to assist).  Pt noted with increased muscle tone/stiffness and some minimal tremors in B UEs (R>L).  Pt was noted to be very confused during evaluation and had difficulty following instructions and required increased assistance to assist with ADLs and mobility.  Goals established to assist pt with returning to PLOF regarding ADLs and func mobility.  Pt will benefit from skilled OT services in order to increase his level of safety and independence with ADLs and mobility.        Rehab Prognosis: Fair; patient would benefit from acute skilled OT services to address these deficits and reach maximum level of function.       Plan:     Patient to be seen 4 x/week to address the above listed problems via self-care/home management, therapeutic activities, therapeutic exercises, neuromuscular re-education  · Plan of Care Expires: 08/18/20  · Plan of Care Reviewed with:  patient    Subjective     Chief Complaint: pain with movement in abdomen  Patient/Family Comments/goals: return home    Occupational Profile:  Pt is poor historian - family member not present to assist with questions for accuracy  Living Environment: Pt states that he lives in a 2SH with his wife and children - did not elaborate on bathroom set up to determine if bathroom has tub/shower combo or walk in shower  Previous level of function: unknown - contradicted self stating he was able to do it and then stated he needed assistance with LB dressing  Roles and Routines: , father  Equipment Used at Home:  (unknown - pt poor historian - answered yes and no to RW)  Assistance upon Discharge: wife lives with him at home - need to verify if 24/7 is available    Pain/Comfort:  · Pain Rating 1: 0/10(no pain at rest - pain only with movement)  · Pain Addressed 1: Pre-medicate for activity, Distraction, Reposition, Cessation of Activity  · Pain Rating Post-Intervention 1: 0/10    Patients cultural, spiritual, Advent conflicts given the current situation:      Objective:     Communicated with: nurse prior to session.  Patient found HOB elevated with bed alarm, bowel management system, peripheral IV, SCD, telemetry upon OT entry to room.    General Precautions: Standard, aspiration, fall   Orthopedic Precautions:N/A   Braces: N/A     Occupational Performance:    Bed Mobility:    · Patient completed Scooting/Bridging with maximal assistance  · Patient completed Supine to Sit with maximal assistance  · Patient completed Sit to Supine with stand by assistance    Functional Mobility/Transfers:  · Patient completed Sit <> Stand Transfer with moderate assistance  with  rolling walker   · Functional Mobility: Pt able to take a few sidesteps to his left to position himself towards the HOB before sitting back down after standing.  Pt needed mod A x2 - 1 person for support/balance and 1 person to assist with postural support  (bring hips forward)    Activities of Daily Living:  · Grooming: moderate assistance pt attempted to wash face with wash cloth - perseverated on mouth and nose - needed cues to move wash cloth to wash other parts of face - needed A to complete half of task  · Upper Body Dressing: minimum assistance    · Lower Body Dressing: total assistance    · Toileting: total assistance flexiseal    Cognitive/Visual Perceptual:  Cognitive/Psychosocial Skills:     -       Oriented to: pt responds to name - able to state correct birth month, but not year - not aware of place   -       Follows Commands/attention:Easily distracted and difficulty following simple instructions - needed tactile cues and gestures to initiate, along with, increased time  -       Communication: clear/fluent  -       Memory: Impaired STM  -       Safety awareness/insight to disability: impaired   -       Mood/Affect/Coping skills/emotional control: Pleasant    Physical Exam:  Balance:    -       Sitting:  required SBA-Min A - seated with posterior lean;  Standing - needed mod A   Postural examination/scapula alignment:    -       Rounded shoulders  -       Forward head  -       R lateral lean  Skin integrity: Visible skin intact  Edema:  None noted  Sensation: -       Intact  Motor Planning: -       impaired - difficulty initiating tasks - perseveration of movement during grooming task of washing face  Upper Extremity Range of Motion:     -       Right Upper Extremity: WFL except shoulder, but WFL for ADLs  -       Left Upper Extremity: WFL except shoulder but WFL for ADLs  Upper Extremity Strength:  Difficult to assess - pt not following instructions to hold position in place for accurate MMT - able to move all joints against gravity and WFL for ADLs   Strength:    -       Right Upper Extremity: WFL  -       Left Upper Extremity: WFL    AMPAC 6 Click ADL:  AMPAC Total Score: 10    Treatment & Education:  · Pt completed ADLs and func mobility activities  for tx session as noted above  · Whiteboard updated  · Pt educated on role of OT and POC    Education:    Patient left HOB elevated with all lines intact, call button in reach and bed alarm on    GOALS:   Multidisciplinary Problems     Occupational Therapy Goals        Problem: Occupational Therapy Goal    Goal Priority Disciplines Outcome Interventions   Occupational Therapy Goal     OT, PT/OT Ongoing, Progressing    Description: Goals to be met by: 08/01/2020     Patient will increase functional independence with ADLs by performing:    Feeding with Set-up Assistance and Supervision.  UE Dressing with Set-up Assistance and Supervision.  Grooming while seated at sink with Set-up Assistance and Supervision.  Toileting from bedside commode with Moderate Assistance for hygiene and clothing management.   Toilet transfer to bedside commode with Minimal Assistance.  Upper extremity exercise program 3x12 reps per handout, with supervision.                     History:     Past Medical History:   Diagnosis Date    *Atrial fibrillation     Anticoagulant long-term use     Atrial fibrillation - asymptomatic but noted on ICD interrogation ([id number]) - 16 h 40 minutes of afib 2/13/2013    Atrial flutter with controlled response - seen on ICD interrogation - asymptomatic 2/13/2013    Automatic implantable cardioverter-defibrillator in situ 2/13/2013    Basal cell carcinoma     mid forehead    CAD (coronary artery disease) 2/13/2013    Cardiomyopathy     Cataract     Cognitive deficits     mild    H/O syncope -  5/27/2013    History of PTCA - LAD, LCX and PDA PCI in 2006 2/13/2013    HTN (hypertension) 2/13/2013    Hyperlipidemia LDL goal < 70 2/13/2013    Hypertension     ICD (implantable cardiac defibrillator) in place 2/13/2013    Ischemic cardiomyopathy LVEF ~45% 2/13/2013    LBBB (left bundle branch block) 2/13/2013    Memory loss     Psoriasis vulgaris     Syncope and collapse        Past Surgical  History:   Procedure Laterality Date    CARDIAC DEFIBRILLATOR PLACEMENT      CATARACT EXTRACTION W/  INTRAOCULAR LENS IMPLANT Right 04/04/16    Dr Michael     CATARACT EXTRACTION W/  INTRAOCULAR LENS IMPLANT Left 05/09/2016    SKIN BIOPSY      TONSILLECTOMY         Time Tracking:     OT Date of Treatment: 07/19/20  OT Start Time: 0948  OT Stop Time: 1007  OT Total Time (min): 19 min    Billable Minutes:Evaluation 12 (coeval with PT)    Rebeca Zafar, OT  7/19/2020

## 2020-07-19 NOTE — PLAN OF CARE
PT alejandroal completed. Pt will begin PT POC.  Esha Herbert, PT  2020    Problem: Physical Therapy Goal  Goal: Physical Therapy Goal  Description: Goals to be met by: 20     Patient will increase functional independence with mobility by performin. Supine to sit with MInimal Assistance  2. Sit to supine with Set-up [place]  3. Sit to stand transfer with Contact Guard Assistance  4. Bed to chair transfer with Contact Guard Assistance using Rolling Walker  5. Gait  x 20 feet with Contact Guard Assistance using Rolling Walker.     Outcome: Ongoing, Progressing

## 2020-07-20 ENCOUNTER — PATIENT MESSAGE (OUTPATIENT)
Dept: ELECTROPHYSIOLOGY | Facility: CLINIC | Age: 80
End: 2020-07-20

## 2020-07-20 ENCOUNTER — CARE AT HOME (OUTPATIENT)
Dept: HOME HEALTH SERVICES | Facility: CLINIC | Age: 80
End: 2020-07-20
Payer: MEDICARE

## 2020-07-20 DIAGNOSIS — Z71.89 COUNSELING REGARDING ADVANCE CARE PLANNING AND GOALS OF CARE: ICD-10-CM

## 2020-07-20 DIAGNOSIS — R53.1 GENERALIZED WEAKNESS: ICD-10-CM

## 2020-07-20 DIAGNOSIS — R06.02 SOB (SHORTNESS OF BREATH) ON EXERTION: ICD-10-CM

## 2020-07-20 DIAGNOSIS — Z71.89 ENCOUNTER FOR HOSPICE CARE DISCUSSION: Primary | ICD-10-CM

## 2020-07-20 DIAGNOSIS — R53.83 FATIGUE, UNSPECIFIED TYPE: ICD-10-CM

## 2020-07-20 DIAGNOSIS — Z51.5 PALLIATIVE CARE ENCOUNTER: ICD-10-CM

## 2020-07-20 LAB
ALBUMIN SERPL BCP-MCNC: 3.1 G/DL (ref 3.5–5.2)
ALP SERPL-CCNC: 73 U/L (ref 55–135)
ALT SERPL W/O P-5'-P-CCNC: 27 U/L (ref 10–44)
ANION GAP SERPL CALC-SCNC: 11 MMOL/L (ref 8–16)
AST SERPL-CCNC: 37 U/L (ref 10–40)
BASOPHILS # BLD AUTO: 0.03 K/UL (ref 0–0.2)
BASOPHILS NFR BLD: 0.5 % (ref 0–1.9)
BILIRUB SERPL-MCNC: 0.6 MG/DL (ref 0.1–1)
BUN SERPL-MCNC: 20 MG/DL (ref 8–23)
CALCIUM SERPL-MCNC: 8.5 MG/DL (ref 8.7–10.5)
CHLORIDE SERPL-SCNC: 110 MMOL/L (ref 95–110)
CO2 SERPL-SCNC: 19 MMOL/L (ref 23–29)
CREAT SERPL-MCNC: 1.7 MG/DL (ref 0.5–1.4)
CRYPTOSP AG STL QL IA: NEGATIVE
DIFFERENTIAL METHOD: ABNORMAL
E COLI SXT1 STL QL IA: NEGATIVE
E COLI SXT2 STL QL IA: NEGATIVE
EOSINOPHIL # BLD AUTO: 0.2 K/UL (ref 0–0.5)
EOSINOPHIL NFR BLD: 3.1 % (ref 0–8)
ERYTHROCYTE [DISTWIDTH] IN BLOOD BY AUTOMATED COUNT: 18.3 % (ref 11.5–14.5)
EST. GFR  (AFRICAN AMERICAN): 42.5 ML/MIN/1.73 M^2
EST. GFR  (NON AFRICAN AMERICAN): 36.7 ML/MIN/1.73 M^2
G LAMBLIA AG STL QL IA: NEGATIVE
GLUCOSE SERPL-MCNC: 100 MG/DL (ref 70–110)
HCT VFR BLD AUTO: 32.4 % (ref 40–54)
HGB BLD-MCNC: 9.7 G/DL (ref 14–18)
IMM GRANULOCYTES # BLD AUTO: 0.03 K/UL (ref 0–0.04)
IMM GRANULOCYTES NFR BLD AUTO: 0.5 % (ref 0–0.5)
LYMPHOCYTES # BLD AUTO: 0.7 K/UL (ref 1–4.8)
LYMPHOCYTES NFR BLD: 11.1 % (ref 18–48)
MCH RBC QN AUTO: 28 PG (ref 27–31)
MCHC RBC AUTO-ENTMCNC: 29.9 G/DL (ref 32–36)
MCV RBC AUTO: 94 FL (ref 82–98)
MONOCYTES # BLD AUTO: 0.8 K/UL (ref 0.3–1)
MONOCYTES NFR BLD: 12.4 % (ref 4–15)
NEUTROPHILS # BLD AUTO: 4.4 K/UL (ref 1.8–7.7)
NEUTROPHILS NFR BLD: 72.4 % (ref 38–73)
NRBC BLD-RTO: 0 /100 WBC
PLATELET # BLD AUTO: 171 K/UL (ref 150–350)
PMV BLD AUTO: 10.5 FL (ref 9.2–12.9)
POTASSIUM SERPL-SCNC: 3.7 MMOL/L (ref 3.5–5.1)
PROT SERPL-MCNC: 6.5 G/DL (ref 6–8.4)
RBC # BLD AUTO: 3.46 M/UL (ref 4.6–6.2)
SODIUM SERPL-SCNC: 140 MMOL/L (ref 136–145)
WBC # BLD AUTO: 6.13 K/UL (ref 3.9–12.7)

## 2020-07-20 PROCEDURE — 11000001 HC ACUTE MED/SURG PRIVATE ROOM

## 2020-07-20 PROCEDURE — 25000003 PHARM REV CODE 250: Performed by: PHYSICIAN ASSISTANT

## 2020-07-20 PROCEDURE — 80053 COMPREHEN METABOLIC PANEL: CPT

## 2020-07-20 PROCEDURE — 99900035 HC TECH TIME PER 15 MIN (STAT)

## 2020-07-20 PROCEDURE — 99497 ADVNCD CARE PLAN 30 MIN: CPT | Mod: 95,,, | Performed by: NURSE PRACTITIONER

## 2020-07-20 PROCEDURE — 99443 PR PHYSICIAN TELEPHONE EVALUATION 21-30 MIN: ICD-10-PCS | Mod: 95,,, | Performed by: NURSE PRACTITIONER

## 2020-07-20 PROCEDURE — 99225 PR SUBSEQUENT OBSERVATION CARE,LEVEL II: CPT | Mod: ,,, | Performed by: PHYSICIAN ASSISTANT

## 2020-07-20 PROCEDURE — 99223 PR INITIAL HOSPITAL CARE,LEVL III: ICD-10-PCS | Mod: ,,, | Performed by: CLINICAL NURSE SPECIALIST

## 2020-07-20 PROCEDURE — 86580 TB INTRADERMAL TEST: CPT | Performed by: HOSPITALIST

## 2020-07-20 PROCEDURE — 99497 PR ADVNCD CARE PLAN 30 MIN: ICD-10-PCS | Mod: 95,,, | Performed by: NURSE PRACTITIONER

## 2020-07-20 PROCEDURE — 99443 PR PHYSICIAN TELEPHONE EVALUATION 21-30 MIN: CPT | Mod: 95,,, | Performed by: NURSE PRACTITIONER

## 2020-07-20 PROCEDURE — 30200315 PPD INTRADERMAL TEST REV CODE 302: Performed by: HOSPITALIST

## 2020-07-20 PROCEDURE — 27000221 HC OXYGEN, UP TO 24 HOURS

## 2020-07-20 PROCEDURE — 63600175 PHARM REV CODE 636 W HCPCS: Performed by: PHYSICIAN ASSISTANT

## 2020-07-20 PROCEDURE — 99497 ADVNCD CARE PLAN 30 MIN: CPT | Mod: 25,,, | Performed by: CLINICAL NURSE SPECIALIST

## 2020-07-20 PROCEDURE — 36415 COLL VENOUS BLD VENIPUNCTURE: CPT

## 2020-07-20 PROCEDURE — 99223 1ST HOSP IP/OBS HIGH 75: CPT | Mod: ,,, | Performed by: CLINICAL NURSE SPECIALIST

## 2020-07-20 PROCEDURE — 99225 PR SUBSEQUENT OBSERVATION CARE,LEVEL II: ICD-10-PCS | Mod: ,,, | Performed by: PHYSICIAN ASSISTANT

## 2020-07-20 PROCEDURE — 94761 N-INVAS EAR/PLS OXIMETRY MLT: CPT

## 2020-07-20 PROCEDURE — 85025 COMPLETE CBC W/AUTO DIFF WBC: CPT

## 2020-07-20 PROCEDURE — 99497 PR ADVNCD CARE PLAN 30 MIN: ICD-10-PCS | Mod: 25,,, | Performed by: CLINICAL NURSE SPECIALIST

## 2020-07-20 RX ADMIN — ENOXAPARIN SODIUM 40 MG: 100 INJECTION SUBCUTANEOUS at 06:07

## 2020-07-20 RX ADMIN — MEMANTINE HYDROCHLORIDE 10 MG: 10 TABLET ORAL at 09:07

## 2020-07-20 RX ADMIN — TUBERCULIN PURIFIED PROTEIN DERIVATIVE 5 UNITS: 5 INJECTION, SOLUTION INTRADERMAL at 06:07

## 2020-07-20 RX ADMIN — ACETAMINOPHEN 650 MG: 325 TABLET ORAL at 01:07

## 2020-07-20 RX ADMIN — ATORVASTATIN CALCIUM 40 MG: 40 TABLET, FILM COATED ORAL at 09:07

## 2020-07-20 RX ADMIN — TAMSULOSIN HYDROCHLORIDE 0.4 MG: 0.4 CAPSULE ORAL at 09:07

## 2020-07-20 RX ADMIN — PANTOPRAZOLE SODIUM 40 MG: 40 TABLET, DELAYED RELEASE ORAL at 09:07

## 2020-07-20 RX ADMIN — THERA TABS 1 TABLET: TAB at 09:07

## 2020-07-20 RX ADMIN — METOPROLOL TARTRATE 12.5 MG: 25 TABLET, FILM COATED ORAL at 09:07

## 2020-07-20 RX ADMIN — AMIODARONE HYDROCHLORIDE 100 MG: 100 TABLET ORAL at 09:07

## 2020-07-20 RX ADMIN — LOPERAMIDE HYDROCHLORIDE 2 MG: 2 CAPSULE ORAL at 09:07

## 2020-07-20 RX ADMIN — DUTASTERIDE 0.5 MG: 0.5 CAPSULE, LIQUID FILLED ORAL at 09:07

## 2020-07-20 RX ADMIN — ASPIRIN 81 MG: 81 TABLET, COATED ORAL at 09:07

## 2020-07-20 NOTE — CONSULTS
Palliative Care Acknowledgement of Consult - .date    Consult received. Palliative Care Provider:_LAMBERT Flores, APRN, ACNS-BC______ will touch base with team prior to seeing patient. Full consult to follow.    Thank you for allowing us to be a part of the care of this patient.          Janie Retana LCSW, ACHP-SW

## 2020-07-20 NOTE — PROGRESS NOTES
"[facility] Hospital Medicine  Progress Note    Patient Name: Mark Anthony Velarde Jr.  MRN: [id number]  Patient Class: OP- Observation   Admission Date: 7/16/2020  Length of Stay: 0 days  Attending Physician: Jason Blanc MD  Primary Care Provider: Jeff Yan MD    [facility] Medicine Team: [facility] MED E Cheri Gupta PA-C    Subjective:     Principal Problem:Weakness        HPI:  Mark Anthony Velarde Jr. Is an 81 yo M with a PMH including HTN, HLd, CAD s/p PTCA to LAD, LCX and PAD in 2006, ischemic cardiomyopathy EF ~ 45%, afib not currently anticoagulated, lewy body dementia and parkinsonism presents with generalized weakness per family. Patient was brought in by EMS who reports family called due to patient "being in bed all morning." Per ED triage notes, patient was slightly confused when he was brought in and oriented to person only, not oriented to place, situation, or time. Family reported baseline oriented to person and place.  ED nurse spoke with the daughter at 17:59 who reported the patient is normally continent and wakes up for breakfast in the morning. His wife stated that this morning she was woken up by the patient having bladder incontinence and while ambulating him to the bathroom she noticed bowel incontinence (diarrhea). Also noticed generalized weakness and lethargy stating the patient just wanted to sleep which is not usual for him.   Currently he is oriented to person only. He denies CP, SOB, NVD, abdominal pain.     Overview/Hospital Course:  Patient with Lewy Body Dementia admitted for weakness/altered baseline. On admission 0/4 SIRS. UA and CXR without signs of infection. COVID negative. CTH no acute infarct or bleed seen. CT cervical spine no acute fracture. XRAY of lumbar and thoracic spine no acute fracture. BCx2 were collected, 1 BCx showed GPC final result contaminant. Repeat BCx were obtained, and patient was started on IV Vancomycin, pharmacy consulted. Repeat Bcx " NGTD. TTE ordered given patient with AICD/PPM, no signs of vegetation, TTE relatively unchanged. Stool studies NGTD, C. Diff negative. Given functional and mental decline, goals of care discussed with family who wish to further discuss hospice/end of life care with palliative care (pall care has been following outpt). Pall care consulted, appreciate assistance. Anticipate d/c with home hospice.    Interval History: No reported events overnight. Pt seen at bedside thsi AM resting in NAD, AOx3- most lucid patient has been on my exam. He offers no complaints. Pall care to see patient today.    Review of Systems   Unable to perform ROS: Dementia     Objective:     Vital Signs (Most Recent):  Temp: 96.2 °F (35.7 °C) (07/20/20 0716)  Pulse: 63 (07/20/20 0716)  Resp: 18 (07/20/20 0716)  BP: (!) 150/68 (07/20/20 0716)  SpO2: (!) 93 % (07/20/20 0839) Vital Signs (24h Range):  Temp:  [96.2 °F (35.7 °C)-100.3 °F (37.9 °C)] 96.2 °F (35.7 °C)  Pulse:  [63-83] 63  Resp:  [16-18] 18  SpO2:  [89 %-94 %] 93 %  BP: (114-163)/(67-73) 150/68     Weight: 78.5 kg (173 lb)  Body mass index is 27.1 kg/m².    Intake/Output Summary (Last 24 hours) at 7/20/2020 1036  Last data filed at 7/20/2020 0900  Gross per 24 hour   Intake 665 ml   Output 150 ml   Net 515 ml      Physical Exam  Constitutional:       General: He is not in acute distress.     Appearance: He is well-developed. He is not ill-appearing.   HENT:      Head: Normocephalic and atraumatic.   Neck:      Musculoskeletal: Neck supple.      Thyroid: No thyromegaly.   Cardiovascular:      Rate and Rhythm: Normal rate and regular rhythm.   Pulmonary:      Effort: Pulmonary effort is normal. No respiratory distress.   Abdominal:      General: Bowel sounds are normal.      Palpations: Abdomen is soft. There is no hepatomegaly or splenomegaly.      Tenderness: There is no abdominal tenderness.   Musculoskeletal:      Comments: Bilateral UE rigidity   Skin:     General: Skin is warm and  dry.   Neurological:      General: No focal deficit present.      Mental Status: He is disoriented and confused.      Sensory: No sensory deficit.      Comments:  oriented to self, , place this AM   Psychiatric:         Cognition and Memory: Cognition is impaired. Memory is impaired.         Significant Labs: All pertinent labs within the past 24 hours have been reviewed.    Significant Imaging: I have reviewed all pertinent imaging results/findings within the past 24 hours.      Assessment/Plan:      * Weakness  80 year old male admitted to observation for weakness. >> bacteremia? Unclear source?   - CTH no acute infarct or intracranial hemorrhage  - UA unremarkable for infection  - CXR without signs of infection  - XR thoracic and lumbar spine -- no acute fractures  - Consult    - Fall precautions, aspiration precautions, delirium precautions.  - See 2020 note for palliative consult.  - BCx2 were ordered - 1 BCx grew Gram positive cocci in clusters resembling Staph>contaminant  -20:   - pt febrile  but afebrile since  - Repeat BCx ordered : NGTD  - Started on IV Vancomycin , pharmacy consulted; stopped  as patient has been afebrile without WBC, no infectious source identified  - pt with PPM/AICD: TTE no signs of vegetations or significant changes from previous (WMA on previous TTE)  - Stool Studies ordered (nursing reports episode of loose stool): C. Diff negative, cx NGTD> start imodium   *due to mental and physical decline (see parkinsons/dementia below), family is interested in goals of care discussion with palliative/possibly transitioning to hospice: pall care consulted; anticipate d/c to home hospice    Lewy body dementia  Parkinsonism    - Memantine 10mg BID    DELIRIUM BEHAVIOR MANAGEMENT  - Minimize use of restraints; if physical restraints necessary, please utilize medical/chemical prns for agitation.  - Keep shades open and room lit during day and room dim at night in  order to promote healthy circadian rhythms.  - Encourage family at bedside  - Keep whiteboard in patient's room current with the date and name of the members of patient's team for easy patient self re-orientation.  - Avoid benzodiazepines, antihistamines, anticholinergics, hypnotics, and minimize opiates while controlling for pain as these medications may exacerbate delirium.    Atrial fibrillation   - Amiodarone 100mg qd; MTP 12.5mg   - Patient is no longer on chronic anticoagulation, likely secondary to fall risk.    Ischemic cardiomyopathy LVEF ~45%  AICD present    - No evidence of decompensation.  Repeat TTE shows rEF 43%, WMA    Echo 1/2017    1 - Upper limit of normal left ventricular enlargement.     2 - Mildly to moderately depressed left ventricular systolic function (EF 40-45%).     3 - Eccentric hypertrophy.     4 - Normal left ventricular diastolic function.     5 - Normal right ventricular systolic function .     6 - Mild left atrial enlargement.     7 - Trivial to mild aortic regurgitation.     8 - Mild mitral regurgitation.     9 - Trivial tricuspid regurgitation.     10 - Trivial pulmonic regurgitation.     11 - The estimated PA systolic pressure is 33 mmHg.     12 - TDI as per text.     Hyperlipidemia with target LDL less than 70  - Atorvastatin 40mg qd    HTN (hypertension)  - Amlodipine 5mg qd, Lopressor 12.5mg BID  BP stable, will monitor  7/18: hold norvasc in setting of low Bps, continue MTP for now as pt seems to have hx afib rvr episodes    CAD (coronary artery disease)  History of PTCA - LAD, LCX and PDA PCI in 2006    - Continue secondary prevention, ASA daily.      VTE Risk Mitigation (From admission, onward)         Ordered     enoxaparin injection 40 mg  Every 24 hours      07/16/20 2307     Place MATTHIAS hose  Until discontinued      07/16/20 2307     IP VTE HIGH RISK PATIENT  Once      07/16/20 2307     Place sequential compression device  Until discontinued      07/16/20 2307                     Discussed with staff and pall care  Cheri Gupta PA-C  Department of Hospital Medicine   [facility]

## 2020-07-20 NOTE — PLAN OF CARE
SHARLENE called in LOCET to [facility]. SKYLA scanned to [facility]. 142 to be emailed to primary SHARLENE.     Flaca Howell LMSW  [facility]

## 2020-07-20 NOTE — SUBJECTIVE & OBJECTIVE
Interval History:     Past Medical History:   Diagnosis Date    *Atrial fibrillation     Anticoagulant long-term use     Atrial fibrillation - asymptomatic but noted on ICD interrogation ([id number]) - 16 h 40 minutes of afib 2/13/2013    Atrial flutter with controlled response - seen on ICD interrogation - asymptomatic 2/13/2013    Automatic implantable cardioverter-defibrillator in situ 2/13/2013    Basal cell carcinoma     mid forehead    CAD (coronary artery disease) 2/13/2013    Cardiomyopathy     Cataract     Cognitive deficits     mild    H/O syncope -  5/27/2013    History of PTCA - LAD, LCX and PDA PCI in 2006 2/13/2013    HTN (hypertension) 2/13/2013    Hyperlipidemia LDL goal < 70 2/13/2013    Hypertension     ICD (implantable cardiac defibrillator) in place 2/13/2013    Ischemic cardiomyopathy LVEF ~45% 2/13/2013    LBBB (left bundle branch block) 2/13/2013    Memory loss     Psoriasis vulgaris     Syncope and collapse        Past Surgical History:   Procedure Laterality Date    CARDIAC DEFIBRILLATOR PLACEMENT      CATARACT EXTRACTION W/  INTRAOCULAR LENS IMPLANT Right 04/04/16    Dr Michael     CATARACT EXTRACTION W/  INTRAOCULAR LENS IMPLANT Left 05/09/2016    SKIN BIOPSY      TONSILLECTOMY         Review of patient's allergies indicates:   Allergen Reactions    Arb-angiotensin receptor antagonist Other (See Comments)     Hyperkalemia    Lisinopril Diarrhea       Medications:  Continuous Infusions:  Scheduled Meds:   amiodarone  100 mg Oral Daily    aspirin  81 mg Oral Daily    atorvastatin  40 mg Oral Daily    dutasteride  0.5 mg Oral Daily    enoxaparin  40 mg Subcutaneous Q24H    memantine  10 mg Oral BID    metoprolol tartrate  12.5 mg Oral BID    multivitamin  1 tablet Oral Daily    pantoprazole  40 mg Oral Daily    tamsulosin  0.4 mg Oral QHS    tuberculin  5 Units Intradermal Once     PRN Meds:acetaminophen, albuterol-ipratropium, dextrose 50%, dextrose 50%,  glucagon (human recombinant), glucose, glucose, loperamide, melatonin, ondansetron, promethazine, sodium chloride 0.9%, sodium chloride 0.9%    Family History     Problem Relation (Age of Onset)    Cancer Mother    Cataracts Mother    Dementia Father    Diabetes Maternal Grandmother    Kidney disease Sister    No Known Problems Brother, Maternal Aunt, Maternal Uncle, Paternal Aunt, Paternal Uncle, Maternal Grandfather, Paternal Grandmother, Paternal Grandfather    Psoriasis Father, Son, Son    Tremor Father        Tobacco Use    Smoking status: Former Smoker     Quit date: 1963     Years since quittin.8    Smokeless tobacco: Never Used   Substance and Sexual Activity    Alcohol use: No     Frequency: Monthly or less     Drinks per session: 1 or 2     Binge frequency: Never    Drug use: No    Sexual activity: Not on file       Review of Systems   Constitutional: Positive for appetite change and fatigue.   HENT: Negative.    Eyes: Negative.    Respiratory: Positive for shortness of breath.    Gastrointestinal: Positive for diarrhea.   Endocrine: Negative.    Genitourinary: Positive for urgency.   Musculoskeletal: Positive for gait problem.   Skin: Positive for pallor.   Allergic/Immunologic: Negative.    Neurological: Positive for tremors.   Hematological: Negative.    Psychiatric/Behavioral: Positive for confusion.     Objective:     Vital Signs (Most Recent):  Temp: 97.5 °F (36.4 °C) (20 1507)  Pulse: 61 (20 1507)  Resp: 18 (20 1507)  BP: (!) 143/73 (20 1507)  SpO2: 96 % (20 1507) Vital Signs (24h Range):  Temp:  [96.2 °F (35.7 °C)-100.3 °F (37.9 °C)] 97.5 °F (36.4 °C)  Pulse:  [61-76] 61  Resp:  [16-18] 18  SpO2:  [89 %-96 %] 96 %  BP: (138-163)/(67-73) 143/73     Weight: 78.5 kg (173 lb)  Body mass index is 27.1 kg/m².    Physical Exam  Vitals signs and nursing note reviewed.   HENT:      Head: Normocephalic and atraumatic.   Neck:      Musculoskeletal: Normal range of  motion.   Cardiovascular:      Rate and Rhythm: Normal rate and regular rhythm.      Heart sounds: No murmur.   Pulmonary:      Effort: Pulmonary effort is normal.      Breath sounds: Normal breath sounds.   Abdominal:      General: Abdomen is flat. Bowel sounds are normal.   Musculoskeletal: Normal range of motion.   Skin:     General: Skin is warm and dry.      Coloration: Skin is pale.   Neurological:      Mental Status: He is alert.      Gait: Gait abnormal.      Comments: Oriented to person, place, disoriented to time and situation   Psychiatric:         Mood and Affect: Mood normal.         Behavior: Behavior normal.      Comments: Hx of dementia         Advance Care Planning   Review of Symptoms    Symptom Assessment (ESAS 0-10 Scale)  Pain:  0  Dyspnea:  3  Anxiety:  0  Nausea:  0  Depression:  0  Anorexia:  3  Fatigue:  3  Insomnia:  0  Restlessness:  0  Agitation:  0     Cam / Delirium: base line cofusion hx of dementia.  Constipation:  Negative  Diarrhea:  Positive    Bowel Management Plan (BMP):  Yes      Comments:  No complaints of pain, hx of shortness of breath on exertion          OME in 24 hours:  0    Performance Status:  40    Advanced Directives:  Living Will:  Yes.  Copy on chart:  Yes    Do Not Resuscitate Status:  Yes    Medical Power of : Yes     Agent's Name:  Alvarado Velarde .  Agent's Contact Number:  [phone]    Decision Making:  Patient answered questions and Family answered questions    Living Arrangements:  Lives with spouse    Psychosocial/Cultural:   59 years, together for 66 yrs.  5 adult children, lives with wife, one grandchild, retired worked in [employer]    Spiritual:  F - Kristin and Belief:  Mu-ism  A - Address in Care:  Has received communion and anointing of the sick.   visiting         Significant Labs: All pertinent labs within the past 24 hours have been reviewed.  CBC:   Recent Labs   Lab 07/20/20  0517   WBC 6.13   HGB 9.7*   HCT  32.4*   MCV 94        BMP:  Recent Labs   Lab 07/20/20  0517         K 3.7      CO2 19*   BUN 20   CREATININE 1.7*   CALCIUM 8.5*     LFT:  Lab Results   Component Value Date    AST 37 07/20/2020    ALKPHOS 73 07/20/2020    BILITOT 0.6 07/20/2020     Albumin:   Albumin   Date Value Ref Range Status   07/20/2020 3.1 (L) 3.5 - 5.2 g/dL Final     Protein:   Total Protein   Date Value Ref Range Status   07/20/2020 6.5 6.0 - 8.4 g/dL Final     Lactic acid:   No results found for: LACTATE    Significant Imaging: I have reviewed all pertinent imaging results/findings within the past 24 hours.

## 2020-07-20 NOTE — HPI
HPI obtained from chart review:     Mr. Velarde is an 81 yo gentleman with PMH of: HTN, HLd, CAD s/p PTCA to LAD, LCX and PAD in 2006, ischemic cardiomyopathy EF ~ 45%,  Afib,  lewy body dementia and parkinsons.   He presents to [facility] with reports per family of  generalized weakness and  Confused -  oriented to person only, per family Family reports at baseline he is oriented to person and place. son and place.  ED nurse spoke with the daughter at 17:59 who reported the patient is normally continent and wakes up for breakfast in the morning. His wife stated that this morning she was woken up by the patient having bladder incontinence and while ambulating him to the bathroom she noticed bowel incontinence (diarrhea). Also noticed generalized weakness and lethargy stating the patient just wanted to sleep which is not usual for him.   Currently he is oriented to person only. He denies CP, SOB, NVD, abdominal pain.

## 2020-07-20 NOTE — SUBJECTIVE & OBJECTIVE
Interval History: No reported events overnight. Pt seen at bedside thsi AM resting in NAD, AOx3- most lucid patient has been on my exam. He offers no complaints. Pall care to see patient today.    Review of Systems   Unable to perform ROS: Dementia     Objective:     Vital Signs (Most Recent):  Temp: 96.2 °F (35.7 °C) (20 0716)  Pulse: 63 (20 0716)  Resp: 18 (20 0716)  BP: (!) 150/68 (20 0716)  SpO2: (!) 93 % (20 0839) Vital Signs (24h Range):  Temp:  [96.2 °F (35.7 °C)-100.3 °F (37.9 °C)] 96.2 °F (35.7 °C)  Pulse:  [63-83] 63  Resp:  [16-18] 18  SpO2:  [89 %-94 %] 93 %  BP: (114-163)/(67-73) 150/68     Weight: 78.5 kg (173 lb)  Body mass index is 27.1 kg/m².    Intake/Output Summary (Last 24 hours) at 2020 1036  Last data filed at 2020 0900  Gross per 24 hour   Intake 665 ml   Output 150 ml   Net 515 ml      Physical Exam  Constitutional:       General: He is not in acute distress.     Appearance: He is well-developed. He is not ill-appearing.   HENT:      Head: Normocephalic and atraumatic.   Neck:      Musculoskeletal: Neck supple.      Thyroid: No thyromegaly.   Cardiovascular:      Rate and Rhythm: Normal rate and regular rhythm.   Pulmonary:      Effort: Pulmonary effort is normal. No respiratory distress.   Abdominal:      General: Bowel sounds are normal.      Palpations: Abdomen is soft. There is no hepatomegaly or splenomegaly.      Tenderness: There is no abdominal tenderness.   Musculoskeletal:      Comments: Bilateral UE rigidity   Skin:     General: Skin is warm and dry.   Neurological:      General: No focal deficit present.      Mental Status: He is disoriented and confused.      Sensory: No sensory deficit.      Comments:  oriented to self, , place this AM   Psychiatric:         Cognition and Memory: Cognition is impaired. Memory is impaired.         Significant Labs: All pertinent labs within the past 24 hours have been reviewed.    Significant Imaging: I  have reviewed all pertinent imaging results/findings within the past 24 hours.

## 2020-07-20 NOTE — PLAN OF CARE
Safety measures maintained. VSS on RA. Pt had no complaints of pain throughout shift. Mental status remains the same; oriented to person. Rectal tube maintained. Bed in lowest position, call light within reach, side rails up x2. Pt has no complaints at this time. Will continue to monitor.

## 2020-07-20 NOTE — PROGRESS NOTES
Established Patient - Audio Only Telehealth Visit     The patient location is: Home  The chief complaint leading to consultation is: Palliative Care follow-up visit  Visit type: Virtual visit with audio only (telephone)  Total time spent with patient: 56mins       The reason for the audio only service rather than synchronous audio and video virtual visit was related to technical difficulties or patient preference/necessity.     Each patient to whom I provide medical services by telemedicine is:  (1) informed of the relationship between the physician and patient and the respective role of any other health care provider with respect to management of the patient; and (2) notified that they may decline to receive medical services by telemedicine and may withdraw from such care at any time. Patient verbally consented to receive this service via voice-only telephone call.    [facility]  Palliative Care Audio Visit    Visit Date: 7/20/2020  Encounter Provider: Claudette Gilliam DNP, FNP-C  PCP:  Jeff Yan MD    Subjective:      Patient ID: Mark Anthony Velarde Jr. is a 80 y.o. male.    Consult Requested By:  Jeff Yan M.D.  Reason for Consult:  Palliative Care     Chief Complaint: Palliative Care Follow-up with Hospice discussion      Outpatient Palliative Care Encounter:    PMHx:  Mr. Mark Anthony Velarde Jr is an 81 y/o male with PMHx of Parkinson's with Lewy body dementia, mixed dementia, atrial fibrillation, long term anticoagulant use, atrial flutter, defibrillator (2/13/2013), coronary artery disease, cardiomyopathy, cognitive deficits, syncope, hypertension 2/13/2013, Ischemic cardiomyopathy, Left bundle branch block, psoriasis vulgaris, hyperlipidemia, basal cell carcinoma mid forehead, complete heart block, atherosclerosis of aorta, chronic renal insufficiency stag 3, BPH wit urinary obstruction, anemia, dysphagia, Vitamin D deficiency, right leg swelling, and duodenitis.     PSHx:  Surgical history  includes cardiac defibrillator placement, cataract extraction with right intraocular lens implant 2016, Cataract extraction with left intraocular lens implant 2016, skin biopsy, and tonsillectomy.       Social History:  Patient  to his wife for 60 years; has 5 adult children (4 sons and 1 daughter), all living.Reports having 8 grandchildren (7 girls and 1 boy). Patient has 1 sibling (sister), who is . 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 Patient is not a  . He lives at home with his wife.       Impression:    Audio visit done today for palliative care follow-up; called by daughter Aurea to discuss how patient is doing and to discussed advance care, hospice and hospice benefits. Daughter Aurea reports pt was sent to [facility] for increased weakness, fever, more bed bound, and not verbal. Notes indicate patient was worked up for UTI, which UA was unremarkable for infection, CXR no signs of infection,CTH no acute infarct or intracranial hemorrhage,  BC x 1 grew gram positive cocci in clusters resembling Staph (probable contaminant). Repeat BC showed No growth. He was started on Vancomycin  then stopped on >>pt was afebrile. TTE done due to patient having PPM/AICD, showed no signs of vegetations; stool studies showed Cdiff negative.     Daughter reports her father is currently in the hospital but he is very weak and mostly bedbound. No c/o pain. Reports he is not talking as much as he was prior to becoming weak. States she understands he will not get better overall with the dementia. Reports his appetite was very poor.   Discussed Palliative care with her and hospice and hospice benefits. I recommended hospice at this time for him, but she needed to discuss with her brothers and her mother. Explained to her he could have hospice at home but she expresses her mother can not take care of him at home if he is bedbound. Discussed nursing  home placement with her.  Explained to patient's daughter inpatient hospice is not a long term placement facility but only if patient is actively dying. Verbalized understanding.       Goals of Care:  I initiated the process of advance care planning today and explained the importance of this process to the patient and family.  I introduced the concept of advance directives to the patient, as well. Then the patient received detailed information about the importance of designating a Health Care Power of  (HCPOA). She was also instructed to communicate with this person about his wishes for future healthcare, should he become sick and lose decision-making capacity.    I Introduced LaPOST form with patient/family, explaining this is the patient's wishes, and this form will be uploaded into the patient's [facility] and the [place] Registry when completed. Wife completed LaPOSt on today 's visit. DNR code Status.        Goals of care include wanting patient to be pain free and would like him to be able to ambulate      PLAN:  1. Palliative care to follow-up with patient in 4 weeks 8/6  2. Continue all medications  3. Fall precautions at all times  4. Recommend Hospice referral for evaluation and admission  5. Offer supportive care to patient's wife and family      Review of Systems (information provided by daughter)  Constitutional: Positive for activity change (not walking), appetite change (appetite is poor) and fatigue. Negative for chills, fever and unexpected weight change.   HENT: Positive for trouble swallowing. Negative for drooling, postnasal drip, rhinorrhea, sinus pressure and sore throat.    Eyes: Negative for visual disturbance.   Respiratory: Positive for cough (mild cough) and shortness of breath (SOB). Negative for chest tightness.    Cardiovascular: Negative for BLE swelling   Gastrointestinal: Negative for abdominal distention, abdominal pain, constipation, diarrhea and nausea.    Genitourinary: Positive for difficulty urinating. Negative for hematuria.   Musculoskeletal: Positive for arthralgias and myalgias. Negative for back pain.        Occasional knee pain   Skin: Negative for wound.   Neurological: Positive for tremors and weakness. Negative for dizziness, seizures, numbness and headaches.  Mostly non-verbal  Psychiatric/Behavioral: Positive for agitation Negative for hallucinations.       Assessments:  · Environmental: two-story home, clean, uncluttered, good lighting  · Functional Status: able to feed himself; needs assistance with bathing and dressing  · Safety: Fall precautions; someone is always present  · Nutritional: Eats 3 meals a day; snacks throughout the day  · Home Health/DME/Supplies: walker, cane, wheelchair, shower chair, grab bars, lift chair    Symptom Assessment (ESAS 0-10 scale)     ESAS 0 1 2 3 4 5 6 7 8 9 10   Pain x             Dyspnea     x         Anxiety x             Nausea x             Depression  x             Anorexia x             Fatigue       x       Insomnia x             Restlessness     x          Agitation    x            Constipation    no  Bowel Management Plan (BMP): no  Diarrhea        no  Comments: LBM ??    Performance Status:  PPS Score 50  Karnofsky Score:  50  FAST: 6d  NYHA:      History:  Past Medical History:   Diagnosis Date    *Atrial fibrillation     Anticoagulant long-term use     Atrial fibrillation - asymptomatic but noted on ICD interrogation ([id number]) - 16 h 40 minutes of afib 2/13/2013    Atrial flutter with controlled response - seen on ICD interrogation - asymptomatic 2/13/2013    Automatic implantable cardioverter-defibrillator in situ 2/13/2013    Basal cell carcinoma     mid forehead    CAD (coronary artery disease) 2/13/2013    Cardiomyopathy     Cataract     Cognitive deficits     mild    H/O syncope -  5/27/2013    History of PTCA - LAD, LCX and PDA PCI in 2006 2/13/2013    HTN (hypertension) 2/13/2013     Hyperlipidemia LDL goal < 70 2/13/2013    Hypertension     ICD (implantable cardiac defibrillator) in place 2/13/2013    Ischemic cardiomyopathy LVEF ~45% 2/13/2013    LBBB (left bundle branch block) 2/13/2013    Memory loss     Psoriasis vulgaris     Syncope and collapse      Family History   Problem Relation Age of Onset    Psoriasis Father     Dementia Father     Tremor Father     Cataracts Mother     Cancer Mother     Psoriasis Son     Psoriasis Son     Diabetes Maternal Grandmother     Kidney disease Sister     No Known Problems Brother     No Known Problems Maternal Aunt     No Known Problems Maternal Uncle     No Known Problems Paternal Aunt     No Known Problems Paternal Uncle     No Known Problems Maternal Grandfather     No Known Problems Paternal Grandmother     No Known Problems Paternal Grandfather     Amblyopia Neg Hx     Blindness Neg Hx     Glaucoma Neg Hx     Hypertension Neg Hx     Macular degeneration Neg Hx     Retinal detachment Neg Hx     Strabismus Neg Hx     Stroke Neg Hx     Thyroid disease Neg Hx     Parkinsonism Neg Hx     Celiac disease Neg Hx     Cirrhosis Neg Hx     Colon cancer Neg Hx     Colon polyps Neg Hx     Crohn's disease Neg Hx     Cystic fibrosis Neg Hx     Esophageal cancer Neg Hx     Irritable bowel syndrome Neg Hx     Inflammatory bowel disease Neg Hx     Hemochromatosis Neg Hx     Liver cancer Neg Hx     Liver disease Neg Hx     Rectal cancer Neg Hx     Stomach cancer Neg Hx     Ulcerative colitis Neg Hx     Christiano's disease Neg Hx     Lymphoma Neg Hx     Scleroderma Neg Hx     Rheum arthritis Neg Hx     Multiple sclerosis Neg Hx     Melanoma Neg Hx     Tuberculosis Neg Hx     Lupus Neg Hx      Past Surgical History:   Procedure Laterality Date    CARDIAC DEFIBRILLATOR PLACEMENT      CATARACT EXTRACTION W/  INTRAOCULAR LENS IMPLANT Right 04/04/16    Dr Michael     CATARACT EXTRACTION W/  INTRAOCULAR LENS IMPLANT  Left 05/09/2016    SKIN BIOPSY      TONSILLECTOMY       Review of patient's allergies indicates:   Allergen Reactions    Arb-angiotensin receptor antagonist Other (See Comments)     Hyperkalemia    Lisinopril Diarrhea       Medications:    Current Outpatient Medications:     amiodarone (PACERONE) 100 MG Tab, TAKE 1 TABLET BY MOUTH EVERY DAY, Disp: 90 tablet, Rfl: 3    amiodarone (PACERONE) 200 MG Tab, Take 0.5 tablets (100 mg total) by mouth once daily., Disp: 45 tablet, Rfl: 3    amLODIPine (NORVASC) 5 MG tablet, Take 1 tablet (5 mg total) by mouth once daily., Disp: 90 tablet, Rfl: 3    aspirin (ECOTRIN) 81 MG EC tablet, Take 81 mg by mouth once daily., Disp: , Rfl:     atorvastatin (LIPITOR) 40 MG tablet, Take 1 tablet (40 mg total) by mouth once daily., Disp: 90 tablet, Rfl: 3    dutasteride (AVODART) 0.5 mg capsule, Take 1 capsule (0.5 mg total) by mouth once daily., Disp: 90 capsule, Rfl: 3    FLUZONE HIGH-DOSE 2019-20, PF, 180 mcg/0.5 mL Syrg, ADM 0.5ML IM UTD, Disp: , Rfl: 0    loperamide (IMODIUM) 2 mg capsule, Take 1 capsule (2 mg total) by mouth 4 (four) times daily as needed for Diarrhea., Disp:  , Rfl: 0    memantine (NAMENDA) 10 MG Tab, Take 1 tablet (10 mg total) by mouth 2 (two) times daily., Disp: 180 tablet, Rfl: 3    metoprolol tartrate (LOPRESSOR) 25 MG tablet, Take 0.5 tablets (12.5 mg total) by mouth 2 (two) times daily., Disp: 90 tablet, Rfl: 3    multivitamin (MULTIVITAMIN) per tablet, Take 1 tablet by mouth once daily., Disp: , Rfl:     omeprazole (PRILOSEC) 20 MG capsule, Take 1 capsule (20 mg total) by mouth once daily., Disp: 90 capsule, Rfl: 3    tamsulosin (FLOMAX) 0.4 mg Cap, Take 1 capsule (0.4 mg total) by mouth every evening., Disp: 90 capsule, Rfl: 3    24h Oral Morphine Equivalents (OME):  n/a    Objective:     Physical Exam:    There is no height or weight on file to calculate BMI.  Audio visit done today>>unable to do physical assessment on  patient      Labs:  CBC:   WBC   Date Value Ref Range Status   07/23/2020 7.28 3.90 - 12.70 K/uL Final      82 -  Hemoglobin   Date Value Ref Range Status   07/23/2020 9.6 (L) 14.0 - 18.0 g/dL Final   FT  Hematocrit   Date Value Ref Range Status   07/23/2020 32.4 (L) 40.0 - 54.0 % Final   0    BILITOT 0.4 07/23/2020       Albumin:   Albumin   Date Value Ref Range Status   07/23/2020 3.2 (L) 3.5 - 5.2 g/dL Final     Protein:   Total Protein   Date Value Ref Range Status   07/23/2020 6.7 6.0 - 8.4 g/dL Final       Radiology:  I have reviewed all pertinent imaging results/findings within the past 24 hours.    Psychosocial/Cultural/Spiritual:   · F- Kristin and Belief:  Episcopal   · I - Importance: yes  · C - Community: [facility]  · A - Address in Care: no spiritual needs identified      Assessment:     1. Palliative care encounter    2.    Counseling regarding advance directives and goals of care  3.    Fatigue, unspecified  4.    SOB with exertion  5.    Generalized weakness  6.    Encounter for hospice care discussion    Plan:     Ethical / Legal: Advance Care Planning   · Surrogate decision maker:  Name: Alvarado Velarde, Relationship: son  · Code Status:  DNR  · LaPOST:  Completed LaPOST on 6/9/2020  · Other advance directive: Living will completed 2/8/2020,   · Capacity to make medical decisions:  yes,   · Conflict: none     Assessment/Plan:    Palliative care encounter  -initiated Palliative care services  -discussed palliative care and hospice benefits  -completed LaPOST form  -DNR code status  -7/20 discussed palliative care services with dtr    Counseling regarding advance care planning and goals of care  -goals of care pain free and able to ambulate   -pt has POA/living will completed previously 2020  -discussed goals of care and health goals    Encounter for hospice care discussion  -extensive discussion with dtr Aurea on 7/20 concerning hospice benefits  -family to consider hospice  benefits  -discussed inpatient hospice     Fatigue, unspecified  --rest periods    Shortness of breath, with exertion  --keep 02 sats>92%  -02 prn      Generalized weakness  -encourage pt to eat  -3 meals a day as tolerated      Were controlled substances prescribed?  No     Total Audio Visit time 56 mins     >50% of 56 mins visit was spent on counseling and coordination of care. The following issues were discussed: Parkinson's, dementia, BPH, unsteady gait    25 mins spent of Advance directives, goals of care, and hospice and hospice benefits    Follow Up Appointments:   Future Appointments   Date Time Provider Department Center   8/14/2020  9:30 AM Andrew Jane MD [facility]   9/20/2020 10:00 AM HOME MONITOR DEVICE CHECK, [facility]       Patient and family agreed via verbal consent to access medical records and for assessment and treatment during this visit.    This service was not originating from a related E/M service provided within the previous 7 days nor will  to an E/M service or procedure within the next 24 hours or my soonest available appointment.  Prevailing standard of care was able to be met in this audio-only visit.        Signature:  Claudette Gilliam, CARLTON, FNP-C  [facility]

## 2020-07-20 NOTE — ASSESSMENT & PLAN NOTE
80 year old male admitted to observation for weakness. >> bacteremia? Unclear source?   - CTH no acute infarct or intracranial hemorrhage  - UA unremarkable for infection  - CXR without signs of infection  - XR thoracic and lumbar spine -- no acute fractures  - Consult    - Fall precautions, aspiration precautions, delirium precautions.  - See 6/9/2020 note for palliative consult.  - BCx2 were ordered - 1 BCx grew Gram positive cocci in clusters resembling Staph>contaminant  7/17-20:   - pt febrile 7/18 but afebrile since  - Repeat BCx ordered : NGTD  - Started on IV Vancomycin 7/17, pharmacy consulted; stopped 7/19 as patient has been afebrile without WBC, no infectious source identified  - pt with PPM/AICD: TTE no signs of vegetations or significant changes from previous (WMA on previous TTE)  - Stool Studies ordered (nursing reports episode of loose stool): C. Diff negative, cx NGTD> start imodium   *due to mental and physical decline (see parkinsons/dementia below), family is interested in goals of care discussion with palliative/possibly transitioning to hospice: pall care consulted; anticipate d/c to home hospice

## 2020-07-21 ENCOUNTER — TELEPHONE (OUTPATIENT)
Dept: CARDIOLOGY | Facility: HOSPITAL | Age: 80
End: 2020-07-21

## 2020-07-21 DIAGNOSIS — I25.10 CORONARY ARTERY DISEASE INVOLVING NATIVE CORONARY ARTERY OF NATIVE HEART WITHOUT ANGINA PECTORIS: Primary | Chronic | ICD-10-CM

## 2020-07-21 DIAGNOSIS — Z95.0 CARDIAC PACEMAKER IN SITU: ICD-10-CM

## 2020-07-21 LAB
ALBUMIN SERPL BCP-MCNC: 3.4 G/DL (ref 3.5–5.2)
ALP SERPL-CCNC: 83 U/L (ref 55–135)
ALT SERPL W/O P-5'-P-CCNC: 31 U/L (ref 10–44)
ANION GAP SERPL CALC-SCNC: 9 MMOL/L (ref 8–16)
AST SERPL-CCNC: 44 U/L (ref 10–40)
BACTERIA BLD CULT: NORMAL
BACTERIA STL CULT: NORMAL
BASOPHILS # BLD AUTO: 0.06 K/UL (ref 0–0.2)
BASOPHILS NFR BLD: 0.8 % (ref 0–1.9)
BILIRUB SERPL-MCNC: 0.5 MG/DL (ref 0.1–1)
BUN SERPL-MCNC: 24 MG/DL (ref 8–23)
CALCIUM SERPL-MCNC: 8.8 MG/DL (ref 8.7–10.5)
CHLORIDE SERPL-SCNC: 111 MMOL/L (ref 95–110)
CO2 SERPL-SCNC: 24 MMOL/L (ref 23–29)
CREAT SERPL-MCNC: 1.7 MG/DL (ref 0.5–1.4)
DIFFERENTIAL METHOD: ABNORMAL
EOSINOPHIL # BLD AUTO: 0.3 K/UL (ref 0–0.5)
EOSINOPHIL NFR BLD: 4.2 % (ref 0–8)
ERYTHROCYTE [DISTWIDTH] IN BLOOD BY AUTOMATED COUNT: 18.5 % (ref 11.5–14.5)
EST. GFR  (AFRICAN AMERICAN): 42.5 ML/MIN/1.73 M^2
EST. GFR  (NON AFRICAN AMERICAN): 36.7 ML/MIN/1.73 M^2
GLUCOSE SERPL-MCNC: 94 MG/DL (ref 70–110)
H PYLORI AG STL QL IA: NOT DETECTED
HCT VFR BLD AUTO: 31.3 % (ref 40–54)
HGB BLD-MCNC: 9.6 G/DL (ref 14–18)
IMM GRANULOCYTES # BLD AUTO: 0.08 K/UL (ref 0–0.04)
IMM GRANULOCYTES NFR BLD AUTO: 1.1 % (ref 0–0.5)
LYMPHOCYTES # BLD AUTO: 0.8 K/UL (ref 1–4.8)
LYMPHOCYTES NFR BLD: 10.4 % (ref 18–48)
MCH RBC QN AUTO: 28.3 PG (ref 27–31)
MCHC RBC AUTO-ENTMCNC: 30.7 G/DL (ref 32–36)
MCV RBC AUTO: 92 FL (ref 82–98)
MONOCYTES # BLD AUTO: 0.6 K/UL (ref 0.3–1)
MONOCYTES NFR BLD: 7.4 % (ref 4–15)
NEUTROPHILS # BLD AUTO: 5.6 K/UL (ref 1.8–7.7)
NEUTROPHILS NFR BLD: 76.1 % (ref 38–73)
NRBC BLD-RTO: 0 /100 WBC
O+P STL MICRO: NORMAL
PLATELET # BLD AUTO: 209 K/UL (ref 150–350)
PMV BLD AUTO: 10.7 FL (ref 9.2–12.9)
POTASSIUM SERPL-SCNC: 4 MMOL/L (ref 3.5–5.1)
PROCALCITONIN SERPL IA-MCNC: 0.06 NG/ML
PROT SERPL-MCNC: 6.9 G/DL (ref 6–8.4)
RBC # BLD AUTO: 3.39 M/UL (ref 4.6–6.2)
SODIUM SERPL-SCNC: 144 MMOL/L (ref 136–145)
WBC # BLD AUTO: 7.41 K/UL (ref 3.9–12.7)

## 2020-07-21 PROCEDURE — 97530 THERAPEUTIC ACTIVITIES: CPT | Mod: CQ

## 2020-07-21 PROCEDURE — 97110 THERAPEUTIC EXERCISES: CPT | Mod: CQ

## 2020-07-21 PROCEDURE — 63600175 PHARM REV CODE 636 W HCPCS: Performed by: PHYSICIAN ASSISTANT

## 2020-07-21 PROCEDURE — 99232 SBSQ HOSP IP/OBS MODERATE 35: CPT | Mod: ,,, | Performed by: CLINICAL NURSE SPECIALIST

## 2020-07-21 PROCEDURE — 92526 ORAL FUNCTION THERAPY: CPT

## 2020-07-21 PROCEDURE — 99232 PR SUBSEQUENT HOSPITAL CARE,LEVL II: ICD-10-PCS | Mod: ,,, | Performed by: PHYSICIAN ASSISTANT

## 2020-07-21 PROCEDURE — 80053 COMPREHEN METABOLIC PANEL: CPT

## 2020-07-21 PROCEDURE — 85025 COMPLETE CBC W/AUTO DIFF WBC: CPT

## 2020-07-21 PROCEDURE — 25000003 PHARM REV CODE 250: Performed by: PHYSICIAN ASSISTANT

## 2020-07-21 PROCEDURE — 99232 PR SUBSEQUENT HOSPITAL CARE,LEVL II: ICD-10-PCS | Mod: ,,, | Performed by: CLINICAL NURSE SPECIALIST

## 2020-07-21 PROCEDURE — 11000001 HC ACUTE MED/SURG PRIVATE ROOM

## 2020-07-21 PROCEDURE — 99232 SBSQ HOSP IP/OBS MODERATE 35: CPT | Mod: ,,, | Performed by: PHYSICIAN ASSISTANT

## 2020-07-21 PROCEDURE — 84145 PROCALCITONIN (PCT): CPT

## 2020-07-21 PROCEDURE — 36415 COLL VENOUS BLD VENIPUNCTURE: CPT

## 2020-07-21 RX ORDER — LOPERAMIDE HYDROCHLORIDE 2 MG/1
2 CAPSULE ORAL 4 TIMES DAILY PRN
Refills: 0
Start: 2020-07-21 | End: 2020-07-31

## 2020-07-21 RX ADMIN — PANTOPRAZOLE SODIUM 40 MG: 40 TABLET, DELAYED RELEASE ORAL at 10:07

## 2020-07-21 RX ADMIN — MEMANTINE HYDROCHLORIDE 10 MG: 10 TABLET ORAL at 09:07

## 2020-07-21 RX ADMIN — METOPROLOL TARTRATE 12.5 MG: 25 TABLET, FILM COATED ORAL at 10:07

## 2020-07-21 RX ADMIN — DUTASTERIDE 0.5 MG: 0.5 CAPSULE, LIQUID FILLED ORAL at 10:07

## 2020-07-21 RX ADMIN — MEMANTINE HYDROCHLORIDE 10 MG: 10 TABLET ORAL at 10:07

## 2020-07-21 RX ADMIN — ASPIRIN 81 MG: 81 TABLET, COATED ORAL at 10:07

## 2020-07-21 RX ADMIN — THERA TABS 1 TABLET: TAB at 10:07

## 2020-07-21 RX ADMIN — METOPROLOL TARTRATE 12.5 MG: 25 TABLET, FILM COATED ORAL at 09:07

## 2020-07-21 RX ADMIN — AMIODARONE HYDROCHLORIDE 100 MG: 100 TABLET ORAL at 10:07

## 2020-07-21 RX ADMIN — ENOXAPARIN SODIUM 40 MG: 100 INJECTION SUBCUTANEOUS at 04:07

## 2020-07-21 RX ADMIN — TAMSULOSIN HYDROCHLORIDE 0.4 MG: 0.4 CAPSULE ORAL at 09:07

## 2020-07-21 RX ADMIN — ATORVASTATIN CALCIUM 40 MG: 40 TABLET, FILM COATED ORAL at 10:07

## 2020-07-21 NOTE — PLAN OF CARE
Problem: SLP Goal  Goal: SLP Goal  Description: Speech Language Pathology Goals  Goals expected to be met by 7/25/20    1. Pt will tolerate a regular diet with thin liquids w/o overt S/S aspiration, MIN A  2. Educate Pt and family on aspiration precautions and SLP POC      Outcome: Met   Continue regular diet/thin liquids at this time. No further ST warranted.   Rosey Robert, ARIANA-SLP  7/21/2020

## 2020-07-21 NOTE — ASSESSMENT & PLAN NOTE
Palliative medicine consulted for goals of care and advanced care planning   Chart reviewed and patient discussed with primary team PA    10 AM Palliative medicine at bedside, palliative medicine introduced. No family is present.     Impression:  Mr. Velarde is an 81 yo gentleman admitted with weakness, low grade temperature.  Has hx of Lewy Body dementia. Mr. Velarde is awake, alert and oriented to person and place.  He is disoriented to time and situation.  He is able to say he is in the hospital but does not know why.  Reports no complaints of pain or discomfort.  No shortness of breath noted.    Palliative medicine APRN spoke with patient's wife Kayleigh by telephone.  Mrs. Velarde reports a sudden change a few nights ago.  He woke in the night with confusion and incontinence.  Mrs. Velarde reports prior to this he was able to toilet with assistance and was walking with assistance.  Mrs. Velarde reports he is often disoriented to time. Wife notes his appetite has been poor and she has not witnessed any difficulty swallowing or chewing.    1 PM met with the patient's daughter Janie Velarde who reports she has noticed a more steady decline over the past 5-6 months.  Janie also states it has been more difficult for the patient's wife to care for him at home.     Advance Care Planning     -Advanced care planning documents have been received.  Has general living will and HPOA indicating   -His son Alvarado Velarde [phone]   And daughter Janie Velarde [phone] as surrogate decision makers.  -Mrs. Velarde reports and daughter confirms although there is a HPOA they make decisions as a family  And Mrs. Velarde usually makes the final decision  -Chart review indicates Mr. Velarde completed a LaPOST with [facility] home palliative medicine service indicating DNR.  There is no copy on file  - Partial code per primary team.  Family has requested to have DNR order  - LaPOST completed - pending MD signature           Goals of Care:   -  Lengthy conversation with wife by telephone and daughter at bedside.   - Clinical updates provided to wife - daughter states she is surprised but happy to see how alert and oriented Mr. Velarde is today.  She states this is an incredible improvement from yesterday  - Discussed PT/OT recommendation for SNF.  Both wife and daughter are amenable to SNF.  Mrs. Velarde would like her  to have a chance of returning to his baseline activity level.  Mrs. Velarde is aware his physical condition could decline further.  At that time family would have more acceptance and be more amenable to hospice care.   - Daugther expressed concern for wife's ability to provide physical care of Mr. Velarde has he has declined suddenly.  Wife expressed concern for his poor appetite.  She is worried the decrease in appetite is a signal of the inevitable.   - Both wife and daughter are amenable to hospice if Mr. Velarde does not show any improvement with SNF.  - Hospice education (philosophy, setting, services, costs) provided.   - Primary team making appropriate referrals fo SNF - wife expressed interest in [facility] and [facility].     Plan/Recommendations  - Continue current plan of care -   -Family is amenable to DNR order and LaPOST signed.  Requires MD signature.    Placed in blue chart.   - Case Management and  making appropriate referrals.    - Palliative medicine will continue to follow    Primary team PA and MD aware of the above goals of care conversation and recommendation.

## 2020-07-21 NOTE — ASSESSMENT & PLAN NOTE
80 year old male admitted to observation for weakness. >> bacteremia? Unclear source?   - CTH no acute infarct or intracranial hemorrhage  - UA unremarkable for infection  - CXR without signs of infection  - XR thoracic and lumbar spine -- no acute fractures  - Consult    - Fall precautions, aspiration precautions, delirium precautions.  - See 6/9/2020 note for palliative consult.  - BCx2 were ordered - 1 BCx grew Gram positive cocci in clusters resembling Staph>contaminant  7/17-21:   - pt febrile 7/18 but afebrile since  - Repeat BCx ordered : NGTD  - Started on IV Vancomycin 7/17, pharmacy consulted; stopped 7/19 as patient has been afebrile without WBC, no infectious source identified  - Pt with PPM/AICD: TTE no signs of vegetations or significant changes from previous (WMA on previous TTE)  - Stool Studies ordered (nursing reports episode of loose stool): C. Diff negative, cx NGTD> start imodium   *due to mental and physical decline (see parkinsons/dementia below), family is interested in goals of care discussion with palliative/possibly transitioning to SNF: pall care consulted; anticipate d/c to SNF

## 2020-07-21 NOTE — PROGRESS NOTES
"[facility] Hospital Medicine  Progress Note    Patient Name: Mark Anthony Velarde Jr.  MRN: [id number]  Patient Class: IP- Inpatient   Admission Date: 7/16/2020  Length of Stay: 1 days  Attending Physician: Jason Blanc MD  Primary Care Provider: Jeff Yan MD    [facility] Medicine Team: [facility] Cheri Gupta PA-C    Subjective:     Principal Problem:Weakness        HPI:  Mark Anthony Velarde Jr. Is an 79 yo M with a PMH including HTN, HLd, CAD s/p PTCA to LAD, LCX and PAD in 2006, ischemic cardiomyopathy EF ~ 45%, afib not currently anticoagulated, lewy body dementia and parkinsonism presents with generalized weakness per family. Patient was brought in by EMS who reports family called due to patient "being in bed all morning." Per ED triage notes, patient was slightly confused when he was brought in and oriented to person only, not oriented to place, situation, or time. Family reported baseline oriented to person and place.  ED nurse spoke with the daughter at 17:59 who reported the patient is normally continent and wakes up for breakfast in the morning. His wife stated that this morning she was woken up by the patient having bladder incontinence and while ambulating him to the bathroom she noticed bowel incontinence (diarrhea). Also noticed generalized weakness and lethargy stating the patient just wanted to sleep which is not usual for him.   Currently he is oriented to person only. He denies CP, SOB, NVD, abdominal pain.     Overview/Hospital Course:  Patient with Lewy Body Dementia admitted for weakness/altered baseline. On admission 0/4 SIRS. UA and CXR without signs of infection. COVID negative. CTH no acute infarct or bleed seen. CT cervical spine no acute fracture. XRAY of lumbar and thoracic spine no acute fracture. BCx2 were collected, 1 BCx showed GPC final result contaminant. Repeat BCx were obtained, and patient was started on IV Vancomycin, pharmacy consulted. Repeat Bcx NGTD. " TTE ordered given patient with AICD/PPM, no signs of vegetation, TTE relatively unchanged. Stool studies NGTD, C. Diff negative. Given functional and mental decline, goals of care discussed with family who wish to further discuss hospice/end of life care with palliative care (pall care has been following outpt). Pall care consulted, appreciate assistance. Anticipate d/c to SNF.    Interval History: No acute events overnight. AF VSS. Patient seen at bedside, resting, NAD. Wife and son was present. Patient was lucid today, AO - not oriented to the year. He had no complaints today. Waiting for SNF placement, anticipate d/c tomorrow.     Review of Systems   Unable to perform ROS: Dementia   patient had no complaints today    Objective:     Vital Signs (Most Recent):  Temp: 97.7 °F (36.5 °C) (07/21/20 1133)  Pulse: 75 (07/21/20 1133)  Resp: 16 (07/21/20 1133)  BP: (!) 157/68 (07/21/20 1133)  SpO2: 97 % (07/21/20 1133) Vital Signs (24h Range):  Temp:  [97.3 °F (36.3 °C)-98.2 °F (36.8 °C)] 97.7 °F (36.5 °C)  Pulse:  [61-83] 75  Resp:  [16-28] 16  SpO2:  [89 %-97 %] 97 %  BP: (136-157)/(60-73) 157/68     Weight: 79.6 kg (175 lb 7.8 oz)  Body mass index is 27.49 kg/m².    Intake/Output Summary (Last 24 hours) at 7/21/2020 1348  Last data filed at 7/21/2020 0558  Gross per 24 hour   Intake 100 ml   Output --   Net 100 ml      Physical Exam  Constitutional:       General: He is not in acute distress.     Appearance: He is well-developed. He is not ill-appearing.   HENT:      Head: Normocephalic and atraumatic.   Neck:      Musculoskeletal: Neck supple.      Thyroid: No thyromegaly.   Cardiovascular:      Rate and Rhythm: Normal rate and regular rhythm.   Pulmonary:      Effort: Pulmonary effort is normal. No respiratory distress.   Abdominal:      General: Bowel sounds are normal.      Palpations: Abdomen is soft. There is no hepatomegaly or splenomegaly.      Tenderness: There is no abdominal tenderness.   Musculoskeletal:       Comments: Bilateral UE rigidity   Skin:     General: Skin is warm and dry.   Neurological:      General: No focal deficit present.      Mental Status: He is disoriented and confused.      Sensory: No sensory deficit.      Comments:  oriented to self, , family at bedside   Psychiatric:         Cognition and Memory: Cognition is impaired. Memory is impaired.         Significant Labs: All pertinent labs within the past 24 hours have been reviewed.    Significant Imaging: I have reviewed all pertinent imaging results/findings within the past 24 hours.      Assessment/Plan:      * Weakness  80 year old male admitted to observation for weakness. >> bacteremia? Unclear source?   - CTH no acute infarct or intracranial hemorrhage  - UA unremarkable for infection  - CXR without signs of infection  - XR thoracic and lumbar spine -- no acute fractures  - Consult    - Fall precautions, aspiration precautions, delirium precautions.  - See 2020 note for palliative consult.  - BCx2 were ordered - 1 BCx grew Gram positive cocci in clusters resembling Staph>contaminant  -21:   - pt febrile  but afebrile since  - Repeat BCx ordered : NGTD  - Started on IV Vancomycin , pharmacy consulted; stopped  as patient has been afebrile without WBC, no infectious source identified  - Pt with PPM/AICD: TTE no signs of vegetations or significant changes from previous (WMA on previous TTE)  - Stool Studies ordered (nursing reports episode of loose stool): C. Diff negative, cx NGTD> start imodium   *due to mental and physical decline (see parkinsons/dementia below), family is interested in goals of care discussion with palliative/possibly transitioning to SNF: pall care consulted; anticipate d/c to SNF     Lewy body dementia  Parkinsonism    - Memantine 10mg BID    [facility]NAG[facility]  - Minimize use of restraints; if physical restraints necessary, please utilize medical/chemical prns for agitation.  -  Keep shades open and room lit during day and room dim at night in order to promote healthy circadian rhythms.  - Encourage family at bedside  - Keep whiteboard in patient's room current with the date and name of the members of patient's team for easy patient self re-orientation.  - Avoid benzodiazepines, antihistamines, anticholinergics, hypnotics, and minimize opiates while controlling for pain as these medications may exacerbate delirium.    Atrial fibrillation   - Amiodarone 100mg qd; MTP 12.5mg   - Patient is no longer on chronic anticoagulation, likely secondary to fall risk.    Ischemic cardiomyopathy LVEF ~45%  AICD present    - No evidence of decompensation.  Repeat TTE shows rEF 43%, WMA    Echo 1/2017    1 - Upper limit of normal left ventricular enlargement.     2 - Mildly to moderately depressed left ventricular systolic function (EF 40-45%).     3 - Eccentric hypertrophy.     4 - Normal left ventricular diastolic function.     5 - Normal right ventricular systolic function .     6 - Mild left atrial enlargement.     7 - Trivial to mild aortic regurgitation.     8 - Mild mitral regurgitation.     9 - Trivial tricuspid regurgitation.     10 - Trivial pulmonic regurgitation.     11 - The estimated PA systolic pressure is 33 mmHg.     12 - TDI as per text.     Hyperlipidemia with target LDL less than 70  - Atorvastatin 40mg qd    HTN (hypertension)  - Amlodipine 5mg qd, Lopressor 12.5mg BID  BP stable, will monitor  7/18: hold norvasc in setting of low Bps, continue MTP for now as pt seems to have hx afib rvr episodes    CAD (coronary artery disease)  History of PTCA - LAD, LCX and PDA PCI in 2006    - Continue secondary prevention, ASA daily.    Advanced directive placed in chart this admission        Palliative care encounter          VTE Risk Mitigation (From admission, onward)         Ordered     enoxaparin injection 40 mg  Every 24 hours      07/16/20 6139     Place MATTHIAS hose  Until discontinued       07/16/20 2307     IP VTE HIGH RISK PATIENT  Once      07/16/20 2307     Place sequential compression device  Until discontinued      07/16/20 2307                    Discussed with staff  Cheri Gupta PA-C  Department of Hospital Medicine   [facility]

## 2020-07-21 NOTE — PT/OT/SLP PROGRESS
Speech Language Pathology Treatment  Discharge    Patient Name:  Mark Anthony Velarde Jr.   MRN:  [id number]  Admitting Diagnosis: Weakness    Recommendations:                 General Recommendations:  Follow-up not indicated  Diet recommendations:  Regular, Liquid Diet Level: Thin   Aspiration Precautions: Small bites/sips and Standard aspiration precautions Pt would benefit from supervision with meals  General Precautions: Standard, fall  Communication strategies:  none    Subjective     Awake/confusion evident      Pain/Comfort:  · Pain Rating 1: 0/10  · Pain Rating Post-Intervention 1: 0/10    Objective:     Has the patient been evaluated by SLP for swallowing?   Yes  Keep patient NPO? No   Current Respiratory Status: room air      Pt repositioned upright in bed for PO trials. He tolerated thin liquids x6 and cracker x4 with timely swallow initiation, adequate bolus control and oral clearance. Pt had difficulty answering questions during conversation, likely 2/2 to dementia. He would benefit from set up and supervision with meals. Recommend continue regular diet/thin liquids at this time. No further ST warranted.    Assessment:     Mark Anthony Velarde Jr. is a 80 y.o. male with an SLP diagnosis of Dysphagia.     Goals:   Multidisciplinary Problems     SLP Goals     Not on file          Multidisciplinary Problems (Resolved)        Problem: SLP Goal    Goal Priority Disciplines Outcome   SLP Goal   (Resolved)     SLP Met   Description: Speech Language Pathology Goals  Goals expected to be met by 7/25/20    1. Pt will tolerate a regular diet with thin liquids w/o overt S/S aspiration, MIN A  2. Educate Pt and family on aspiration precautions and SLP POC                       Plan:     · Plan of Care reviewed with:  patient   · SLP Follow-Up:  No       Discharge recommendations:  other (see comments)(no additional needs anticipated)       Time Tracking:     SLP Treatment Date:   07/21/20  Speech Start Time:  0900  Speech Stop Time:   0910     Speech Total Time (min):  10 min    Billable Minutes: Treatment Swallowing Dysfunction 10    Rosey Robert CCC-SLP  07/21/2020

## 2020-07-21 NOTE — PT/OT/SLP PROGRESS
"Physical Therapy Treatment    Patient Name:  Mark Anthony Velarde Jr.   MRN:  [id number]    Recommendations:     Discharge Recommendations:  nursing facility, skilled   Discharge Equipment Recommendations: other (see comments)(TBD)   Barriers to discharge: Inaccessible home and Decreased caregiver support    Assessment:     Mark Anthony Velarde Jr. is a 80 y.o. male admitted with a medical diagnosis of Weakness.  He presents with the following impairments/functional limitations:  weakness, impaired endurance, impaired self care skills, gait instability, impaired functional mobilty, impaired balance, impaired cognition, decreased upper extremity function, decreased lower extremity function, decreased safety awareness Patient tolerated treatment well focusing on bed mobility, therex and sitting balance/tolerance. Patient will continue to improve with skilled physical therapy services in order to return to functional baseline.    Rehab Prognosis: Good; patient would benefit from acute skilled PT services to address these deficits and reach maximum level of function.    Recent Surgery: * No surgery found *      Plan:     During this hospitalization, patient to be seen 3 x/week to address the identified rehab impairments via gait training, therapeutic activities, therapeutic exercises and progress toward the following goals:    · Plan of Care Expires:  08/17/20    Subjective     Chief Complaint: increased fatigue after sitting EOB  Patient/Family Comments/goals: "I'll sit up!"  Pain/Comfort:  · Pain Rating 1: 0/10  · Pain Rating Post-Intervention 1: 0/10      Objective:     Communicated with nursing prior to session.  Patient found right sidelying with feet hanging off side of bed with peripheral IV upon PT entry to room.     General Precautions: Standard, aspiration, fall   Orthopedic Precautions:N/A   Braces:       Functional Mobility:  · Bed Mobility:     · Scooting: seated EOB minimum assistance and supine to HOB maximal " assistance  · Supine to Sit: moderate assistance  · Sit to Supine: minimum assistance  · Transfers:  Deferred on this date due to pt's increased fatigue and decreased sitting balance   · Balance: static seated EOB 12 minutes initially mod A to correct posterior lean progressed to CG/SBA with vcs for hand and feet placement for improved balance, dynamic seated EOB Min A while performing CHAPIN vcs for safety      AM-PAC 6 CLICK MOBILITY  Turning over in bed (including adjusting bedclothes, sheets and blankets)?: 2  Sitting down on and standing up from a chair with arms (e.g., wheelchair, bedside commode, etc.): 2  Moving from lying on back to sitting on the side of the bed?: 2  Moving to and from a bed to a chair (including a wheelchair)?: 2  Need to walk in hospital room?: 1  Climbing 3-5 steps with a railing?: 1  Basic Mobility Total Score: 10       Therapeutic Activities and Exercises:  1 x 10 AP, LAQ, HF, abd/add AAROM pt required redirection to task  Patient educated on importance of increased time out of bed, position change and CHAPIN throughout the day    Patient left HOB elevated with call button in reach and bed alarm on..    GOALS:   Multidisciplinary Problems     Physical Therapy Goals        Problem: Physical Therapy Goal    Goal Priority Disciplines Outcome Goal Variances Interventions   Physical Therapy Goal     PT, PT/OT Ongoing, Progressing     Description: Goals to be met by: 20     Patient will increase functional independence with mobility by performin. Supine to sit with MInimal Assistance  2. Sit to supine with Set-up [place]  3. Sit to stand transfer with Contact Guard Assistance  4. Bed to chair transfer with Contact Guard Assistance using Rolling Walker  5. Gait  x 20 feet with Contact Guard Assistance using Rolling Walker.                      Time Tracking:     PT Received On: 20  PT Start Time: 1430     PT Stop Time: 1455  PT Total Time (min): 25 min     Billable Minutes:  Therapeutic Activity 17 and Therapeutic Exercise 8    Treatment Type: Treatment  PT/PTA: PTA     PTA Visit Number: 1     Alda Clifford, PTA  07/21/2020

## 2020-07-21 NOTE — PROGRESS NOTES
[facility]  Palliative Medicine  Progress Note    Patient Name: Mark Anthony Velarde Jr.  MRN: [id number]  Admission Date: 7/16/2020  Hospital Length of Stay: 1 days  Code Status: Partial Code   Attending Provider: Jason Blanc MD  Consulting Provider: CARLITA Gonzalez  Primary Care Physician: Jeff Yan MD  Principal Problem:Weakness    Patient information was obtained from spouse/SO, past medical records and ER records.      Assessment/Plan:     Palliative care encounter  Follow up to goals of care      Impression:  Mr. Velarde is an 79 yo gentleman admitted with weakness, low grade temperature.  Has hx of Lewy Body dementia. Mr. Velarde is awake, alert and oriented to person and place.  He is disoriented to time and situation.  He is able to say he is in the hospital but does not know why.  Reports no complaints of pain or discomfort.  No shortness of breath noted.    Palliative medicine APRN spoke with patient's wife Kayleigh by telephone.  Mrs. Velarde reports a sudden change a few nights ago.  He woke in the night with confusion and incontinence.  Mrs. Velarde reports prior to this he was able to toilet with assistance and was walking with assistance.  Mrs. Velarde reports he is often disoriented to time. Wife notes his appetite has been poor and she has not witnessed any difficulty swallowing or chewing.    Daughter Janie Velarde who reports she has noticed a more steady decline over the past 5-6 months.  Janie also states it has been more difficult for the patient's wife to care for him at home.     Advance Care Planning     -Advanced care planning documents have been received.  Has general living will and HPOA indicating   -His son Alvarado Velarde [phone]   And daughter Janie Velarde [phone] as surrogate decision makers.  -Mrs. Velarde reports and daughter confirms although there is a HPOA they make decisions as a family  And Mrs. Velarde usually makes the final decision  -Chart  "review indicates Mr. Velarde completed a [facility]POST with [facility] home palliative medicine service indicating DNR.  There is no copy on file  - Partial code per primary team.  Family has requested to have DNR order  - LaPOST  Signed by daughter, DAVID . - pending MD signature          Goals of Care:   - Lengthy conversation with family by telephone and in person. Following goals of care established:    -Family amenable to DNR orders and is reflected in LaPOST   - Wife and children in agreement to transfer to SNF for additional PT/OT.   -If Mr. Velarde does not show improvement or has further decline, family decision is to transition to  hospice care - nursing home with hospice  - Currently does not meet criteria for inpatient hospice.   - Hospice education completed    Plan/Recommendations  - Continue current plan of care -   -Please give original copy of LaPOST to patient/family and obtain copy for EMR.   - Goals of care established.  Palliative medicine will sign off.     Thank you for consult and opportunity to participate in Mr. Blackburn's care                       I will sign off. Please contact us if you have any additional questions.    Subjective:     Chief Complaint:   Chief Complaint   Patient presents with    Fatigue     EMS reports that family called 911 secondary to patient "being in bed all morning". Patient with generalized weakness.        HPI:    HPI obtained from chart review:     Mr. Velarde is an 81 yo gentleman with PMH of: HTN, HLd, CAD s/p PTCA to LAD, LCX and PAD in 2006, ischemic cardiomyopathy EF ~ 45%,  Afib,  lewy body dementia and parkinsons.   He presents to [facility] with reports per family of  generalized weakness and  Confused -  oriented to person only, per family Family reports at baseline he is oriented to person and place. son and place.  ED nurse spoke with the daughter at 17:59 who reported the patient is normally continent and wakes up for breakfast in the morning. His wife stated that this " morning she was woken up by the patient having bladder incontinence and while ambulating him to the bathroom she noticed bowel incontinence (diarrhea). Also noticed generalized weakness and lethargy stating the patient just wanted to sleep which is not usual for him.   Currently he is oriented to person only. He denies CP, SOB, NVD, abdominal pain.        Hospital Course:  No notes on file    Interval History:     Past Medical History:   Diagnosis Date    *Atrial fibrillation     Anticoagulant long-term use     Atrial fibrillation - asymptomatic but noted on ICD interrogation ([id number]) - 16 h 40 minutes of afib 2/13/2013    Atrial flutter with controlled response - seen on ICD interrogation - asymptomatic 2/13/2013    Automatic implantable cardioverter-defibrillator in situ 2/13/2013    Basal cell carcinoma     mid forehead    CAD (coronary artery disease) 2/13/2013    Cardiomyopathy     Cataract     Cognitive deficits     mild    H/O syncope -  5/27/2013    History of PTCA - LAD, LCX and PDA PCI in 2006 2/13/2013    HTN (hypertension) 2/13/2013    Hyperlipidemia LDL goal < 70 2/13/2013    Hypertension     ICD (implantable cardiac defibrillator) in place 2/13/2013    Ischemic cardiomyopathy LVEF ~45% 2/13/2013    LBBB (left bundle branch block) 2/13/2013    Memory loss     Psoriasis vulgaris     Syncope and collapse        Past Surgical History:   Procedure Laterality Date    CARDIAC DEFIBRILLATOR PLACEMENT      CATARACT EXTRACTION W/  INTRAOCULAR LENS IMPLANT Right 04/04/16    Dr Michael     CATARACT EXTRACTION W/  INTRAOCULAR LENS IMPLANT Left 05/09/2016    SKIN BIOPSY      TONSILLECTOMY         Review of patient's allergies indicates:   Allergen Reactions    Arb-angiotensin receptor antagonist Other (See Comments)     Hyperkalemia    Lisinopril Diarrhea       Medications:  Continuous Infusions:  Scheduled Meds:   amiodarone  100 mg Oral Daily    aspirin  81 mg Oral Daily     atorvastatin  40 mg Oral Daily    dutasteride  0.5 mg Oral Daily    enoxaparin  40 mg Subcutaneous Q24H    memantine  10 mg Oral BID    metoprolol tartrate  12.5 mg Oral BID    multivitamin  1 tablet Oral Daily    pantoprazole  40 mg Oral Daily    tamsulosin  0.4 mg Oral QHS     PRN Meds:acetaminophen, albuterol-ipratropium, dextrose 50%, dextrose 50%, glucagon (human recombinant), glucose, glucose, loperamide, melatonin, ondansetron, promethazine, sodium chloride 0.9%, sodium chloride 0.9%    Family History     Problem Relation (Age of Onset)    Cancer Mother    Cataracts Mother    Dementia Father    Diabetes Maternal Grandmother    Kidney disease Sister    No Known Problems Brother, Maternal Aunt, Maternal Uncle, Paternal Aunt, Paternal Uncle, Maternal Grandfather, Paternal Grandmother, Paternal Grandfather    Psoriasis Father, Son, Son    Tremor Father        Tobacco Use    Smoking status: Former Smoker     Quit date: 1963     Years since quittin.8    Smokeless tobacco: Never Used   Substance and Sexual Activity    Alcohol use: No     Frequency: Monthly or less     Drinks per session: 1 or 2     Binge frequency: Never    Drug use: No    Sexual activity: Not on file       Review of Systems   Constitutional: Positive for appetite change and fatigue.   HENT: Negative.    Eyes: Negative.    Respiratory: Positive for shortness of breath.    Gastrointestinal: Positive for diarrhea.   Endocrine: Negative.    Genitourinary: Positive for urgency.   Musculoskeletal: Positive for gait problem.   Skin: Positive for pallor.   Allergic/Immunologic: Negative.    Neurological: Positive for tremors.   Hematological: Negative.    Psychiatric/Behavioral: Positive for confusion.     Objective:     Vital Signs (Most Recent):  Temp: 97.6 °F (36.4 °C) (20)  Pulse: 83 (20)  Resp: 20 (20)  BP: (!) 145/67 (20)  SpO2: (!) 89 % (20) Vital Signs (24h Range):  Temp:   [96.3 °F (35.7 °C)-98.2 °F (36.8 °C)] 97.6 °F (36.4 °C)  Pulse:  [61-83] 83  Resp:  [17-28] 20  SpO2:  [89 %-97 %] 89 %  BP: (136-145)/(60-73) 145/67     Weight: 79.6 kg (175 lb 7.8 oz)  Body mass index is 27.49 kg/m².    Physical Exam  Vitals signs and nursing note reviewed.   HENT:      Head: Normocephalic and atraumatic.   Neck:      Musculoskeletal: Normal range of motion.   Cardiovascular:      Rate and Rhythm: Normal rate and regular rhythm.      Heart sounds: No murmur.   Pulmonary:      Effort: Pulmonary effort is normal.      Breath sounds: Normal breath sounds.   Abdominal:      General: Abdomen is flat. Bowel sounds are normal.   Musculoskeletal: Normal range of motion.   Skin:     General: Skin is warm and dry.      Coloration: Skin is pale.   Neurological:      Mental Status: He is alert.      Gait: Gait abnormal.      Comments: Oriented to person, place, disoriented to time and situation   Psychiatric:         Mood and Affect: Mood normal.         Behavior: Behavior normal.      Comments: Hx of dementia         Advance Care Planning   Review of Symptoms    Symptom Assessment (ESAS 0-10 Scale)  Pain:  0  Dyspnea:  3  Anxiety:  0  Nausea:  0  Depression:  0  Anorexia:  3  Fatigue:  3  Insomnia:  0  Restlessness:  0  Agitation:  0     Cam / Delirium: base line cofusion hx of dementia.  Constipation:  Negative  Diarrhea:  Positive    Bowel Management Plan (BMP):  Yes      Comments:  No complaints of pain, hx of shortness of breath on exertion          OME in 24 hours:  0    Performance Status:  40    Advanced Directives:  Living Will:  Yes.  Copy on chart:  Yes    Do Not Resuscitate Status:  Yes    Medical Power of : Yes     Agent's Name:  Alvarado Velarde .  Agent's Contact Number:  [phone]    Decision Making:  Patient answered questions and Family answered questions    Living Arrangements:  Lives with spouse    Psychosocial/Cultural:   59 years, together for 66 yrs.  5 adult children,  lives with wife, one grandchild, retired worked in commercial [employer]    Spiritual:  F - Kristin and Belief:  Episcopalian  A - Address in Care:  Has received communion and anointing of the sick.   visiting         Significant Labs: All pertinent labs within the past 24 hours have been reviewed.  CBC:   Recent Labs   Lab 07/20/20  0517   WBC 6.13   HGB 9.7*   HCT 32.4*   MCV 94        BMP:  No results for input(s): GLU, NA, K, CL, CO2, BUN, CREATININE, CALCIUM, MG in the last 24 hours.  LFT:  Lab Results   Component Value Date    AST 37 07/20/2020    ALKPHOS 73 07/20/2020    BILITOT 0.6 07/20/2020     Albumin:   Albumin   Date Value Ref Range Status   07/20/2020 3.1 (L) 3.5 - 5.2 g/dL Final     Protein:   Total Protein   Date Value Ref Range Status   07/20/2020 6.5 6.0 - 8.4 g/dL Final     Lactic acid:   No results found for: LACTATE    Significant Imaging: I have reviewed all pertinent imaging results/findings within the past 24 hours.      > 50% of 25  min visit spent in chart review, face to face discussion of goals of care,  symptom assessment, coordination of care and emotional support.    Amanda Chou, CNS  Palliative Medicine  [facility]

## 2020-07-21 NOTE — PLAN OF CARE
[facility]     Department of Hospital Medicine     [address] [place] [address]     [phone] after hours  [phone] fax       NURSING HOME ORDERS    07/23/2020    Admit to Nursing Home:     Skilled Bed                                                Diagnoses:  Active Hospital Problems    Diagnosis  POA    *Weakness [R53.1]  Yes     Priority: 1 - High     Chronic    Lewy body dementia [G31.83, F02.80]  Yes     Priority: 2      Chronic    Atrial fibrillation  [I48.91]  Yes     Priority: 3      Chronic    Ischemic cardiomyopathy LVEF ~45% [I25.5]  Yes     Priority: 4      Chronic    Hyperlipidemia with target LDL less than 70 [E78.5]  Yes     Priority: 5      Chronic    HTN (hypertension) [I10]  Yes     Priority: 6      Chronic    CAD (coronary artery disease) [I25.10]  Yes     Priority: 7      Chronic    Palliative care encounter [Z51.5]  Not Applicable    Advanced care planning/counseling discussion [Z71.89]  Not Applicable    Advanced directive placed in chart this admission [Z78.9]  Yes    Goals of care, counseling/discussion [Z71.89]  Not Applicable      Resolved Hospital Problems   No resolved problems to display.       Patient is homebound due to:  Weakness    Allergies:  Review of patient's allergies indicates:   Allergen Reactions    Arb-angiotensin receptor antagonist Other (See Comments)     Hyperkalemia    Lisinopril Diarrhea       Vitals:   Per facility protocol    Diet: adult regular    Acitivities:      - Up in a chair each morning as tolerated   - Ambulate with assistance to bathroom   - Scheduled walks once each shift (every 8 hours)  - May use walker, cane, or self-propelled wheelchair    LABS:  Per facility protocol    Nursing Precautions:    - Aspiration precautions:             - Total assistance with meals            -  Upright 90 degrees befor during and after meals             -  Suction at bedside          - Fall  precautions per nursing home protocol   - Seizure precaution per [facility] protocol   - Decubitus precautions:        -  for positioning   - Pressure reducing foam mattress   - Turn patient every two hours. Use wedge pillows to anchor patient    CONSULTS:      Physical Therapy to evaluate and treat     Occupational Therapy to evaluate and treat     Psychiatry to evaluate and follow patients for delirium    MISCELLANEOUS CARE:    Routine Skin for Bedridden Patients:  Apply moisture barrier cream to all    skin folds and wet areas in perineal area daily and after baths and                           all bowel movements.      Medications: Discontinue all previous medication orders, if any. See new list below.     Mark Anthony Velarde Jr.   Home Medication Instructions LINDY:[id number]    Printed on:07/21/20 1024   Medication Information                      amiodarone (PACERONE) 200 MG Tab  Take 0.5 tablets (100 mg total) by mouth once daily.             amLODIPine (NORVASC) 5 MG tablet  Take 1 tablet (5 mg total) by mouth once daily.             aspirin (ECOTRIN) 81 MG EC tablet  Take 81 mg by mouth once daily.             atorvastatin (LIPITOR) 40 MG tablet  Take 1 tablet (40 mg total) by mouth once daily.             dutasteride (AVODART) 0.5 mg capsule  Take 1 capsule (0.5 mg total) by mouth once daily.             FLUZONE HIGH-DOSE 2019-20, PF, 180 mcg/0.5 mL Syrg  ADM 0.5ML IM UTD             loperamide (IMODIUM) 2 mg capsule  Take 1 capsule (2 mg total) by mouth 4 (four) times daily as needed for Diarrhea.             memantine (NAMENDA) 10 MG Tab  Take 1 tablet (10 mg total) by mouth 2 (two) times daily.             metoprolol tartrate (LOPRESSOR) 25 MG tablet  Take 0.5 tablets (12.5 mg total) by mouth 2 (two) times daily.             multivitamin (MULTIVITAMIN) per tablet  Take 1 tablet by mouth once daily.             omeprazole (PRILOSEC) 20 MG capsule  Take 1 capsule (20 mg total) by mouth once daily.              tamsulosin (FLOMAX) 0.4 mg Cap  Take 1 capsule (0.4 mg total) by mouth every evening.                     Cheri Gupta PA-C  07/21/2020

## 2020-07-21 NOTE — PLAN OF CARE
Pt. Lying in bed awake,oriented to self only,pleasant and cooperative. Pt. Follows commands but conversation inappropriate. Resp shallow and rapid,denies having difficulty breathing breath sounds clear shayna. O2 maintained at 2 liters per n/c. Afebrile,last wbc count 6.1 no evidence of infection. Safety measures in place bed alarm set. Continue current plan of care.

## 2020-07-21 NOTE — CONSULTS
[facility]  Palliative Medicine  Consult Note    Patient Name: Mark Anthony Velarde Jr.  MRN: [id number]  Admission Date: 7/16/2020  Hospital Length of Stay: 0 days  Code Status: Partial Code   Attending Provider: Jason Blanc MD  Consulting Provider: CARLITA Gonzalez  Primary Care Physician: Jeff Yan MD  Principal Problem:Weakness    Patient information was obtained from spouse/SO, relative(s), past medical records and ER records.      Consults  Assessment/Plan:     Palliative care encounter  Palliative medicine consulted for goals of care and advanced care planning   Chart reviewed and patient discussed with primary team PA    10 AM Palliative medicine at bedside, palliative medicine introduced. No family is present.     Impression:  Mr. Velarde is an 81 yo gentleman admitted with weakness, low grade temperature.  Has hx of Lewy Body dementia. Mr. Velarde is awake, alert and oriented to person and place.  He is disoriented to time and situation.  He is able to say he is in the hospital but does not know why.  Reports no complaints of pain or discomfort.  No shortness of breath noted.    Palliative medicine APRN spoke with patient's wife Kayleigh by telephone.  Mrs. Velarde reports a sudden change a few nights ago.  He woke in the night with confusion and incontinence.  Mrs. Velarde reports prior to this he was able to toilet with assistance and was walking with assistance.  Mrs. Velarde reports he is often disoriented to time. Wife notes his appetite has been poor and she has not witnessed any difficulty swallowing or chewing.    1 PM met with the patient's daughter Janie Velarde who reports she has noticed a more steady decline over the past 5-6 months.  Janie also states it has been more difficult for the patient's wife to care for him at home.     Advance Care Planning     -Advanced care planning documents have been received.  Has general living will and HPOA indicating   -His son  Alvarado Velarde [phone]   And daughter Janei Velarde [phone] as surrogate decision makers.  -Mrs. Velarde reports and daughter confirms although there is a HPOA they make decisions as a family  And Mrs. Velarde usually makes the final decision  -Chart review indicates Mr. Velarde completed a LaPOST with [facility] home palliative medicine service indicating DNR.  There is no copy on file  - Partial code per primary team.  Family has requested to have DNR order  - LaPOST completed - pending MD signature          Goals of Care:   - Lengthy conversation with wife by telephone and daughter at bedside.   - Clinical updates provided to wife - daughter states she is surprised but happy to see how alert and oriented Mr. Velarde is today.  She states this is an incredible improvement from yesterday  - Discussed PT/OT recommendation for SNF.  Both wife and daughter are amenable to SNF.  Mrs. Velarde would like her  to have a chance of returning to his baseline activity level.  Mrs. Velarde is aware his physical condition could decline further.  At that time family would have more acceptance and be more amenable to hospice care.   - Daugther expressed concern for wife's ability to provide physical care of Mr. Velarde has he has declined suddenly.  Wife expressed concern for his poor appetite.  She is worried the decrease in appetite is a signal of the inevitable.   - Both wife and daughter are amenable to hospice if Mr. Velarde does not show any improvement with SNF.  - Hospice education (philosophy, setting, services, costs) provided.   - Primary team making appropriate referrals fo SNF - wife expressed interest in [facility] SNF and [facility] SNF.     Plan/Recommendations  - Continue current plan of care -   -Family is amenable to DNR order and LaPOST signed.  Requires MD signature.    Placed in blue chart.   - Case Management and  making appropriate referrals.    - Palliative medicine will continue to follow    Primary  team PA and MD aware of the above goals of care conversation and recommendation.                    Thank you for your consult. I will follow-up with patient. Please contact us if you have any additional questions.    Subjective:     HPI:    HPI obtained from chart review:     Mr. Velarde is an 81 yo gentleman with PMH of: HTN, HLd, CAD s/p PTCA to LAD, LCX and PAD in 2006, ischemic cardiomyopathy EF ~ 45%,  Afib,  lewy body dementia and parkinsons.   He presents to [facility] with reports per family of  generalized weakness and  Confused -  oriented to person only, per family Family reports at baseline he is oriented to person and place. son and place.  ED nurse spoke with the daughter at 17:59 who reported the patient is normally continent and wakes up for breakfast in the morning. His wife stated that this morning she was woken up by the patient having bladder incontinence and while ambulating him to the bathroom she noticed bowel incontinence (diarrhea). Also noticed generalized weakness and lethargy stating the patient just wanted to sleep which is not usual for him.   Currently he is oriented to person only. He denies CP, SOB, NVD, abdominal pain.        Hospital Course:  No notes on file    Interval History:     Past Medical History:   Diagnosis Date    *Atrial fibrillation     Anticoagulant long-term use     Atrial fibrillation - asymptomatic but noted on ICD interrogation (5-02-20121) - 16 h 40 minutes of afib 2/13/2013    Atrial flutter with controlled response - seen on ICD interrogation - asymptomatic 2/13/2013    Automatic implantable cardioverter-defibrillator in situ 2/13/2013    Basal cell carcinoma     mid forehead    CAD (coronary artery disease) 2/13/2013    Cardiomyopathy     Cataract     Cognitive deficits     mild    H/O syncope -  5/27/2013    History of PTCA - LAD, LCX and PDA PCI in 2006 2/13/2013    HTN (hypertension) 2/13/2013    Hyperlipidemia LDL goal < 70 2/13/2013     Hypertension     ICD (implantable cardiac defibrillator) in place 2013    Ischemic cardiomyopathy LVEF ~45% 2013    LBBB (left bundle branch block) 2013    Memory loss     Psoriasis vulgaris     Syncope and collapse        Past Surgical History:   Procedure Laterality Date    CARDIAC DEFIBRILLATOR PLACEMENT      CATARACT EXTRACTION W/  INTRAOCULAR LENS IMPLANT Right 16    Dr Michael     CATARACT EXTRACTION W/  INTRAOCULAR LENS IMPLANT Left 2016    SKIN BIOPSY      TONSILLECTOMY         Review of patient's allergies indicates:   Allergen Reactions    Arb-angiotensin receptor antagonist Other (See Comments)     Hyperkalemia    Lisinopril Diarrhea       Medications:  Continuous Infusions:  Scheduled Meds:   amiodarone  100 mg Oral Daily    aspirin  81 mg Oral Daily    atorvastatin  40 mg Oral Daily    dutasteride  0.5 mg Oral Daily    enoxaparin  40 mg Subcutaneous Q24H    memantine  10 mg Oral BID    metoprolol tartrate  12.5 mg Oral BID    multivitamin  1 tablet Oral Daily    pantoprazole  40 mg Oral Daily    tamsulosin  0.4 mg Oral QHS    tuberculin  5 Units Intradermal Once     PRN Meds:acetaminophen, albuterol-ipratropium, dextrose 50%, dextrose 50%, glucagon (human recombinant), glucose, glucose, loperamide, melatonin, ondansetron, promethazine, sodium chloride 0.9%, sodium chloride 0.9%    Family History     Problem Relation (Age of Onset)    Cancer Mother    Cataracts Mother    Dementia Father    Diabetes Maternal Grandmother    Kidney disease Sister    No Known Problems Brother, Maternal Aunt, Maternal Uncle, Paternal Aunt, Paternal Uncle, Maternal Grandfather, Paternal Grandmother, Paternal Grandfather    Psoriasis Father, Son, Son    Tremor Father        Tobacco Use    Smoking status: Former Smoker     Quit date: 1963     Years since quittin.8    Smokeless tobacco: Never Used   Substance and Sexual Activity    Alcohol use: No     Frequency: Monthly  or less     Drinks per session: 1 or 2     Binge frequency: Never    Drug use: No    Sexual activity: Not on file       Review of Systems   Constitutional: Positive for appetite change and fatigue.   HENT: Negative.    Eyes: Negative.    Respiratory: Positive for shortness of breath.    Gastrointestinal: Positive for diarrhea.   Endocrine: Negative.    Genitourinary: Positive for urgency.   Musculoskeletal: Positive for gait problem.   Skin: Positive for pallor.   Allergic/Immunologic: Negative.    Neurological: Positive for tremors.   Hematological: Negative.    Psychiatric/Behavioral: Positive for confusion.     Objective:     Vital Signs (Most Recent):  Temp: 97.5 °F (36.4 °C) (07/20/20 1507)  Pulse: 61 (07/20/20 1507)  Resp: 18 (07/20/20 1507)  BP: (!) 143/73 (07/20/20 1507)  SpO2: 96 % (07/20/20 1507) Vital Signs (24h Range):  Temp:  [96.2 °F (35.7 °C)-100.3 °F (37.9 °C)] 97.5 °F (36.4 °C)  Pulse:  [61-76] 61  Resp:  [16-18] 18  SpO2:  [89 %-96 %] 96 %  BP: (138-163)/(67-73) 143/73     Weight: 78.5 kg (173 lb)  Body mass index is 27.1 kg/m².    Physical Exam  Vitals signs and nursing note reviewed.   HENT:      Head: Normocephalic and atraumatic.   Neck:      Musculoskeletal: Normal range of motion.   Cardiovascular:      Rate and Rhythm: Normal rate and regular rhythm.      Heart sounds: No murmur.   Pulmonary:      Effort: Pulmonary effort is normal.      Breath sounds: Normal breath sounds.   Abdominal:      General: Abdomen is flat. Bowel sounds are normal.   Musculoskeletal: Normal range of motion.   Skin:     General: Skin is warm and dry.      Coloration: Skin is pale.   Neurological:      Mental Status: He is alert.      Gait: Gait abnormal.      Comments: Oriented to person, place, disoriented to time and situation   Psychiatric:         Mood and Affect: Mood normal.         Behavior: Behavior normal.      Comments: Hx of dementia         Advance Care Planning   Review of Symptoms    Symptom  Assessment (ESAS 0-10 Scale)  Pain:  0  Dyspnea:  3  Anxiety:  0  Nausea:  0  Depression:  0  Anorexia:  3  Fatigue:  3  Insomnia:  0  Restlessness:  0  Agitation:  0     Cam / Delirium: base line cofusion hx of dementia.  Constipation:  Negative  Diarrhea:  Positive    Bowel Management Plan (BMP):  Yes      Comments:  No complaints of pain, hx of shortness of breath on exertion          OME in 24 hours:  0    Performance Status:  40    Advanced Directives:  Living Will:  Yes.  Copy on chart:  Yes    Do Not Resuscitate Status:  Yes    Medical Power of : Yes     Agent's Name:  Alvarado Velarde .  Agent's Contact Number:  [phone]    Decision Making:  Patient answered questions and Family answered questions    Living Arrangements:  Lives with spouse    Psychosocial/Cultural:   59 years, together for 66 yrs.  5 adult children, lives with wife, one grandchild, retired worked in [employer]    Spiritual:  F - Kristin and Belief:  Faith  A - Address in Care:  Has received communion and anointing of the sick.   visiting         Significant Labs: All pertinent labs within the past 24 hours have been reviewed.  CBC:   Recent Labs   Lab 07/20/20  0517   WBC 6.13   HGB 9.7*   HCT 32.4*   MCV 94        BMP:  Recent Labs   Lab 07/20/20 0517         K 3.7      CO2 19*   BUN 20   CREATININE 1.7*   CALCIUM 8.5*     LFT:  Lab Results   Component Value Date    AST 37 07/20/2020    ALKPHOS 73 07/20/2020    BILITOT 0.6 07/20/2020     Albumin:   Albumin   Date Value Ref Range Status   07/20/2020 3.1 (L) 3.5 - 5.2 g/dL Final     Protein:   Total Protein   Date Value Ref Range Status   07/20/2020 6.5 6.0 - 8.4 g/dL Final     Lactic acid:   No results found for: LACTATE    Significant Imaging: I have reviewed all pertinent imaging results/findings within the past 24 hours.      > 50% of 70 min visit spent in chart review, face to face discussion of goals of care,  symptom  assessment, coordination of care and emotional support.  Additional 20 minutes spent in advanced care planning and completion of documents     Amanda Chou, CNS  Palliative Medicine  [facility] [address] [facility]

## 2020-07-21 NOTE — PLAN OF CARE
The patient is oriented to self only.  Calm, cooperative and accepting of care. Reposition q2 hours. Appetite is good, no episodes of loose stool this shift. Side rails up x 3, call light is within reach of patient  Explanation of care provided to patient, and  family.  Plan of care reviewed.

## 2020-07-21 NOTE — PLAN OF CARE
Patient's VSS, AAO to self, re-orientated patient throughout shift. Patient removed flex seal and IV-catheter intact with no complications. No pain or discomfort noted per pt. Q2 turning with weigh assisted rolling Q2, tray set up with assist PRN for meals. Awaiting new IV access, call light within reach. Patient resting in bed, bed alarm set, monitoring patient for changes.

## 2020-07-21 NOTE — SUBJECTIVE & OBJECTIVE
Interval History:     Past Medical History:   Diagnosis Date    *Atrial fibrillation     Anticoagulant long-term use     Atrial fibrillation - asymptomatic but noted on ICD interrogation (5-02-20121) - 16 h 40 minutes of afib 2/13/2013    Atrial flutter with controlled response - seen on ICD interrogation - asymptomatic 2/13/2013    Automatic implantable cardioverter-defibrillator in situ 2/13/2013    Basal cell carcinoma     mid forehead    CAD (coronary artery disease) 2/13/2013    Cardiomyopathy     Cataract     Cognitive deficits     mild    H/O syncope -  5/27/2013    History of PTCA - LAD, LCX and PDA PCI in 2006 2/13/2013    HTN (hypertension) 2/13/2013    Hyperlipidemia LDL goal < 70 2/13/2013    Hypertension     ICD (implantable cardiac defibrillator) in place 2/13/2013    Ischemic cardiomyopathy LVEF ~45% 2/13/2013    LBBB (left bundle branch block) 2/13/2013    Memory loss     Psoriasis vulgaris     Syncope and collapse        Past Surgical History:   Procedure Laterality Date    CARDIAC DEFIBRILLATOR PLACEMENT      CATARACT EXTRACTION W/  INTRAOCULAR LENS IMPLANT Right 04/04/16    Dr Michael     CATARACT EXTRACTION W/  INTRAOCULAR LENS IMPLANT Left 05/09/2016    SKIN BIOPSY      TONSILLECTOMY         Review of patient's allergies indicates:   Allergen Reactions    Arb-angiotensin receptor antagonist Other (See Comments)     Hyperkalemia    Lisinopril Diarrhea       Medications:  Continuous Infusions:  Scheduled Meds:   amiodarone  100 mg Oral Daily    aspirin  81 mg Oral Daily    atorvastatin  40 mg Oral Daily    dutasteride  0.5 mg Oral Daily    enoxaparin  40 mg Subcutaneous Q24H    memantine  10 mg Oral BID    metoprolol tartrate  12.5 mg Oral BID    multivitamin  1 tablet Oral Daily    pantoprazole  40 mg Oral Daily    tamsulosin  0.4 mg Oral QHS     PRN Meds:acetaminophen, albuterol-ipratropium, dextrose 50%, dextrose 50%, glucagon (human recombinant), glucose,  glucose, loperamide, melatonin, ondansetron, promethazine, sodium chloride 0.9%, sodium chloride 0.9%    Family History     Problem Relation (Age of Onset)    Cancer Mother    Cataracts Mother    Dementia Father    Diabetes Maternal Grandmother    Kidney disease Sister    No Known Problems Brother, Maternal Aunt, Maternal Uncle, Paternal Aunt, Paternal Uncle, Maternal Grandfather, Paternal Grandmother, Paternal Grandfather    Psoriasis Father, Son, Son    Tremor Father        Tobacco Use    Smoking status: Former Smoker     Quit date: 1963     Years since quittin.8    Smokeless tobacco: Never Used   Substance and Sexual Activity    Alcohol use: No     Frequency: Monthly or less     Drinks per session: 1 or 2     Binge frequency: Never    Drug use: No    Sexual activity: Not on file       Review of Systems   Constitutional: Positive for appetite change and fatigue.   HENT: Negative.    Eyes: Negative.    Respiratory: Positive for shortness of breath.    Gastrointestinal: Positive for diarrhea.   Endocrine: Negative.    Genitourinary: Positive for urgency.   Musculoskeletal: Positive for gait problem.   Skin: Positive for pallor.   Allergic/Immunologic: Negative.    Neurological: Positive for tremors.   Hematological: Negative.    Psychiatric/Behavioral: Positive for confusion.     Objective:     Vital Signs (Most Recent):  Temp: 97.6 °F (36.4 °C) (20 07)  Pulse: 83 (20 07)  Resp: 20 (20 07)  BP: (!) 145/67 (20)  SpO2: (!) 89 % (20) Vital Signs (24h Range):  Temp:  [96.3 °F (35.7 °C)-98.2 °F (36.8 °C)] 97.6 °F (36.4 °C)  Pulse:  [61-83] 83  Resp:  [17-28] 20  SpO2:  [89 %-97 %] 89 %  BP: (136-145)/(60-73) 145/67     Weight: 79.6 kg (175 lb 7.8 oz)  Body mass index is 27.49 kg/m².    Physical Exam  Vitals signs and nursing note reviewed.   HENT:      Head: Normocephalic and atraumatic.   Neck:      Musculoskeletal: Normal range of motion.   Cardiovascular:       Rate and Rhythm: Normal rate and regular rhythm.      Heart sounds: No murmur.   Pulmonary:      Effort: Pulmonary effort is normal.      Breath sounds: Normal breath sounds.   Abdominal:      General: Abdomen is flat. Bowel sounds are normal.   Musculoskeletal: Normal range of motion.   Skin:     General: Skin is warm and dry.      Coloration: Skin is pale.   Neurological:      Mental Status: He is alert.      Gait: Gait abnormal.      Comments: Oriented to person, place, disoriented to time and situation   Psychiatric:         Mood and Affect: Mood normal.         Behavior: Behavior normal.      Comments: Hx of dementia         Advance Care Planning   Review of Symptoms    Symptom Assessment (ESAS 0-10 Scale)  Pain:  0  Dyspnea:  3  Anxiety:  0  Nausea:  0  Depression:  0  Anorexia:  3  Fatigue:  3  Insomnia:  0  Restlessness:  0  Agitation:  0     Cam / Delirium: base line cofusion hx of dementia.  Constipation:  Negative  Diarrhea:  Positive    Bowel Management Plan (BMP):  Yes      Comments:  No complaints of pain, hx of shortness of breath on exertion          OME in 24 hours:  0    Performance Status:  40    Advanced Directives:  Living Will:  Yes.  Copy on chart:  Yes    Do Not Resuscitate Status:  Yes    Medical Power of : Yes     Agent's Name:  Alvarado Velarde .  Agent's Contact Number:  [phone]    Decision Making:  Patient answered questions and Family answered questions    Living Arrangements:  Lives with spouse    Psychosocial/Cultural:   59 years, together for 66 yrs.  5 adult children, lives with wife, one grandchild, retired worked in [employer]    Spiritual:  F - Kristin and Belief:  Roman Catholic  A - Address in Care:  Has received communion and anointing of the sick.   visiting         Significant Labs: All pertinent labs within the past 24 hours have been reviewed.  CBC:   Recent Labs   Lab 07/20/20  0517   WBC 6.13   HGB 9.7*   HCT 32.4*   MCV 94         BMP:  No results for input(s): GLU, NA, K, CL, CO2, BUN, CREATININE, CALCIUM, MG in the last 24 hours.  LFT:  Lab Results   Component Value Date    AST 37 07/20/2020    ALKPHOS 73 07/20/2020    BILITOT 0.6 07/20/2020     Albumin:   Albumin   Date Value Ref Range Status   07/20/2020 3.1 (L) 3.5 - 5.2 g/dL Final     Protein:   Total Protein   Date Value Ref Range Status   07/20/2020 6.5 6.0 - 8.4 g/dL Final     Lactic acid:   No results found for: LACTATE    Significant Imaging: I have reviewed all pertinent imaging results/findings within the past 24 hours.

## 2020-07-21 NOTE — TELEPHONE ENCOUNTER
Attempted to contact the pt's wife Mrs. Ji on this am @ both listed #'s of [phone] and her cell # [phone] in relation to pt's remote ICD home monitor.  Message left on the voice mail asking that [facility] call the [facility].  Immediate Alerts are Not received when a pt is away from their monitor.   Otto (Merlin) sends an update to the pt's monitor every 8 days to check for connectivity and communication.  Last update was on 7/17/20 therefor an alert for No connectivity and/or communication would not be received on or about 7/23 - 7/24.  Depending on which facility the pt is discharged to he may be able to bring his remote monitor with him and connect it there until he returns home should his wife decide to do so. Contact # for the [facility] was left on the voice mail.

## 2020-07-21 NOTE — ASSESSMENT & PLAN NOTE
Follow up to goals of care      Impression:  Mr. Velarde is an 81 yo gentleman admitted with weakness, low grade temperature.  Has hx of Lewy Body dementia. Mr. Velarde is awake, alert and oriented to person and place.  He is disoriented to time and situation.  He is able to say he is in the hospital but does not know why.  Reports no complaints of pain or discomfort.  No shortness of breath noted.    Palliative medicine APRN spoke with patient's wife Kayleigh by telephone.  Mrs. Vealrde reports a sudden change a few nights ago.  He woke in the night with confusion and incontinence.  Mrs. Velarde reports prior to this he was able to toilet with assistance and was walking with assistance.  Mrs. Velarde reports he is often disoriented to time. Wife notes his appetite has been poor and she has not witnessed any difficulty swallowing or chewing.    Daughter Janie Velarde who reports she has noticed a more steady decline over the past 5-6 months.  Janie also states it has been more difficult for the patient's wife to care for him at home.     Advance Care Planning     -Advanced care planning documents have been received.  Has general living will and HPOA indicating   -His son Alvarado Velarde [phone]   And daughter Janie Velarde [phone] as surrogate decision makers.  -Mrs. Velarde reports and daughter confirms although there is a HPOA they make decisions as a family  And Mrs. Velarde usually makes the final decision  -Chart review indicates Mr. Velarde completed a LaPOST with [facility] home palliative medicine service indicating DNR.  There is no copy on file  - Partial code per primary team.  Family has requested to have DNR order  - LaPOST  Signed by daughter, HPMARIBEL . - pending MD signature           Goals of Care:   - Lengthy conversation with family by telephone and in person. Following goals of care established:    -Family amenable to DNR orders and is reflected in LaPOST   - Wife and children in agreement to transfer to SNF  for additional PT/OT.   -If Mr. Velarde does not show improvement or has further decline, family decision is to transition to  hospice care - nursing home with hospice  - Currently does not meet criteria for inpatient hospice.   - Hospice education completed    Plan/Recommendations  - Continue current plan of care -   -Please give original copy of LaPOST to patient/family and obtain copy for EMR.   - Goals of care established.  Palliative medicine will sign off.     Thank you for consult and opportunity to participate in Mr. Blackburn's care

## 2020-07-22 LAB
ALBUMIN SERPL BCP-MCNC: 3.4 G/DL (ref 3.5–5.2)
ALP SERPL-CCNC: 78 U/L (ref 55–135)
ALT SERPL W/O P-5'-P-CCNC: 35 U/L (ref 10–44)
ANION GAP SERPL CALC-SCNC: 10 MMOL/L (ref 8–16)
AST SERPL-CCNC: 40 U/L (ref 10–40)
BACTERIA BLD CULT: NORMAL
BACTERIA BLD CULT: NORMAL
BASOPHILS # BLD AUTO: 0.08 K/UL (ref 0–0.2)
BASOPHILS NFR BLD: 1.1 % (ref 0–1.9)
BILIRUB SERPL-MCNC: 0.5 MG/DL (ref 0.1–1)
BUN SERPL-MCNC: 20 MG/DL (ref 8–23)
CALCIUM SERPL-MCNC: 8.5 MG/DL (ref 8.7–10.5)
CHLORIDE SERPL-SCNC: 109 MMOL/L (ref 95–110)
CO2 SERPL-SCNC: 21 MMOL/L (ref 23–29)
CREAT SERPL-MCNC: 1.5 MG/DL (ref 0.5–1.4)
DIFFERENTIAL METHOD: ABNORMAL
EOSINOPHIL # BLD AUTO: 0.3 K/UL (ref 0–0.5)
EOSINOPHIL NFR BLD: 4.7 % (ref 0–8)
ERYTHROCYTE [DISTWIDTH] IN BLOOD BY AUTOMATED COUNT: 18.3 % (ref 11.5–14.5)
EST. GFR  (AFRICAN AMERICAN): 49.4 ML/MIN/1.73 M^2
EST. GFR  (NON AFRICAN AMERICAN): 42.7 ML/MIN/1.73 M^2
GLUCOSE SERPL-MCNC: 84 MG/DL (ref 70–110)
HCT VFR BLD AUTO: 34 % (ref 40–54)
HGB BLD-MCNC: 10 G/DL (ref 14–18)
IMM GRANULOCYTES # BLD AUTO: 0.23 K/UL (ref 0–0.04)
IMM GRANULOCYTES NFR BLD AUTO: 3.2 % (ref 0–0.5)
LYMPHOCYTES # BLD AUTO: 1 K/UL (ref 1–4.8)
LYMPHOCYTES NFR BLD: 13.5 % (ref 18–48)
MCH RBC QN AUTO: 27.9 PG (ref 27–31)
MCHC RBC AUTO-ENTMCNC: 29.4 G/DL (ref 32–36)
MCV RBC AUTO: 95 FL (ref 82–98)
MONOCYTES # BLD AUTO: 0.6 K/UL (ref 0.3–1)
MONOCYTES NFR BLD: 7.8 % (ref 4–15)
NEUTROPHILS # BLD AUTO: 5.1 K/UL (ref 1.8–7.7)
NEUTROPHILS NFR BLD: 69.7 % (ref 38–73)
NRBC BLD-RTO: 0 /100 WBC
PLATELET # BLD AUTO: 228 K/UL (ref 150–350)
PMV BLD AUTO: 9.9 FL (ref 9.2–12.9)
POTASSIUM SERPL-SCNC: 4 MMOL/L (ref 3.5–5.1)
PROT SERPL-MCNC: 6.9 G/DL (ref 6–8.4)
RBC # BLD AUTO: 3.59 M/UL (ref 4.6–6.2)
SODIUM SERPL-SCNC: 140 MMOL/L (ref 136–145)
TB INDURATION 48 - 72 HR READ: 0 MM
WBC # BLD AUTO: 7.28 K/UL (ref 3.9–12.7)

## 2020-07-22 PROCEDURE — 94761 N-INVAS EAR/PLS OXIMETRY MLT: CPT

## 2020-07-22 PROCEDURE — 97530 THERAPEUTIC ACTIVITIES: CPT

## 2020-07-22 PROCEDURE — 85025 COMPLETE CBC W/AUTO DIFF WBC: CPT

## 2020-07-22 PROCEDURE — 97535 SELF CARE MNGMENT TRAINING: CPT

## 2020-07-22 PROCEDURE — 97116 GAIT TRAINING THERAPY: CPT

## 2020-07-22 PROCEDURE — 99233 PR SUBSEQUENT HOSPITAL CARE,LEVL III: ICD-10-PCS | Mod: ,,, | Performed by: PHYSICIAN ASSISTANT

## 2020-07-22 PROCEDURE — 25000003 PHARM REV CODE 250: Performed by: PHYSICIAN ASSISTANT

## 2020-07-22 PROCEDURE — 80053 COMPREHEN METABOLIC PANEL: CPT

## 2020-07-22 PROCEDURE — 36415 COLL VENOUS BLD VENIPUNCTURE: CPT

## 2020-07-22 PROCEDURE — 11000001 HC ACUTE MED/SURG PRIVATE ROOM

## 2020-07-22 PROCEDURE — 99233 SBSQ HOSP IP/OBS HIGH 50: CPT | Mod: ,,, | Performed by: PHYSICIAN ASSISTANT

## 2020-07-22 RX ADMIN — THERA TABS 1 TABLET: TAB at 07:07

## 2020-07-22 RX ADMIN — AMIODARONE HYDROCHLORIDE 100 MG: 100 TABLET ORAL at 07:07

## 2020-07-22 RX ADMIN — TAMSULOSIN HYDROCHLORIDE 0.4 MG: 0.4 CAPSULE ORAL at 09:07

## 2020-07-22 RX ADMIN — METOPROLOL TARTRATE 12.5 MG: 25 TABLET, FILM COATED ORAL at 09:07

## 2020-07-22 RX ADMIN — ATORVASTATIN CALCIUM 40 MG: 40 TABLET, FILM COATED ORAL at 07:07

## 2020-07-22 RX ADMIN — ASPIRIN 81 MG: 81 TABLET, COATED ORAL at 07:07

## 2020-07-22 RX ADMIN — DUTASTERIDE 0.5 MG: 0.5 CAPSULE, LIQUID FILLED ORAL at 07:07

## 2020-07-22 RX ADMIN — METOPROLOL TARTRATE 12.5 MG: 25 TABLET, FILM COATED ORAL at 07:07

## 2020-07-22 RX ADMIN — PANTOPRAZOLE SODIUM 40 MG: 40 TABLET, DELAYED RELEASE ORAL at 07:07

## 2020-07-22 RX ADMIN — MEMANTINE HYDROCHLORIDE 10 MG: 10 TABLET ORAL at 07:07

## 2020-07-22 RX ADMIN — MEMANTINE HYDROCHLORIDE 10 MG: 10 TABLET ORAL at 09:07

## 2020-07-22 NOTE — PLAN OF CARE
Problem: Physical Therapy Goal  Goal: Physical Therapy Goal  Description: Goals to be met by: 20     Patient will increase functional independence with mobility by performin. Supine to sit with MInimal Assistance  2. Sit to supine with Set-up [place]  3. Sit to stand transfer with Contact Guard Assistance  4. Bed to chair transfer with Contact Guard Assistance using Rolling Walker  5. Gait  x 20 feet with Contact Guard Assistance using Rolling Walker.     Goals remain appropriate. Continue with Physical therapy Plan of Care. Betty Lemus, PT 2020

## 2020-07-22 NOTE — PT/OT/SLP PROGRESS
Physical Therapy Treatment    Patient Name:  Mark Anthony Velarde Jr.   MRN:  [id number]    Recommendations:     Discharge Recommendations:  nursing facility, skilled   Discharge Equipment Recommendations: other (see comments)(TBD)   Barriers to discharge: Decreased caregiver support    Assessment:     Mark Anthony Velarde Jr. is a 80 y.o. male admitted with a medical diagnosis of Weakness.  He presents with the following impairments/functional limitations:  weakness, impaired balance, impaired endurance, impaired functional mobilty, gait instability, decreased lower extremity function, decreased upper extremity function, decreased coordination . Patient improving w/ gait and transfers. Patient will benefit from continued physical therapy to address deficits and improve safety and functional mobility. Continue with physical therapy plan of care. .    Rehab Prognosis: Good; patient would benefit from acute skilled PT services to address these deficits and reach maximum level of function.    Recent Surgery: * No surgery found *      Plan:     During this hospitalization, patient to be seen 3 x/week to address the identified rehab impairments via gait training, therapeutic activities, therapeutic exercises and progress toward the following goals:    · Plan of Care Expires:  08/17/20    Subjective     Chief Complaint: wants to sit in chair  Patient/Family Comments/goals: agreeable to session  Pain/Comfort:  · Pain Rating 1: 0/10  · Pain Rating 2: 0/10      Objective:     Communicated with nurse prior to session.  Patient found HOB elevated with bed alarm, oxygen upon PT entry to room.     General Precautions: Standard, fall   Orthopedic Precautions:N/A   Braces: N/A     Functional Mobility:  · Bed Mobility:     · Supine to Sit: maximal assistance  · Transfers:     · Sit to Stand:  minimum assistance to rise with rolling walker and from EOB, from chair (after preparation: scoot forward, position feet)  · Bed to Chair: minimum assistance  with  rolling walker  using  Step Transfer    Gait: patient takes steps to pivot to chair w/ RW and min assistance. After seated rest break, patient ambulates x2 trials: 4 feet, 3 feet w/ RW and min assist w/chair follow. Small steps, assist to manage RW, mildly forward flexed posture, decreased hip and knee flexion in swing. Patient unable to take backward steps to chair.      AM-PAC 6 CLICK MOBILITY  Turning over in bed (including adjusting bedclothes, sheets and blankets)?: 2  Sitting down on and standing up from a chair with arms (e.g., wheelchair, bedside commode, etc.): 3  Moving from lying on back to sitting on the side of the bed?: 2  Moving to and from a bed to a chair (including a wheelchair)?: 3  Need to walk in hospital room?: 2  Climbing 3-5 steps with a railing?: 1  Basic Mobility Total Score: 13       Therapeutic Activities and Exercises:   patient educated in PT POC; call for assistance.    Patient left up in chair with all lines intact, call button in reach and nurse notified..    GOALS:   Multidisciplinary Problems     Physical Therapy Goals        Problem: Physical Therapy Goal    Goal Priority Disciplines Outcome Goal Variances Interventions   Physical Therapy Goal     PT, PT/OT Ongoing, Progressing     Description: Goals to be met by: 20     Patient will increase functional independence with mobility by performin. Supine to sit with MInimal Assistance  2. Sit to supine with Set-up Patrick  3. Sit to stand transfer with Contact Guard Assistance  4. Bed to chair transfer with Contact Guard Assistance using Rolling Walker  5. Gait  x 20 feet with Contact Guard Assistance using Rolling Walker.                      Time Tracking:     PT Received On: 20  PT Start Time: 956     PT Stop Time:   PT Total Time (min): 25 min     Billable Minutes: Gait Training 15 and Therapeutic Activity 10  (co-treatment performed with OT due to low activity tolerance; level of assistance  required for mobility and skilled treatment)    Treatment Type: Treatment  PT/PTA: PT     PTA Visit Number: 0     Betty Lemus, PT  07/22/2020

## 2020-07-22 NOTE — PT/OT/SLP PROGRESS
Occupational Therapy   Treatment    Name: Mark Anthony Velarde Jr.  MRN: [id number]  Admitting Diagnosis:  Weakness       Recommendations:     Discharge Recommendations: nursing facility, skilled  Discharge Equipment Recommendations:  walker, rolling  Barriers to discharge:  Decreased caregiver support    Assessment:     Mark Anthony Velarde Jr. is a 80 y.o. male with a medical diagnosis of Weakness.  He presents with impaired ADL and mobility performance deficits. Pt pleasant and willing to work with OT/PT for treatment this morning. Pt participated in bed mobility, EOB sitting tolerance tasks with postural support exercises, sit<stand t/f trials, grooming tasks, and bedroom mobility. Pt with delayed processing noted with impairments in command following evident. Pt required max (A) for bed mobility and sat EOB with min (A)-progressing to SBA. Pt required mod (A) with RW for ambulation and mod (A)x2 for initial stand. Chair follow utilized for safety and pt needing rest breaks throughout 2/2 global fatigue. Pt remains a high fall risk and appropriate for continued therapy.  Performance deficits affecting function are weakness, impaired functional mobilty, impaired endurance, gait instability, impaired self care skills, impaired balance, decreased upper extremity function, decreased lower extremity function, impaired cognition, decreased coordination, decreased safety awareness, impaired coordination, impaired fine motor. Pt would benefit from continued OT skilled services 4x/wk to improve daily living skills to optimize QOL.Pt is recommended to discharge to SNF at this time.      Rehab Prognosis:  Good; patient would benefit from acute skilled OT services to address these deficits and reach maximum level of function.       Plan:     Patient to be seen 4 x/week to address the above listed problems via self-care/home management, therapeutic activities, therapeutic exercises, neuromuscular re-education  · Plan of Care Expires:  08/18/20  · Plan of Care Reviewed with: patient    Subjective     Pain/Comfort:  · Pain Rating 1: 0/10  · Pain Rating Post-Intervention 2: 0/10    Objective:     Communicated with: RN prior to session.  Patient found HOB elevated with bed alarm upon OT entry to room.    General Precautions: Standard, fall   Orthopedic Precautions:N/A   Braces: N/A     Occupational Performance:     Bed Mobility:    · Patient completed Rolling/Turning to Right with maximal assistance  · Patient completed Scooting/Bridging with maximal assistance  · Patient completed Supine to Sit with maximal assistance     Functional Mobility/Transfers:  · Patient completed Sit <> Stand Transfer with moderate assistance and of 2 persons  with  rolling walker   · Patient completed Bed <> Chair Transfer using Step Transfer technique with moderate assistance with rolling walker  · Functional Mobility: Pt initially stood requiring mod (A)x2 with RW. Once mobilizing and max vc's provided, pt progressed to mod (A)x1 with chair follow utilized. Pt able to take ~3 steps forward/backward in room completed x2 trials and rest breaks required between trials sitting in chair.    Activities of Daily Living:  · Grooming: setup (A) of warm face towel to wash face; pt provided setup for tooth brushing items while UIC  · Lower Body Dressing: total assistance adjusting  sock fit at EOB         AMPAC 6 Click ADL: 10    Treatment & Education:  Pt educated on role of occupational therapy, POC, and safety during ADLs and functional mobility. Pt and OT discussed importance of safe, continued mobility to optimize daily living skills. Pt verbalized understanding. Pt completed the following during session: bed mobility, EOB ADLs, sit<Stand t/f trials (x3), bedroom ambulation, walker management training. See above for mobility performance. White board updated during session. Pt given instruction to call for medical staff/nurse for assistance.       Patient left up in chair  with all lines intact, call button in reach and RN Sandy presentEducation:      GOALS:   Multidisciplinary Problems     Occupational Therapy Goals        Problem: Occupational Therapy Goal    Goal Priority Disciplines Outcome Interventions   Occupational Therapy Goal     OT, PT/OT Ongoing, Progressing    Description: Goals to be met by: 08/01/2020     Patient will increase functional independence with ADLs by performing:    Feeding with Set-up Assistance and Supervision.  UE Dressing with Set-up Assistance and Supervision.  Grooming while seated at sink with Set-up Assistance and Supervision.  Toileting from bedside commode with Moderate Assistance for hygiene and clothing management.   Toilet transfer to bedside commode with Minimal Assistance.  Upper extremity exercise program 3x12 reps per handout, with supervision.                     Time Tracking:     OT Date of Treatment: 07/22/20  OT Start Time: 0956  OT Stop Time: 1023  OT Total Time (min): 27 min    Billable Minutes:Self Care/Home Management 15 min  Therapeutic Activity 12 min    Federica Shirley OT  7/22/2020

## 2020-07-22 NOTE — SUBJECTIVE & OBJECTIVE
Interval History: Tessie cute events overnight. Pt seen at bedside resting in NAD, no complaints. Only oriented to name today. Insurance denied SNF, family discussing appealing vs pursuing home hospice.     Review of Systems   Unable to perform ROS: Dementia     Objective:     Vital Signs (Most Recent):  Temp: 96.8 °F (36 °C) (07/22/20 1119)  Pulse: 60 (07/22/20 1119)  Resp: 18 (07/22/20 1119)  BP: 127/62 (07/22/20 1119)  SpO2: (!) 94 % (07/22/20 1119) Vital Signs (24h Range):  Temp:  [96.2 °F (35.7 °C)-98.4 °F (36.9 °C)] 96.8 °F (36 °C)  Pulse:  [60-73] 60  Resp:  [14-24] 18  SpO2:  [92 %-97 %] 94 %  BP: (120-182)/(57-86) 127/62     Weight: 79.6 kg (175 lb 7.8 oz)  Body mass index is 27.49 kg/m².    Intake/Output Summary (Last 24 hours) at 7/22/2020 1418  Last data filed at 7/22/2020 1402  Gross per 24 hour   Intake 180 ml   Output --   Net 180 ml      Physical Exam  Constitutional:       General: He is not in acute distress.     Appearance: He is well-developed. He is not ill-appearing.   HENT:      Head: Normocephalic and atraumatic.   Neck:      Musculoskeletal: Neck supple.      Thyroid: No thyromegaly.   Cardiovascular:      Rate and Rhythm: Normal rate and regular rhythm.   Pulmonary:      Effort: Pulmonary effort is normal. No respiratory distress.   Abdominal:      General: Bowel sounds are normal.      Palpations: Abdomen is soft. There is no hepatomegaly or splenomegaly.      Tenderness: There is no abdominal tenderness.   Musculoskeletal:      Comments: Bilateral UE rigidity   Skin:     General: Skin is warm and dry.   Neurological:      General: No focal deficit present.      Mental Status: He is disoriented and confused.      Sensory: No sensory deficit.      Comments:  oriented to self   Psychiatric:         Cognition and Memory: Cognition is impaired. Memory is impaired.         Significant Labs: All pertinent labs within the past 24 hours have been reviewed.    Significant Imaging: I have reviewed all  pertinent imaging results/findings within the past 24 hours.

## 2020-07-22 NOTE — DISCHARGE SUMMARY
"[facility] Hospital Medicine  Discharge Summary      Patient Name: Mark Anthony Velarde Jr.  MRN: [id number]  Admission Date: 7/16/2020  Hospital Length of Stay: 2 days  Discharge Date and Time: No discharge date for patient encounter.  Attending Physician: Jason Blanc MD   Discharging Provider: Cheri Gupta PA-C  Primary Care Provider: Jeff Yan MD  [facility] Medicine Team: [facility] E Cehri Gupta PA-C    HPI:   Mark Anthony Velarde Jr. Is an 81 yo M with a PMH including HTN, HLd, CAD s/p PTCA to LAD, LCX and PAD in 2006, ischemic cardiomyopathy EF ~ 45%, afib not currently anticoagulated, lewy body dementia and parkinsonism presents with generalized weakness per family. Patient was brought in by EMS who reports family called due to patient "being in bed all morning." Per ED triage notes, patient was slightly confused when he was brought in and oriented to person only, not oriented to place, situation, or time. Family reported baseline oriented to person and place.  ED nurse spoke with the daughter at 17:59 who reported the patient is normally continent and wakes up for breakfast in the morning. His wife stated that this morning she was woken up by the patient having bladder incontinence and while ambulating him to the bathroom she noticed bowel incontinence (diarrhea). Also noticed generalized weakness and lethargy stating the patient just wanted to sleep which is not usual for him.   Currently he is oriented to person only. He denies CP, SOB, NVD, abdominal pain.     * No surgery found *      Hospital Course:   Patient with Lewy Body Dementia admitted for weakness/altered baseline. On admission 0/4 SIRS. UA and CXR without signs of infection. COVID negative. CTH no acute infarct or bleed seen. CT cervical spine no acute fracture. XRAY of lumbar and thoracic spine no acute fracture. BCx2 were collected, 1 BCx showed GPC final result contaminant. Repeat BCx were obtained, and patient " was started on IV Vancomycin, pharmacy consulted. Repeat Bcx NGTD. TTE ordered given patient with AICD/PPM, no signs of vegetation, TTE relatively unchanged. Stool studies NGTD, C. Diff negative. Given functional and mental decline, goals of care discussed with family who wish to further discuss hospice/end of life care with palliative care (pall care has been following outpt). Pall care consulted, appreciate assistance, after discussion family decided to purse SNF. Stable for d/c to SNF with pcp f/u, return precautions discussed.     Consults:   Consults (From admission, onward)        Status Ordering Provider     Case Management/  Once     Provider:  (Not yet assigned)    Acknowledged ARMANDO PARSONS     Inpatient consult to Palliative Care  Once     Provider:  (Not yet assigned)    Completed DRAKE RODRIGUEZ     Inpatient consult to Spiritual Care  Once     Provider:  (Not yet assigned)    Completed DRAKE RODRIGUEZ          * Weakness  80 year old male admitted to observation for weakness. >> bacteremia? Unclear source?   - CTH no acute infarct or intracranial hemorrhage  - UA unremarkable for infection  - CXR without signs of infection  - XR thoracic and lumbar spine -- no acute fractures  - Consult    - Fall precautions, aspiration precautions, delirium precautions.  - See 6/9/2020 note for palliative consult.  - BCx2 were ordered - 1 BCx grew Gram positive cocci in clusters resembling Staph>contaminant  7/17-21:   - pt febrile 7/18 but afebrile since  - Repeat BCx ordered : NGTD  - Started on IV Vancomycin 7/17, pharmacy consulted; stopped 7/19 as patient has been afebrile without WBC, no infectious source identified  - Pt with PPM/AICD: TTE no signs of vegetations or significant changes from previous (WMA on previous TTE)  - Stool Studies ordered (nursing reports episode of loose stool): C. Diff negative, cx NGTD> start imodium   *due to mental and physical decline (see  parkinsons/dementia below), family is interested in goals of care discussion with palliative/possibly transitioning to SNF: pall care consulted: after discussion with family, decided to pursue SNF placement  - stable for dc to SNF    Lewy body dementia  Parkinsonism    - Memantine 10mg BID    [facility]NAGEMENT  - Minimize use of restraints; if physical restraints necessary, please utilize medical/chemical prns for agitation.  - Keep shades open and room lit during day and room dim at night in order to promote healthy circadian rhythms.  - Encourage family at bedside  - Keep whiteboard in patient's room current with the date and name of the members of patient's team for easy patient self re-orientation.  - Avoid benzodiazepines, antihistamines, anticholinergics, hypnotics, and minimize opiates while controlling for pain as these medications may exacerbate delirium.    Atrial fibrillation   - Amiodarone 100mg qd; MTP 12.5mg   - Patient is no longer on chronic anticoagulation, likely secondary to fall risk.    Ischemic cardiomyopathy LVEF ~45%  AICD present    - No evidence of decompensation.  Repeat TTE shows rEF 43%, WMA    Echo 1/2017    1 - Upper limit of normal left ventricular enlargement.     2 - Mildly to moderately depressed left ventricular systolic function (EF 40-45%).     3 - Eccentric hypertrophy.     4 - Normal left ventricular diastolic function.     5 - Normal right ventricular systolic function .     6 - Mild left atrial enlargement.     7 - Trivial to mild aortic regurgitation.     8 - Mild mitral regurgitation.     9 - Trivial tricuspid regurgitation.     10 - Trivial pulmonic regurgitation.     11 - The estimated PA systolic pressure is 33 mmHg.     12 - TDI as per text.     Hyperlipidemia with target LDL less than 70  - Atorvastatin 40mg qd    HTN (hypertension)  - Amlodipine 5mg qd, Lopressor 12.5mg BID  BP stable, will monitor  7/18: hold norvasc in setting of low Bps, continue MTP  for now as pt seems to have hx afib rvr episodes  OK to resume    CAD (coronary artery disease)  History of PTCA - LAD, LCX and PDA PCI in 2006    - Continue secondary prevention, ASA daily.      Final Active Diagnoses:    Diagnosis Date Noted POA    PRINCIPAL PROBLEM:  Weakness [R53.1] 07/16/2020 Yes     Chronic    Lewy body dementia [G31.83, F02.80] 12/20/2018 Yes     Chronic    Atrial fibrillation  [I48.91] 02/13/2013 Yes     Chronic    Ischemic cardiomyopathy LVEF ~45% [I25.5] 02/13/2013 Yes     Chronic    Hyperlipidemia with target LDL less than 70 [E78.5] 02/13/2013 Yes     Chronic    HTN (hypertension) [I10] 02/13/2013 Yes     Chronic    CAD (coronary artery disease) [I25.10] 02/13/2013 Yes     Chronic    Palliative care encounter [Z51.5] 07/20/2020 Not Applicable    Advanced care planning/counseling discussion [Z71.89]  Not Applicable    Advanced directive placed in chart this admission [Z78.9]  Yes    Goals of care, counseling/discussion [Z71.89]  Not Applicable      Problems Resolved During this Admission:       Discharged Condition: stable    Disposition: Skilled Nursing Facility    Follow Up:    Patient Instructions:      Notify your health care provider if you experience any of the following:  temperature >100.4     Notify your health care provider if you experience any of the following:  redness, tenderness, or signs of infection (pain, swelling, redness, odor or green/yellow discharge around incision site)     Notify your health care provider if you experience any of the following:  worsening rash     Notify your health care provider if you experience any of the following:  increased confusion or weakness     Notify your health care provider if you experience any of the following:  severe persistent headache     Notify your health care provider if you experience any of the following:  persistent nausea and vomiting or diarrhea     Notify your health care provider if you experience any of  the following:  temperature >100.4     Notify your health care provider if you experience any of the following:  persistent nausea and vomiting or diarrhea     Notify your health care provider if you experience any of the following:  redness, tenderness, or signs of infection (pain, swelling, redness, odor or green/yellow discharge around incision site)     Notify your health care provider if you experience any of the following:  worsening rash     Notify your health care provider if you experience any of the following:  difficulty breathing or increased cough     Notify your health care provider if you experience any of the following:  increased confusion or weakness     Notify your health care provider if you experience any of the following:  persistent dizziness, light-headedness, or visual disturbances     Activity as tolerated     Activity as tolerated       Significant Diagnostic Studies: Microbiology:   Blood Culture   Lab Results   Component Value Date    LABBLOO No Growth to date 07/18/2020    LABBLOO No Growth to date 07/18/2020    LABBLOO No Growth to date 07/18/2020    LABBLOO No Growth to date 07/18/2020    LABBLOO No Growth to date 07/18/2020     Radiology: X-Ray: CXR: no evidence of infectious etiology    Pending Diagnostic Studies:     None         Medications:  Reconciled Home Medications:      Medication List      START taking these medications    loperamide 2 mg capsule  Commonly known as: IMODIUM  Take 1 capsule (2 mg total) by mouth 4 (four) times daily as needed for Diarrhea.        CONTINUE taking these medications    * amiodarone 200 MG Tab  Commonly known as: PACERONE  Take 0.5 tablets (100 mg total) by mouth once daily.     * amiodarone 100 MG Tab  Commonly known as: PACERONE  TAKE 1 TABLET BY MOUTH EVERY DAY     amLODIPine 5 MG tablet  Commonly known as: NORVASC  Take 1 tablet (5 mg total) by mouth once daily.     aspirin 81 MG EC tablet  Commonly known as: ECOTRIN  Take 81 mg by mouth  once daily.     atorvastatin 40 MG tablet  Commonly known as: LIPITOR  Take 1 tablet (40 mg total) by mouth once daily.     dutasteride 0.5 mg capsule  Commonly known as: AVODART  Take 1 capsule (0.5 mg total) by mouth once daily.     FLUZONE HIGH-DOSE 2019-20 (PF) 180 mcg/0.5 mL Syrg  Generic drug: flu vacc [id number](65yr up)PF  ADM 0.5ML IM UTD     memantine 10 MG Tab  Commonly known as: NAMENDA  Take 1 tablet (10 mg total) by mouth 2 (two) times daily.     metoprolol tartrate 25 MG tablet  Commonly known as: LOPRESSOR  Take 0.5 tablets (12.5 mg total) by mouth 2 (two) times daily.     omeprazole 20 MG capsule  Commonly known as: PriLOSEC  Take 1 capsule (20 mg total) by mouth once daily.     ONE DAILY MULTIVITAMIN per tablet  Generic drug: multivitamin  Take 1 tablet by mouth once daily.     tamsulosin 0.4 mg Cap  Commonly known as: FLOMAX  Take 1 capsule (0.4 mg total) by mouth every evening.         * This list has 2 medication(s) that are the same as other medications prescribed for you. Read the directions carefully, and ask your doctor or other care provider to review them with you.                Indwelling Lines/Drains at time of discharge:   Lines/Drains/Airways     None                 Time spent on the discharge of patient: >45 minutes  Patient was seen and examined on the date of discharge and determined to be suitable for discharge.         Cheri Gupta PA-C  Department of [facility] Medicine  [facility]

## 2020-07-22 NOTE — PROGRESS NOTES
"[facility] Hospital Medicine  Progress Note    Patient Name: Mark Anthony Velarde Jr.  MRN: [id number]  Patient Class: IP- Inpatient   Admission Date: 7/16/2020  Length of Stay: 2 days  Attending Physician: Jason Blanc MD  Primary Care Provider: Jeff Yan MD    [facility] Hospital Medicine Team: [facility] HOSP MED E Cheri Gupta PA-C    Subjective:     Principal Problem:Weakness        HPI:  Mark Anthony Velarde Jr. Is an 79 yo M with a PMH including HTN, HLd, CAD s/p PTCA to LAD, LCX and PAD in 2006, ischemic cardiomyopathy EF ~ 45%, afib not currently anticoagulated, lewy body dementia and parkinsonism presents with generalized weakness per family. Patient was brought in by EMS who reports family called due to patient "being in bed all morning." Per ED triage notes, patient was slightly confused when he was brought in and oriented to person only, not oriented to place, situation, or time. Family reported baseline oriented to person and place.  ED nurse spoke with the daughter at 17:59 who reported the patient is normally continent and wakes up for breakfast in the morning. His wife stated that this morning she was woken up by the patient having bladder incontinence and while ambulating him to the bathroom she noticed bowel incontinence (diarrhea). Also noticed generalized weakness and lethargy stating the patient just wanted to sleep which is not usual for him.   Currently he is oriented to person only. He denies CP, SOB, NVD, abdominal pain.     Overview/Hospital Course:  Patient with Lewy Body Dementia admitted for weakness/altered baseline. On admission 0/4 SIRS. UA and CXR without signs of infection. COVID negative. CTH no acute infarct or bleed seen. CT cervical spine no acute fracture. XRAY of lumbar and thoracic spine no acute fracture. BCx2 were collected, 1 BCx showed GPC final result contaminant. Repeat BCx were obtained, and patient was started on IV Vancomycin, pharmacy consulted. Repeat Bcx NGTD. " TTE ordered given patient with AICD/PPM, no signs of vegetation, TTE relatively unchanged. Stool studies NGTD, C. Diff negative. Given functional and mental decline, goals of care discussed with family who wish to further discuss hospice/end of life care with palliative care (pall care has been following outpt). Pall care consulted, appreciate assistance, after discussion family decided to purse SNF. Unfortunately insurance denied- family discussing pursuing additional review for SNF vs home hospice, leaning toward SNF.      Interval History: Tessie cute events overnight. Pt seen at bedside resting in NAD, no complaints. Only oriented to name today. Insurance denied SNF, family discussing appealing vs pursuing home hospice.     Review of Systems   Unable to perform ROS: Dementia     Objective:     Vital Signs (Most Recent):  Temp: 96.8 °F (36 °C) (07/22/20 1119)  Pulse: 60 (07/22/20 1119)  Resp: 18 (07/22/20 1119)  BP: 127/62 (07/22/20 1119)  SpO2: (!) 94 % (07/22/20 1119) Vital Signs (24h Range):  Temp:  [96.2 °F (35.7 °C)-98.4 °F (36.9 °C)] 96.8 °F (36 °C)  Pulse:  [60-73] 60  Resp:  [14-24] 18  SpO2:  [92 %-97 %] 94 %  BP: (120-182)/(57-86) 127/62     Weight: 79.6 kg (175 lb 7.8 oz)  Body mass index is 27.49 kg/m².    Intake/Output Summary (Last 24 hours) at 7/22/2020 1418  Last data filed at 7/22/2020 1402  Gross per 24 hour   Intake 180 ml   Output --   Net 180 ml      Physical Exam  Constitutional:       General: He is not in acute distress.     Appearance: He is well-developed. He is not ill-appearing.   HENT:      Head: Normocephalic and atraumatic.   Neck:      Musculoskeletal: Neck supple.      Thyroid: No thyromegaly.   Cardiovascular:      Rate and Rhythm: Normal rate and regular rhythm.   Pulmonary:      Effort: Pulmonary effort is normal. No respiratory distress.   Abdominal:      General: Bowel sounds are normal.      Palpations: Abdomen is soft. There is no hepatomegaly or splenomegaly.      Tenderness:  There is no abdominal tenderness.   Musculoskeletal:      Comments: Bilateral UE rigidity   Skin:     General: Skin is warm and dry.   Neurological:      General: No focal deficit present.      Mental Status: He is disoriented and confused.      Sensory: No sensory deficit.      Comments:  oriented to self   Psychiatric:         Cognition and Memory: Cognition is impaired. Memory is impaired.         Significant Labs: All pertinent labs within the past 24 hours have been reviewed.    Significant Imaging: I have reviewed all pertinent imaging results/findings within the past 24 hours.      Assessment/Plan:      * Weakness  80 year old male admitted to observation for weakness. >> bacteremia? Unclear source?   - CTH no acute infarct or intracranial hemorrhage  - UA unremarkable for infection  - CXR without signs of infection  - XR thoracic and lumbar spine -- no acute fractures  - Consult    - Fall precautions, aspiration precautions, delirium precautions.  - See 6/9/2020 note for palliative consult.  - BCx2 were ordered - 1 BCx grew Gram positive cocci in clusters resembling Staph>contaminant  7/17-21:   - pt febrile 7/18 but afebrile since  - Repeat BCx ordered : NGTD  - Started on IV Vancomycin 7/17, pharmacy consulted; stopped 7/19 as patient has been afebrile without WBC, no infectious source identified  - Pt with PPM/AICD: TTE no signs of vegetations or significant changes from previous (WMA on previous TTE)  - Stool Studies ordered (nursing reports episode of loose stool): C. Diff negative, cx NGTD> start imodium   *due to mental and physical decline (see parkinsons/dementia below), family is interested in goals of care discussion with palliative/possibly transitioning to SNF: pall care consulted: after discussion with family, decided to pursue SNF placement  7/22 insurance denied SNF, family discussing pursuing appeal vs pursuing home hospice; leaning toward insurance appeal, continue to  monitor    Lewy body dementia  Parkinsonism    - Memantine 10mg BID    DELIRIUM BEHAVIOR MANAGEMENT  - Minimize use of restraints; if physical restraints necessary, please utilize medical/chemical prns for agitation.  - Keep shades open and room lit during day and room dim at night in order to promote healthy circadian rhythms.  - Encourage family at bedside  - Keep whiteboard in patient's room current with the date and name of the members of patient's team for easy patient self re-orientation.  - Avoid benzodiazepines, antihistamines, anticholinergics, hypnotics, and minimize opiates while controlling for pain as these medications may exacerbate delirium.    Atrial fibrillation   - Amiodarone 100mg qd; MTP 12.5mg   - Patient is no longer on chronic anticoagulation, likely secondary to fall risk.    Ischemic cardiomyopathy LVEF ~45%  AICD present    - No evidence of decompensation.  Repeat TTE shows rEF 43%, WMA    Echo 1/2017    1 - Upper limit of normal left ventricular enlargement.     2 - Mildly to moderately depressed left ventricular systolic function (EF 40-45%).     3 - Eccentric hypertrophy.     4 - Normal left ventricular diastolic function.     5 - Normal right ventricular systolic function .     6 - Mild left atrial enlargement.     7 - Trivial to mild aortic regurgitation.     8 - Mild mitral regurgitation.     9 - Trivial tricuspid regurgitation.     10 - Trivial pulmonic regurgitation.     11 - The estimated PA systolic pressure is 33 mmHg.     12 - TDI as per text.     Hyperlipidemia with target LDL less than 70  - Atorvastatin 40mg qd    HTN (hypertension)  - Amlodipine 5mg qd, Lopressor 12.5mg BID  BP stable, will monitor  7/18: hold norvasc in setting of low Bps, continue MTP for now as pt seems to have hx afib rvr episodes  OK to resume    CAD (coronary artery disease)  History of PTCA - LAD, LCX and PDA PCI in 2006    - Continue secondary prevention, ASA daily.    Advanced directive placed in  chart this admission        Palliative care encounter          VTE Risk Mitigation (From admission, onward)         Ordered     enoxaparin injection 40 mg  Every 24 hours      07/16/20 2307     Place MATTHIAS hose  Until discontinued      07/16/20 2307     IP VTE HIGH RISK PATIENT  Once      07/16/20 2307     Place sequential compression device  Until discontinued      07/16/20 2307                    Discussed with staff  Cheri Gupta PA-C  Department of Hospital Medicine   [facility]

## 2020-07-22 NOTE — PLAN OF CARE
Plan of care reviewed with patient. Verbalized understanding. Oriented to self. Reoriented to Place time and situation. Denies pain. Assisted with reposition q2h for comfort and pressure relief. No falls or injury noted this shift. Bed alarm activated. Call light in reach all safety measures maintained.

## 2020-07-22 NOTE — ASSESSMENT & PLAN NOTE
- Amlodipine 5mg qd, Lopressor 12.5mg BID  BP stable, will monitor  7/18: hold norvasc in setting of low Bps, continue MTP for now as pt seems to have hx afib rvr episodes  OK to resume

## 2020-07-22 NOTE — ASSESSMENT & PLAN NOTE
80 year old male admitted to observation for weakness. >> bacteremia? Unclear source?   - CTH no acute infarct or intracranial hemorrhage  - UA unremarkable for infection  - CXR without signs of infection  - XR thoracic and lumbar spine -- no acute fractures  - Consult    - Fall precautions, aspiration precautions, delirium precautions.  - See 6/9/2020 note for palliative consult.  - BCx2 were ordered - 1 BCx grew Gram positive cocci in clusters resembling Staph>contaminant  7/17-21:   - pt febrile 7/18 but afebrile since  - Repeat BCx ordered : NGTD  - Started on IV Vancomycin 7/17, pharmacy consulted; stopped 7/19 as patient has been afebrile without WBC, no infectious source identified  - Pt with PPM/AICD: TTE no signs of vegetations or significant changes from previous (WMA on previous TTE)  - Stool Studies ordered (nursing reports episode of loose stool): C. Diff negative, cx NGTD> start imodium   *due to mental and physical decline (see parkinsons/dementia below), family is interested in goals of care discussion with palliative/possibly transitioning to SNF: [facility] care consulted: after discussion with family, decided to pursue SNF placement  7/22 insurance denied SNF, family discussing pursuing appeal vs pursuing home hospice; leaning toward insurance appeal, continue to monitor

## 2020-07-22 NOTE — NURSING
Confused, orientation continues to wax and wane, pt trying to get OOB multiple times today despite reorientation, bed alarm use. Telesitter placed for safety. Otherwise weaned to RA, other VSS. No pain reported. Tolerating diet, encouraged PO intake. Incontinent - care provided. PTOT got pt OOB, MAX assist back to bed, pt lasted less than one hour in chair before asking to go back to bed and needed a lot of coaxing and instruction to get back to bed, unsteady, shuffling gait noted, pt leaning backward most of the time requiring this RN and PCT to hold him upright. Communicated difficulty to PA who is aware. See flowsheet for detail.       4:24 PM  Aside from some mild bruising TB skin test negative.

## 2020-07-23 VITALS
OXYGEN SATURATION: 93 % | TEMPERATURE: 98 F | DIASTOLIC BLOOD PRESSURE: 57 MMHG | SYSTOLIC BLOOD PRESSURE: 127 MMHG | HEART RATE: 61 BPM | WEIGHT: 175.06 LBS | BODY MASS INDEX: 27.48 KG/M2 | HEIGHT: 67 IN | RESPIRATION RATE: 12 BRPM

## 2020-07-23 LAB
ALBUMIN SERPL BCP-MCNC: 3.2 G/DL (ref 3.5–5.2)
ALP SERPL-CCNC: 77 U/L (ref 55–135)
ALT SERPL W/O P-5'-P-CCNC: 39 U/L (ref 10–44)
ANION GAP SERPL CALC-SCNC: 9 MMOL/L (ref 8–16)
AST SERPL-CCNC: 44 U/L (ref 10–40)
BACTERIA BLD CULT: NORMAL
BACTERIA BLD CULT: NORMAL
BASOPHILS # BLD AUTO: 0.07 K/UL (ref 0–0.2)
BASOPHILS NFR BLD: 1 % (ref 0–1.9)
BILIRUB SERPL-MCNC: 0.4 MG/DL (ref 0.1–1)
BUN SERPL-MCNC: 24 MG/DL (ref 8–23)
CALCIUM SERPL-MCNC: 8.5 MG/DL (ref 8.7–10.5)
CHLORIDE SERPL-SCNC: 109 MMOL/L (ref 95–110)
CO2 SERPL-SCNC: 23 MMOL/L (ref 23–29)
CREAT SERPL-MCNC: 1.7 MG/DL (ref 0.5–1.4)
DIFFERENTIAL METHOD: ABNORMAL
EOSINOPHIL # BLD AUTO: 0.4 K/UL (ref 0–0.5)
EOSINOPHIL NFR BLD: 4.8 % (ref 0–8)
ERYTHROCYTE [DISTWIDTH] IN BLOOD BY AUTOMATED COUNT: 18.5 % (ref 11.5–14.5)
EST. GFR  (AFRICAN AMERICAN): 42.5 ML/MIN/1.73 M^2
EST. GFR  (NON AFRICAN AMERICAN): 36.7 ML/MIN/1.73 M^2
GLUCOSE SERPL-MCNC: 88 MG/DL (ref 70–110)
HCT VFR BLD AUTO: 32.4 % (ref 40–54)
HGB BLD-MCNC: 9.6 G/DL (ref 14–18)
IMM GRANULOCYTES # BLD AUTO: 0.23 K/UL (ref 0–0.04)
IMM GRANULOCYTES NFR BLD AUTO: 3.2 % (ref 0–0.5)
LYMPHOCYTES # BLD AUTO: 1.1 K/UL (ref 1–4.8)
LYMPHOCYTES NFR BLD: 14.4 % (ref 18–48)
MCH RBC QN AUTO: 27.9 PG (ref 27–31)
MCHC RBC AUTO-ENTMCNC: 29.6 G/DL (ref 32–36)
MCV RBC AUTO: 94 FL (ref 82–98)
MONOCYTES # BLD AUTO: 0.6 K/UL (ref 0.3–1)
MONOCYTES NFR BLD: 8.8 % (ref 4–15)
NEUTROPHILS # BLD AUTO: 4.9 K/UL (ref 1.8–7.7)
NEUTROPHILS NFR BLD: 67.8 % (ref 38–73)
NRBC BLD-RTO: 0 /100 WBC
PLATELET # BLD AUTO: 253 K/UL (ref 150–350)
PMV BLD AUTO: 10 FL (ref 9.2–12.9)
POTASSIUM SERPL-SCNC: 4 MMOL/L (ref 3.5–5.1)
PROT SERPL-MCNC: 6.7 G/DL (ref 6–8.4)
RBC # BLD AUTO: 3.44 M/UL (ref 4.6–6.2)
SODIUM SERPL-SCNC: 141 MMOL/L (ref 136–145)
WBC # BLD AUTO: 7.28 K/UL (ref 3.9–12.7)

## 2020-07-23 PROCEDURE — 36415 COLL VENOUS BLD VENIPUNCTURE: CPT

## 2020-07-23 PROCEDURE — 85025 COMPLETE CBC W/AUTO DIFF WBC: CPT

## 2020-07-23 PROCEDURE — 27000221 HC OXYGEN, UP TO 24 HOURS

## 2020-07-23 PROCEDURE — 80053 COMPREHEN METABOLIC PANEL: CPT

## 2020-07-23 PROCEDURE — 99239 PR HOSPITAL DISCHARGE DAY,>30 MIN: ICD-10-PCS | Mod: ,,, | Performed by: PHYSICIAN ASSISTANT

## 2020-07-23 PROCEDURE — 99239 HOSP IP/OBS DSCHRG MGMT >30: CPT | Mod: ,,, | Performed by: PHYSICIAN ASSISTANT

## 2020-07-23 PROCEDURE — 25000003 PHARM REV CODE 250: Performed by: PHYSICIAN ASSISTANT

## 2020-07-23 PROCEDURE — 94761 N-INVAS EAR/PLS OXIMETRY MLT: CPT

## 2020-07-23 RX ADMIN — ATORVASTATIN CALCIUM 40 MG: 40 TABLET, FILM COATED ORAL at 08:07

## 2020-07-23 RX ADMIN — DUTASTERIDE 0.5 MG: 0.5 CAPSULE, LIQUID FILLED ORAL at 08:07

## 2020-07-23 RX ADMIN — THERA TABS 1 TABLET: TAB at 08:07

## 2020-07-23 RX ADMIN — ASPIRIN 81 MG: 81 TABLET, COATED ORAL at 08:07

## 2020-07-23 RX ADMIN — MEMANTINE HYDROCHLORIDE 10 MG: 10 TABLET ORAL at 08:07

## 2020-07-23 RX ADMIN — METOPROLOL TARTRATE 12.5 MG: 25 TABLET, FILM COATED ORAL at 08:07

## 2020-07-23 RX ADMIN — AMIODARONE HYDROCHLORIDE 100 MG: 100 TABLET ORAL at 08:07

## 2020-07-23 RX ADMIN — PANTOPRAZOLE SODIUM 40 MG: 40 TABLET, DELAYED RELEASE ORAL at 08:07

## 2020-07-23 NOTE — PLAN OF CARE
Nurse can call report to [phone]; [address]; [address]    SW arranged wheelchair transport via [facility]. Requested  time is 4:00 PM Requested  time does not guarantee arrival time.      Nurse, patient, and family members has been informed of above.       07/23/20 1504   Final Note   Assessment Type Final Discharge Note   Anticipated Discharge Disposition SNF   Right Care Referral Info   Post Acute Recommendation SNF / Sub-Acute Rehab   Referral Type SNF   Facility Name [facility]   Post-Acute Status   Post-Acute Authorization Placement   Post-Acute Placement Status Set-up Complete     Janie Rowley LMSW  [facility]  [id number]

## 2020-07-23 NOTE — PLAN OF CARE
Plan- Pt to transfer to [facility]       07/23/20 1444   Discharge Reassessment   Assessment Type Discharge Planning Reassessment   Provided patient/caregiver education on the expected discharge date and the discharge plan Yes   Do you have any problems affording any of your prescribed medications? No   Discharge Plan A Skilled Nursing Facility   Discharge Plan B Home with family;Home Health

## 2020-07-23 NOTE — PLAN OF CARE
Patient is oriented to self. Reoriented to place , time and situation. Telesitter at bedside for direct observation. Bed alarm activated. Call light in reach. No fall or injury noted this shift. Will continue to monitor.

## 2020-07-23 NOTE — PLAN OF CARE
SW awaiting half-way NH orders. SW informed by Erika at [facility] [phone] that patient has authorization for SNF. SW to follow and assist with needs as indicated.      07/23/20 1211   Post-Acute Status   Post-Acute Authorization Placement   Post-Acute Placement Status   (Awaiting half-way NH orders.)   Discharge Plan   Discharge Plan A New Nursing Home placement - half-way care facility   Discharge Plan B New Nursing Home placement - half-way care facility     Janie Rowley LMSW  [facility]  [id number]

## 2020-07-26 NOTE — PATIENT INSTRUCTIONS
INSTRUCTIONS:    1. Follow-up with palliative care NP in 4 weeks on 8/6  2. Continue all medications  3. Fall precautions at all times  4. Family and patient to practice social distancing  5. Patient to have 6 feet between each other  6. Recommend hospice referral for evaluation and admission  7.   Family to consider hospice

## 2020-07-27 NOTE — DISCHARGE SUMMARY
"[facility] Hospital Medicine  Discharge Summary      Patient Name: Mark Anthony eVlarde Jr.  MRN: [id number]  Admission Date: 7/16/2020  Hospital Length of Stay: 3 days  Discharge Date and Time: 7/23/2020  4:35 PM  Attending Physician:JOSE L CANELA  Discharging Provider: Mich Lombardi PA-C  Primary Care Provider: Jeff Yan MD  [facility] Medicine Team: [facility] HOSP MED E Mich Lombardi PA-C    HPI:   Mark Anthony Velarde Jr. Is an 79 yo M with a PMH including HTN, HLd, CAD s/p PTCA to LAD, LCX and PAD in 2006, ischemic cardiomyopathy EF ~ 45%, afib not currently anticoagulated, lewy body dementia and parkinsonism presents with generalized weakness per family. Patient was brought in by EMS who reports family called due to patient "being in bed all morning." Per ED triage notes, patient was slightly confused when he was brought in and oriented to person only, not oriented to place, situation, or time. Family reported baseline oriented to person and place.  ED nurse spoke with the daughter at 17:59 who reported the patient is normally continent and wakes up for breakfast in the morning. His wife stated that this morning she was woken up by the patient having bladder incontinence and while ambulating him to the bathroom she noticed bowel incontinence (diarrhea). Also noticed generalized weakness and lethargy stating the patient just wanted to sleep which is not usual for him.   Currently he is oriented to person only. He denies CP, SOB, NVD, abdominal pain.     * No surgery found *      Hospital Course:   Patient with Lewy Body Dementia admitted for weakness/altered baseline. On admission 0/4 SIRS. UA and CXR without signs of infection. COVID negative. CTH no acute infarct or bleed seen. CT cervical spine no acute fracture. XRAY of lumbar and thoracic spine no acute fracture. BCx2 were collected, 1 BCx showed GPC final result contaminant. Repeat BCx were obtained, and patient was started on IV Vancomycin, " pharmacy consulted. Repeat Bcx NGTD. TTE ordered given patient with AICD/PPM, no signs of vegetation, TTE relatively unchanged. Stool studies NGTD, C. Diff negative. Given functional and mental decline, goals of care discussed with family who wish to further discuss hospice/end of life care with palliative care (pall care has been following outpt). Following discussion with palliative care, family decided to purse SNF. Discharged in stable condition.      Consults:   Consults (From admission, onward)        Status Ordering Provider     Case Management/  Once     Provider:  (Not yet assigned)    Completed ARMANDO PARSONS     Inpatient consult to Palliative Care  Once     Provider:  (Not yet assigned)    Completed DRAKE RODRIGUEZ     Inpatient consult to Spiritual Care  Once     Provider:  (Not yet assigned)    Completed DRAKE RODRIGUEZ          No new Assessment & Plan notes have been filed under this hospital service since the last note was generated.  Service: Hospital Medicine    Final Active Diagnoses:    Diagnosis Date Noted POA    PRINCIPAL PROBLEM:  Weakness [R53.1] 07/16/2020 Yes     Chronic    Palliative care encounter [Z51.5] 07/20/2020 Not Applicable    Advanced care planning/counseling discussion [Z71.89]  Not Applicable    Advanced directive placed in chart this admission [Z78.9]  Yes    Goals of care, counseling/discussion [Z71.89]  Not Applicable    Lewy body dementia [G31.83, F02.80] 12/20/2018 Yes     Chronic    CAD (coronary artery disease) [I25.10] 02/13/2013 Yes     Chronic    HTN (hypertension) [I10] 02/13/2013 Yes     Chronic    Hyperlipidemia with target LDL less than 70 [E78.5] 02/13/2013 Yes     Chronic    Ischemic cardiomyopathy LVEF ~45% [I25.5] 02/13/2013 Yes     Chronic    Atrial fibrillation  [I48.91] 02/13/2013 Yes     Chronic      Problems Resolved During this Admission:       Discharged Condition: stable    Disposition: Skilled Nursing  Facility    Follow Up:    Patient Instructions:      Diet Cardiac     Notify your health care provider if you experience any of the following:  temperature >100.4     Notify your health care provider if you experience any of the following:  redness, tenderness, or signs of infection (pain, swelling, redness, odor or green/yellow discharge around incision site)     Notify your health care provider if you experience any of the following:  worsening rash     Notify your health care provider if you experience any of the following:  increased confusion or weakness     Notify your health care provider if you experience any of the following:  severe persistent headache     Notify your health care provider if you experience any of the following:  persistent nausea and vomiting or diarrhea     Notify your health care provider if you experience any of the following:  temperature >100.4     Notify your health care provider if you experience any of the following:  persistent nausea and vomiting or diarrhea     Notify your health care provider if you experience any of the following:  redness, tenderness, or signs of infection (pain, swelling, redness, odor or green/yellow discharge around incision site)     Notify your health care provider if you experience any of the following:  worsening rash     Notify your health care provider if you experience any of the following:  difficulty breathing or increased cough     Notify your health care provider if you experience any of the following:  increased confusion or weakness     Notify your health care provider if you experience any of the following:  persistent dizziness, light-headedness, or visual disturbances     Activity as tolerated     Activity as tolerated     Activity as tolerated       Significant Diagnostic Studies: Labs: All labs within the past 24 hours have been reviewed    Pending Diagnostic Studies:     None         Medications:  Reconciled Home Medications:       Medication List      START taking these medications    loperamide 2 mg capsule  Commonly known as: IMODIUM  Take 1 capsule (2 mg total) by mouth 4 (four) times daily as needed for Diarrhea.        CONTINUE taking these medications    * amiodarone 200 MG Tab  Commonly known as: PACERONE  Take 0.5 tablets (100 mg total) by mouth once daily.     * amiodarone 100 MG Tab  Commonly known as: PACERONE  TAKE 1 TABLET BY MOUTH EVERY DAY     amLODIPine 5 MG tablet  Commonly known as: NORVASC  Take 1 tablet (5 mg total) by mouth once daily.     aspirin 81 MG EC tablet  Commonly known as: ECOTRIN  Take 81 mg by mouth once daily.     atorvastatin 40 MG tablet  Commonly known as: LIPITOR  Take 1 tablet (40 mg total) by mouth once daily.     dutasteride 0.5 mg capsule  Commonly known as: AVODART  Take 1 capsule (0.5 mg total) by mouth once daily.     FLUZONE HIGH-DOSE 2019-20 (PF) 180 mcg/0.5 mL Syrg  Generic drug: flu vacc [id number](65yr up)PF  ADM 0.5ML IM UTD     memantine 10 MG Tab  Commonly known as: NAMENDA  Take 1 tablet (10 mg total) by mouth 2 (two) times daily.     metoprolol tartrate 25 MG tablet  Commonly known as: LOPRESSOR  Take 0.5 tablets (12.5 mg total) by mouth 2 (two) times daily.     omeprazole 20 MG capsule  Commonly known as: PriLOSEC  Take 1 capsule (20 mg total) by mouth once daily.     ONE DAILY MULTIVITAMIN per tablet  Generic drug: multivitamin  Take 1 tablet by mouth once daily.     tamsulosin 0.4 mg Cap  Commonly known as: FLOMAX  Take 1 capsule (0.4 mg total) by mouth every evening.         * This list has 2 medication(s) that are the same as other medications prescribed for you. Read the directions carefully, and ask your doctor or other care provider to review them with you.                Indwelling Lines/Drains at time of discharge:   Lines/Drains/Airways     None                 Time spent on the discharge of patient: >45 minutes  Patient was seen and examined on the date of discharge and  determined to be suitable for discharge.         Mich Lombardi PA-C  Department of Hospital Medicine  [facility]

## 2020-07-30 ENCOUNTER — LAB VISIT (OUTPATIENT)
Dept: LAB | Facility: OTHER | Age: 80
End: 2020-07-30
Payer: MEDICARE

## 2020-07-30 DIAGNOSIS — Z03.818 ENCOUNTER FOR OBSERVATION FOR SUSPECTED EXPOSURE TO OTHER BIOLOGICAL AGENTS RULED OUT: ICD-10-CM

## 2020-07-30 PROCEDURE — U0003 INFECTIOUS AGENT DETECTION BY NUCLEIC ACID (DNA OR RNA); SEVERE ACUTE RESPIRATORY SYNDROME CORONAVIRUS 2 (SARS-COV-2) (CORONAVIRUS DISEASE [COVID-19]), AMPLIFIED PROBE TECHNIQUE, MAKING USE OF HIGH THROUGHPUT TECHNOLOGIES AS DESCRIBED BY CMS-2020-01-R: HCPCS

## 2020-08-03 LAB — SARS-COV-2 RNA RESP QL NAA+PROBE: NORMAL

## 2020-08-04 ENCOUNTER — TELEPHONE (OUTPATIENT)
Dept: RESEARCH | Facility: HOSPITAL | Age: 80
End: 2020-08-04

## 2020-08-04 NOTE — TELEPHONE ENCOUNTER
"[facility]   Event Name: Month 10    Date call was made 7/21/20  Been speaking to pts wife and son through out the month.     She called me to inform me of a fall he may have had  with no injuries. She woke up one morning and found pt sleeping on the floor. The home health provider had just shown up and get him up.     We talked about the different options of bringing pt home. Either hiring more help in the house and the children said that they would be willing to help out more. Second option would be going into a nursing facility. There is some tension between the children and it is causing stress on the caregiver.     Saw pt, wife and son in the hospital today. Pts wife was very tense as she was stating this was the "last goodbye" she did not think she would see him now that he is going into a nursing facility. So we discussed the different options for seeing pt and contacting the nursing facility to see what restrictions they had in place and what they are doing for family visits. CG seemed to be a little more calm after our discussion.  "

## 2020-08-06 ENCOUNTER — LAB VISIT (OUTPATIENT)
Dept: LAB | Facility: OTHER | Age: 80
End: 2020-08-06
Payer: MEDICARE

## 2020-08-06 DIAGNOSIS — Z03.818 ENCOUNTER FOR OBSERVATION FOR SUSPECTED EXPOSURE TO OTHER BIOLOGICAL AGENTS RULED OUT: ICD-10-CM

## 2020-08-06 PROCEDURE — U0003 INFECTIOUS AGENT DETECTION BY NUCLEIC ACID (DNA OR RNA); SEVERE ACUTE RESPIRATORY SYNDROME CORONAVIRUS 2 (SARS-COV-2) (CORONAVIRUS DISEASE [COVID-19]), AMPLIFIED PROBE TECHNIQUE, MAKING USE OF HIGH THROUGHPUT TECHNOLOGIES AS DESCRIBED BY CMS-2020-01-R: HCPCS

## 2020-08-08 LAB — SARS-COV-2 RNA RESP QL NAA+PROBE: NOT DETECTED

## 2020-08-13 ENCOUNTER — LAB VISIT (OUTPATIENT)
Dept: LAB | Facility: OTHER | Age: 80
End: 2020-08-13
Payer: MEDICARE

## 2020-08-13 DIAGNOSIS — Z11.59 SPECIAL SCREENING EXAMINATION FOR UNSPECIFIED VIRAL DISEASE: ICD-10-CM

## 2020-08-13 PROCEDURE — U0003 INFECTIOUS AGENT DETECTION BY NUCLEIC ACID (DNA OR RNA); SEVERE ACUTE RESPIRATORY SYNDROME CORONAVIRUS 2 (SARS-COV-2) (CORONAVIRUS DISEASE [COVID-19]), AMPLIFIED PROBE TECHNIQUE, MAKING USE OF HIGH THROUGHPUT TECHNOLOGIES AS DESCRIBED BY CMS-2020-01-R: HCPCS

## 2020-08-15 LAB — SARS-COV-2 RNA RESP QL NAA+PROBE: NOT DETECTED

## 2020-08-20 ENCOUNTER — LAB VISIT (OUTPATIENT)
Dept: LAB | Facility: OTHER | Age: 80
End: 2020-08-20
Payer: MEDICARE

## 2020-08-20 DIAGNOSIS — Z03.818 ENCOUNTER FOR OBSERVATION FOR SUSPECTED EXPOSURE TO OTHER BIOLOGICAL AGENTS RULED OUT: ICD-10-CM

## 2020-08-20 PROCEDURE — U0003 INFECTIOUS AGENT DETECTION BY NUCLEIC ACID (DNA OR RNA); SEVERE ACUTE RESPIRATORY SYNDROME CORONAVIRUS 2 (SARS-COV-2) (CORONAVIRUS DISEASE [COVID-19]), AMPLIFIED PROBE TECHNIQUE, MAKING USE OF HIGH THROUGHPUT TECHNOLOGIES AS DESCRIBED BY CMS-2020-01-R: HCPCS

## 2020-08-21 LAB — SARS-COV-2 RNA RESP QL NAA+PROBE: NOT DETECTED

## 2020-08-27 ENCOUNTER — OUTPATIENT CASE MANAGEMENT (OUTPATIENT)
Dept: RESEARCH | Facility: HOSPITAL | Age: 80
End: 2020-08-27

## 2020-08-27 ENCOUNTER — LAB VISIT (OUTPATIENT)
Dept: LAB | Facility: OTHER | Age: 80
End: 2020-08-27
Attending: INTERNAL MEDICINE
Payer: MEDICARE

## 2020-08-27 DIAGNOSIS — Z03.818 ENCOUNTER FOR OBSERVATION FOR SUSPECTED EXPOSURE TO OTHER BIOLOGICAL AGENTS RULED OUT: ICD-10-CM

## 2020-08-27 PROCEDURE — U0003 INFECTIOUS AGENT DETECTION BY NUCLEIC ACID (DNA OR RNA); SEVERE ACUTE RESPIRATORY SYNDROME CORONAVIRUS 2 (SARS-COV-2) (CORONAVIRUS DISEASE [COVID-19]), AMPLIFIED PROBE TECHNIQUE, MAKING USE OF HIGH THROUGHPUT TECHNOLOGIES AS DESCRIBED BY CMS-2020-01-R: HCPCS

## 2020-08-28 LAB — SARS-COV-2 RNA RESP QL NAA+PROBE: NOT DETECTED

## 2020-09-03 ENCOUNTER — LAB VISIT (OUTPATIENT)
Dept: LAB | Facility: OTHER | Age: 80
End: 2020-09-03
Payer: MEDICARE

## 2020-09-03 DIAGNOSIS — Z03.818 ENCOUNTER FOR OBSERVATION FOR SUSPECTED EXPOSURE TO OTHER BIOLOGICAL AGENTS RULED OUT: ICD-10-CM

## 2020-09-03 PROCEDURE — U0003 INFECTIOUS AGENT DETECTION BY NUCLEIC ACID (DNA OR RNA); SEVERE ACUTE RESPIRATORY SYNDROME CORONAVIRUS 2 (SARS-COV-2) (CORONAVIRUS DISEASE [COVID-19]), AMPLIFIED PROBE TECHNIQUE, MAKING USE OF HIGH THROUGHPUT TECHNOLOGIES AS DESCRIBED BY CMS-2020-01-R: HCPCS

## 2020-09-04 LAB — SARS-COV-2 RNA RESP QL NAA+PROBE: NOT DETECTED

## 2020-09-07 NOTE — PROGRESS NOTES
8/19/20  CG called to give me a brief update and want to ask a question about visiting angels. Stated we would speak again during our monthly call.   8/27/20  Today we concentrated mostly on the cg offering support and just listening to what she had to say. She seems overwhelmed by all the changes going on since pts most recent hospitalization. We spoke about  hospice and nurse garret and then had some conversation about the children. She is still afraid to ask the children for help when she needs something. The children on multiple occasions have told me they are willing to help out they just need to know what she struggles with. Gave her some communication tips that she is going to work on. She will also be taking time for herself- we talked about how important it is to keep her health up.

## 2020-09-10 ENCOUNTER — LAB VISIT (OUTPATIENT)
Dept: LAB | Facility: OTHER | Age: 80
End: 2020-09-10
Payer: MEDICARE

## 2020-09-10 DIAGNOSIS — Z03.818 ENCOUNTER FOR OBSERVATION FOR SUSPECTED EXPOSURE TO OTHER BIOLOGICAL AGENTS RULED OUT: ICD-10-CM

## 2020-09-10 PROCEDURE — U0003 INFECTIOUS AGENT DETECTION BY NUCLEIC ACID (DNA OR RNA); SEVERE ACUTE RESPIRATORY SYNDROME CORONAVIRUS 2 (SARS-COV-2) (CORONAVIRUS DISEASE [COVID-19]), AMPLIFIED PROBE TECHNIQUE, MAKING USE OF HIGH THROUGHPUT TECHNOLOGIES AS DESCRIBED BY CMS-2020-01-R: HCPCS

## 2020-09-11 LAB — SARS-COV-2 RNA RESP QL NAA+PROBE: NOT DETECTED

## 2020-09-17 ENCOUNTER — LAB VISIT (OUTPATIENT)
Dept: LAB | Facility: OTHER | Age: 80
End: 2020-09-17
Payer: MEDICARE

## 2020-09-17 DIAGNOSIS — Z03.818 ENCOUNTER FOR OBSERVATION FOR SUSPECTED EXPOSURE TO OTHER BIOLOGICAL AGENTS RULED OUT: ICD-10-CM

## 2020-09-17 PROCEDURE — U0003 INFECTIOUS AGENT DETECTION BY NUCLEIC ACID (DNA OR RNA); SEVERE ACUTE RESPIRATORY SYNDROME CORONAVIRUS 2 (SARS-COV-2) (CORONAVIRUS DISEASE [COVID-19]), AMPLIFIED PROBE TECHNIQUE, MAKING USE OF HIGH THROUGHPUT TECHNOLOGIES AS DESCRIBED BY CMS-2020-01-R: HCPCS

## 2020-09-18 LAB — SARS-COV-2 RNA RESP QL NAA+PROBE: NOT DETECTED

## 2020-09-22 ENCOUNTER — PATIENT MESSAGE (OUTPATIENT)
Dept: NEUROLOGY | Facility: CLINIC | Age: 80
End: 2020-09-22

## 2020-09-24 ENCOUNTER — LAB VISIT (OUTPATIENT)
Dept: LAB | Facility: OTHER | Age: 80
End: 2020-09-24
Payer: MEDICARE

## 2020-09-24 DIAGNOSIS — Z03.818 ENCOUNTER FOR OBSERVATION FOR SUSPECTED EXPOSURE TO OTHER BIOLOGICAL AGENTS RULED OUT: ICD-10-CM

## 2020-09-24 PROCEDURE — U0003 INFECTIOUS AGENT DETECTION BY NUCLEIC ACID (DNA OR RNA); SEVERE ACUTE RESPIRATORY SYNDROME CORONAVIRUS 2 (SARS-COV-2) (CORONAVIRUS DISEASE [COVID-19]), AMPLIFIED PROBE TECHNIQUE, MAKING USE OF HIGH THROUGHPUT TECHNOLOGIES AS DESCRIBED BY CMS-2020-01-R: HCPCS

## 2020-09-24 NOTE — TELEPHONE ENCOUNTER
Spoke to Mrs. Velarde at length last night.   Discussed decline past couple of weeks.   Asked her to ask nsg home about food and fluid intake.  Discussed strategies to see if we can determine if he actually can still feed independently.  Covid has made all this challenging as they have only been able to visit thru a window. Just now have been able to visit outside under tent.  She will call me next week with update.   Did discuss dementia, pdism and progression.

## 2020-09-25 ENCOUNTER — OUTPATIENT CASE MANAGEMENT (OUTPATIENT)
Dept: RESEARCH | Facility: HOSPITAL | Age: 80
End: 2020-09-25

## 2020-09-25 LAB — SARS-COV-2 RNA RESP QL NAA+PROBE: NOT DETECTED

## 2020-09-25 NOTE — PROGRESS NOTES
Left vm for cg with cb on Monday 9/28    Spoke to pts son and they were in the process of enrolling pt into hospice. He stated he would call me back after they finished the appointment. He left me a vm late 9/28  and I called him back on 9/29    I sent an email to the wife to see when she will be available to do the 12 month visit. They have been dealing with a lot as the pt has been declining rapidly.     10/19/20  Finally spoke to the cg and went over the 12 month questions. Over all they are doing well and cg has had reduced stress. She does have some stress and anxiety because they were not able to see the pt face to face and first when he was placed in the facility. But they are starting to be able to see him in person outside.

## 2020-09-28 ENCOUNTER — HOSPITAL ENCOUNTER (EMERGENCY)
Facility: HOSPITAL | Age: 80
Discharge: HOME | End: 2020-09-28
Attending: EMERGENCY MEDICINE
Payer: MEDICARE

## 2020-09-28 VITALS
BODY MASS INDEX: 28.25 KG/M2 | DIASTOLIC BLOOD PRESSURE: 77 MMHG | OXYGEN SATURATION: 96 % | HEART RATE: 68 BPM | WEIGHT: 180 LBS | HEIGHT: 67 IN | RESPIRATION RATE: 20 BRPM | SYSTOLIC BLOOD PRESSURE: 175 MMHG | TEMPERATURE: 97 F
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
## 2020-09-28 LAB
BUN SERPL-MCNC: 26 MG/DL (ref 6–30)
CHLORIDE SERPL-SCNC: 107 MMOL/L (ref 95–110)
CREAT SERPL-MCNC: 1.7 MG/DL (ref 0.5–1.4)
GLUCOSE SERPL-MCNC: 115 MG/DL (ref 70–110)
HCT VFR BLD CALC: 37 %PCV (ref 36–54)
POC IONIZED CALCIUM: 1.23 MMOL/L (ref 1.06–1.42)
POC TCO2 (MEASURED): 27 MMOL/L (ref 23–29)
POTASSIUM BLD-SCNC: 4.2 MMOL/L (ref 3.5–5.1)
SAMPLE: ABNORMAL
SODIUM BLD-SCNC: 144 MMOL/L (ref 136–145)

## 2020-09-28 PROCEDURE — 99285 PR EMERGENCY DEPT VISIT,LEVEL V: ICD-10-PCS | Mod: ,,, | Performed by: EMERGENCY MEDICINE

## 2020-09-28 PROCEDURE — 82962 GLUCOSE BLOOD TEST: CPT

## 2020-09-28 PROCEDURE — 99285 EMERGENCY DEPT VISIT HI MDM: CPT | Mod: ,,, | Performed by: EMERGENCY MEDICINE

## 2020-09-28 PROCEDURE — 99283 EMERGENCY DEPT VISIT LOW MDM: CPT | Mod: 25

## 2020-09-28 NOTE — ED NOTES
I-STAT Chem-8+ Results:   Value Reference Range   Sodium 144 136-145 mmol/L   Potassium  4.2 3.5-5.1 mmol/L   Chloride 107  mmol/L   Ionized Calcium 1.23 1.06-1.42 mmol/L   CO2 (measured) 27 23-29 mmol/L   Glucose 115  mg/dL   BUN 26 6-30 mg/dL   Creatinine 1.7 0.5-1.4 mg/dL   Hematocrit 37 36-54%

## 2020-09-28 NOTE — PLAN OF CARE
[facility]  Department of Hospital Medicine  [address] [place] [address]  [phone] after hours  [phone] fax    HOSPICE  ORDERS    09/28/2020    Admit to Hospice:  Home Service     Diagnoses: There are no hospital problems to display for this patient.      Hospice Qualifying Diagnoses:        Patient has a life expectancy < 6 months due to:  1) Primary Hospice Diagnosis:  Advanced dementia  2) Comorbid Conditions Contributing to Decline:  Atrial fibrillation, CAD, CHF, HTN    Vital Signs: Routine per Hospice Protocol.    Code Status: DNR    Allergies:   Review of patient's allergies indicates:   Allergen Reactions    Arb-angiotensin receptor antagonist Other (See Comments)     Hyperkalemia    Lisinopril Diarrhea       Diet: pleasure feeds    Activities: As tolerated    Nursing: Per Hospice Routine.      Routine Skin for Bedridden Patients: Apply moisture barrier cream to all skin folds and   wet areas in perineal area daily and after baths and all bowel movements.    Oxygen: no    Other Miscellaneous Care:       Medications:    Mark Anthony Velarde Jr.   Home Medication Instructions LINDY:[id number]    Printed on:09/28/20 0932   Medication Information                      amiodarone (PACERONE) 100 MG Tab  TAKE 1 TABLET BY MOUTH EVERY DAY             amiodarone (PACERONE) 200 MG Tab  Take 0.5 tablets (100 mg total) by mouth once daily.             amLODIPine (NORVASC) 5 MG tablet  Take 1 tablet (5 mg total) by mouth once daily.             aspirin (ECOTRIN) 81 MG EC tablet  Take 81 mg by mouth once daily.             atorvastatin (LIPITOR) 40 MG tablet  Take 1 tablet (40 mg total) by mouth once daily.             dutasteride (AVODART) 0.5 mg capsule  Take 1 capsule (0.5 mg total) by mouth once daily.             FLUZONE HIGH-DOSE 2019-20, PF, 180 mcg/0.5 mL Syrg  ADM 0.5ML IM UTD             memantine (NAMENDA) 10 MG Tab  Take 1 tablet (10 mg total) by mouth 2 (two)  times daily.             metoprolol tartrate (LOPRESSOR) 25 MG tablet  Take 0.5 tablets (12.5 mg total) by mouth 2 (two) times daily.             multivitamin (MULTIVITAMIN) per tablet  Take 1 tablet by mouth once daily.             omeprazole (PRILOSEC) 20 MG capsule  Take 1 capsule (20 mg total) by mouth once daily.             tamsulosin (FLOMAX) 0.4 mg Cap  Take 1 capsule (0.4 mg total) by mouth every evening.                   Future Orders:  Hospice Medical Director may dictate new orders for comfortable care measures & sign death certificate.        _________________________________  Fern Sanchez MD  09/28/2020

## 2020-09-28 NOTE — ED NOTES
ED SW phoned [facility] to confirm hospice agency connection.: [facility]. SHARLENE faxed referral.     ED Physician notified.     - Susi Cassidy LMSW   [id number]

## 2020-09-28 NOTE — ED PROVIDER NOTES
Encounter Date: 9/28/2020       History     Chief Complaint   Patient presents with    Fall     arrived by samyian, unwitnessed fall, unknown LOC. Confused at baseline. no blood thinners     81yo male with history of Lewy Body Dementia,  Afib, CAD s/p PTCA to LAD, LCX and PAD in 2006, ischemic cardiomyopathy was brought to ED from SNF for unwitnessed fall. Per nursing home, patient was found down laying on his left side next to his wheelchair. They state he has often attempted to get out of his wheelchair and is unsteady on his feet. History from patient is limited as he is at baseline confused, somnolent, and oriented to self only. Patient currently denies pain or recollection of fall. He is not currently on anticoagulants.     Of note he was recently admitted on 7/16/2020 for generalized weakness and lethargy. During that admission palliative care was consulted due to patients progressive functional and mental decline. Hospice/end of life care was discussed at that time. It was decided by the family to pursue SNF with palliative care outpatient follow up and transition to hospice if patient did not improve.       The history is provided by the nursing home, medical records and the patient. The history is limited by the condition of the patient.     Review of patient's allergies indicates:   Allergen Reactions    Arb-angiotensin receptor antagonist Other (See Comments)     Hyperkalemia    Lisinopril Diarrhea     Past Medical History:   Diagnosis Date    *Atrial fibrillation     Anticoagulant long-term use     Atrial fibrillation - asymptomatic but noted on ICD interrogation (5-02-20121) - 16 h 40 minutes of afib 2/13/2013    Atrial flutter with controlled response - seen on ICD interrogation - asymptomatic 2/13/2013    Automatic implantable cardioverter-defibrillator in situ 2/13/2013    Basal cell carcinoma     mid forehead    CAD (coronary artery disease) 2/13/2013    Cardiomyopathy     Cataract      Cognitive deficits     mild    H/O syncope -  5/27/2013    History of PTCA - LAD, LCX and PDA PCI in 2006 2/13/2013    HTN (hypertension) 2/13/2013    Hyperlipidemia LDL goal < 70 2/13/2013    Hypertension     ICD (implantable cardiac defibrillator) in place 2/13/2013    Ischemic cardiomyopathy LVEF ~45% 2/13/2013    LBBB (left bundle branch block) 2/13/2013    Memory loss     Psoriasis vulgaris     Syncope and collapse      Past Surgical History:   Procedure Laterality Date    CARDIAC DEFIBRILLATOR PLACEMENT      CATARACT EXTRACTION W/  INTRAOCULAR LENS IMPLANT Right 04/04/16    Dr Michael     CATARACT EXTRACTION W/  INTRAOCULAR LENS IMPLANT Left 05/09/2016    SKIN BIOPSY      TONSILLECTOMY       Family History   Problem Relation Age of Onset    Psoriasis Father     Dementia Father     Tremor Father     Cataracts Mother     Cancer Mother     Psoriasis Son     Psoriasis Son     Diabetes Maternal Grandmother     Kidney disease Sister     No Known Problems Brother     No Known Problems Maternal Aunt     No Known Problems Maternal Uncle     No Known Problems Paternal Aunt     No Known Problems Paternal Uncle     No Known Problems Maternal Grandfather     No Known Problems Paternal Grandmother     No Known Problems Paternal Grandfather     Amblyopia Neg Hx     Blindness Neg Hx     Glaucoma Neg Hx     Hypertension Neg Hx     Macular degeneration Neg Hx     Retinal detachment Neg Hx     Strabismus Neg Hx     Stroke Neg Hx     Thyroid disease Neg Hx     Parkinsonism Neg Hx     Celiac disease Neg Hx     Cirrhosis Neg Hx     Colon cancer Neg Hx     Colon polyps Neg Hx     Crohn's disease Neg Hx     Cystic fibrosis Neg Hx     Esophageal cancer Neg Hx     Irritable bowel syndrome Neg Hx     Inflammatory bowel disease Neg Hx     Hemochromatosis Neg Hx     Liver cancer Neg Hx     Liver disease Neg Hx     Rectal cancer Neg Hx     Stomach cancer Neg Hx     Ulcerative colitis  Neg Hx     Christiano's disease Neg Hx     Lymphoma Neg Hx     Scleroderma Neg Hx     Rheum arthritis Neg Hx     Multiple sclerosis Neg Hx     Melanoma Neg Hx     Tuberculosis Neg Hx     Lupus Neg Hx      Social History     Tobacco Use    Smoking status: Former Smoker     Quit date: 1963     Years since quittin.0    Smokeless tobacco: Never Used   Substance Use Topics    Alcohol use: No     Frequency: Monthly or less     Drinks per session: 1 or 2     Binge frequency: Never    Drug use: No     Review of Systems   Unable to perform ROS: Dementia       Physical Exam     Initial Vitals [20 1433]   BP Pulse Resp Temp SpO2   101/66 68 (!) 22 97.4 °F (36.3 °C) 96 %      MAP       --         Physical Exam    Constitutional: No distress.   Slight abrasion on left knee. No obvious major trauma.   HENT:   Head: Normocephalic and atraumatic.   Eyes: Pupils are equal, round, and reactive to light.   Cardiovascular: Normal rate and regular rhythm.   Pulmonary/Chest: Breath sounds normal. No respiratory distress. He has no wheezes.   Abdominal: Soft. There is no abdominal tenderness. There is no rebound and no guarding.   Musculoskeletal: No edema.      Comments: Intermittent bilateral leg tremors   Lymphadenopathy:     He has no cervical adenopathy.   Neurological:   Patient lethargic, only intermittently responds to questions. Oriented to self only.   Skin: Skin is warm and dry.   Patient has scabs on knuckles of left hand         ED Course   Procedures  Labs Reviewed   ISTAT PROCEDURE - Abnormal; Notable for the following components:       Result Value    POC Glucose 115 (*)     POC Creatinine 1.7 (*)     All other components within normal limits          Imaging Results    None          Medical Decision Making:   History:   I obtained history from: someone other than patient.  Old Medical Records: I decided to obtain old medical records.  Old Records Summarized: records from previous  "admission(s).  Initial Assessment:   79yo male with history of Lewy Body Dementia,  Afib, CAD s/p PTCA to LAD, LCX and PAD in 2006, ischemic cardiomyopathy was brought to ED from SNF for unwitnessed fall. Recent admission for geenralized weakness, lethargy, and decline in function. Per chart review family decided to pursue SNF and Hospice if patient does not improve.   Differential Diagnosis:   Mechanical vs Syncope vs Parkinson/Lewy Body Dementia  ED Management:  No signs of external injury or head trauma. Contacted family to discuss patient/family wishes. Wife states family does not want any testing as patient has had multiple prior falls and they do not want him to be transferred to the ED every time he falls. Wife requested for hospice so hospice request ordered.        APC / Resident Notes:   3:47 PM  ISTAT BMP normal         Attending Attestation:   Physician Attestation Statement for Resident:  As the supervising MD   Physician Attestation Statement: I have personally seen and examined this patient.   I agree with the above history. -: 81 yo m, h/o lewy body dementia, multiple other comorbidities, recent hospitalization in July during which pal care was consulted, extensive discussion re  O[facility].  Wife ultimately decided on DNR/DNI, completed [facility]POST and selected comfort-focused treatment.  There were discussions about enrolling the pt in hospice after he received PT/OT in SNF.  Pt now transferred back here for unwitnessed fall.  On exam, VSS.  Pt mildly lethargic.  No external signs of ftrama and i'm able to range all of his joints/palpate bones, doesn't seem to cause him pain, no deformities/swelling to suggest acute fracture.    Touched base with wife to re-address GOC.  She says she's surprised by how rapidly the pt's declined.  I asked why the pt was never enrolled in hospice and she said "o'm glad you're bringing that up."  We talked about the advantage of hospice for pt - wouldn't have to undergo further " "burdensome transfer sto ED when has issues, refocus all efforts on QOL."  Wife in full agreement that this is the best plan for pt at this point in time and would like us to make a referral.  ED SW consulted, hospice orders placed.  Wife does not want pt to receive a work-up for fall today in the ED.   As the supervising MD I agree with the above PE.    As the supervising MD I agree with the above treatment, course, plan, and disposition.  I have reviewed and agree with the residents interpretation of the following: lab data.  I have reviewed the following: old records at this facility.                              Clinical Impression:     ICD-10-CM ICD-9-CM   1. Fall, initial encounter  W19.XXXA E888.9                          ED Disposition Condition    Discharge Stable        ED Prescriptions     None        Follow-up Information     Follow up With Specialties Details Why Contact Info    Jeff Yan MD Internal Medicine   [address]  [place] [address]  [phone]      [facility]  Call                                          Fern Sanchez MD  09/29/20 1052    "

## 2020-09-28 NOTE — ED TRIAGE NOTES
Mark Anthony Velarde Jr., a 80 y.o. male presents to the ED via EMS from [facility] w/ complaint of unwitnessed fall from a wheelchair.      Triage note:  Chief Complaint   Patient presents with    Fall     arrived by acadian, unwitnessed fall, unknown LOC. Confused at baseline. no blood thinners     Review of patient's allergies indicates:   Allergen Reactions    Arb-angiotensin receptor antagonist Other (See Comments)     Hyperkalemia    Lisinopril Diarrhea     Past Medical History:   Diagnosis Date    *Atrial fibrillation     Anticoagulant long-term use     Atrial fibrillation - asymptomatic but noted on ICD interrogation (5-02-20121) - 16 h 40 minutes of afib 2/13/2013    Atrial flutter with controlled response - seen on ICD interrogation - asymptomatic 2/13/2013    Automatic implantable cardioverter-defibrillator in situ 2/13/2013    Basal cell carcinoma     mid forehead    CAD (coronary artery disease) 2/13/2013    Cardiomyopathy     Cataract     Cognitive deficits     mild    H/O syncope -  5/27/2013    History of PTCA - LAD, LCX and PDA PCI in 2006 2/13/2013    HTN (hypertension) 2/13/2013    Hyperlipidemia LDL goal < 70 2/13/2013    Hypertension     ICD (implantable cardiac defibrillator) in place 2/13/2013    Ischemic cardiomyopathy LVEF ~45% 2/13/2013    LBBB (left bundle branch block) 2/13/2013    Memory loss     Psoriasis vulgaris     Syncope and collapse

## 2020-09-28 NOTE — ED NOTES
Pt and all pt belongings transferred back to [facility] via EMS.    Patient discharged home  Discharge instructions given  Patient verbalizes understanding   Patient denies pain, chest pain and shortness of breath   All belongings sent home with patient

## 2020-10-01 ENCOUNTER — LAB VISIT (OUTPATIENT)
Dept: LAB | Facility: OTHER | Age: 80
End: 2020-10-01
Payer: MEDICARE

## 2020-10-01 DIAGNOSIS — Z03.818 ENCOUNTER FOR OBSERVATION FOR SUSPECTED EXPOSURE TO OTHER BIOLOGICAL AGENTS RULED OUT: ICD-10-CM

## 2020-10-01 PROCEDURE — U0003 INFECTIOUS AGENT DETECTION BY NUCLEIC ACID (DNA OR RNA); SEVERE ACUTE RESPIRATORY SYNDROME CORONAVIRUS 2 (SARS-COV-2) (CORONAVIRUS DISEASE [COVID-19]), AMPLIFIED PROBE TECHNIQUE, MAKING USE OF HIGH THROUGHPUT TECHNOLOGIES AS DESCRIBED BY CMS-2020-01-R: HCPCS

## 2020-10-02 LAB — SARS-COV-2 RNA RESP QL NAA+PROBE: NOT DETECTED

## 2020-10-08 ENCOUNTER — LAB VISIT (OUTPATIENT)
Dept: LAB | Facility: OTHER | Age: 80
End: 2020-10-08
Payer: MEDICARE

## 2020-10-08 DIAGNOSIS — Z03.818 ENCOUNTER FOR OBSERVATION FOR SUSPECTED EXPOSURE TO OTHER BIOLOGICAL AGENTS RULED OUT: ICD-10-CM

## 2020-10-08 PROCEDURE — U0003 INFECTIOUS AGENT DETECTION BY NUCLEIC ACID (DNA OR RNA); SEVERE ACUTE RESPIRATORY SYNDROME CORONAVIRUS 2 (SARS-COV-2) (CORONAVIRUS DISEASE [COVID-19]), AMPLIFIED PROBE TECHNIQUE, MAKING USE OF HIGH THROUGHPUT TECHNOLOGIES AS DESCRIBED BY CMS-2020-01-R: HCPCS

## 2020-10-09 ENCOUNTER — PES CALL (OUTPATIENT)
Dept: ADMINISTRATIVE | Facility: CLINIC | Age: 80
End: 2020-10-09

## 2020-10-09 LAB — SARS-COV-2 RNA RESP QL NAA+PROBE: NOT DETECTED

## 2020-10-15 ENCOUNTER — LAB VISIT (OUTPATIENT)
Dept: LAB | Facility: OTHER | Age: 80
End: 2020-10-15
Attending: INTERNAL MEDICINE
Payer: MEDICARE

## 2020-10-15 DIAGNOSIS — Z03.818 ENCOUNTER FOR OBSERVATION FOR SUSPECTED EXPOSURE TO OTHER BIOLOGICAL AGENTS RULED OUT: ICD-10-CM

## 2020-10-15 PROCEDURE — U0003 INFECTIOUS AGENT DETECTION BY NUCLEIC ACID (DNA OR RNA); SEVERE ACUTE RESPIRATORY SYNDROME CORONAVIRUS 2 (SARS-COV-2) (CORONAVIRUS DISEASE [COVID-19]), AMPLIFIED PROBE TECHNIQUE, MAKING USE OF HIGH THROUGHPUT TECHNOLOGIES AS DESCRIBED BY CMS-2020-01-R: HCPCS

## 2020-10-16 LAB — SARS-COV-2 RNA RESP QL NAA+PROBE: NOT DETECTED

## 2020-10-22 ENCOUNTER — LAB VISIT (OUTPATIENT)
Dept: LAB | Facility: OTHER | Age: 80
End: 2020-10-22
Attending: INTERNAL MEDICINE
Payer: MEDICARE

## 2020-10-22 DIAGNOSIS — Z03.818 ENCOUNTER FOR OBSERVATION FOR SUSPECTED EXPOSURE TO OTHER BIOLOGICAL AGENTS RULED OUT: ICD-10-CM

## 2020-10-22 PROCEDURE — U0003 INFECTIOUS AGENT DETECTION BY NUCLEIC ACID (DNA OR RNA); SEVERE ACUTE RESPIRATORY SYNDROME CORONAVIRUS 2 (SARS-COV-2) (CORONAVIRUS DISEASE [COVID-19]), AMPLIFIED PROBE TECHNIQUE, MAKING USE OF HIGH THROUGHPUT TECHNOLOGIES AS DESCRIBED BY CMS-2020-01-R: HCPCS

## 2020-10-23 LAB — SARS-COV-2 RNA RESP QL NAA+PROBE: NOT DETECTED

## 2020-10-23 RX ORDER — MEMANTINE HYDROCHLORIDE 10 MG/1
10 TABLET ORAL 2 TIMES DAILY
Qty: 180 TABLET | Refills: 3 | Status: CANCELLED | OUTPATIENT
Start: 2020-10-23 | End: 2021-10-23

## 2020-10-26 ENCOUNTER — TELEPHONE (OUTPATIENT)
Dept: CARDIOLOGY | Facility: HOSPITAL | Age: 80
End: 2020-10-26

## 2020-10-26 NOTE — TELEPHONE ENCOUNTER
M for Joy Mccabe @ [phone] to discuss having patients AICD turned OFF for hospice care with [facility]. Per CHAS Figueroa, industry rep for [employer], hospice should call [phone] and request the local rep be called for service. A nurse must be present along with a doctors order.

## 2020-10-29 ENCOUNTER — LAB VISIT (OUTPATIENT)
Dept: LAB | Facility: OTHER | Age: 80
End: 2020-10-29
Payer: MEDICARE

## 2020-10-29 DIAGNOSIS — Z03.818 ENCOUNTER FOR OBSERVATION FOR SUSPECTED EXPOSURE TO OTHER BIOLOGICAL AGENTS RULED OUT: ICD-10-CM

## 2020-10-29 PROCEDURE — U0003 INFECTIOUS AGENT DETECTION BY NUCLEIC ACID (DNA OR RNA); SEVERE ACUTE RESPIRATORY SYNDROME CORONAVIRUS 2 (SARS-COV-2) (CORONAVIRUS DISEASE [COVID-19]), AMPLIFIED PROBE TECHNIQUE, MAKING USE OF HIGH THROUGHPUT TECHNOLOGIES AS DESCRIBED BY CMS-2020-01-R: HCPCS

## 2020-10-30 LAB — SARS-COV-2 RNA RESP QL NAA+PROBE: NOT DETECTED

## 2020-11-05 ENCOUNTER — LAB VISIT (OUTPATIENT)
Dept: LAB | Facility: OTHER | Age: 80
End: 2020-11-05
Payer: MEDICARE

## 2020-11-05 DIAGNOSIS — Z03.818 ENCOUNTER FOR OBSERVATION FOR SUSPECTED EXPOSURE TO OTHER BIOLOGICAL AGENTS RULED OUT: ICD-10-CM

## 2020-11-05 PROCEDURE — U0003 INFECTIOUS AGENT DETECTION BY NUCLEIC ACID (DNA OR RNA); SEVERE ACUTE RESPIRATORY SYNDROME CORONAVIRUS 2 (SARS-COV-2) (CORONAVIRUS DISEASE [COVID-19]), AMPLIFIED PROBE TECHNIQUE, MAKING USE OF HIGH THROUGHPUT TECHNOLOGIES AS DESCRIBED BY CMS-2020-01-R: HCPCS

## 2020-11-06 LAB — SARS-COV-2 RNA RESP QL NAA+PROBE: NOT DETECTED

## 2020-11-12 ENCOUNTER — LAB VISIT (OUTPATIENT)
Dept: LAB | Facility: OTHER | Age: 80
End: 2020-11-12
Payer: MEDICARE

## 2020-11-12 DIAGNOSIS — Z03.818 ENCOUNTER FOR OBSERVATION FOR SUSPECTED EXPOSURE TO OTHER BIOLOGICAL AGENTS RULED OUT: ICD-10-CM

## 2020-11-12 PROCEDURE — U0003 INFECTIOUS AGENT DETECTION BY NUCLEIC ACID (DNA OR RNA); SEVERE ACUTE RESPIRATORY SYNDROME CORONAVIRUS 2 (SARS-COV-2) (CORONAVIRUS DISEASE [COVID-19]), AMPLIFIED PROBE TECHNIQUE, MAKING USE OF HIGH THROUGHPUT TECHNOLOGIES AS DESCRIBED BY CMS-2020-01-R: HCPCS

## 2020-11-13 LAB — SARS-COV-2 RNA RESP QL NAA+PROBE: NOT DETECTED

## 2020-11-19 ENCOUNTER — LAB VISIT (OUTPATIENT)
Dept: LAB | Facility: OTHER | Age: 80
End: 2020-11-19
Payer: MEDICARE

## 2020-11-19 DIAGNOSIS — Z03.818 ENCOUNTER FOR OBSERVATION FOR SUSPECTED EXPOSURE TO OTHER BIOLOGICAL AGENTS RULED OUT: ICD-10-CM

## 2020-11-19 PROCEDURE — U0003 INFECTIOUS AGENT DETECTION BY NUCLEIC ACID (DNA OR RNA); SEVERE ACUTE RESPIRATORY SYNDROME CORONAVIRUS 2 (SARS-COV-2) (CORONAVIRUS DISEASE [COVID-19]), AMPLIFIED PROBE TECHNIQUE, MAKING USE OF HIGH THROUGHPUT TECHNOLOGIES AS DESCRIBED BY CMS-2020-01-R: HCPCS

## 2020-11-24 ENCOUNTER — LAB VISIT (OUTPATIENT)
Dept: LAB | Facility: OTHER | Age: 80
End: 2020-11-24
Payer: MEDICARE

## 2020-11-24 DIAGNOSIS — Z03.818 ENCOUNTER FOR OBSERVATION FOR SUSPECTED EXPOSURE TO OTHER BIOLOGICAL AGENTS RULED OUT: ICD-10-CM

## 2020-11-24 LAB — SARS-COV-2 RNA RESP QL NAA+PROBE: NOT DETECTED

## 2020-11-24 PROCEDURE — U0003 INFECTIOUS AGENT DETECTION BY NUCLEIC ACID (DNA OR RNA); SEVERE ACUTE RESPIRATORY SYNDROME CORONAVIRUS 2 (SARS-COV-2) (CORONAVIRUS DISEASE [COVID-19]), AMPLIFIED PROBE TECHNIQUE, MAKING USE OF HIGH THROUGHPUT TECHNOLOGIES AS DESCRIBED BY CMS-2020-01-R: HCPCS

## 2020-11-26 LAB — SARS-COV-2 RNA RESP QL NAA+PROBE: NOT DETECTED

## 2020-11-28 ENCOUNTER — PATIENT MESSAGE (OUTPATIENT)
Dept: ELECTROPHYSIOLOGY | Facility: CLINIC | Age: 80
End: 2020-11-28

## 2020-12-02 NOTE — TELEPHONE ENCOUNTER
Spoke with pt's wife. She stated that her  has deteriorated so quickly that she doesn't want him to linger or be shocked prior to his death.  Explained to her that I would call the nurse and give the order to turn off tachy therapies and leave on sisa pacing per Dr Benitez.  Called Rubi Mccabe RN contacted to give Dr Benitez's order. She stated that the representative that goes to turn the device off will not be able to go in the facility. So the pt will have to be brought out side to have done or into the office for settings to be changed. She would like to schedule appt.  Advised I would discuss further with device supervisor.    Returned call to Joy Mccabe today to see if any progress was made with her supervisor on how [facility] wanted to handle getting the patients device deactivated. She stated she is on vacation and she has not heard back from her. She took my number and will contact me on Monday with there decision.

## 2020-12-03 ENCOUNTER — LAB VISIT (OUTPATIENT)
Dept: LAB | Facility: OTHER | Age: 80
End: 2020-12-03
Payer: MEDICARE

## 2020-12-03 DIAGNOSIS — Z03.818 ENCOUNTER FOR OBSERVATION FOR SUSPECTED EXPOSURE TO OTHER BIOLOGICAL AGENTS RULED OUT: ICD-10-CM

## 2020-12-03 PROCEDURE — U0003 INFECTIOUS AGENT DETECTION BY NUCLEIC ACID (DNA OR RNA); SEVERE ACUTE RESPIRATORY SYNDROME CORONAVIRUS 2 (SARS-COV-2) (CORONAVIRUS DISEASE [COVID-19]), AMPLIFIED PROBE TECHNIQUE, MAKING USE OF HIGH THROUGHPUT TECHNOLOGIES AS DESCRIBED BY CMS-2020-01-R: HCPCS

## 2020-12-04 LAB — SARS-COV-2 RNA RESP QL NAA+PROBE: NOT DETECTED

## 2020-12-10 ENCOUNTER — LAB VISIT (OUTPATIENT)
Dept: LAB | Facility: OTHER | Age: 80
End: 2020-12-10
Payer: MEDICARE

## 2020-12-10 DIAGNOSIS — Z03.818 ENCOUNTER FOR OBSERVATION FOR SUSPECTED EXPOSURE TO OTHER BIOLOGICAL AGENTS RULED OUT: ICD-10-CM

## 2020-12-10 PROCEDURE — U0003 INFECTIOUS AGENT DETECTION BY NUCLEIC ACID (DNA OR RNA); SEVERE ACUTE RESPIRATORY SYNDROME CORONAVIRUS 2 (SARS-COV-2) (CORONAVIRUS DISEASE [COVID-19]), AMPLIFIED PROBE TECHNIQUE, MAKING USE OF HIGH THROUGHPUT TECHNOLOGIES AS DESCRIBED BY CMS-2020-01-R: HCPCS

## 2020-12-11 LAB — SARS-COV-2 RNA RESP QL NAA+PROBE: NOT DETECTED

## 2020-12-14 ENCOUNTER — LAB VISIT (OUTPATIENT)
Dept: LAB | Facility: OTHER | Age: 80
End: 2020-12-14
Payer: MEDICARE

## 2020-12-14 DIAGNOSIS — Z03.818 ENCOUNTER FOR OBSERVATION FOR SUSPECTED EXPOSURE TO OTHER BIOLOGICAL AGENTS RULED OUT: ICD-10-CM

## 2020-12-14 PROCEDURE — U0003 INFECTIOUS AGENT DETECTION BY NUCLEIC ACID (DNA OR RNA); SEVERE ACUTE RESPIRATORY SYNDROME CORONAVIRUS 2 (SARS-COV-2) (CORONAVIRUS DISEASE [COVID-19]), AMPLIFIED PROBE TECHNIQUE, MAKING USE OF HIGH THROUGHPUT TECHNOLOGIES AS DESCRIBED BY CMS-2020-01-R: HCPCS

## 2020-12-15 LAB — SARS-COV-2 RNA RESP QL NAA+PROBE: NOT DETECTED

## 2020-12-17 ENCOUNTER — LAB VISIT (OUTPATIENT)
Dept: LAB | Facility: OTHER | Age: 80
End: 2020-12-17
Payer: MEDICARE

## 2020-12-17 DIAGNOSIS — Z03.818 ENCOUNTER FOR OBSERVATION FOR SUSPECTED EXPOSURE TO OTHER BIOLOGICAL AGENTS RULED OUT: ICD-10-CM

## 2020-12-17 PROCEDURE — U0003 INFECTIOUS AGENT DETECTION BY NUCLEIC ACID (DNA OR RNA); SEVERE ACUTE RESPIRATORY SYNDROME CORONAVIRUS 2 (SARS-COV-2) (CORONAVIRUS DISEASE [COVID-19]), AMPLIFIED PROBE TECHNIQUE, MAKING USE OF HIGH THROUGHPUT TECHNOLOGIES AS DESCRIBED BY CMS-2020-01-R: HCPCS

## 2020-12-20 LAB — SARS-COV-2 RNA RESP QL NAA+PROBE: NOT DETECTED

## 2020-12-28 ENCOUNTER — LAB VISIT (OUTPATIENT)
Dept: LAB | Facility: OTHER | Age: 80
End: 2020-12-28
Payer: MEDICARE

## 2020-12-28 DIAGNOSIS — Z03.818 ENCOUNTER FOR OBSERVATION FOR SUSPECTED EXPOSURE TO OTHER BIOLOGICAL AGENTS RULED OUT: ICD-10-CM

## 2020-12-28 PROCEDURE — U0003 INFECTIOUS AGENT DETECTION BY NUCLEIC ACID (DNA OR RNA); SEVERE ACUTE RESPIRATORY SYNDROME CORONAVIRUS 2 (SARS-COV-2) (CORONAVIRUS DISEASE [COVID-19]), AMPLIFIED PROBE TECHNIQUE, MAKING USE OF HIGH THROUGHPUT TECHNOLOGIES AS DESCRIBED BY CMS-2020-01-R: HCPCS

## 2020-12-30 LAB — SARS-COV-2 RNA RESP QL NAA+PROBE: NOT DETECTED

## 2021-01-04 ENCOUNTER — LAB VISIT (OUTPATIENT)
Dept: LAB | Facility: OTHER | Age: 81
End: 2021-01-04
Payer: MEDICARE

## 2021-01-04 ENCOUNTER — TELEPHONE (OUTPATIENT)
Dept: CARDIOLOGY | Facility: HOSPITAL | Age: 81
End: 2021-01-04

## 2021-01-04 DIAGNOSIS — Z03.818 ENCOUNTER FOR OBSERVATION FOR SUSPECTED EXPOSURE TO OTHER BIOLOGICAL AGENTS RULED OUT: ICD-10-CM

## 2021-01-04 PROCEDURE — U0003 INFECTIOUS AGENT DETECTION BY NUCLEIC ACID (DNA OR RNA); SEVERE ACUTE RESPIRATORY SYNDROME CORONAVIRUS 2 (SARS-COV-2) (CORONAVIRUS DISEASE [COVID-19]), AMPLIFIED PROBE TECHNIQUE, MAKING USE OF HIGH THROUGHPUT TECHNOLOGIES AS DESCRIBED BY CMS-2020-01-R: HCPCS

## 2021-01-05 LAB — SARS-COV-2 RNA RESP QL NAA+PROBE: NOT DETECTED

## 2021-03-09 ENCOUNTER — PATIENT MESSAGE (OUTPATIENT)
Dept: CARDIOLOGY | Facility: CLINIC | Age: 81
End: 2021-03-09

## 2021-03-09 DIAGNOSIS — I10 ESSENTIAL HYPERTENSION: Chronic | ICD-10-CM

## 2021-03-09 RX ORDER — AMLODIPINE BESYLATE 5 MG/1
5 TABLET ORAL DAILY
Qty: 90 TABLET | Refills: 3 | Status: SHIPPED | OUTPATIENT
Start: 2021-03-09

## 2021-03-09 RX ORDER — METOPROLOL TARTRATE 25 MG/1
12.5 TABLET, FILM COATED ORAL 2 TIMES DAILY
Qty: 90 TABLET | Refills: 3 | Status: SHIPPED | OUTPATIENT
Start: 2021-03-09

## 2021-06-23 NOTE — PLAN OF CARE
Patient discharging home with no needs.        07/17/20 1449   Final Note   Assessment Type Final Discharge Note   Anticipated Discharge Disposition Home   Right Care Referral Info   Post Acute Recommendation No Care       Janie Rowley LMSW  [facility]  [id number]     normal for race

## 2023-12-08 NOTE — LETTER
February 13, 2017      Jeff Yan MD  [address]  [facility] [address]           Laton Myah  Neurology  [address]  [facility] [address]  Phone: [phone]  Fax: [phone]          Patient: Mark Anthony Velarde Jr.   MR Number: [id number]   YOB: 1940   Date of Visit: 2/13/2017       Dear Dr. Jeff Yan:    Thank you for referring Mark Anthony Velarde to me for evaluation. Attached you will find relevant portions of my assessment and plan of care.    If you have questions, please do not hesitate to call me. I look forward to following Mark Anthony Velarde along with you.    Sincerely,    Yazmin Israel NP    Enclosure  CC:  No Recipients    If you would like to receive this communication electronically, please contact [email or web address] or [phone] to request more information on [employer] Link access.    For providers and/or their staff who would like to refer a patient to [facility], please contact us through our one-stop-shop provider referral line, [facility], at [phone].    If you feel you have received this communication in error or would no longer like to receive these types of communications, please e-mail [email or web address]         
week(s)

## 2024-02-29 NOTE — LETTER
----- Message from DAKOTA Hidalgo sent at 2/29/2024  4:40 PM CST -----  There is no hernia to his abdominal wall, only intact scar tissue from his previous PEG tube placement.  He does have a moderate hiatal hernia, which is a hernia where the stomach pushes up through the diaphragm. This can cause symptoms of pain, but usually causes symptoms of gastric reflux. I don't think this would cause the incisional pain he described.  There was also a 10 mm lesion on the left kidney with recommendations for further eval with an MRI. We can place an order if he would like to proceed.    June 20, 2019      Hermilo Powell PA-C  [address]  [facility] [address]           [facility]  [address]  [facility] [address]  Phone: [phone]          Patient: Mark Anthony Velarde Jr.   MR Number: [id number]   YOB: 1940   Date of Visit: 6/20/2019       Dear Hermilo Powell:    Thank you for referring Mark Anthony Velarde to me for evaluation. Attached you will find relevant portions of my assessment and plan of care.    If you have questions, please do not hesitate to call me. I look forward to following Mark Anthony Velarde along with you.    Sincerely,    Gavin Cordoba PA-C    Enclosure  CC:  No Recipients    If you would like to receive this communication electronically, please contact [email or web address] or [phone] to request more information on [facility] Link access.    For providers and/or their staff who would like to refer a patient to [facility], please contact us through our one-stop-shop provider referral line, [facility], at [phone].    If you feel you have received this communication in error or would no longer like to receive these types of communications, please e-mail [email or web address]

## 2024-04-18 NOTE — TELEPHONE ENCOUNTER
Please let pt know that he will likely require manometry and esophagogram to further evaluate this. Please set him up with MAYTE Kate or MAYTE Infante within 2 weeks for further evaluation of his problems.  Pls arrange previously requested CT.  He should stay on soft diet and drink lots of liquid w each meal.      5.25

## 2024-12-14 NOTE — SUBJECTIVE & OBJECTIVE
Interval History: No acute events overnight. AF VSS. Patient seen at bedside, resting, NAD. Wife and son was present. Patient was lucid today, AO - not oriented to the year. He had no complaints today. Waiting for SNF placement, anticipate d/c tomorrow.     Review of Systems   Unable to perform ROS: Dementia   patient had no complaints today    Objective:     Vital Signs (Most Recent):  Temp: 97.7 °F (36.5 °C) (20 1133)  Pulse: 75 (20 1133)  Resp: 16 (20 1133)  BP: (!) 157/68 (20 1133)  SpO2: 97 % (20 1133) Vital Signs (24h Range):  Temp:  [97.3 °F (36.3 °C)-98.2 °F (36.8 °C)] 97.7 °F (36.5 °C)  Pulse:  [61-83] 75  Resp:  [16-28] 16  SpO2:  [89 %-97 %] 97 %  BP: (136-157)/(60-73) 157/68     Weight: 79.6 kg (175 lb 7.8 oz)  Body mass index is 27.49 kg/m².    Intake/Output Summary (Last 24 hours) at 2020 1348  Last data filed at 2020 0558  Gross per 24 hour   Intake 100 ml   Output --   Net 100 ml      Physical Exam  Constitutional:       General: He is not in acute distress.     Appearance: He is well-developed. He is not ill-appearing.   HENT:      Head: Normocephalic and atraumatic.   Neck:      Musculoskeletal: Neck supple.      Thyroid: No thyromegaly.   Cardiovascular:      Rate and Rhythm: Normal rate and regular rhythm.   Pulmonary:      Effort: Pulmonary effort is normal. No respiratory distress.   Abdominal:      General: Bowel sounds are normal.      Palpations: Abdomen is soft. There is no hepatomegaly or splenomegaly.      Tenderness: There is no abdominal tenderness.   Musculoskeletal:      Comments: Bilateral UE rigidity   Skin:     General: Skin is warm and dry.   Neurological:      General: No focal deficit present.      Mental Status: He is disoriented and confused.      Sensory: No sensory deficit.      Comments:  oriented to self, , family at bedside   Psychiatric:         Cognition and Memory: Cognition is impaired. Memory is impaired.         Significant  Labs: All pertinent labs within the past 24 hours have been reviewed.    Significant Imaging: I have reviewed all pertinent imaging results/findings within the past 24 hours.   2